# Patient Record
Sex: MALE | Race: WHITE | Employment: OTHER | URBAN - METROPOLITAN AREA
[De-identification: names, ages, dates, MRNs, and addresses within clinical notes are randomized per-mention and may not be internally consistent; named-entity substitution may affect disease eponyms.]

---

## 2019-01-12 ENCOUNTER — HOSPITAL ENCOUNTER (INPATIENT)
Age: 74
LOS: 4 days | Discharge: NURSING, REHAB OR LONG-TERM CARE | DRG: 854 | End: 2019-01-16
Attending: EMERGENCY MEDICINE | Admitting: INTERNAL MEDICINE
Payer: MEDICARE

## 2019-01-12 ENCOUNTER — APPOINTMENT (OUTPATIENT)
Dept: CT IMAGING | Age: 74
DRG: 854 | End: 2019-01-12
Payer: MEDICARE

## 2019-01-12 DIAGNOSIS — D72.829 LEUKOCYTOSIS, UNSPECIFIED TYPE: ICD-10-CM

## 2019-01-12 DIAGNOSIS — R11.2 NAUSEA AND VOMITING, INTRACTABILITY OF VOMITING NOT SPECIFIED, UNSPECIFIED VOMITING TYPE: Primary | ICD-10-CM

## 2019-01-12 DIAGNOSIS — N17.9 AKI (ACUTE KIDNEY INJURY) (HCC): ICD-10-CM

## 2019-01-12 DIAGNOSIS — K42.0 UMBILICAL HERNIA WITH OBSTRUCTION, WITHOUT GANGRENE: ICD-10-CM

## 2019-01-12 DIAGNOSIS — K56.609 INTESTINAL OBSTRUCTION, UNSPECIFIED CAUSE, UNSPECIFIED WHETHER PARTIAL OR COMPLETE (HCC): ICD-10-CM

## 2019-01-12 LAB
A/G RATIO: 1.1 (ref 1.1–2.2)
ALBUMIN SERPL-MCNC: 4.1 G/DL (ref 3.4–5)
ALP BLD-CCNC: 138 U/L (ref 40–129)
ALT SERPL-CCNC: 16 U/L (ref 10–40)
ANION GAP SERPL CALCULATED.3IONS-SCNC: 14 MMOL/L (ref 3–16)
AST SERPL-CCNC: 22 U/L (ref 15–37)
BACTERIA: ABNORMAL /HPF
BASOPHILS ABSOLUTE: 0 K/UL (ref 0–0.2)
BASOPHILS RELATIVE PERCENT: 0.2 %
BILIRUB SERPL-MCNC: 0.6 MG/DL (ref 0–1)
BILIRUBIN URINE: ABNORMAL
BLOOD, URINE: ABNORMAL
BUN BLDV-MCNC: 47 MG/DL (ref 7–20)
CALCIUM SERPL-MCNC: 10 MG/DL (ref 8.3–10.6)
CASTS 2: ABNORMAL /LPF
CASTS: ABNORMAL /LPF
CHLORIDE BLD-SCNC: 94 MMOL/L (ref 99–110)
CLARITY: ABNORMAL
CO2: 31 MMOL/L (ref 21–32)
COLOR: ABNORMAL
CREAT SERPL-MCNC: 2.7 MG/DL (ref 0.8–1.3)
EOSINOPHILS ABSOLUTE: 0 K/UL (ref 0–0.6)
EOSINOPHILS RELATIVE PERCENT: 0.1 %
EPITHELIAL CELLS, UA: 11 /HPF (ref 0–5)
GFR AFRICAN AMERICAN: 28
GFR NON-AFRICAN AMERICAN: 23
GLOBULIN: 3.6 G/DL
GLUCOSE BLD-MCNC: 114 MG/DL (ref 70–99)
GLUCOSE BLD-MCNC: 143 MG/DL (ref 70–99)
GLUCOSE URINE: NEGATIVE MG/DL
HCT VFR BLD CALC: 44.9 % (ref 40.5–52.5)
HEMOGLOBIN: 15.1 G/DL (ref 13.5–17.5)
KETONES, URINE: ABNORMAL MG/DL
LACTIC ACID, SEPSIS: 1.7 MMOL/L (ref 0.4–1.9)
LEUKOCYTE ESTERASE, URINE: ABNORMAL
LIPASE: 13 U/L (ref 13–60)
LYMPHOCYTES ABSOLUTE: 1.7 K/UL (ref 1–5.1)
LYMPHOCYTES RELATIVE PERCENT: 9.1 %
MCH RBC QN AUTO: 33 PG (ref 26–34)
MCHC RBC AUTO-ENTMCNC: 33.6 G/DL (ref 31–36)
MCV RBC AUTO: 98.1 FL (ref 80–100)
MICROSCOPIC EXAMINATION: YES
MONOCYTES ABSOLUTE: 1.7 K/UL (ref 0–1.3)
MONOCYTES RELATIVE PERCENT: 9.1 %
NEUTROPHILS ABSOLUTE: 15 K/UL (ref 1.7–7.7)
NEUTROPHILS RELATIVE PERCENT: 81.5 %
NITRITE, URINE: NEGATIVE
PDW BLD-RTO: 15.2 % (ref 12.4–15.4)
PERFORMED ON: ABNORMAL
PH UA: 5
PLATELET # BLD: 275 K/UL (ref 135–450)
PMV BLD AUTO: 8 FL (ref 5–10.5)
POTASSIUM SERPL-SCNC: 5 MMOL/L (ref 3.5–5.1)
PROTEIN UA: >=300 MG/DL
RBC # BLD: 4.57 M/UL (ref 4.2–5.9)
RBC UA: ABNORMAL /HPF (ref 0–2)
REASON FOR REJECTION: NORMAL
REJECTED TEST: NORMAL
SODIUM BLD-SCNC: 139 MMOL/L (ref 136–145)
SPECIFIC GRAVITY UA: 1.02
TOTAL PROTEIN: 7.7 G/DL (ref 6.4–8.2)
URINE REFLEX TO CULTURE: YES
URINE TYPE: ABNORMAL
UROBILINOGEN, URINE: 1 E.U./DL
WBC # BLD: 18.4 K/UL (ref 4–11)
WBC UA: >900 /HPF (ref 0–5)

## 2019-01-12 PROCEDURE — 2580000003 HC RX 258: Performed by: EMERGENCY MEDICINE

## 2019-01-12 PROCEDURE — 80053 COMPREHEN METABOLIC PANEL: CPT

## 2019-01-12 PROCEDURE — 2580000003 HC RX 258: Performed by: INTERNAL MEDICINE

## 2019-01-12 PROCEDURE — 99222 1ST HOSP IP/OBS MODERATE 55: CPT | Performed by: SURGERY

## 2019-01-12 PROCEDURE — 83605 ASSAY OF LACTIC ACID: CPT

## 2019-01-12 PROCEDURE — 96374 THER/PROPH/DIAG INJ IV PUSH: CPT

## 2019-01-12 PROCEDURE — 96375 TX/PRO/DX INJ NEW DRUG ADDON: CPT

## 2019-01-12 PROCEDURE — 87040 BLOOD CULTURE FOR BACTERIA: CPT

## 2019-01-12 PROCEDURE — 83690 ASSAY OF LIPASE: CPT

## 2019-01-12 PROCEDURE — 99285 EMERGENCY DEPT VISIT HI MDM: CPT

## 2019-01-12 PROCEDURE — 4500000025 HC ED LEVEL 5 PROCEDURE

## 2019-01-12 PROCEDURE — 1200000000 HC SEMI PRIVATE

## 2019-01-12 PROCEDURE — 81001 URINALYSIS AUTO W/SCOPE: CPT

## 2019-01-12 PROCEDURE — 74176 CT ABD & PELVIS W/O CONTRAST: CPT

## 2019-01-12 PROCEDURE — 6370000000 HC RX 637 (ALT 250 FOR IP): Performed by: INTERNAL MEDICINE

## 2019-01-12 PROCEDURE — 6360000002 HC RX W HCPCS: Performed by: EMERGENCY MEDICINE

## 2019-01-12 PROCEDURE — 87086 URINE CULTURE/COLONY COUNT: CPT

## 2019-01-12 PROCEDURE — 85025 COMPLETE CBC W/AUTO DIFF WBC: CPT

## 2019-01-12 RX ORDER — CITALOPRAM 20 MG/1
20 TABLET ORAL DAILY
Status: DISCONTINUED | OUTPATIENT
Start: 2019-01-12 | End: 2019-01-16 | Stop reason: HOSPADM

## 2019-01-12 RX ORDER — TROSPIUM CHLORIDE 20 MG/1
20 TABLET, FILM COATED ORAL
Status: DISCONTINUED | OUTPATIENT
Start: 2019-01-12 | End: 2019-01-16 | Stop reason: HOSPADM

## 2019-01-12 RX ORDER — SODIUM CHLORIDE 9 MG/ML
INJECTION, SOLUTION INTRAVENOUS CONTINUOUS
Status: DISCONTINUED | OUTPATIENT
Start: 2019-01-12 | End: 2019-01-16 | Stop reason: SDUPTHER

## 2019-01-12 RX ORDER — ONDANSETRON 2 MG/ML
4 INJECTION INTRAMUSCULAR; INTRAVENOUS
Status: COMPLETED | OUTPATIENT
Start: 2019-01-12 | End: 2019-01-12

## 2019-01-12 RX ORDER — LISINOPRIL 20 MG/1
20 TABLET ORAL DAILY
Status: DISCONTINUED | OUTPATIENT
Start: 2019-01-12 | End: 2019-01-16 | Stop reason: HOSPADM

## 2019-01-12 RX ORDER — LANOLIN ALCOHOL/MO/W.PET/CERES
1000 CREAM (GRAM) TOPICAL DAILY
Status: DISCONTINUED | OUTPATIENT
Start: 2019-01-12 | End: 2019-01-16 | Stop reason: HOSPADM

## 2019-01-12 RX ORDER — TROSPIUM CHLORIDE 20 MG/1
20 TABLET, FILM COATED ORAL 2 TIMES DAILY
COMMUNITY

## 2019-01-12 RX ORDER — GABAPENTIN 100 MG/1
200 CAPSULE ORAL 3 TIMES DAILY
Status: DISCONTINUED | OUTPATIENT
Start: 2019-01-12 | End: 2019-01-16 | Stop reason: HOSPADM

## 2019-01-12 RX ORDER — ACETAMINOPHEN 325 MG/1
650 TABLET ORAL EVERY 4 HOURS PRN
Status: DISCONTINUED | OUTPATIENT
Start: 2019-01-12 | End: 2019-01-16 | Stop reason: SDUPTHER

## 2019-01-12 RX ORDER — ONDANSETRON 2 MG/ML
4 INJECTION INTRAMUSCULAR; INTRAVENOUS EVERY 6 HOURS PRN
Status: DISCONTINUED | OUTPATIENT
Start: 2019-01-12 | End: 2019-01-16 | Stop reason: SDUPTHER

## 2019-01-12 RX ORDER — POLYETHYLENE GLYCOL 3350 17 G/17G
17 POWDER, FOR SOLUTION ORAL DAILY
Status: DISCONTINUED | OUTPATIENT
Start: 2019-01-12 | End: 2019-01-16 | Stop reason: SDUPTHER

## 2019-01-12 RX ORDER — LANOLIN ALCOHOL/MO/W.PET/CERES
3 CREAM (GRAM) TOPICAL NIGHTLY
Status: DISCONTINUED | OUTPATIENT
Start: 2019-01-12 | End: 2019-01-16 | Stop reason: HOSPADM

## 2019-01-12 RX ORDER — NICOTINE POLACRILEX 4 MG
15 LOZENGE BUCCAL PRN
Status: DISCONTINUED | OUTPATIENT
Start: 2019-01-12 | End: 2019-01-16 | Stop reason: HOSPADM

## 2019-01-12 RX ORDER — FOLIC ACID 1 MG/1
1 TABLET ORAL DAILY
Status: DISCONTINUED | OUTPATIENT
Start: 2019-01-12 | End: 2019-01-16 | Stop reason: HOSPADM

## 2019-01-12 RX ORDER — POLYETHYLENE GLYCOL 3350 17 G/17G
17 POWDER ORAL DAILY
Status: DISCONTINUED | OUTPATIENT
Start: 2019-01-12 | End: 2019-01-12 | Stop reason: SDUPTHER

## 2019-01-12 RX ORDER — VANCOMYCIN HYDROCHLORIDE 1 G/200ML
1000 INJECTION, SOLUTION INTRAVENOUS ONCE
Status: COMPLETED | OUTPATIENT
Start: 2019-01-12 | End: 2019-01-12

## 2019-01-12 RX ORDER — ASPIRIN 81 MG/1
81 TABLET, CHEWABLE ORAL DAILY
Status: DISCONTINUED | OUTPATIENT
Start: 2019-01-12 | End: 2019-01-16 | Stop reason: HOSPADM

## 2019-01-12 RX ORDER — 0.9 % SODIUM CHLORIDE 0.9 %
1000 INTRAVENOUS SOLUTION INTRAVENOUS ONCE
Status: COMPLETED | OUTPATIENT
Start: 2019-01-12 | End: 2019-01-12

## 2019-01-12 RX ORDER — PANTOPRAZOLE SODIUM 40 MG/1
40 TABLET, DELAYED RELEASE ORAL
Status: DISCONTINUED | OUTPATIENT
Start: 2019-01-13 | End: 2019-01-16 | Stop reason: HOSPADM

## 2019-01-12 RX ORDER — IPRATROPIUM BROMIDE AND ALBUTEROL SULFATE 2.5; .5 MG/3ML; MG/3ML
3 SOLUTION RESPIRATORY (INHALATION) EVERY 4 HOURS PRN
Status: DISCONTINUED | OUTPATIENT
Start: 2019-01-12 | End: 2019-01-16 | Stop reason: HOSPADM

## 2019-01-12 RX ORDER — CETIRIZINE HYDROCHLORIDE 10 MG/1
5 TABLET ORAL DAILY
Status: DISCONTINUED | OUTPATIENT
Start: 2019-01-12 | End: 2019-01-16 | Stop reason: HOSPADM

## 2019-01-12 RX ORDER — GUAIFENESIN 600 MG/1
600 TABLET, EXTENDED RELEASE ORAL 2 TIMES DAILY
Status: DISCONTINUED | OUTPATIENT
Start: 2019-01-12 | End: 2019-01-16 | Stop reason: HOSPADM

## 2019-01-12 RX ORDER — BENZONATATE 100 MG/1
100 CAPSULE ORAL 3 TIMES DAILY PRN
Status: DISCONTINUED | OUTPATIENT
Start: 2019-01-12 | End: 2019-01-16 | Stop reason: HOSPADM

## 2019-01-12 RX ORDER — LACTOBACILLUS RHAMNOSUS GG 10B CELL
1 CAPSULE ORAL 2 TIMES DAILY WITH MEALS
Status: DISCONTINUED | OUTPATIENT
Start: 2019-01-12 | End: 2019-01-16 | Stop reason: HOSPADM

## 2019-01-12 RX ORDER — CITALOPRAM 20 MG/1
20 TABLET ORAL DAILY
COMMUNITY

## 2019-01-12 RX ORDER — DEXTROSE MONOHYDRATE 25 G/50ML
12.5 INJECTION, SOLUTION INTRAVENOUS PRN
Status: DISCONTINUED | OUTPATIENT
Start: 2019-01-12 | End: 2019-01-16 | Stop reason: HOSPADM

## 2019-01-12 RX ORDER — SENNA AND DOCUSATE SODIUM 50; 8.6 MG/1; MG/1
2 TABLET, FILM COATED ORAL NIGHTLY
Status: DISCONTINUED | OUTPATIENT
Start: 2019-01-12 | End: 2019-01-16 | Stop reason: HOSPADM

## 2019-01-12 RX ORDER — MIDAZOLAM HYDROCHLORIDE 1 MG/ML
2 INJECTION INTRAMUSCULAR; INTRAVENOUS ONCE
Status: COMPLETED | OUTPATIENT
Start: 2019-01-12 | End: 2019-01-12

## 2019-01-12 RX ORDER — CARBAMAZEPINE 200 MG/1
200 TABLET ORAL 3 TIMES DAILY
Status: DISCONTINUED | OUTPATIENT
Start: 2019-01-12 | End: 2019-01-16 | Stop reason: HOSPADM

## 2019-01-12 RX ORDER — BENZONATATE 100 MG/1
100 CAPSULE ORAL 3 TIMES DAILY PRN
Status: ON HOLD | COMMUNITY
End: 2020-01-01 | Stop reason: ALTCHOICE

## 2019-01-12 RX ORDER — FENTANYL CITRATE 50 UG/ML
75 INJECTION, SOLUTION INTRAMUSCULAR; INTRAVENOUS ONCE
Status: COMPLETED | OUTPATIENT
Start: 2019-01-12 | End: 2019-01-12

## 2019-01-12 RX ORDER — YOHIMBE BARK 500 MG
CAPSULE ORAL 2 TIMES DAILY
COMMUNITY

## 2019-01-12 RX ORDER — LAMOTRIGINE 100 MG/1
100 TABLET ORAL 3 TIMES DAILY
Status: DISCONTINUED | OUTPATIENT
Start: 2019-01-12 | End: 2019-01-16 | Stop reason: HOSPADM

## 2019-01-12 RX ORDER — DEXTROSE MONOHYDRATE 50 MG/ML
100 INJECTION, SOLUTION INTRAVENOUS PRN
Status: DISCONTINUED | OUTPATIENT
Start: 2019-01-12 | End: 2019-01-16 | Stop reason: HOSPADM

## 2019-01-12 RX ORDER — YOHIMBE BARK 500 MG
1 CAPSULE ORAL 2 TIMES DAILY
Status: DISCONTINUED | OUTPATIENT
Start: 2019-01-12 | End: 2019-01-12 | Stop reason: CLARIF

## 2019-01-12 RX ADMIN — CETIRIZINE HYDROCHLORIDE 5 MG: 10 TABLET ORAL at 17:39

## 2019-01-12 RX ADMIN — FOLIC ACID 1 MG: 1 TABLET ORAL at 17:39

## 2019-01-12 RX ADMIN — TAZOBACTAM SODIUM AND PIPERACILLIN SODIUM 3.38 G: 375; 3 INJECTION, SOLUTION INTRAVENOUS at 11:49

## 2019-01-12 RX ADMIN — FENTANYL CITRATE 75 MCG: 50 INJECTION, SOLUTION INTRAMUSCULAR; INTRAVENOUS at 12:44

## 2019-01-12 RX ADMIN — LAMOTRIGINE 100 MG: 100 TABLET ORAL at 17:40

## 2019-01-12 RX ADMIN — MELATONIN TAB 3 MG 3 MG: 3 TAB at 20:50

## 2019-01-12 RX ADMIN — CYANOCOBALAMIN TAB 1000 MCG 1000 MCG: 1000 TAB at 17:40

## 2019-01-12 RX ADMIN — GABAPENTIN 200 MG: 100 CAPSULE ORAL at 17:40

## 2019-01-12 RX ADMIN — GUAIFENESIN 600 MG: 600 TABLET, EXTENDED RELEASE ORAL at 20:50

## 2019-01-12 RX ADMIN — VANCOMYCIN HYDROCHLORIDE 1000 MG: 1 INJECTION, SOLUTION INTRAVENOUS at 12:48

## 2019-01-12 RX ADMIN — GABAPENTIN 200 MG: 100 CAPSULE ORAL at 20:49

## 2019-01-12 RX ADMIN — TROSPIUM CHLORIDE 20 MG: 20 TABLET ORAL at 17:40

## 2019-01-12 RX ADMIN — INSULIN GLARGINE 5 UNITS: 100 INJECTION, SOLUTION SUBCUTANEOUS at 21:40

## 2019-01-12 RX ADMIN — VITAMIN D, TAB 1000IU (100/BT) 5000 UNITS: 25 TAB at 17:39

## 2019-01-12 RX ADMIN — SODIUM CHLORIDE: 9 INJECTION, SOLUTION INTRAVENOUS at 17:37

## 2019-01-12 RX ADMIN — LAMOTRIGINE 100 MG: 100 TABLET ORAL at 20:50

## 2019-01-12 RX ADMIN — GUAIFENESIN 600 MG: 600 TABLET, EXTENDED RELEASE ORAL at 17:40

## 2019-01-12 RX ADMIN — SENNOSIDES AND DOCUSATE SODIUM 2 TABLET: 8.6; 5 TABLET ORAL at 20:49

## 2019-01-12 RX ADMIN — CITALOPRAM HYDROBROMIDE 20 MG: 20 TABLET ORAL at 17:41

## 2019-01-12 RX ADMIN — CARBAMAZEPINE 200 MG: 200 TABLET ORAL at 20:50

## 2019-01-12 RX ADMIN — MIDAZOLAM HYDROCHLORIDE 2 MG: 2 INJECTION, SOLUTION INTRAMUSCULAR; INTRAVENOUS at 12:40

## 2019-01-12 RX ADMIN — Medication 1 CAPSULE: at 17:42

## 2019-01-12 RX ADMIN — ONDANSETRON 4 MG: 2 INJECTION INTRAMUSCULAR; INTRAVENOUS at 11:49

## 2019-01-12 RX ADMIN — LISINOPRIL 20 MG: 20 TABLET ORAL at 17:40

## 2019-01-12 RX ADMIN — ASPIRIN 81 MG 81 MG: 81 TABLET ORAL at 17:40

## 2019-01-12 RX ADMIN — CARBAMAZEPINE 200 MG: 200 TABLET ORAL at 17:46

## 2019-01-12 RX ADMIN — SODIUM CHLORIDE 1000 ML: 9 INJECTION, SOLUTION INTRAVENOUS at 11:47

## 2019-01-12 ASSESSMENT — ENCOUNTER SYMPTOMS
CHEST TIGHTNESS: 0
DIARRHEA: 0
ABDOMINAL DISTENTION: 1
VOMITING: 1
ABDOMINAL PAIN: 1
SHORTNESS OF BREATH: 0
NAUSEA: 1

## 2019-01-12 ASSESSMENT — PAIN SCALES - GENERAL
PAINLEVEL_OUTOF10: 9
PAINLEVEL_OUTOF10: 0

## 2019-01-13 LAB
A/G RATIO: 1.1 (ref 1.1–2.2)
ALBUMIN SERPL-MCNC: 3.6 G/DL (ref 3.4–5)
ALP BLD-CCNC: 118 U/L (ref 40–129)
ALT SERPL-CCNC: 16 U/L (ref 10–40)
ANION GAP SERPL CALCULATED.3IONS-SCNC: 9 MMOL/L (ref 3–16)
APTT: 30.3 SEC (ref 26–36)
AST SERPL-CCNC: 23 U/L (ref 15–37)
BASOPHILS ABSOLUTE: 0.1 K/UL (ref 0–0.2)
BASOPHILS RELATIVE PERCENT: 0.5 %
BILIRUB SERPL-MCNC: 0.6 MG/DL (ref 0–1)
BUN BLDV-MCNC: 59 MG/DL (ref 7–20)
CALCIUM SERPL-MCNC: 8.9 MG/DL (ref 8.3–10.6)
CHLORIDE BLD-SCNC: 99 MMOL/L (ref 99–110)
CO2: 29 MMOL/L (ref 21–32)
CREAT SERPL-MCNC: 2.2 MG/DL (ref 0.8–1.3)
EOSINOPHILS ABSOLUTE: 0.2 K/UL (ref 0–0.6)
EOSINOPHILS RELATIVE PERCENT: 1.6 %
GFR AFRICAN AMERICAN: 35
GFR NON-AFRICAN AMERICAN: 29
GLOBULIN: 3.3 G/DL
GLUCOSE BLD-MCNC: 120 MG/DL (ref 70–99)
GLUCOSE BLD-MCNC: 97 MG/DL (ref 70–99)
HCT VFR BLD CALC: 37 % (ref 40.5–52.5)
HEMOGLOBIN: 12.3 G/DL (ref 13.5–17.5)
INR BLD: 1.05 (ref 0.86–1.14)
LACTIC ACID: 0.9 MMOL/L (ref 0.4–2)
LYMPHOCYTES ABSOLUTE: 1.7 K/UL (ref 1–5.1)
LYMPHOCYTES RELATIVE PERCENT: 16 %
MAGNESIUM: 2.2 MG/DL (ref 1.8–2.4)
MCH RBC QN AUTO: 33.2 PG (ref 26–34)
MCHC RBC AUTO-ENTMCNC: 33.3 G/DL (ref 31–36)
MCV RBC AUTO: 99.8 FL (ref 80–100)
MONOCYTES ABSOLUTE: 1.1 K/UL (ref 0–1.3)
MONOCYTES RELATIVE PERCENT: 10.2 %
NEUTROPHILS ABSOLUTE: 7.8 K/UL (ref 1.7–7.7)
NEUTROPHILS RELATIVE PERCENT: 71.7 %
PDW BLD-RTO: 15.3 % (ref 12.4–15.4)
PERFORMED ON: ABNORMAL
PHOSPHORUS: 4.9 MG/DL (ref 2.5–4.9)
PLATELET # BLD: 205 K/UL (ref 135–450)
PMV BLD AUTO: 7.5 FL (ref 5–10.5)
POTASSIUM SERPL-SCNC: 4.5 MMOL/L (ref 3.5–5.1)
PREALBUMIN: 23.2 MG/DL (ref 20–40)
PROTHROMBIN TIME: 12 SEC (ref 9.8–13)
RBC # BLD: 3.71 M/UL (ref 4.2–5.9)
SODIUM BLD-SCNC: 137 MMOL/L (ref 136–145)
TOTAL PROTEIN: 6.9 G/DL (ref 6.4–8.2)
TRANSFERRIN: 216 MG/DL (ref 200–360)
URINE CULTURE, ROUTINE: NORMAL
WBC # BLD: 10.8 K/UL (ref 4–11)

## 2019-01-13 PROCEDURE — 2500000003 HC RX 250 WO HCPCS: Performed by: INTERNAL MEDICINE

## 2019-01-13 PROCEDURE — 2580000003 HC RX 258: Performed by: INTERNAL MEDICINE

## 2019-01-13 PROCEDURE — 6360000002 HC RX W HCPCS: Performed by: INTERNAL MEDICINE

## 2019-01-13 PROCEDURE — 99232 SBSQ HOSP IP/OBS MODERATE 35: CPT | Performed by: SURGERY

## 2019-01-13 PROCEDURE — 85610 PROTHROMBIN TIME: CPT

## 2019-01-13 PROCEDURE — 83605 ASSAY OF LACTIC ACID: CPT

## 2019-01-13 PROCEDURE — G0009 ADMIN PNEUMOCOCCAL VACCINE: HCPCS | Performed by: INTERNAL MEDICINE

## 2019-01-13 PROCEDURE — 51705 CHANGE OF BLADDER TUBE: CPT

## 2019-01-13 PROCEDURE — 84100 ASSAY OF PHOSPHORUS: CPT

## 2019-01-13 PROCEDURE — 1200000000 HC SEMI PRIVATE

## 2019-01-13 PROCEDURE — 85730 THROMBOPLASTIN TIME PARTIAL: CPT

## 2019-01-13 PROCEDURE — 84466 ASSAY OF TRANSFERRIN: CPT

## 2019-01-13 PROCEDURE — 80053 COMPREHEN METABOLIC PANEL: CPT

## 2019-01-13 PROCEDURE — 83735 ASSAY OF MAGNESIUM: CPT

## 2019-01-13 PROCEDURE — 84134 ASSAY OF PREALBUMIN: CPT

## 2019-01-13 PROCEDURE — APPNB30 APP NON BILLABLE TIME 0-30 MINS: Performed by: NURSE PRACTITIONER

## 2019-01-13 PROCEDURE — 90670 PCV13 VACCINE IM: CPT | Performed by: INTERNAL MEDICINE

## 2019-01-13 PROCEDURE — APPSS15 APP SPLIT SHARED TIME 0-15 MINUTES: Performed by: NURSE PRACTITIONER

## 2019-01-13 PROCEDURE — 85025 COMPLETE CBC W/AUTO DIFF WBC: CPT

## 2019-01-13 PROCEDURE — 36415 COLL VENOUS BLD VENIPUNCTURE: CPT

## 2019-01-13 PROCEDURE — 0T2BX0Z CHANGE DRAINAGE DEVICE IN BLADDER, EXTERNAL APPROACH: ICD-10-PCS | Performed by: UROLOGY

## 2019-01-13 PROCEDURE — 6370000000 HC RX 637 (ALT 250 FOR IP): Performed by: INTERNAL MEDICINE

## 2019-01-13 RX ADMIN — CARBAMAZEPINE 200 MG: 200 TABLET ORAL at 23:40

## 2019-01-13 RX ADMIN — GABAPENTIN 200 MG: 100 CAPSULE ORAL at 23:41

## 2019-01-13 RX ADMIN — TAZOBACTAM SODIUM AND PIPERACILLIN SODIUM 3.38 G: 375; 3 INJECTION, SOLUTION INTRAVENOUS at 23:41

## 2019-01-13 RX ADMIN — CITALOPRAM HYDROBROMIDE 20 MG: 20 TABLET ORAL at 09:34

## 2019-01-13 RX ADMIN — SODIUM CHLORIDE: 9 INJECTION, SOLUTION INTRAVENOUS at 09:55

## 2019-01-13 RX ADMIN — TROSPIUM CHLORIDE 20 MG: 20 TABLET ORAL at 17:20

## 2019-01-13 RX ADMIN — PANTOPRAZOLE SODIUM 40 MG: 40 TABLET, DELAYED RELEASE ORAL at 06:33

## 2019-01-13 RX ADMIN — TROSPIUM CHLORIDE 20 MG: 20 TABLET ORAL at 06:33

## 2019-01-13 RX ADMIN — LAMOTRIGINE 100 MG: 100 TABLET ORAL at 14:44

## 2019-01-13 RX ADMIN — GABAPENTIN 200 MG: 100 CAPSULE ORAL at 14:44

## 2019-01-13 RX ADMIN — CARBAMAZEPINE 200 MG: 200 TABLET ORAL at 09:53

## 2019-01-13 RX ADMIN — GUAIFENESIN 600 MG: 600 TABLET, EXTENDED RELEASE ORAL at 23:41

## 2019-01-13 RX ADMIN — TAZOBACTAM SODIUM AND PIPERACILLIN SODIUM 3.38 G: 375; 3 INJECTION, SOLUTION INTRAVENOUS at 12:54

## 2019-01-13 RX ADMIN — LAMOTRIGINE 100 MG: 100 TABLET ORAL at 23:40

## 2019-01-13 RX ADMIN — CARBAMAZEPINE 200 MG: 200 TABLET ORAL at 14:44

## 2019-01-13 RX ADMIN — ENOXAPARIN SODIUM 30 MG: 30 INJECTION SUBCUTANEOUS at 09:32

## 2019-01-13 RX ADMIN — LISINOPRIL 20 MG: 20 TABLET ORAL at 09:34

## 2019-01-13 RX ADMIN — GABAPENTIN 200 MG: 100 CAPSULE ORAL at 09:34

## 2019-01-13 RX ADMIN — SENNOSIDES AND DOCUSATE SODIUM 2 TABLET: 8.6; 5 TABLET ORAL at 23:41

## 2019-01-13 RX ADMIN — GUAIFENESIN 600 MG: 600 TABLET, EXTENDED RELEASE ORAL at 09:34

## 2019-01-13 RX ADMIN — LAMOTRIGINE 100 MG: 100 TABLET ORAL at 09:34

## 2019-01-13 RX ADMIN — CETIRIZINE HYDROCHLORIDE 5 MG: 10 TABLET ORAL at 09:34

## 2019-01-13 RX ADMIN — TAZOBACTAM SODIUM AND PIPERACILLIN SODIUM 3.38 G: 375; 3 INJECTION, SOLUTION INTRAVENOUS at 00:01

## 2019-01-13 RX ADMIN — INSULIN GLARGINE 5 UNITS: 100 INJECTION, SOLUTION SUBCUTANEOUS at 23:44

## 2019-01-13 RX ADMIN — MELATONIN TAB 3 MG 3 MG: 3 TAB at 23:41

## 2019-01-13 RX ADMIN — FOLIC ACID 1 MG: 1 TABLET ORAL at 09:34

## 2019-01-13 RX ADMIN — SODIUM CHLORIDE: 9 INJECTION, SOLUTION INTRAVENOUS at 23:41

## 2019-01-13 RX ADMIN — ASPIRIN 81 MG 81 MG: 81 TABLET ORAL at 09:34

## 2019-01-13 RX ADMIN — PNEUMOCOCCAL 13-VALENT CONJUGATE VACCINE 0.5 ML: 2.2; 2.2; 2.2; 2.2; 2.2; 4.4; 2.2; 2.2; 2.2; 2.2; 2.2; 2.2; 2.2 INJECTION, SUSPENSION INTRAMUSCULAR at 09:32

## 2019-01-13 RX ADMIN — Medication 1 CAPSULE: at 17:20

## 2019-01-13 ASSESSMENT — PAIN SCALES - GENERAL
PAINLEVEL_OUTOF10: 0

## 2019-01-14 ENCOUNTER — ANESTHESIA EVENT (OUTPATIENT)
Dept: OPERATING ROOM | Age: 74
DRG: 854 | End: 2019-01-14
Payer: MEDICARE

## 2019-01-14 LAB
ANION GAP SERPL CALCULATED.3IONS-SCNC: 8 MMOL/L (ref 3–16)
BASOPHILS ABSOLUTE: 0 K/UL (ref 0–0.2)
BASOPHILS RELATIVE PERCENT: 0.5 %
BUN BLDV-MCNC: 37 MG/DL (ref 7–20)
CALCIUM SERPL-MCNC: 8.8 MG/DL (ref 8.3–10.6)
CHLORIDE BLD-SCNC: 103 MMOL/L (ref 99–110)
CO2: 27 MMOL/L (ref 21–32)
CREAT SERPL-MCNC: 1.1 MG/DL (ref 0.8–1.3)
EKG ATRIAL RATE: 73 BPM
EKG DIAGNOSIS: NORMAL
EKG P AXIS: 2 DEGREES
EKG P-R INTERVAL: 136 MS
EKG Q-T INTERVAL: 390 MS
EKG QRS DURATION: 112 MS
EKG QTC CALCULATION (BAZETT): 429 MS
EKG R AXIS: 68 DEGREES
EKG T AXIS: 49 DEGREES
EKG VENTRICULAR RATE: 73 BPM
EOSINOPHILS ABSOLUTE: 0.3 K/UL (ref 0–0.6)
EOSINOPHILS RELATIVE PERCENT: 2.8 %
GFR AFRICAN AMERICAN: >60
GFR NON-AFRICAN AMERICAN: >60
GLUCOSE BLD-MCNC: 102 MG/DL (ref 70–99)
GLUCOSE BLD-MCNC: 107 MG/DL (ref 70–99)
GLUCOSE BLD-MCNC: 109 MG/DL (ref 70–99)
GLUCOSE BLD-MCNC: 97 MG/DL (ref 70–99)
GLUCOSE BLD-MCNC: 99 MG/DL (ref 70–99)
HCT VFR BLD CALC: 32.9 % (ref 40.5–52.5)
HEMOGLOBIN: 11.2 G/DL (ref 13.5–17.5)
LACTIC ACID: 0.6 MMOL/L (ref 0.4–2)
LYMPHOCYTES ABSOLUTE: 1.2 K/UL (ref 1–5.1)
LYMPHOCYTES RELATIVE PERCENT: 13.6 %
MCH RBC QN AUTO: 34.3 PG (ref 26–34)
MCHC RBC AUTO-ENTMCNC: 34.2 G/DL (ref 31–36)
MCV RBC AUTO: 100.4 FL (ref 80–100)
MONOCYTES ABSOLUTE: 1 K/UL (ref 0–1.3)
MONOCYTES RELATIVE PERCENT: 11.4 %
NEUTROPHILS ABSOLUTE: 6.4 K/UL (ref 1.7–7.7)
NEUTROPHILS RELATIVE PERCENT: 71.7 %
PDW BLD-RTO: 15.5 % (ref 12.4–15.4)
PERFORMED ON: ABNORMAL
PERFORMED ON: ABNORMAL
PERFORMED ON: NORMAL
PERFORMED ON: NORMAL
PLATELET # BLD: 182 K/UL (ref 135–450)
PMV BLD AUTO: 7.5 FL (ref 5–10.5)
POTASSIUM SERPL-SCNC: 4.3 MMOL/L (ref 3.5–5.1)
RBC # BLD: 3.28 M/UL (ref 4.2–5.9)
SODIUM BLD-SCNC: 138 MMOL/L (ref 136–145)
WBC # BLD: 8.9 K/UL (ref 4–11)

## 2019-01-14 PROCEDURE — 6360000002 HC RX W HCPCS: Performed by: INTERNAL MEDICINE

## 2019-01-14 PROCEDURE — 85025 COMPLETE CBC W/AUTO DIFF WBC: CPT

## 2019-01-14 PROCEDURE — 99232 SBSQ HOSP IP/OBS MODERATE 35: CPT | Performed by: SURGERY

## 2019-01-14 PROCEDURE — 93010 ELECTROCARDIOGRAM REPORT: CPT | Performed by: INTERNAL MEDICINE

## 2019-01-14 PROCEDURE — 1200000000 HC SEMI PRIVATE

## 2019-01-14 PROCEDURE — APPNB30 APP NON BILLABLE TIME 0-30 MINS: Performed by: NURSE PRACTITIONER

## 2019-01-14 PROCEDURE — 2500000003 HC RX 250 WO HCPCS: Performed by: INTERNAL MEDICINE

## 2019-01-14 PROCEDURE — 80048 BASIC METABOLIC PNL TOTAL CA: CPT

## 2019-01-14 PROCEDURE — 6370000000 HC RX 637 (ALT 250 FOR IP): Performed by: INTERNAL MEDICINE

## 2019-01-14 PROCEDURE — 93005 ELECTROCARDIOGRAM TRACING: CPT | Performed by: ANESTHESIOLOGY

## 2019-01-14 PROCEDURE — APPSS15 APP SPLIT SHARED TIME 0-15 MINUTES: Performed by: NURSE PRACTITIONER

## 2019-01-14 PROCEDURE — 36415 COLL VENOUS BLD VENIPUNCTURE: CPT

## 2019-01-14 PROCEDURE — 83605 ASSAY OF LACTIC ACID: CPT

## 2019-01-14 PROCEDURE — 2580000003 HC RX 258: Performed by: INTERNAL MEDICINE

## 2019-01-14 RX ADMIN — SENNOSIDES AND DOCUSATE SODIUM 2 TABLET: 8.6; 5 TABLET ORAL at 21:45

## 2019-01-14 RX ADMIN — ASPIRIN 81 MG 81 MG: 81 TABLET ORAL at 11:14

## 2019-01-14 RX ADMIN — GABAPENTIN 200 MG: 100 CAPSULE ORAL at 11:12

## 2019-01-14 RX ADMIN — TROSPIUM CHLORIDE 20 MG: 20 TABLET ORAL at 21:44

## 2019-01-14 RX ADMIN — LAMOTRIGINE 100 MG: 100 TABLET ORAL at 15:14

## 2019-01-14 RX ADMIN — CETIRIZINE HYDROCHLORIDE 5 MG: 10 TABLET ORAL at 11:12

## 2019-01-14 RX ADMIN — GABAPENTIN 200 MG: 100 CAPSULE ORAL at 15:14

## 2019-01-14 RX ADMIN — VITAMIN D, TAB 1000IU (100/BT) 5000 UNITS: 25 TAB at 11:11

## 2019-01-14 RX ADMIN — CARBAMAZEPINE 200 MG: 200 TABLET ORAL at 21:46

## 2019-01-14 RX ADMIN — Medication 1 CAPSULE: at 21:44

## 2019-01-14 RX ADMIN — GABAPENTIN 200 MG: 100 CAPSULE ORAL at 21:45

## 2019-01-14 RX ADMIN — LISINOPRIL 20 MG: 20 TABLET ORAL at 11:13

## 2019-01-14 RX ADMIN — LAMOTRIGINE 100 MG: 100 TABLET ORAL at 21:44

## 2019-01-14 RX ADMIN — POLYETHYLENE GLYCOL 3350 17 G: 17 POWDER, FOR SOLUTION ORAL at 11:25

## 2019-01-14 RX ADMIN — CYANOCOBALAMIN TAB 1000 MCG 1000 MCG: 1000 TAB at 11:14

## 2019-01-14 RX ADMIN — MELATONIN TAB 3 MG 3 MG: 3 TAB at 21:45

## 2019-01-14 RX ADMIN — CITALOPRAM HYDROBROMIDE 20 MG: 20 TABLET ORAL at 11:24

## 2019-01-14 RX ADMIN — FOLIC ACID 1 MG: 1 TABLET ORAL at 11:12

## 2019-01-14 RX ADMIN — INSULIN GLARGINE 5 UNITS: 100 INJECTION, SOLUTION SUBCUTANEOUS at 21:50

## 2019-01-14 RX ADMIN — CARBAMAZEPINE 200 MG: 200 TABLET ORAL at 15:13

## 2019-01-14 RX ADMIN — TAZOBACTAM SODIUM AND PIPERACILLIN SODIUM 3.38 G: 375; 3 INJECTION, SOLUTION INTRAVENOUS at 11:11

## 2019-01-14 RX ADMIN — ENOXAPARIN SODIUM 30 MG: 30 INJECTION SUBCUTANEOUS at 11:15

## 2019-01-14 RX ADMIN — TROSPIUM CHLORIDE 20 MG: 20 TABLET ORAL at 08:13

## 2019-01-14 RX ADMIN — GUAIFENESIN 600 MG: 600 TABLET, EXTENDED RELEASE ORAL at 11:24

## 2019-01-14 RX ADMIN — CARBAMAZEPINE 200 MG: 200 TABLET ORAL at 11:14

## 2019-01-14 RX ADMIN — SODIUM CHLORIDE: 9 INJECTION, SOLUTION INTRAVENOUS at 18:52

## 2019-01-14 RX ADMIN — PANTOPRAZOLE SODIUM 40 MG: 40 TABLET, DELAYED RELEASE ORAL at 08:13

## 2019-01-14 RX ADMIN — LAMOTRIGINE 100 MG: 100 TABLET ORAL at 11:13

## 2019-01-14 RX ADMIN — Medication 1 CAPSULE: at 11:13

## 2019-01-14 RX ADMIN — GUAIFENESIN 600 MG: 600 TABLET, EXTENDED RELEASE ORAL at 21:45

## 2019-01-14 ASSESSMENT — PAIN SCALES - GENERAL: PAINLEVEL_OUTOF10: 0

## 2019-01-15 ENCOUNTER — ANESTHESIA (OUTPATIENT)
Dept: OPERATING ROOM | Age: 74
DRG: 854 | End: 2019-01-15
Payer: MEDICARE

## 2019-01-15 VITALS
SYSTOLIC BLOOD PRESSURE: 110 MMHG | TEMPERATURE: 97 F | DIASTOLIC BLOOD PRESSURE: 58 MMHG | OXYGEN SATURATION: 100 % | RESPIRATION RATE: 5 BRPM

## 2019-01-15 LAB
ANION GAP SERPL CALCULATED.3IONS-SCNC: 6 MMOL/L (ref 3–16)
BASOPHILS ABSOLUTE: 0 K/UL (ref 0–0.2)
BASOPHILS RELATIVE PERCENT: 0.2 %
BUN BLDV-MCNC: 19 MG/DL (ref 7–20)
CALCIUM SERPL-MCNC: 8.5 MG/DL (ref 8.3–10.6)
CHLORIDE BLD-SCNC: 103 MMOL/L (ref 99–110)
CO2: 28 MMOL/L (ref 21–32)
CREAT SERPL-MCNC: 0.8 MG/DL (ref 0.8–1.3)
EOSINOPHILS ABSOLUTE: 0.1 K/UL (ref 0–0.6)
EOSINOPHILS RELATIVE PERCENT: 2.2 %
GFR AFRICAN AMERICAN: >60
GFR NON-AFRICAN AMERICAN: >60
GLUCOSE BLD-MCNC: 105 MG/DL (ref 70–99)
GLUCOSE BLD-MCNC: 108 MG/DL (ref 70–99)
GLUCOSE BLD-MCNC: 249 MG/DL (ref 70–99)
GLUCOSE BLD-MCNC: 97 MG/DL (ref 70–99)
HCT VFR BLD CALC: 32.8 % (ref 40.5–52.5)
HEMOGLOBIN: 11.1 G/DL (ref 13.5–17.5)
LYMPHOCYTES ABSOLUTE: 1.3 K/UL (ref 1–5.1)
LYMPHOCYTES RELATIVE PERCENT: 19.8 %
MCH RBC QN AUTO: 33.3 PG (ref 26–34)
MCHC RBC AUTO-ENTMCNC: 33.9 G/DL (ref 31–36)
MCV RBC AUTO: 98.5 FL (ref 80–100)
MONOCYTES ABSOLUTE: 0.9 K/UL (ref 0–1.3)
MONOCYTES RELATIVE PERCENT: 12.7 %
NEUTROPHILS ABSOLUTE: 4.4 K/UL (ref 1.7–7.7)
NEUTROPHILS RELATIVE PERCENT: 65.1 %
PDW BLD-RTO: 15.3 % (ref 12.4–15.4)
PERFORMED ON: ABNORMAL
PERFORMED ON: ABNORMAL
PERFORMED ON: NORMAL
PLATELET # BLD: 177 K/UL (ref 135–450)
PMV BLD AUTO: 7.4 FL (ref 5–10.5)
POTASSIUM SERPL-SCNC: 4.1 MMOL/L (ref 3.5–5.1)
RBC # BLD: 3.33 M/UL (ref 4.2–5.9)
SODIUM BLD-SCNC: 137 MMOL/L (ref 136–145)
WBC # BLD: 6.7 K/UL (ref 4–11)

## 2019-01-15 PROCEDURE — 2580000003 HC RX 258: Performed by: NURSE ANESTHETIST, CERTIFIED REGISTERED

## 2019-01-15 PROCEDURE — 2500000003 HC RX 250 WO HCPCS: Performed by: NURSE ANESTHETIST, CERTIFIED REGISTERED

## 2019-01-15 PROCEDURE — 3700000001 HC ADD 15 MINUTES (ANESTHESIA): Performed by: SURGERY

## 2019-01-15 PROCEDURE — 2500000003 HC RX 250 WO HCPCS: Performed by: INTERNAL MEDICINE

## 2019-01-15 PROCEDURE — 7100000000 HC PACU RECOVERY - FIRST 15 MIN: Performed by: SURGERY

## 2019-01-15 PROCEDURE — 36415 COLL VENOUS BLD VENIPUNCTURE: CPT

## 2019-01-15 PROCEDURE — 0WQF0ZZ REPAIR ABDOMINAL WALL, OPEN APPROACH: ICD-10-PCS | Performed by: SURGERY

## 2019-01-15 PROCEDURE — 2709999900 HC NON-CHARGEABLE SUPPLY: Performed by: SURGERY

## 2019-01-15 PROCEDURE — 2580000003 HC RX 258: Performed by: SURGERY

## 2019-01-15 PROCEDURE — 49585 REPAIR UMBILICAL HERN,5+Y/O,REDUC: CPT | Performed by: SURGERY

## 2019-01-15 PROCEDURE — 6370000000 HC RX 637 (ALT 250 FOR IP): Performed by: INTERNAL MEDICINE

## 2019-01-15 PROCEDURE — 3600000002 HC SURGERY LEVEL 2 BASE: Performed by: SURGERY

## 2019-01-15 PROCEDURE — 2580000003 HC RX 258: Performed by: INTERNAL MEDICINE

## 2019-01-15 PROCEDURE — 2500000003 HC RX 250 WO HCPCS: Performed by: SURGERY

## 2019-01-15 PROCEDURE — 1200000000 HC SEMI PRIVATE

## 2019-01-15 PROCEDURE — 80048 BASIC METABOLIC PNL TOTAL CA: CPT

## 2019-01-15 PROCEDURE — 3600000012 HC SURGERY LEVEL 2 ADDTL 15MIN: Performed by: SURGERY

## 2019-01-15 PROCEDURE — 85025 COMPLETE CBC W/AUTO DIFF WBC: CPT

## 2019-01-15 PROCEDURE — 6360000002 HC RX W HCPCS: Performed by: NURSE ANESTHETIST, CERTIFIED REGISTERED

## 2019-01-15 PROCEDURE — 7100000001 HC PACU RECOVERY - ADDTL 15 MIN: Performed by: SURGERY

## 2019-01-15 PROCEDURE — 3700000000 HC ANESTHESIA ATTENDED CARE: Performed by: SURGERY

## 2019-01-15 RX ORDER — LABETALOL HYDROCHLORIDE 5 MG/ML
5 INJECTION, SOLUTION INTRAVENOUS EVERY 10 MIN PRN
Status: DISCONTINUED | OUTPATIENT
Start: 2019-01-15 | End: 2019-01-15 | Stop reason: HOSPADM

## 2019-01-15 RX ORDER — MEPERIDINE HYDROCHLORIDE 25 MG/ML
12.5 INJECTION INTRAMUSCULAR; INTRAVENOUS; SUBCUTANEOUS EVERY 5 MIN PRN
Status: DISCONTINUED | OUTPATIENT
Start: 2019-01-15 | End: 2019-01-15 | Stop reason: HOSPADM

## 2019-01-15 RX ORDER — HYDROMORPHONE HCL 110MG/55ML
0.5 PATIENT CONTROLLED ANALGESIA SYRINGE INTRAVENOUS EVERY 5 MIN PRN
Status: DISCONTINUED | OUTPATIENT
Start: 2019-01-15 | End: 2019-01-15 | Stop reason: HOSPADM

## 2019-01-15 RX ORDER — MAGNESIUM HYDROXIDE 1200 MG/15ML
LIQUID ORAL CONTINUOUS PRN
Status: COMPLETED | OUTPATIENT
Start: 2019-01-15 | End: 2019-01-15

## 2019-01-15 RX ORDER — LIDOCAINE HYDROCHLORIDE 20 MG/ML
INJECTION, SOLUTION EPIDURAL; INFILTRATION; INTRACAUDAL; PERINEURAL PRN
Status: DISCONTINUED | OUTPATIENT
Start: 2019-01-15 | End: 2019-01-15 | Stop reason: SDUPTHER

## 2019-01-15 RX ORDER — ONDANSETRON 2 MG/ML
INJECTION INTRAMUSCULAR; INTRAVENOUS PRN
Status: DISCONTINUED | OUTPATIENT
Start: 2019-01-15 | End: 2019-01-15 | Stop reason: SDUPTHER

## 2019-01-15 RX ORDER — HYDRALAZINE HYDROCHLORIDE 20 MG/ML
5 INJECTION INTRAMUSCULAR; INTRAVENOUS EVERY 10 MIN PRN
Status: DISCONTINUED | OUTPATIENT
Start: 2019-01-15 | End: 2019-01-15 | Stop reason: HOSPADM

## 2019-01-15 RX ORDER — EPHEDRINE SULFATE 50 MG/ML
INJECTION, SOLUTION INTRAVENOUS PRN
Status: DISCONTINUED | OUTPATIENT
Start: 2019-01-15 | End: 2019-01-15 | Stop reason: SDUPTHER

## 2019-01-15 RX ORDER — VECURONIUM BROMIDE 1 MG/ML
INJECTION, POWDER, LYOPHILIZED, FOR SOLUTION INTRAVENOUS PRN
Status: DISCONTINUED | OUTPATIENT
Start: 2019-01-15 | End: 2019-01-15 | Stop reason: SDUPTHER

## 2019-01-15 RX ORDER — OXYCODONE HYDROCHLORIDE AND ACETAMINOPHEN 5; 325 MG/1; MG/1
1 TABLET ORAL
Status: DISCONTINUED | OUTPATIENT
Start: 2019-01-15 | End: 2019-01-15 | Stop reason: HOSPADM

## 2019-01-15 RX ORDER — BUPIVACAINE HYDROCHLORIDE AND EPINEPHRINE 5; 5 MG/ML; UG/ML
INJECTION, SOLUTION EPIDURAL; INTRACAUDAL; PERINEURAL
Status: COMPLETED | OUTPATIENT
Start: 2019-01-15 | End: 2019-01-15

## 2019-01-15 RX ORDER — DEXAMETHASONE SODIUM PHOSPHATE 4 MG/ML
INJECTION, SOLUTION INTRA-ARTICULAR; INTRALESIONAL; INTRAMUSCULAR; INTRAVENOUS; SOFT TISSUE PRN
Status: DISCONTINUED | OUTPATIENT
Start: 2019-01-15 | End: 2019-01-15 | Stop reason: SDUPTHER

## 2019-01-15 RX ORDER — FENTANYL CITRATE 50 UG/ML
INJECTION, SOLUTION INTRAMUSCULAR; INTRAVENOUS PRN
Status: DISCONTINUED | OUTPATIENT
Start: 2019-01-15 | End: 2019-01-15 | Stop reason: SDUPTHER

## 2019-01-15 RX ORDER — PROPOFOL 10 MG/ML
INJECTION, EMULSION INTRAVENOUS PRN
Status: DISCONTINUED | OUTPATIENT
Start: 2019-01-15 | End: 2019-01-15 | Stop reason: SDUPTHER

## 2019-01-15 RX ORDER — SUCCINYLCHOLINE CHLORIDE 20 MG/ML
INJECTION INTRAMUSCULAR; INTRAVENOUS PRN
Status: DISCONTINUED | OUTPATIENT
Start: 2019-01-15 | End: 2019-01-15 | Stop reason: SDUPTHER

## 2019-01-15 RX ORDER — SODIUM CHLORIDE 9 MG/ML
INJECTION, SOLUTION INTRAVENOUS CONTINUOUS PRN
Status: DISCONTINUED | OUTPATIENT
Start: 2019-01-15 | End: 2019-01-15 | Stop reason: SDUPTHER

## 2019-01-15 RX ORDER — ONDANSETRON 2 MG/ML
4 INJECTION INTRAMUSCULAR; INTRAVENOUS
Status: DISCONTINUED | OUTPATIENT
Start: 2019-01-15 | End: 2019-01-15 | Stop reason: HOSPADM

## 2019-01-15 RX ADMIN — SODIUM CHLORIDE: 9 INJECTION, SOLUTION INTRAVENOUS at 08:31

## 2019-01-15 RX ADMIN — SUCCINYLCHOLINE CHLORIDE 100 MG: 20 INJECTION, SOLUTION INTRAMUSCULAR; INTRAVENOUS at 11:49

## 2019-01-15 RX ADMIN — SODIUM CHLORIDE: 9 INJECTION, SOLUTION INTRAVENOUS at 20:09

## 2019-01-15 RX ADMIN — GUAIFENESIN 600 MG: 600 TABLET, EXTENDED RELEASE ORAL at 20:37

## 2019-01-15 RX ADMIN — TAZOBACTAM SODIUM AND PIPERACILLIN SODIUM 3.38 G: 375; 3 INJECTION, SOLUTION INTRAVENOUS at 11:40

## 2019-01-15 RX ADMIN — SODIUM CHLORIDE: 9 INJECTION, SOLUTION INTRAVENOUS at 11:45

## 2019-01-15 RX ADMIN — EPHEDRINE SULFATE 10 MG: 50 INJECTION, SOLUTION INTRAMUSCULAR; INTRAVENOUS; SUBCUTANEOUS at 12:16

## 2019-01-15 RX ADMIN — VECURONIUM BROMIDE 4 MG: 1 INJECTION, POWDER, LYOPHILIZED, FOR SOLUTION INTRAVENOUS at 11:58

## 2019-01-15 RX ADMIN — EPHEDRINE SULFATE 10 MG: 50 INJECTION, SOLUTION INTRAMUSCULAR; INTRAVENOUS; SUBCUTANEOUS at 12:15

## 2019-01-15 RX ADMIN — GABAPENTIN 200 MG: 100 CAPSULE ORAL at 20:37

## 2019-01-15 RX ADMIN — LIDOCAINE HYDROCHLORIDE 100 MG: 20 INJECTION, SOLUTION EPIDURAL; INFILTRATION; INTRACAUDAL; PERINEURAL at 11:47

## 2019-01-15 RX ADMIN — LAMOTRIGINE 100 MG: 100 TABLET ORAL at 20:37

## 2019-01-15 RX ADMIN — TAZOBACTAM SODIUM AND PIPERACILLIN SODIUM 3.38 G: 375; 3 INJECTION, SOLUTION INTRAVENOUS at 01:00

## 2019-01-15 RX ADMIN — SENNOSIDES AND DOCUSATE SODIUM 2 TABLET: 8.6; 5 TABLET ORAL at 20:37

## 2019-01-15 RX ADMIN — ONDANSETRON 4 MG: 2 INJECTION INTRAMUSCULAR; INTRAVENOUS at 12:00

## 2019-01-15 RX ADMIN — Medication 1 CAPSULE: at 17:02

## 2019-01-15 RX ADMIN — FENTANYL CITRATE 50 MCG: 50 INJECTION, SOLUTION INTRAMUSCULAR; INTRAVENOUS at 11:47

## 2019-01-15 RX ADMIN — PROPOFOL 100 MG: 10 INJECTION, EMULSION INTRAVENOUS at 11:48

## 2019-01-15 RX ADMIN — INSULIN GLARGINE 5 UNITS: 100 INJECTION, SOLUTION SUBCUTANEOUS at 20:40

## 2019-01-15 RX ADMIN — SUGAMMADEX 200 MG: 100 INJECTION, SOLUTION INTRAVENOUS at 12:24

## 2019-01-15 RX ADMIN — MELATONIN TAB 3 MG 3 MG: 3 TAB at 20:37

## 2019-01-15 RX ADMIN — FENTANYL CITRATE 50 MCG: 50 INJECTION, SOLUTION INTRAMUSCULAR; INTRAVENOUS at 12:32

## 2019-01-15 RX ADMIN — CARBAMAZEPINE 200 MG: 200 TABLET ORAL at 08:30

## 2019-01-15 RX ADMIN — TROSPIUM CHLORIDE 20 MG: 20 TABLET ORAL at 17:02

## 2019-01-15 RX ADMIN — DEXAMETHASONE SODIUM PHOSPHATE 4 MG: 4 INJECTION, SOLUTION INTRAMUSCULAR; INTRAVENOUS at 12:00

## 2019-01-15 RX ADMIN — CARBAMAZEPINE 200 MG: 200 TABLET ORAL at 20:36

## 2019-01-15 ASSESSMENT — PULMONARY FUNCTION TESTS
PIF_VALUE: 20
PIF_VALUE: 20
PIF_VALUE: 1
PIF_VALUE: 13
PIF_VALUE: 20
PIF_VALUE: 1
PIF_VALUE: 13
PIF_VALUE: 12
PIF_VALUE: 20
PIF_VALUE: 17
PIF_VALUE: 3
PIF_VALUE: 12
PIF_VALUE: 2
PIF_VALUE: 13
PIF_VALUE: 1
PIF_VALUE: 12
PIF_VALUE: 17
PIF_VALUE: 12
PIF_VALUE: 1
PIF_VALUE: 12
PIF_VALUE: 20
PIF_VALUE: 2
PIF_VALUE: 21
PIF_VALUE: 20
PIF_VALUE: 1
PIF_VALUE: 13
PIF_VALUE: 3
PIF_VALUE: 2
PIF_VALUE: 12
PIF_VALUE: 20
PIF_VALUE: 20
PIF_VALUE: 21
PIF_VALUE: 12
PIF_VALUE: 12
PIF_VALUE: 20
PIF_VALUE: 2
PIF_VALUE: 1
PIF_VALUE: 1
PIF_VALUE: 21
PIF_VALUE: 12
PIF_VALUE: 2
PIF_VALUE: 20
PIF_VALUE: 13
PIF_VALUE: 20
PIF_VALUE: 20
PIF_VALUE: 2
PIF_VALUE: 20
PIF_VALUE: 27
PIF_VALUE: 20
PIF_VALUE: 2
PIF_VALUE: 2
PIF_VALUE: 3
PIF_VALUE: 2
PIF_VALUE: 21
PIF_VALUE: 12

## 2019-01-15 ASSESSMENT — PAIN SCALES - GENERAL
PAINLEVEL_OUTOF10: 0

## 2019-01-16 VITALS
BODY MASS INDEX: 21.4 KG/M2 | WEIGHT: 181.22 LBS | RESPIRATION RATE: 18 BRPM | HEART RATE: 86 BPM | DIASTOLIC BLOOD PRESSURE: 67 MMHG | SYSTOLIC BLOOD PRESSURE: 139 MMHG | TEMPERATURE: 98.6 F | OXYGEN SATURATION: 97 % | HEIGHT: 77 IN

## 2019-01-16 LAB
ANION GAP SERPL CALCULATED.3IONS-SCNC: 7 MMOL/L (ref 3–16)
BASOPHILS ABSOLUTE: 0 K/UL (ref 0–0.2)
BASOPHILS RELATIVE PERCENT: 0.3 %
BUN BLDV-MCNC: 15 MG/DL (ref 7–20)
CALCIUM SERPL-MCNC: 8.7 MG/DL (ref 8.3–10.6)
CHLORIDE BLD-SCNC: 106 MMOL/L (ref 99–110)
CO2: 28 MMOL/L (ref 21–32)
CREAT SERPL-MCNC: 0.8 MG/DL (ref 0.8–1.3)
EOSINOPHILS ABSOLUTE: 0.2 K/UL (ref 0–0.6)
EOSINOPHILS RELATIVE PERCENT: 2 %
GFR AFRICAN AMERICAN: >60
GFR NON-AFRICAN AMERICAN: >60
GLUCOSE BLD-MCNC: 102 MG/DL (ref 70–99)
GLUCOSE BLD-MCNC: 107 MG/DL (ref 70–99)
GLUCOSE BLD-MCNC: 110 MG/DL (ref 70–99)
GLUCOSE BLD-MCNC: 91 MG/DL (ref 70–99)
HCT VFR BLD CALC: 33.1 % (ref 40.5–52.5)
HEMOGLOBIN: 11 G/DL (ref 13.5–17.5)
LYMPHOCYTES ABSOLUTE: 1.5 K/UL (ref 1–5.1)
LYMPHOCYTES RELATIVE PERCENT: 17.9 %
MCH RBC QN AUTO: 33.4 PG (ref 26–34)
MCHC RBC AUTO-ENTMCNC: 33.3 G/DL (ref 31–36)
MCV RBC AUTO: 100.3 FL (ref 80–100)
MONOCYTES ABSOLUTE: 0.9 K/UL (ref 0–1.3)
MONOCYTES RELATIVE PERCENT: 11.4 %
NEUTROPHILS ABSOLUTE: 5.6 K/UL (ref 1.7–7.7)
NEUTROPHILS RELATIVE PERCENT: 68.4 %
PDW BLD-RTO: 15.5 % (ref 12.4–15.4)
PERFORMED ON: ABNORMAL
PERFORMED ON: ABNORMAL
PERFORMED ON: NORMAL
PLATELET # BLD: 193 K/UL (ref 135–450)
PMV BLD AUTO: 8 FL (ref 5–10.5)
POTASSIUM SERPL-SCNC: 4.2 MMOL/L (ref 3.5–5.1)
RBC # BLD: 3.3 M/UL (ref 4.2–5.9)
SODIUM BLD-SCNC: 141 MMOL/L (ref 136–145)
WBC # BLD: 8.3 K/UL (ref 4–11)

## 2019-01-16 PROCEDURE — 36415 COLL VENOUS BLD VENIPUNCTURE: CPT

## 2019-01-16 PROCEDURE — 6370000000 HC RX 637 (ALT 250 FOR IP): Performed by: SURGERY

## 2019-01-16 PROCEDURE — 80048 BASIC METABOLIC PNL TOTAL CA: CPT

## 2019-01-16 PROCEDURE — 94664 DEMO&/EVAL PT USE INHALER: CPT

## 2019-01-16 PROCEDURE — APPNB30 APP NON BILLABLE TIME 0-30 MINS: Performed by: NURSE PRACTITIONER

## 2019-01-16 PROCEDURE — 6370000000 HC RX 637 (ALT 250 FOR IP): Performed by: INTERNAL MEDICINE

## 2019-01-16 PROCEDURE — 99024 POSTOP FOLLOW-UP VISIT: CPT | Performed by: SURGERY

## 2019-01-16 PROCEDURE — 94760 N-INVAS EAR/PLS OXIMETRY 1: CPT

## 2019-01-16 PROCEDURE — 2500000003 HC RX 250 WO HCPCS: Performed by: INTERNAL MEDICINE

## 2019-01-16 PROCEDURE — APPSS15 APP SPLIT SHARED TIME 0-15 MINUTES: Performed by: NURSE PRACTITIONER

## 2019-01-16 PROCEDURE — 85025 COMPLETE CBC W/AUTO DIFF WBC: CPT

## 2019-01-16 PROCEDURE — 2580000003 HC RX 258: Performed by: SURGERY

## 2019-01-16 PROCEDURE — 6360000002 HC RX W HCPCS: Performed by: SURGERY

## 2019-01-16 RX ORDER — CEFAZOLIN SODIUM 2 G/100ML
2 INJECTION, SOLUTION INTRAVENOUS EVERY 8 HOURS
Status: DISCONTINUED | OUTPATIENT
Start: 2019-01-16 | End: 2019-01-16

## 2019-01-16 RX ORDER — OXYCODONE HYDROCHLORIDE 5 MG/1
10 TABLET ORAL EVERY 4 HOURS PRN
Status: DISCONTINUED | OUTPATIENT
Start: 2019-01-16 | End: 2019-01-16 | Stop reason: HOSPADM

## 2019-01-16 RX ORDER — POLYETHYLENE GLYCOL 3350 17 G/17G
17 POWDER, FOR SOLUTION ORAL DAILY
Status: DISCONTINUED | OUTPATIENT
Start: 2019-01-16 | End: 2019-01-16 | Stop reason: HOSPADM

## 2019-01-16 RX ORDER — DOCUSATE SODIUM 100 MG/1
100 CAPSULE, LIQUID FILLED ORAL 2 TIMES DAILY
Status: DISCONTINUED | OUTPATIENT
Start: 2019-01-16 | End: 2019-01-16 | Stop reason: HOSPADM

## 2019-01-16 RX ORDER — SODIUM CHLORIDE 9 MG/ML
INJECTION, SOLUTION INTRAVENOUS CONTINUOUS
Status: DISCONTINUED | OUTPATIENT
Start: 2019-01-16 | End: 2019-01-16

## 2019-01-16 RX ORDER — SODIUM CHLORIDE 0.9 % (FLUSH) 0.9 %
10 SYRINGE (ML) INJECTION PRN
Status: DISCONTINUED | OUTPATIENT
Start: 2019-01-16 | End: 2019-01-16 | Stop reason: HOSPADM

## 2019-01-16 RX ORDER — MORPHINE SULFATE 2 MG/ML
2 INJECTION, SOLUTION INTRAMUSCULAR; INTRAVENOUS
Status: DISCONTINUED | OUTPATIENT
Start: 2019-01-16 | End: 2019-01-16

## 2019-01-16 RX ORDER — ONDANSETRON 2 MG/ML
4 INJECTION INTRAMUSCULAR; INTRAVENOUS EVERY 6 HOURS PRN
Status: DISCONTINUED | OUTPATIENT
Start: 2019-01-16 | End: 2019-01-16 | Stop reason: HOSPADM

## 2019-01-16 RX ORDER — MORPHINE SULFATE 4 MG/ML
4 INJECTION, SOLUTION INTRAMUSCULAR; INTRAVENOUS
Status: DISCONTINUED | OUTPATIENT
Start: 2019-01-16 | End: 2019-01-16

## 2019-01-16 RX ORDER — PSEUDOEPHEDRINE HCL 30 MG
100 TABLET ORAL 2 TIMES DAILY
COMMUNITY
Start: 2019-01-16 | End: 2020-01-01

## 2019-01-16 RX ORDER — CEFUROXIME AXETIL 500 MG/1
500 TABLET ORAL EVERY 12 HOURS SCHEDULED
Qty: 14 TABLET | Refills: 0 | Status: SHIPPED | OUTPATIENT
Start: 2019-01-16 | End: 2019-01-23

## 2019-01-16 RX ORDER — CEFUROXIME AXETIL 250 MG/1
500 TABLET ORAL EVERY 12 HOURS SCHEDULED
Status: DISCONTINUED | OUTPATIENT
Start: 2019-01-16 | End: 2019-01-16 | Stop reason: HOSPADM

## 2019-01-16 RX ORDER — ACETAMINOPHEN 325 MG/1
650 TABLET ORAL EVERY 4 HOURS PRN
Status: DISCONTINUED | OUTPATIENT
Start: 2019-01-16 | End: 2019-01-16 | Stop reason: HOSPADM

## 2019-01-16 RX ORDER — HYDROCODONE BITARTRATE AND ACETAMINOPHEN 5; 325 MG/1; MG/1
1 TABLET ORAL EVERY 6 HOURS PRN
Qty: 20 TABLET | Refills: 0 | Status: SHIPPED | OUTPATIENT
Start: 2019-01-16 | End: 2019-01-21

## 2019-01-16 RX ORDER — ACETAMINOPHEN 325 MG/1
650 TABLET ORAL EVERY 4 HOURS PRN
Status: DISCONTINUED | OUTPATIENT
Start: 2019-01-16 | End: 2019-01-16 | Stop reason: SDUPTHER

## 2019-01-16 RX ORDER — OXYCODONE HYDROCHLORIDE 5 MG/1
5 TABLET ORAL EVERY 4 HOURS PRN
Status: DISCONTINUED | OUTPATIENT
Start: 2019-01-16 | End: 2019-01-16 | Stop reason: HOSPADM

## 2019-01-16 RX ORDER — SODIUM CHLORIDE 0.9 % (FLUSH) 0.9 %
10 SYRINGE (ML) INJECTION EVERY 12 HOURS SCHEDULED
Status: DISCONTINUED | OUTPATIENT
Start: 2019-01-16 | End: 2019-01-16 | Stop reason: HOSPADM

## 2019-01-16 RX ORDER — NICOTINE POLACRILEX 4 MG
15 LOZENGE BUCCAL PRN
Qty: 45 G | Refills: 1 | Status: SHIPPED | OUTPATIENT
Start: 2019-01-16

## 2019-01-16 RX ADMIN — TAZOBACTAM SODIUM AND PIPERACILLIN SODIUM 3.38 G: 375; 3 INJECTION, SOLUTION INTRAVENOUS at 00:25

## 2019-01-16 RX ADMIN — TAZOBACTAM SODIUM AND PIPERACILLIN SODIUM 3.38 G: 375; 3 INJECTION, SOLUTION INTRAVENOUS at 12:35

## 2019-01-16 RX ADMIN — FOLIC ACID 1 MG: 1 TABLET ORAL at 09:09

## 2019-01-16 RX ADMIN — VITAMIN D, TAB 1000IU (100/BT) 5000 UNITS: 25 TAB at 09:09

## 2019-01-16 RX ADMIN — POLYETHYLENE GLYCOL 3350 17 G: 17 POWDER, FOR SOLUTION ORAL at 12:35

## 2019-01-16 RX ADMIN — CETIRIZINE HYDROCHLORIDE 5 MG: 10 TABLET ORAL at 09:09

## 2019-01-16 RX ADMIN — Medication 1 CAPSULE: at 09:08

## 2019-01-16 RX ADMIN — LISINOPRIL 20 MG: 20 TABLET ORAL at 09:09

## 2019-01-16 RX ADMIN — PANTOPRAZOLE SODIUM 40 MG: 40 TABLET, DELAYED RELEASE ORAL at 06:11

## 2019-01-16 RX ADMIN — GABAPENTIN 200 MG: 100 CAPSULE ORAL at 09:08

## 2019-01-16 RX ADMIN — CARBAMAZEPINE 200 MG: 200 TABLET ORAL at 14:50

## 2019-01-16 RX ADMIN — CARBAMAZEPINE 200 MG: 200 TABLET ORAL at 09:09

## 2019-01-16 RX ADMIN — Medication 10 ML: at 09:00

## 2019-01-16 RX ADMIN — GUAIFENESIN 600 MG: 600 TABLET, EXTENDED RELEASE ORAL at 09:10

## 2019-01-16 RX ADMIN — TROSPIUM CHLORIDE 20 MG: 20 TABLET ORAL at 06:11

## 2019-01-16 RX ADMIN — SODIUM CHLORIDE: 9 INJECTION, SOLUTION INTRAVENOUS at 09:08

## 2019-01-16 RX ADMIN — Medication 10 ML: at 09:52

## 2019-01-16 RX ADMIN — GABAPENTIN 200 MG: 100 CAPSULE ORAL at 14:50

## 2019-01-16 RX ADMIN — ENOXAPARIN SODIUM 40 MG: 40 INJECTION SUBCUTANEOUS at 09:09

## 2019-01-16 RX ADMIN — ACETAMINOPHEN 650 MG: 325 TABLET, FILM COATED ORAL at 10:51

## 2019-01-16 RX ADMIN — MORPHINE SULFATE 2 MG: 2 INJECTION, SOLUTION INTRAMUSCULAR; INTRAVENOUS at 09:52

## 2019-01-16 RX ADMIN — LAMOTRIGINE 100 MG: 100 TABLET ORAL at 09:09

## 2019-01-16 RX ADMIN — CITALOPRAM HYDROBROMIDE 20 MG: 20 TABLET ORAL at 09:09

## 2019-01-16 RX ADMIN — CYANOCOBALAMIN TAB 1000 MCG 1000 MCG: 1000 TAB at 09:09

## 2019-01-16 RX ADMIN — ASPIRIN 81 MG 81 MG: 81 TABLET ORAL at 09:09

## 2019-01-16 RX ADMIN — LAMOTRIGINE 100 MG: 100 TABLET ORAL at 16:52

## 2019-01-16 RX ADMIN — TROSPIUM CHLORIDE 20 MG: 20 TABLET ORAL at 16:50

## 2019-01-16 RX ADMIN — CEFAZOLIN SODIUM 2 G: 2 INJECTION, SOLUTION INTRAVENOUS at 09:11

## 2019-01-16 RX ADMIN — DOCUSATE SODIUM 100 MG: 100 CAPSULE, LIQUID FILLED ORAL at 09:09

## 2019-01-16 ASSESSMENT — PAIN SCALES - GENERAL
PAINLEVEL_OUTOF10: 0
PAINLEVEL_OUTOF10: 0
PAINLEVEL_OUTOF10: 10
PAINLEVEL_OUTOF10: 0
PAINLEVEL_OUTOF10: 10

## 2019-01-16 ASSESSMENT — PAIN DESCRIPTION - ONSET: ONSET: GRADUAL

## 2019-01-16 ASSESSMENT — PAIN DESCRIPTION - ORIENTATION: ORIENTATION: LOWER;MID

## 2019-01-16 ASSESSMENT — PAIN DESCRIPTION - PAIN TYPE: TYPE: SURGICAL PAIN

## 2019-01-16 ASSESSMENT — PAIN DESCRIPTION - DESCRIPTORS: DESCRIPTORS: BURNING

## 2019-01-16 ASSESSMENT — PAIN DESCRIPTION - FREQUENCY: FREQUENCY: CONTINUOUS

## 2019-01-16 ASSESSMENT — PAIN DESCRIPTION - LOCATION: LOCATION: ABDOMEN;BACK

## 2019-01-17 LAB
BLOOD CULTURE, ROUTINE: NORMAL
CULTURE, BLOOD 2: NORMAL
GLUCOSE BLD-MCNC: 101 MG/DL (ref 70–99)
GLUCOSE BLD-MCNC: 99 MG/DL (ref 70–99)
PERFORMED ON: ABNORMAL
PERFORMED ON: NORMAL

## 2019-01-31 ENCOUNTER — TELEPHONE (OUTPATIENT)
Dept: SURGERY | Age: 74
End: 2019-01-31

## 2019-02-05 ENCOUNTER — OFFICE VISIT (OUTPATIENT)
Dept: SURGERY | Age: 74
End: 2019-02-05

## 2019-02-05 VITALS — SYSTOLIC BLOOD PRESSURE: 108 MMHG | DIASTOLIC BLOOD PRESSURE: 62 MMHG

## 2019-02-05 DIAGNOSIS — Z48.89 ENCOUNTER FOR POSTOPERATIVE CARE: Primary | ICD-10-CM

## 2019-02-05 PROCEDURE — 99024 POSTOP FOLLOW-UP VISIT: CPT | Performed by: SURGERY

## 2019-04-22 NOTE — PROGRESS NOTES
Name_______________________________________Printed:____________________  Date and time of surgery_4/24/19  0730_______________________Arrival Time:_0600  [facility] hospital_______________   1. Do not eat or drink anything after 12 midnight (or____hours) prior to surgery. This includes no water, chewing gum or mints. Endoscopy patients follow your doctors bowel prep instructions,which may include taking part of prep after midnight. 2. Take the following pills with a small sip of water on the morning of surgery_lamictal__________________________________________________                  Do not take blood pressure medications ending in pril or sartan the alvarez prior to surgery or the morning of surgery_   3. Aspirin, Ibuprofen, Advil, Naproxen, Vitamin E and other Anti-inflammatory products should be stopped for 5 days before surgery or as directed by your physician. 4. Check with your Doctor regarding stopping Plavix, Coumadin,Eliquis, Lovenox,Effient,Pradaxa,Xarelto, Fragmin or other blood thinners and follow their instructions. 5. Do not smoke, and do not drink any alcoholic beverages 24 hours prior to surgery. This includes NA Beer. Refrain from the usage of any recreational drugs. 6. You may brush your teeth and gargle the morning of surgery. DO NOT SWALLOW WATER   7. You MUST make arrangements for a responsible adult to stay on site while you are here and take you home after your surgery. You will not be allowed to leave alone or drive yourself home. It is strongly suggested someone stay with you the first 24 hrs. Your surgery will be cancelled if you do not have a ride home. 8. A parent/legal guardian must accompany a child scheduled for surgery and plan to stay at the hospital until the child is discharged. Please do not bring other children with you.    9. Please wear simple, loose fitting clothing to the hospital.  Do not bring valuables (money, credit cards, checkbooks, etc.) Do not wear any makeup pharmacy on site to fill your prescriptions. 24. If you use oxygen and have a portable tank please bring it  with you the DOS             25. Bring a complete list of all your medications with name and dose include any supplements. 26. Other__________________________________________   *Please call pre admission testing if you any further questions   Rufina Cisneros     [id number]  [phone]   [address] [place] [address]       All above information reviewed with patient in person or by phone. Patient verbalizes understanding. All questions and concerns addressed.                                                                                                  Patient/Rep_Faxed to [facility]___________________                                                                                                                                    PRE OP INSTRUCTIONS

## 2019-04-23 ENCOUNTER — HOSPITAL ENCOUNTER (EMERGENCY)
Age: 74
Discharge: HOME | End: 2019-04-23
Attending: EMERGENCY MEDICINE
Payer: MEDICARE

## 2019-04-23 ENCOUNTER — APPOINTMENT (OUTPATIENT)
Dept: CT IMAGING | Age: 74
End: 2019-04-23
Payer: MEDICARE

## 2019-04-23 VITALS
OXYGEN SATURATION: 97 % | HEIGHT: 70 IN | TEMPERATURE: 97.7 F | SYSTOLIC BLOOD PRESSURE: 118 MMHG | HEART RATE: 78 BPM | RESPIRATION RATE: 16 BRPM | BODY MASS INDEX: 25.77 KG/M2 | DIASTOLIC BLOOD PRESSURE: 65 MMHG | WEIGHT: 180 LBS

## 2019-04-23 DIAGNOSIS — S09.90XA CLOSED HEAD INJURY, INITIAL ENCOUNTER: Primary | ICD-10-CM

## 2019-04-23 DIAGNOSIS — S01.01XA LACERATION OF SCALP, INITIAL ENCOUNTER: ICD-10-CM

## 2019-04-23 PROCEDURE — 72125 CT NECK SPINE W/O DYE: CPT

## 2019-04-23 PROCEDURE — 99284 EMERGENCY DEPT VISIT MOD MDM: CPT

## 2019-04-23 PROCEDURE — 70450 CT HEAD/BRAIN W/O DYE: CPT

## 2019-04-23 NOTE — ED NOTES
Pt in bed, resting respirations easy, even and unlabored. Pt alert and orientated. No s/s of distress. Pt has a bandaid over a dime size area in the middle of the forehead no active bleeding at this time.       Maranda Mora RN  04/23/19 0119

## 2019-04-23 NOTE — PROGRESS NOTES
Alayna Oconnor from [facility] called to report that pt fell last night, hit his head and was taken to [facility] E.R. Pt had a CT scan of the head which was normal. Pt has skin tear on head. He was released from E.R. And nursing home has been doing neuro checks which have been normal. Mary Ann BERNSTEIN called his surgeon Dr. Neha Schneider who wants anesthesia to decide if pt ok to have procedure on 4/24/19. Informed Dr. Isabel Chris about pt's fall - since pt's pain level is 6 out of a scale of 1-10 and he has a suprapubic catheter that gets clogged, Dr. Isabel Chris said that pt should keep surgery appointment on 4/24/19 and anesthesia will evaluate him that day. Alayna Oconnor at [facility]. Notified. Dr. Prosper Elliott office notified.

## 2019-04-23 NOTE — ED NOTES
Bed: 06  Expected date:   Expected time:   Means of arrival: [facility]  Comments:  negro Jeong  04/23/19 0327

## 2019-04-23 NOTE — ED NOTES
Gave report to Re Carlos at [facility]. Pt is in bed, respirations easy, even, and unlabored, no s/s of distress.       Glenn Cotto RN  04/23/19 [address] JAVIER Emmanuel  04/23/19 2324

## 2019-04-23 NOTE — ED PROVIDER NOTES
Emergency [address]  EMERGENCY DEPARTMENT    Patient: Arpita Fernandez  MRN: [id number]  : 1945  Date of Evaluation: 2019  ED Provider: Cleve Medina DO    Chief Complaint       Chief Complaint   Patient presents with    Fall     Pt in via EMS after falling forward from his wheelchair (per EMS) and hitting his head on the ground. Pt with hx of aphasia r/t previous stroke but able to communicate effectively. Denies LOC. Only pain in forehead. Amber Thompson is a 75 y.o. male who presents to the emergency department for chief complaint of fall. Patient was brought in by EMS from his nursing home after they were called due to him falling. He was sitting in his wheelchair and axillae too far forward causing him to fall and hit his face on the ground. He denies loss of consciousness, being on blood thinners, and only has a slight amount of pain with a small abrasion to his forehead. He denies visual changes, pain in his mouth, neck, numbness or tingling, chest pain, shortness breath, nausea, vomiting, abdominal pain, changes in bowel movements or urinary symptoms. ROS:     Review of Systems   All other systems reviewed and are negative.       Past History     Past Medical History:   Diagnosis Date    Constipation     Convulsions ([address]     Degenerative disease of nervous system     Delusional disorder ([address]     Encephalopathy     Enlarged prostate with lower urinary tract symptoms (LUTS)     GERD (gastroesophageal reflux disease)     Hematuria     Hyperlipidemia     Hypernatremia     Hypertension     Hypertension     Mood disorder (HCC)     Muscle weakness     Muscle weakness (generalized)     Neuromuscular dysfunction of bladder     Neuropathy     Obsessive-compulsive personality disorder (CODE)     Urinary retention      Past Surgical History:   Procedure Laterality Date    ARM SURGERY      BLADDER SURGERY      EYE SURGERY  TONSILLECTOMY      UMBILICAL HERNIA REPAIR N/A 1/15/2019    OPEN PRIMARY UMBILICAL HERNIA REPAIR performed by Christina Holder MD at [address] History     Socioeconomic History    Marital status: Single     Spouse name: None    Number of children: None    Years of education: None    Highest education level: None   Occupational History    None   Social Needs    Financial resource strain: None    Food insecurity:     Worry: None     Inability: None    Transportation needs:     Medical: None     Non-medical: None   Tobacco Use    Smoking status: Never Smoker    Smokeless tobacco: Never Used   Substance and Sexual Activity    Alcohol use: No    Drug use: No    Sexual activity: None   Lifestyle    Physical activity:     Days per week: None     Minutes per session: None    Stress: None   Relationships    Social connections:     Talks on phone: None     Gets together: None     Attends Sabianism service: None     Active member of club or organization: None     Attends meetings of clubs or organizations: None     Relationship status: None    Intimate partner violence:     Fear of current or ex partner: None     Emotionally abused: None     Physically abused: None     Forced sexual activity: None   Other Topics Concern    None   Social History Narrative    None       Medications/Allergies     Previous Medications    ACETAMINOPHEN (TYLENOL) 325 MG TABLET    Take 650 mg by mouth every 4 hours as needed for Pain    ASPIRIN 81 MG TABLET    Take 81 mg by mouth daily    BENZONATATE (TESSALON) 100 MG CAPSULE    Take 100 mg by mouth 3 times daily as needed for Cough    CARBAMAZEPINE (TEGRETOL) 200 MG TABLET    Take 200 mg by mouth 3 times daily     CITALOPRAM (CELEXA) 20 MG TABLET    Take 20 mg by mouth daily    CRANBERRY-VITAMIN C-VITAMIN E (CRANBERRY PLUS VITAMIN C PO)    Take 30 mLs by mouth 2 times daily    DOCUSATE SODIUM (COLACE, DULCOLAX) 100 MG CAPS    Take 100 mg by mouth 2 times daily FOLIC ACID 0.8 MG CAPS    Take 1 tablet by mouth daily    GABAPENTIN (NEURONTIN) 100 MG CAPSULE    Take 200 mg by mouth 3 times daily    GLUCOSE (GLUTOSE) 40 % GEL    Take 37.5 mLs by mouth as needed (low BS)    GUAIFENESIN (MUCINEX) 600 MG EXTENDED RELEASE TABLET    Take 400 mg by mouth 3 times daily as needed     INSULIN DETEMIR (LEVEMIR) 100 UNIT/ML INJECTION VIAL    Inject 5 Units into the skin 2 times daily    IPRATROPIUM-ALBUTEROL (DUONEB) 0.5-2.5 (3) MG/3ML SOLN NEBULIZER SOLUTION    Inhale 3 mLs into the lungs every 4 hours as needed for Shortness of Breath    LACTOBACILLUS (ACIDOPHILUS) 100 MG CAPS    Take by mouth 2 times daily    LAMOTRIGINE (LAMICTAL) 100 MG TABLET    Take 100 mg by mouth 3 times daily     LISINOPRIL (PRINIVIL;ZESTRIL) 40 MG TABLET    Take by mouth daily     LORATADINE (CLARITIN) 10 MG TABLET    Take 10 mg by mouth daily    MELATONIN 3 MG TABS TABLET    Take 3 mg by mouth nightly    OMEPRAZOLE (PRILOSEC) 20 MG CAPSULE    Take 20 mg by mouth daily    POLYETHYLENE GLYCOL (MIRALAX) POWD POWDER    Take 17 g by mouth daily     SENNA-DOCUSATE (PERICOLACE) 8.6-50 MG PER TABLET    Take 2 tablets by mouth nightly    TROSPIUM (SANCTURA) 20 MG TABLET    Take 20 mg by mouth 2 times daily    VITAMIN B-12 (CYANOCOBALAMIN) 1000 MCG TABLET    Take 1,000 mcg by mouth daily    VITAMIN D (CHOLECALCIFEROL) 1000 UNIT TABS TABLET    Take 5,000 Units by mouth daily      No Known Allergies     Physical Exam       ED Triage Vitals [04/23/19 0329]   BP Temp Temp Source Pulse Resp SpO2 Height Weight   (!) 155/70 97.7 °F (36.5 °C) Oral 64 19 98 % 5' 10\" (1.778 m) 180 lb (81.6 kg)     Physical Exam   Constitutional: He is oriented to person, place, and time. He appears well-developed and well-nourished. No distress. Patient is laying in bed, he is able to communicate, though he is difficult to understand due to some residual aphasia from a previous stroke.   He is following commands properly, and has no signs of distress or discomfort. HENT:   Head: Normocephalic. Head is with abrasion and with laceration. Head is without raccoon's eyes and without Morris's sign. Mouth/Throat: Oropharynx is clear and moist.   Eyes: Pupils are equal, round, and reactive to light. EOM are normal. Right eye exhibits no discharge. Left eye exhibits no discharge. Neck: Normal range of motion. Neck supple. No tracheal deviation present. Cardiovascular: Normal rate, regular rhythm, normal heart sounds and intact distal pulses. Exam reveals no gallop and no friction rub. No murmur heard. Pulmonary/Chest: Effort normal and breath sounds normal. No stridor. No respiratory distress. He has no wheezes. He has no rales. He exhibits no tenderness. Abdominal: Soft. He exhibits no distension. There is no tenderness. There is no rebound and no guarding. Musculoskeletal: Normal range of motion. He exhibits no edema, tenderness or deformity. Neurological: He is alert and oriented to person, place, and time. No cranial nerve deficit. Coordination normal.   Skin: Skin is warm and dry. No rash noted. He is not diaphoretic. No erythema. No pallor. Psychiatric: He has a normal mood and affect. Nursing note and vitals reviewed. Diagnostics   Labs:  No results found for this visit on 04/23/19. Radiographs:  Ct Head Wo Contrast    Result Date: 4/23/2019  EXAMINATION: CT OF THE HEAD WITHOUT CONTRAST  4/23/2019 3:57 am TECHNIQUE: CT of the head was performed without the administration of intravenous contrast. Dose modulation, iterative reconstruction, and/or weight based adjustment of the mA/kV was utilized to reduce the radiation dose to as low as reasonably achievable. COMPARISON: None. HISTORY: ORDERING SYSTEM PROVIDED HISTORY: fall TECHNOLOGIST PROVIDED HISTORY: Has a \"code stroke\" or \"stroke alert\" been called? ->No Ordering Physician Provided Reason for Exam: fall Acuity: Acute Type of Exam: Initial Relevant Medical/Surgical History: is minimal anterolisthesis at C2-C3 and C4-C5. Alignment is otherwise normal. DEGENERATIVE CHANGES: There is moderate to marked disc space narrowing at C5-C6. There is diffuse endplate spurring, uncovertebral spurring and facet arthropathy. SOFT TISSUES: There is no prevertebral soft tissue swelling. No acute abnormality of the cervical spine. Procedures/EKG:   Procedures    ED Course and MDM           In Karen Vidal is a 75 y.o. male who presented to the emergency department for chief complaint of fall. Patient mechanical fall earlier today with a small abrasion to his head. CT scans were negative, he has no focal neurologic deficits, and at this point I do feel that he is safe for discharge home with close PCP follow-up. We did discuss results as well as strict return precautions and close PCP follow-up. Patient agrees the plan at this time as no further concerns or questions. ED Medication Orders (From admission, onward)    None          Final Impression      1. Closed head injury, initial encounter    2.  Laceration of scalp, initial encounter      DISPOSITION    (Please note that portions of this note may have been completed with a voice recognition program. Efforts were made to edit thedictations but occasionally words are mis-transcribed.)    Enrico Burroughs, [facility] [address], DO  04/23/19 6708

## 2019-04-24 ENCOUNTER — HOSPITAL ENCOUNTER (OUTPATIENT)
Age: 74
Setting detail: OUTPATIENT SURGERY
Discharge: NURSING, REHAB OR LONG-TERM CARE | End: 2019-04-24
Attending: UROLOGY | Admitting: UROLOGY
Payer: MEDICARE

## 2019-04-24 VITALS
RESPIRATION RATE: 20 BRPM | TEMPERATURE: 97.2 F | HEART RATE: 68 BPM | WEIGHT: 180.6 LBS | HEIGHT: 68 IN | BODY MASS INDEX: 27.37 KG/M2 | SYSTOLIC BLOOD PRESSURE: 123 MMHG | OXYGEN SATURATION: 98 % | DIASTOLIC BLOOD PRESSURE: 64 MMHG

## 2019-04-24 LAB
ANION GAP SERPL CALCULATED.3IONS-SCNC: 8 MMOL/L (ref 3–16)
BUN BLDV-MCNC: 29 MG/DL (ref 7–20)
CALCIUM SERPL-MCNC: 9.4 MG/DL (ref 8.3–10.6)
CHLORIDE BLD-SCNC: 101 MMOL/L (ref 99–110)
CO2: 29 MMOL/L (ref 21–32)
CREAT SERPL-MCNC: 1 MG/DL (ref 0.8–1.3)
GFR AFRICAN AMERICAN: >60
GFR NON-AFRICAN AMERICAN: >60
GLUCOSE BLD-MCNC: 106 MG/DL (ref 70–99)
HCT VFR BLD CALC: 36.8 % (ref 40.5–52.5)
HEMOGLOBIN: 12.6 G/DL (ref 13.5–17.5)
MCH RBC QN AUTO: 33.9 PG (ref 26–34)
MCHC RBC AUTO-ENTMCNC: 34.1 G/DL (ref 31–36)
MCV RBC AUTO: 99.6 FL (ref 80–100)
PDW BLD-RTO: 15.3 % (ref 12.4–15.4)
PLATELET # BLD: 207 K/UL (ref 135–450)
PMV BLD AUTO: 7.6 FL (ref 5–10.5)
POTASSIUM SERPL-SCNC: 4.3 MMOL/L (ref 3.5–5.1)
RBC # BLD: 3.7 M/UL (ref 4.2–5.9)
SODIUM BLD-SCNC: 138 MMOL/L (ref 136–145)
WBC # BLD: 7.7 K/UL (ref 4–11)

## 2019-04-24 PROCEDURE — 80048 BASIC METABOLIC PNL TOTAL CA: CPT

## 2019-04-24 PROCEDURE — 36415 COLL VENOUS BLD VENIPUNCTURE: CPT

## 2019-04-24 PROCEDURE — 85027 COMPLETE CBC AUTOMATED: CPT

## 2019-04-24 RX ORDER — SODIUM CHLORIDE 9 MG/ML
INJECTION, SOLUTION INTRAVENOUS CONTINUOUS
Status: DISCONTINUED | OUTPATIENT
Start: 2019-04-24 | End: 2019-04-24 | Stop reason: HOSPADM

## 2019-04-24 RX ORDER — LIDOCAINE HYDROCHLORIDE 10 MG/ML
0.5 INJECTION, SOLUTION EPIDURAL; INFILTRATION; INTRACAUDAL; PERINEURAL ONCE
Status: DISCONTINUED | OUTPATIENT
Start: 2019-04-24 | End: 2019-04-24 | Stop reason: HOSPADM

## 2019-04-24 RX ORDER — CIPROFLOXACIN 2 MG/ML
400 INJECTION, SOLUTION INTRAVENOUS
Status: DISCONTINUED | OUTPATIENT
Start: 2019-04-24 | End: 2019-04-24 | Stop reason: HOSPADM

## 2019-04-24 NOTE — PROGRESS NOTES
Pt states that he had 5-6 small white powdered donuts this morning around 0500. Nursing aid to bedside and agreed that pt did have donuts this morning. Dr. Naresh Bejarano notified. Dr. Naresh Bejarano states surgery willl be delayed until the afternoon or will have to be cancelled depending on Dr. Rudolph Cool availability. Surgery cancelled and will be rescheduled per Dr. Marshal Mojica. Pt dressed x2 assist. PIV removed. Transport to take pt back to nursing home.

## 2019-04-24 NOTE — PROGRESS NOTES
Teaching / education initiated regarding perioperative experience, expectations, and pain management during stay. Patient verbalized understanding. Pt MRDD. Alert and oriented x3. Pt able to state what procedure he is having done today. Pt able to sign own consent, pt states his signature is his last name.

## 2019-05-10 NOTE — PROGRESS NOTES
Name_______________________________________Printed:____________________  Date and time of surgery_5/15/2019  0845_______________________Arrival Time:__0715  Main hospital entrance______________   1. Do not eat or drink anything after 12 midnight (or____hours) prior to surgery. This includes no water, chewing gum or mints. Endoscopy patients follow your doctors bowel prep instructions,which may include taking part of prep after midnight. 2. Take the following pills with a small sip of water on the morning of surgery_tegretol, lamictal omeprazole, duoneb prn,__________________________________________________                  Do not take blood pressure medications ending in pril or sartan the alvarez prior to surgery or the morning of surgery_   3. Aspirin, Ibuprofen, Advil, Naproxen, Vitamin E and other Anti-inflammatory products should be stopped for 5 days before surgery or as directed by your physician. 4. Check with your Doctor regarding stopping Plavix, Coumadin,Eliquis, Lovenox,Effient,Pradaxa,Xarelto, Fragmin or other blood thinners and follow their instructions. 5. Do not smoke, and do not drink any alcoholic beverages 24 hours prior to surgery. This includes NA Beer. Refrain from the usage of any recreational drugs. 6. You may brush your teeth and gargle the morning of surgery. DO NOT SWALLOW WATER   7. You MUST make arrangements for a responsible adult to stay on site while you are here and take you home after your surgery. You will not be allowed to leave alone or drive yourself home. It is strongly suggested someone stay with you the first 24 hrs. Your surgery will be cancelled if you do not have a ride home. 8. A parent/legal guardian must accompany a child scheduled for surgery and plan to stay at the hospital until the child is discharged. Please do not bring other children with you.    9. Please wear simple, loose fitting clothing to the hospital.  Do not bring valuables (money, credit cards, checkbooks, etc.) Do not wear any makeup (including no eye makeup) or nail polish on your fingers or toes. 10. DO NOT wear any jewelry or piercings on day of surgery. All body piercing jewelry must be removed. 11. If you have _x__dentures, they will be removed before going to the OR; we will provide you a container. If you wear ___contact lenses or _x__glasses, they will be removed; please bring a case for them. 12. Please see your family doctor/pediatrician for a history & physical and/or concerning medications. Bring any test results/reports from your physician's office. PCP__________________Phone___________H&P Appt. Date________             13 If you  have a Living Will and Durable Power of  for Healthcare, please bring in a copy. 15. Notify your Surgeon if you develop any illness between now and surgery  time, cough, cold, fever, sore throat, nausea, vomiting, etc.  Please notify your surgeon if you experience dizziness, shortness of breath or blurred vision between now & the time of your surgery             15. DO NOT shave your operative site 96 hours prior to surgery. For face & neck surgery, men may use an electric razor 48 hours prior to surgery. 16. Shower the night before surgery with x___Antibacterial soap ___Hibiclens             17. To provide excellent care visitors will be limited to one in the room at any given time. 18.  Please bring picture ID and insurance card. 19.  Visit our web site for additional information:  [email or web address]              20.During flu season no children under the age of 15 are permitted in the hospital for the safety of all patients.                               21. If you take a long acting insulin in the evening only  take half of your usual  dose the night  before your procedure              22. If you use a c-pap please bring DOS if staying overnight,

## 2019-05-10 NOTE — PROGRESS NOTES
Spoke with [facility] @ [facility]. Patient is his own guardian; signs his own consents. Patient in wheel chair. No isolation or skin breakdown. He will be transported and escorted by [employer]. Will fax most recent MAR.

## 2019-05-15 ENCOUNTER — ANESTHESIA EVENT (OUTPATIENT)
Dept: OPERATING ROOM | Age: 74
End: 2019-05-15
Payer: MEDICARE

## 2019-05-15 ENCOUNTER — ANESTHESIA (OUTPATIENT)
Dept: OPERATING ROOM | Age: 74
End: 2019-05-15
Payer: MEDICARE

## 2019-05-15 ENCOUNTER — HOSPITAL ENCOUNTER (OUTPATIENT)
Age: 74
Setting detail: OUTPATIENT SURGERY
Discharge: HOME | End: 2019-05-15
Attending: UROLOGY | Admitting: UROLOGY
Payer: MEDICARE

## 2019-05-15 VITALS — SYSTOLIC BLOOD PRESSURE: 111 MMHG | DIASTOLIC BLOOD PRESSURE: 58 MMHG | TEMPERATURE: 95.9 F | OXYGEN SATURATION: 100 %

## 2019-05-15 VITALS
WEIGHT: 195 LBS | HEIGHT: 68 IN | HEART RATE: 67 BPM | DIASTOLIC BLOOD PRESSURE: 65 MMHG | BODY MASS INDEX: 29.55 KG/M2 | RESPIRATION RATE: 22 BRPM | TEMPERATURE: 97 F | SYSTOLIC BLOOD PRESSURE: 132 MMHG | OXYGEN SATURATION: 94 %

## 2019-05-15 LAB
GLUCOSE BLD-MCNC: 85 MG/DL (ref 70–99)
PERFORMED ON: NORMAL

## 2019-05-15 PROCEDURE — 6360000002 HC RX W HCPCS: Performed by: NURSE ANESTHETIST, CERTIFIED REGISTERED

## 2019-05-15 PROCEDURE — 3700000000 HC ANESTHESIA ATTENDED CARE: Performed by: UROLOGY

## 2019-05-15 PROCEDURE — 7100000001 HC PACU RECOVERY - ADDTL 15 MIN: Performed by: UROLOGY

## 2019-05-15 PROCEDURE — 7100000000 HC PACU RECOVERY - FIRST 15 MIN: Performed by: UROLOGY

## 2019-05-15 PROCEDURE — 6360000002 HC RX W HCPCS: Performed by: UROLOGY

## 2019-05-15 PROCEDURE — 3600000004 HC SURGERY LEVEL 4 BASE: Performed by: UROLOGY

## 2019-05-15 PROCEDURE — 2500000003 HC RX 250 WO HCPCS: Performed by: NURSE ANESTHETIST, CERTIFIED REGISTERED

## 2019-05-15 PROCEDURE — 3600000014 HC SURGERY LEVEL 4 ADDTL 15MIN: Performed by: UROLOGY

## 2019-05-15 PROCEDURE — 3700000001 HC ADD 15 MINUTES (ANESTHESIA): Performed by: UROLOGY

## 2019-05-15 PROCEDURE — 2580000003 HC RX 258: Performed by: UROLOGY

## 2019-05-15 PROCEDURE — 7100000010 HC PHASE II RECOVERY - FIRST 15 MIN: Performed by: UROLOGY

## 2019-05-15 PROCEDURE — 2709999900 HC NON-CHARGEABLE SUPPLY: Performed by: UROLOGY

## 2019-05-15 PROCEDURE — 82365 CALCULUS SPECTROSCOPY: CPT

## 2019-05-15 PROCEDURE — 7100000011 HC PHASE II RECOVERY - ADDTL 15 MIN: Performed by: UROLOGY

## 2019-05-15 RX ORDER — LABETALOL HYDROCHLORIDE 5 MG/ML
5 INJECTION, SOLUTION INTRAVENOUS EVERY 10 MIN PRN
Status: DISCONTINUED | OUTPATIENT
Start: 2019-05-15 | End: 2019-05-15 | Stop reason: HOSPADM

## 2019-05-15 RX ORDER — MAGNESIUM HYDROXIDE 1200 MG/15ML
LIQUID ORAL
Status: COMPLETED | OUTPATIENT
Start: 2019-05-15 | End: 2019-05-15

## 2019-05-15 RX ORDER — LIDOCAINE HYDROCHLORIDE 20 MG/ML
INJECTION, SOLUTION INFILTRATION; PERINEURAL PRN
Status: DISCONTINUED | OUTPATIENT
Start: 2019-05-15 | End: 2019-05-15 | Stop reason: SDUPTHER

## 2019-05-15 RX ORDER — CIPROFLOXACIN 2 MG/ML
400 INJECTION, SOLUTION INTRAVENOUS
Status: COMPLETED | OUTPATIENT
Start: 2019-05-15 | End: 2019-05-15

## 2019-05-15 RX ORDER — SODIUM CHLORIDE 0.9 % (FLUSH) 0.9 %
10 SYRINGE (ML) INJECTION PRN
Status: CANCELLED | OUTPATIENT
Start: 2019-05-15

## 2019-05-15 RX ORDER — SODIUM CHLORIDE 9 MG/ML
INJECTION, SOLUTION INTRAVENOUS CONTINUOUS
Status: CANCELLED | OUTPATIENT
Start: 2019-05-15

## 2019-05-15 RX ORDER — LIDOCAINE HYDROCHLORIDE 10 MG/ML
0.5 INJECTION, SOLUTION EPIDURAL; INFILTRATION; INTRACAUDAL; PERINEURAL ONCE
Status: DISCONTINUED | OUTPATIENT
Start: 2019-05-15 | End: 2019-05-15 | Stop reason: HOSPADM

## 2019-05-15 RX ORDER — HYDROMORPHONE HCL 110MG/55ML
0.25 PATIENT CONTROLLED ANALGESIA SYRINGE INTRAVENOUS EVERY 5 MIN PRN
Status: DISCONTINUED | OUTPATIENT
Start: 2019-05-15 | End: 2019-05-15 | Stop reason: HOSPADM

## 2019-05-15 RX ORDER — ONDANSETRON 2 MG/ML
INJECTION INTRAMUSCULAR; INTRAVENOUS PRN
Status: DISCONTINUED | OUTPATIENT
Start: 2019-05-15 | End: 2019-05-15 | Stop reason: SDUPTHER

## 2019-05-15 RX ORDER — FENTANYL CITRATE 50 UG/ML
50 INJECTION, SOLUTION INTRAMUSCULAR; INTRAVENOUS EVERY 5 MIN PRN
Status: DISCONTINUED | OUTPATIENT
Start: 2019-05-15 | End: 2019-05-15 | Stop reason: HOSPADM

## 2019-05-15 RX ORDER — EPHEDRINE SULFATE 50 MG/ML
INJECTION INTRAVENOUS PRN
Status: DISCONTINUED | OUTPATIENT
Start: 2019-05-15 | End: 2019-05-15 | Stop reason: SDUPTHER

## 2019-05-15 RX ORDER — SODIUM CHLORIDE 0.9 % (FLUSH) 0.9 %
10 SYRINGE (ML) INJECTION EVERY 12 HOURS SCHEDULED
Status: CANCELLED | OUTPATIENT
Start: 2019-05-15

## 2019-05-15 RX ORDER — SODIUM CHLORIDE 9 MG/ML
INJECTION, SOLUTION INTRAVENOUS CONTINUOUS
Status: DISCONTINUED | OUTPATIENT
Start: 2019-05-15 | End: 2019-05-15 | Stop reason: HOSPADM

## 2019-05-15 RX ORDER — PROPOFOL 10 MG/ML
INJECTION, EMULSION INTRAVENOUS PRN
Status: DISCONTINUED | OUTPATIENT
Start: 2019-05-15 | End: 2019-05-15 | Stop reason: SDUPTHER

## 2019-05-15 RX ORDER — DEXAMETHASONE SODIUM PHOSPHATE 4 MG/ML
INJECTION, SOLUTION INTRA-ARTICULAR; INTRALESIONAL; INTRAMUSCULAR; INTRAVENOUS; SOFT TISSUE PRN
Status: DISCONTINUED | OUTPATIENT
Start: 2019-05-15 | End: 2019-05-15 | Stop reason: SDUPTHER

## 2019-05-15 RX ORDER — SODIUM CHLORIDE, SODIUM LACTATE, POTASSIUM CHLORIDE, CALCIUM CHLORIDE 600; 310; 30; 20 MG/100ML; MG/100ML; MG/100ML; MG/100ML
INJECTION, SOLUTION INTRAVENOUS CONTINUOUS
Status: CANCELLED | OUTPATIENT
Start: 2019-05-15

## 2019-05-15 RX ORDER — SUCCINYLCHOLINE CHLORIDE 20 MG/ML
INJECTION INTRAMUSCULAR; INTRAVENOUS PRN
Status: DISCONTINUED | OUTPATIENT
Start: 2019-05-15 | End: 2019-05-15 | Stop reason: SDUPTHER

## 2019-05-15 RX ORDER — ONDANSETRON 2 MG/ML
4 INJECTION INTRAMUSCULAR; INTRAVENOUS
Status: DISCONTINUED | OUTPATIENT
Start: 2019-05-15 | End: 2019-05-15 | Stop reason: HOSPADM

## 2019-05-15 RX ORDER — FENTANYL CITRATE 50 UG/ML
INJECTION, SOLUTION INTRAMUSCULAR; INTRAVENOUS PRN
Status: DISCONTINUED | OUTPATIENT
Start: 2019-05-15 | End: 2019-05-15 | Stop reason: SDUPTHER

## 2019-05-15 RX ORDER — FENTANYL CITRATE 50 UG/ML
25 INJECTION, SOLUTION INTRAMUSCULAR; INTRAVENOUS EVERY 5 MIN PRN
Status: DISCONTINUED | OUTPATIENT
Start: 2019-05-15 | End: 2019-05-15 | Stop reason: HOSPADM

## 2019-05-15 RX ORDER — HYDROMORPHONE HCL 110MG/55ML
0.5 PATIENT CONTROLLED ANALGESIA SYRINGE INTRAVENOUS EVERY 5 MIN PRN
Status: DISCONTINUED | OUTPATIENT
Start: 2019-05-15 | End: 2019-05-15 | Stop reason: HOSPADM

## 2019-05-15 RX ORDER — GLYCINE 1.5 G/100ML
IRRIGANT IRRIGATION
Status: COMPLETED | OUTPATIENT
Start: 2019-05-15 | End: 2019-05-15

## 2019-05-15 RX ADMIN — EPHEDRINE SULFATE 10 MG: 50 INJECTION, SOLUTION INTRAVENOUS at 08:59

## 2019-05-15 RX ADMIN — ONDANSETRON 4 MG: 2 INJECTION INTRAMUSCULAR; INTRAVENOUS at 08:44

## 2019-05-15 RX ADMIN — DEXAMETHASONE SODIUM PHOSPHATE 8 MG: 4 INJECTION, SOLUTION INTRAMUSCULAR; INTRAVENOUS at 08:44

## 2019-05-15 RX ADMIN — PHENYLEPHRINE HYDROCHLORIDE 50 MCG: 10 INJECTION INTRAVENOUS at 08:55

## 2019-05-15 RX ADMIN — EPHEDRINE SULFATE 10 MG: 50 INJECTION, SOLUTION INTRAVENOUS at 09:04

## 2019-05-15 RX ADMIN — LIDOCAINE HYDROCHLORIDE 80 MG: 20 INJECTION, SOLUTION INFILTRATION; PERINEURAL at 08:44

## 2019-05-15 RX ADMIN — FENTANYL CITRATE 50 MCG: 50 INJECTION, SOLUTION INTRAMUSCULAR; INTRAVENOUS at 09:06

## 2019-05-15 RX ADMIN — PROPOFOL 150 MG: 10 INJECTION, EMULSION INTRAVENOUS at 08:44

## 2019-05-15 RX ADMIN — FENTANYL CITRATE 50 MCG: 50 INJECTION, SOLUTION INTRAMUSCULAR; INTRAVENOUS at 08:44

## 2019-05-15 RX ADMIN — SODIUM CHLORIDE: 9 INJECTION, SOLUTION INTRAVENOUS at 07:48

## 2019-05-15 RX ADMIN — SUCCINYLCHOLINE CHLORIDE 120 MG: 20 INJECTION, SOLUTION INTRAMUSCULAR; INTRAVENOUS at 08:44

## 2019-05-15 RX ADMIN — CIPROFLOXACIN 400 MG: 2 INJECTION, SOLUTION INTRAVENOUS at 08:30

## 2019-05-15 ASSESSMENT — PULMONARY FUNCTION TESTS
PIF_VALUE: 14
PIF_VALUE: 18
PIF_VALUE: 2
PIF_VALUE: 20
PIF_VALUE: 22
PIF_VALUE: 20
PIF_VALUE: 20
PIF_VALUE: 2
PIF_VALUE: 18
PIF_VALUE: 0
PIF_VALUE: 1
PIF_VALUE: 24
PIF_VALUE: 1
PIF_VALUE: 18
PIF_VALUE: 20
PIF_VALUE: 20
PIF_VALUE: 16
PIF_VALUE: 28
PIF_VALUE: 20
PIF_VALUE: 3
PIF_VALUE: 1
PIF_VALUE: 14
PIF_VALUE: 18
PIF_VALUE: 17
PIF_VALUE: 18
PIF_VALUE: 20
PIF_VALUE: 2
PIF_VALUE: 0
PIF_VALUE: 20
PIF_VALUE: 2
PIF_VALUE: 20
PIF_VALUE: 20
PIF_VALUE: 1
PIF_VALUE: 2
PIF_VALUE: 18
PIF_VALUE: 8
PIF_VALUE: 18
PIF_VALUE: 1
PIF_VALUE: 18
PIF_VALUE: 20
PIF_VALUE: 0
PIF_VALUE: 16
PIF_VALUE: 2
PIF_VALUE: 2
PIF_VALUE: 20
PIF_VALUE: 16
PIF_VALUE: 20
PIF_VALUE: 15
PIF_VALUE: 20
PIF_VALUE: 18
PIF_VALUE: 2
PIF_VALUE: 3
PIF_VALUE: 1
PIF_VALUE: 0
PIF_VALUE: 21
PIF_VALUE: 19
PIF_VALUE: 16
PIF_VALUE: 1
PIF_VALUE: 12
PIF_VALUE: 1
PIF_VALUE: 18
PIF_VALUE: 18
PIF_VALUE: 0
PIF_VALUE: 20
PIF_VALUE: 0
PIF_VALUE: 18

## 2019-05-15 NOTE — PROGRESS NOTES
Pt resting quietly in bed with eyes closed, awakens easily to voice. VSS, O2 sats 94% on room air. Suprapubic cath remains in place with light pink tinged clear urine. Pt seen by anesthesia, phase 1 criteria met. Will transfer pt to same day for discharge.

## 2019-05-15 NOTE — OP NOTE
Urology Operative Report  [facility]    Provider: Amber Bravo MD Patient ID:  Admission Date: 5/15/2019 Name: Wanda Mott  OR Date: 5/15/2019  MRN: [id number]   Patient Location: OR/NONE : 1945  Attending: Amber Bravo MD Date of Service: 5/15/2019  PCP: Sharon Ugarte MD     Date of Operation: 5/15/2019    Preoperative Diagnosis: Neurogenic bladder with chronic SPT, bladder stone 1cm in size    Postoperative Diagnosis: same    Procedure:    1. Cystolithalopaxy - 1 cm bladder stone  2. Suprapubic tube exchange    Surgeon:   Amber Bravo MD    Anesthesia: General LMA anesthesia    Indications: Wanda Mott is a 75 y.o. male who presents for the above named surgery. Informed consent was obtained and the risks, benefits, and details of the procedure were explained to the patient who elected to proceed. Details of Procedure: The patient was brought to the operating room and placed in the supine position on the operating room table. SCDs were placed on the lower extremities. Following induction of anesthesia the patient was positioned in a lithotomy position, all pressure points were padded, and the genitals were prepped and draped in the usual sterile fashion. A routine timeout was performed, confirming the patient, procedure, site, risk of fire, patient allergies and confirming that preoperative antibiotics had been administered prior to beginning. A 21 fr rigid cystoscope was advanced via the urethra into the bladder. The urethra showed prostatic enlargement. The bladder was inspected systematically. There was a bladder stone seen in the dependent bladder corresponding to the CT scan. The resectoscope was then used with the stone  to break the stone into small pieces, which were irrigated out. The ureteral orifices were visualized and without injury. The bladder was left full and the scope was removed.  A 20 Chinese Herman with 10cc in the balloon was placed through the suprapubic tube tract, then confirmed with cystoscopy for correct placement. There was return of light pink urine. At the end of the procedure all counts were correct. The patient tolerated the procedure well and was transported to the PACU in stable condition. Findings: 1 cm bladder stone    Estimated Blood Loss: minimal                  Drains:  20 fr Herman catheter - suprapubic tube          Specimens: bladder stone    Complications: none apparent           Disposition:  PACU - hemodynamically stable.             Plan: continue monthly suprapubic tube changes, routine clinic followup    Xu Carreon MD  5/15/2019

## 2019-05-15 NOTE — PROGRESS NOTES
Teaching / education initiated regarding perioperative experience, expectations, and pain management during stay. Patient verbalized understanding. Pt alert and oriented x4, able to sign own consent. Consent verified and signed.

## 2019-05-15 NOTE — ANESTHESIA PRE PROCEDURE
Department of Anesthesiology  Preprocedure Note       Name:  Jaleesa Rankin   Age:  75 y.o.  :  1945                                          MRN:  [id number]         Date:  5/15/2019      Surgeon: Navin Martinez):  Jasper Knutson MD    Procedure: Corey Lias WITH LASER AND STONE  (N/A )    Medications prior to admission:   Prior to Admission medications    Medication Sig Start Date End Date Taking?  Authorizing Provider   glucose (GLUTOSE) 40 % GEL Take 37.5 mLs by mouth as needed (low BS) 19   Carly Bishop MD   docusate sodium (COLACE, DULCOLAX) 100 MG CAPS Take 100 mg by mouth 2 times daily 19   Carly Bishop MD   Lactobacillus (ACIDOPHILUS) 100 MG CAPS Take by mouth 2 times daily    Historical Provider, MD   benzonatate (TESSALON) 100 MG capsule Take 100 mg by mouth 3 times daily as needed for Cough    Historical Provider, MD   citalopram (CELEXA) 20 MG tablet Take 20 mg by mouth daily    Historical Provider, MD   trospium (SANCTURA) 20 MG tablet Take 20 mg by mouth 2 times daily    Historical Provider, MD   ipratropium-albuterol (DUONEB) 0.5-2.5 (3) MG/3ML SOLN nebulizer solution Inhale 3 mLs into the lungs every 4 hours as needed for Shortness of Breath 17   Oliverio Lugo MD   insulin detemir (LEVEMIR) 100 UNIT/ML injection vial Inject 5 Units into the skin 2 times daily    Historical Provider, MD   melatonin 3 MG TABS tablet Take 3 mg by mouth nightly    Historical Provider, MD   guaiFENesin (MUCINEX) 600 MG extended release tablet Take 400 mg by mouth 3 times daily as needed     Historical Provider, MD   Cranberry-Vitamin C-Vitamin E (CRANBERRY PLUS VITAMIN C PO) Take 30 mLs by mouth 2 times daily    Historical Provider, MD   acetaminophen (TYLENOL) 325 MG tablet Take 650 mg by mouth every 4 hours as needed for Pain    Historical Provider, MD   aspirin 81 MG tablet Take 81 mg by mouth daily    Historical Provider, MD   carBAMazepine (TEGRETOL) 200 MG tablet Take 200 mg by mouth 3 times daily     Historical Provider, MD   loratadine (CLARITIN) 10 MG tablet Take 10 mg by mouth daily    Historical Provider, MD   Folic Acid 0.8 MG CAPS Take 1 tablet by mouth daily    Historical Provider, MD   gabapentin (NEURONTIN) 100 MG capsule Take 200 mg by mouth 3 times daily    Historical Provider, MD   lamoTRIgine (LAMICTAL) 100 MG tablet Take 100 mg by mouth 3 times daily     Historical Provider, MD   lisinopril (PRINIVIL;ZESTRIL) 20 MG tablet Take 20 mg by mouth daily     Historical Provider, MD   polyethylene glycol (MIRALAX) POWD powder Take 17 g by mouth daily     Historical Provider, MD   senna-docusate (PERICOLACE) 8.6-50 MG per tablet Take 2 tablets by mouth nightly    Historical Provider, MD   vitamin B-12 (CYANOCOBALAMIN) 1000 MCG tablet Take 1,000 mcg by mouth daily    Historical Provider, MD   vitamin D (CHOLECALCIFEROL) 1000 UNIT TABS tablet Take 5,000 Units by mouth daily     Historical Provider, MD   omeprazole (PRILOSEC) 20 MG capsule Take 20 mg by mouth daily    Historical Provider, MD       Current medications:    No current facility-administered medications for this visit. No current outpatient medications on file.      Facility-Administered Medications Ordered in Other Visits   Medication Dose Route Frequency Provider Last Rate Last Dose    0.9 % sodium chloride infusion   Intravenous Continuous Staci Cabral MD 50 mL/hr at 05/15/19 0748      lidocaine PF 1 % injection 0.5 mL  0.5 mL Intradermal Once Staci Cabral MD        ciprofloxacin (CIPRO) IVPB 400 mg  400 mg Intravenous On Call to [facility] 178MD Sergio           Allergies:  No Known Allergies    Problem List:    Patient Active Problem List   Diagnosis Code    Intellectual disability F68    Seizure disorder ([address] G40.909    Acute renal failure ([address] N17.9    Acute cystitis with hematuria N30.01    Ileus ([address] K56.7    Sepsis ([address] A41.9    Acute kidney injury ([facility] N17.9    Small bowel obstruction (HCC) K56.609    Nausea and vomiting U37.2    Umbilical hernia with obstruction, without gangrene K42.0       Past Medical History:        Diagnosis Date    Constipation     Convulsions ([facility]     Degenerative disease of nervous system     Delusional disorder ([facility]     Encephalopathy     Enlarged prostate with lower urinary tract symptoms (LUTS)     GERD (gastroesophageal reflux disease)     Hematuria     Hyperlipidemia     Hypernatremia     Hypertension     Hypertension     Mood disorder (HCC)     Muscle weakness     Muscle weakness (generalized)     Neuromuscular dysfunction of bladder     Neuropathy     Obsessive-compulsive personality disorder (CODE)     Suprapubic catheter ([facility]     #20    Urinary retention        Past Surgical History:        Procedure Laterality Date    ARM SURGERY      BLADDER SURGERY      EYE SURGERY      TONSILLECTOMY      UMBILICAL HERNIA REPAIR N/A 1/15/2019    OPEN PRIMARY UMBILICAL HERNIA REPAIR performed by Tova Clark MD at [address] [place] [address] History:    Social History     Tobacco Use    Smoking status: Never Smoker    Smokeless tobacco: Never Used   Substance Use Topics    Alcohol use: No                                Counseling given: Not Answered      Vital Signs (Current): There were no vitals filed for this visit.                                            BP Readings from Last 3 Encounters:   05/15/19 (!) 158/78   04/24/19 123/64   04/23/19 118/65       NPO Status:                                                                                 BMI:   Wt Readings from Last 3 Encounters:   05/15/19 195 lb (88.5 kg)   04/24/19 180 lb 9.6 oz (81.9 kg)   04/23/19 180 lb (81.6 kg)     There is no height or weight on file to calculate BMI.    CBC:   Lab Results   Component Value Date    WBC 7.7 04/24/2019    RBC 3.70 04/24/2019    HGB 12.6 04/24/2019    HCT 36.8 04/24/2019    MCV 99.6 04/24/2019    RDW 15.3 04/24/2019     04/24/2019       CMP:   Lab Results   Component Value Date     04/24/2019    K 4.3 04/24/2019     04/24/2019    CO2 29 04/24/2019    BUN 29 04/24/2019    CREATININE 1.0 04/24/2019    GFRAA >60 04/24/2019    AGRATIO 1.1 01/13/2019    LABGLOM >60 04/24/2019    GLUCOSE 106 04/24/2019    PROT 6.9 01/13/2019    CALCIUM 9.4 04/24/2019    BILITOT 0.6 01/13/2019    ALKPHOS 118 01/13/2019    AST 23 01/13/2019    ALT 16 01/13/2019       POC Tests:   Recent Labs     05/15/19  0728   POCGLU 85       Coags:   Lab Results   Component Value Date    PROTIME 12.0 01/13/2019    INR 1.05 01/13/2019    APTT 30.3 01/13/2019       HCG (If Applicable): No results found for: PREGTESTUR, PREGSERUM, HCG, HCGQUANT     ABGs: No results found for: PHART, PO2ART, WVG3YRD, ZEK8QOM, BEART, J0BWCSSZ     Type & Screen (If Applicable):  No results found for: LABABO, [address]    Anesthesia Evaluation  Patient summary reviewed and Nursing notes reviewed no history of anesthetic complications:   Airway: Mallampati: II  TM distance: >3 FB   Neck ROM: full  Mouth opening: > = 3 FB Dental:    (+) edentulous      Pulmonary:normal exam                               Cardiovascular:  Exercise tolerance: poor (<4 METS),   (+) hypertension:, CHF: diastolic, hyperlipidemia      ECG reviewed  Rhythm: regular    Echocardiogram reviewed               ROS comment: EF 55%   RVSP 35 to 45     Neuro/Psych:   (+) seizures:, neuromuscular disease:, psychiatric history:depression/anxiety             GI/Hepatic/Renal:   (+) GERD:,          ROS comment: Possibly SBO from umbilical hernia. .   Endo/Other:    (+) Diabetes, electrolyte abnormalities, . Abdominal:           Vascular:                                        Anesthesia Plan      general     ASA 4     (Dr Lloyd Acuña has reviewed WILLIAM Franco preop.)  Induction: intravenous and rapid sequence. MIPS: Postoperative opioids intended and Prophylactic antiemetics administered.   Anesthetic plan and risks discussed with patient. Plan discussed with CRNA. MEDICATIONS:  No current facility-administered medications for this visit. No current outpatient medications on file.      Facility-Administered Medications Ordered in Other Visits   Medication Dose Route Frequency Provider Last Rate Last Dose    0.9 % sodium chloride infusion   Intravenous Continuous Mak Schmidt MD 50 mL/hr at 05/15/19 0748      lidocaine PF 1 % injection 0.5 mL  0.5 mL Intradermal Once Mak Schmidt MD        ciprofloxacin (CIPRO) IVPB 400 mg  400 mg Intravenous On Call to [address] [place] [address] Jesús 178MD Sergio            LABS:  Lab Results   Component Value Date    WBC 7.7 04/24/2019    HGB 12.6 (L) 04/24/2019    HCT 36.8 (L) 04/24/2019    MCV 99.6 04/24/2019     04/24/2019    GLUCOSE 106 (H) 04/24/2019    PROTIME 12.0 01/13/2019    INR 1.05 01/13/2019    APTT 30.3 01/13/2019       Lab Results   Component Value Date     04/24/2019    K 4.3 04/24/2019     04/24/2019    CO2 29 04/24/2019    PHOS 4.9 01/13/2019    BUN 29 (H) 04/24/2019    CREATININE 1.0 04/24/2019       Problem List:  Patient Active Problem List   Diagnosis    Intellectual disability    Seizure disorder ([address]    Acute renal failure ([address]    Acute cystitis with hematuria    Ileus ([address]    Sepsis ([address]    Acute kidney injury ([address]    Small bowel obstruction ([address]    Nausea and vomiting    Umbilical hernia with obstruction, without gangrene           Sana Perry MD   5/15/2019

## 2019-05-15 NOTE — PROGRESS NOTES
Pt arrived from OR to PACU, awakens to voice, denies pain. VSS, O2 sats 100% on 3 L NC. Suprapubic catheter in place draining pink tinged clear urine. Will monitor.

## 2019-05-15 NOTE — ANESTHESIA POSTPROCEDURE EVALUATION
[facility] Department of Anesthesiology  Post-Anesthesia Note       Name:  Harini Mckeon                                  Age:  75 y.o. MRN:  [id number]     Last Vitals & Oxygen Saturation: /65   Pulse 67   Temp 97 °F (36.1 °C) (Temporal)   Resp 22   Ht 5' 8\" (1.727 m)   Wt 195 lb (88.5 kg)   SpO2 94%   BMI 29.65 kg/m²   Patient Vitals for the past 4 hrs:   BP Temp Temp src Pulse Resp SpO2   05/15/19 1027 132/65 97 °F (36.1 °C) Temporal 67 22 94 %   05/15/19 1015 (!) 141/68 -- -- 70 22 95 %   05/15/19 1005 (!) 146/65 97.3 °F (36.3 °C) Temporal 70 17 100 %   05/15/19 1000 (!) 143/72 -- -- 69 17 100 %   05/15/19 0955 126/76 -- -- 71 18 100 %   05/15/19 0948 125/60 97.6 °F (36.4 °C) Temporal 69 30 100 %       Level of consciousness:  Awake, alert to baseline    Respiratory: Respirations easy, no distress. Stable. Cardiovascular: Hemodynamically stable. Hydration: Adequate. PONV: Adequately managed. Post-op pain: Adequately controlled. Post-op assessment: Tolerated anesthetic well without complication. Complications:  None.     Chaparrita Almeida MD  May 15, 2019   12:59 PM

## 2019-05-15 NOTE — PROGRESS NOTES
Discharge instructions reviewed and understanding verbalized per pt/family with copy given. All home medications/new prescriptions have been reviewed, questions answered and patient/family state understanding.  Medication information sheet provided for new prescriptions received when applicable  With Juliet HERRERA at [facility]

## 2019-05-18 LAB
CALCULI COMPOSITION: NORMAL
MASS: 181 MG
STONE DESCRIPTION: NORMAL
STONE NUMBER: 1
STONE SIZE: NORMAL MM

## 2019-05-19 ENCOUNTER — APPOINTMENT (OUTPATIENT)
Dept: CT IMAGING | Age: 74
DRG: 696 | End: 2019-05-19
Payer: MEDICARE

## 2019-05-19 ENCOUNTER — APPOINTMENT (OUTPATIENT)
Dept: GENERAL RADIOLOGY | Age: 74
DRG: 696 | End: 2019-05-19
Payer: MEDICARE

## 2019-05-19 ENCOUNTER — HOSPITAL ENCOUNTER (INPATIENT)
Age: 74
LOS: 3 days | Discharge: NURSING, REHAB OR LONG-TERM CARE | DRG: 696 | End: 2019-05-22
Attending: EMERGENCY MEDICINE | Admitting: INTERNAL MEDICINE
Payer: MEDICARE

## 2019-05-19 DIAGNOSIS — Z98.890 POST-OPERATIVE STATE: ICD-10-CM

## 2019-05-19 DIAGNOSIS — N13.9 ACUTE URINARY OBSTRUCTION: Primary | ICD-10-CM

## 2019-05-19 DIAGNOSIS — R31.9 HEMATURIA, UNSPECIFIED TYPE: ICD-10-CM

## 2019-05-19 PROBLEM — N20.0 KIDNEY STONE: Status: ACTIVE | Noted: 2019-05-19

## 2019-05-19 LAB
A/G RATIO: 1.2 (ref 1.1–2.2)
ALBUMIN SERPL-MCNC: 3.7 G/DL (ref 3.4–5)
ALP BLD-CCNC: 144 U/L (ref 40–129)
ALT SERPL-CCNC: 13 U/L (ref 10–40)
ANION GAP SERPL CALCULATED.3IONS-SCNC: 14 MMOL/L (ref 3–16)
APTT: 30.8 SEC (ref 26–36)
AST SERPL-CCNC: 19 U/L (ref 15–37)
BASOPHILS ABSOLUTE: 0 K/UL (ref 0–0.2)
BASOPHILS RELATIVE PERCENT: 0 %
BILIRUB SERPL-MCNC: <0.2 MG/DL (ref 0–1)
BILIRUBIN URINE: NEGATIVE
BLOOD, URINE: ABNORMAL
BUN BLDV-MCNC: 40 MG/DL (ref 7–20)
CALCIUM SERPL-MCNC: 9.1 MG/DL (ref 8.3–10.6)
CHLORIDE BLD-SCNC: 99 MMOL/L (ref 99–110)
CLARITY: ABNORMAL
CO2: 20 MMOL/L (ref 21–32)
COLOR: ABNORMAL
CREAT SERPL-MCNC: 1.2 MG/DL (ref 0.8–1.3)
EOSINOPHILS ABSOLUTE: 0.1 K/UL (ref 0–0.6)
EOSINOPHILS RELATIVE PERCENT: 1 %
GFR AFRICAN AMERICAN: >60
GFR NON-AFRICAN AMERICAN: 59
GLOBULIN: 3.2 G/DL
GLUCOSE BLD-MCNC: 147 MG/DL (ref 70–99)
GLUCOSE URINE: NEGATIVE MG/DL
HCT VFR BLD CALC: 33.6 % (ref 40.5–52.5)
HEMOGLOBIN: 11.5 G/DL (ref 13.5–17.5)
INR BLD: 1 (ref 0.86–1.14)
KETONES, URINE: NEGATIVE MG/DL
LEUKOCYTE ESTERASE, URINE: NEGATIVE
LYMPHOCYTES ABSOLUTE: 2.9 K/UL (ref 1–5.1)
LYMPHOCYTES RELATIVE PERCENT: 26 %
MCH RBC QN AUTO: 33.6 PG (ref 26–34)
MCHC RBC AUTO-ENTMCNC: 34.2 G/DL (ref 31–36)
MCV RBC AUTO: 98.3 FL (ref 80–100)
MICROSCOPIC EXAMINATION: YES
MONOCYTES ABSOLUTE: 1 K/UL (ref 0–1.3)
MONOCYTES RELATIVE PERCENT: 9 %
NEUTROPHILS ABSOLUTE: 7 K/UL (ref 1.7–7.7)
NEUTROPHILS RELATIVE PERCENT: 64 %
NITRITE, URINE: NEGATIVE
OVALOCYTES: ABNORMAL
PDW BLD-RTO: 15.2 % (ref 12.4–15.4)
PH UA: 7.5 (ref 5–8)
PLATELET # BLD: 207 K/UL (ref 135–450)
PLATELET SLIDE REVIEW: ADEQUATE
PMV BLD AUTO: 7.8 FL (ref 5–10.5)
POLYCHROMASIA: ABNORMAL
POTASSIUM REFLEX MAGNESIUM: 5 MMOL/L (ref 3.5–5.1)
PROTEIN UA: >=300 MG/DL
PROTHROMBIN TIME: 11.4 SEC (ref 9.8–13)
RBC # BLD: 3.42 M/UL (ref 4.2–5.9)
RBC UA: >100 /HPF (ref 0–2)
SLIDE REVIEW: ABNORMAL
SODIUM BLD-SCNC: 133 MMOL/L (ref 136–145)
SPECIFIC GRAVITY UA: 1.02 (ref 1–1.03)
TOTAL PROTEIN: 6.9 G/DL (ref 6.4–8.2)
URINE REFLEX TO CULTURE: ABNORMAL
URINE TYPE: ABNORMAL
UROBILINOGEN, URINE: 0.2 E.U./DL
WBC # BLD: 11 K/UL (ref 4–11)
WBC UA: ABNORMAL /HPF (ref 0–5)

## 2019-05-19 PROCEDURE — 96376 TX/PRO/DX INJ SAME DRUG ADON: CPT

## 2019-05-19 PROCEDURE — 96374 THER/PROPH/DIAG INJ IV PUSH: CPT

## 2019-05-19 PROCEDURE — 85025 COMPLETE CBC W/AUTO DIFF WBC: CPT

## 2019-05-19 PROCEDURE — 85730 THROMBOPLASTIN TIME PARTIAL: CPT

## 2019-05-19 PROCEDURE — 99285 EMERGENCY DEPT VISIT HI MDM: CPT

## 2019-05-19 PROCEDURE — 1200000000 HC SEMI PRIVATE

## 2019-05-19 PROCEDURE — 51702 INSERT TEMP BLADDER CATH: CPT

## 2019-05-19 PROCEDURE — 2580000003 HC RX 258: Performed by: INTERNAL MEDICINE

## 2019-05-19 PROCEDURE — 2580000003 HC RX 258: Performed by: EMERGENCY MEDICINE

## 2019-05-19 PROCEDURE — 81001 URINALYSIS AUTO W/SCOPE: CPT

## 2019-05-19 PROCEDURE — 74177 CT ABD & PELVIS W/CONTRAST: CPT

## 2019-05-19 PROCEDURE — 6360000004 HC RX CONTRAST MEDICATION: Performed by: EMERGENCY MEDICINE

## 2019-05-19 PROCEDURE — 2700000000 HC OXYGEN THERAPY PER DAY

## 2019-05-19 PROCEDURE — 6360000002 HC RX W HCPCS: Performed by: EMERGENCY MEDICINE

## 2019-05-19 PROCEDURE — 87184 SC STD DISK METHOD PER PLATE: CPT

## 2019-05-19 PROCEDURE — 96375 TX/PRO/DX INJ NEW DRUG ADDON: CPT

## 2019-05-19 PROCEDURE — 87186 SC STD MICRODIL/AGAR DIL: CPT

## 2019-05-19 PROCEDURE — 80053 COMPREHEN METABOLIC PANEL: CPT

## 2019-05-19 PROCEDURE — 85610 PROTHROMBIN TIME: CPT

## 2019-05-19 PROCEDURE — 87086 URINE CULTURE/COLONY COUNT: CPT

## 2019-05-19 PROCEDURE — 87077 CULTURE AEROBIC IDENTIFY: CPT

## 2019-05-19 PROCEDURE — 71045 X-RAY EXAM CHEST 1 VIEW: CPT

## 2019-05-19 PROCEDURE — 94760 N-INVAS EAR/PLS OXIMETRY 1: CPT

## 2019-05-19 RX ORDER — HYDROMORPHONE HYDROCHLORIDE 1 MG/ML
0.5 INJECTION, SOLUTION INTRAMUSCULAR; INTRAVENOUS; SUBCUTANEOUS EVERY 4 HOURS PRN
Status: DISCONTINUED | OUTPATIENT
Start: 2019-05-19 | End: 2019-05-22 | Stop reason: HOSPADM

## 2019-05-19 RX ORDER — ONDANSETRON 2 MG/ML
4 INJECTION INTRAMUSCULAR; INTRAVENOUS EVERY 8 HOURS PRN
Status: DISCONTINUED | OUTPATIENT
Start: 2019-05-19 | End: 2019-05-22 | Stop reason: HOSPADM

## 2019-05-19 RX ORDER — ONDANSETRON 2 MG/ML
4 INJECTION INTRAMUSCULAR; INTRAVENOUS ONCE
Status: COMPLETED | OUTPATIENT
Start: 2019-05-19 | End: 2019-05-19

## 2019-05-19 RX ORDER — SODIUM CHLORIDE 0.9 % (FLUSH) 0.9 %
10 SYRINGE (ML) INJECTION EVERY 12 HOURS SCHEDULED
Status: DISCONTINUED | OUTPATIENT
Start: 2019-05-19 | End: 2019-05-22

## 2019-05-19 RX ORDER — MORPHINE SULFATE 2 MG/ML
2 INJECTION, SOLUTION INTRAMUSCULAR; INTRAVENOUS ONCE
Status: COMPLETED | OUTPATIENT
Start: 2019-05-19 | End: 2019-05-19

## 2019-05-19 RX ORDER — MORPHINE SULFATE 4 MG/ML
4 INJECTION, SOLUTION INTRAMUSCULAR; INTRAVENOUS ONCE
Status: COMPLETED | OUTPATIENT
Start: 2019-05-19 | End: 2019-05-19

## 2019-05-19 RX ORDER — SODIUM CHLORIDE 9 MG/ML
INJECTION, SOLUTION INTRAVENOUS CONTINUOUS
Status: DISCONTINUED | OUTPATIENT
Start: 2019-05-19 | End: 2019-05-22

## 2019-05-19 RX ORDER — SODIUM CHLORIDE, SODIUM LACTATE, POTASSIUM CHLORIDE, CALCIUM CHLORIDE 600; 310; 30; 20 MG/100ML; MG/100ML; MG/100ML; MG/100ML
INJECTION, SOLUTION INTRAVENOUS ONCE
Status: COMPLETED | OUTPATIENT
Start: 2019-05-19 | End: 2019-05-19

## 2019-05-19 RX ORDER — SODIUM CHLORIDE 0.9 % (FLUSH) 0.9 %
10 SYRINGE (ML) INJECTION PRN
Status: DISCONTINUED | OUTPATIENT
Start: 2019-05-19 | End: 2019-05-22 | Stop reason: HOSPADM

## 2019-05-19 RX ADMIN — MORPHINE SULFATE 4 MG: 4 INJECTION INTRAVENOUS at 09:23

## 2019-05-19 RX ADMIN — SODIUM CHLORIDE, POTASSIUM CHLORIDE, SODIUM LACTATE AND CALCIUM CHLORIDE: 600; 310; 30; 20 INJECTION, SOLUTION INTRAVENOUS at 13:33

## 2019-05-19 RX ADMIN — IOPAMIDOL 75 ML: 755 INJECTION, SOLUTION INTRAVENOUS at 10:26

## 2019-05-19 RX ADMIN — MORPHINE SULFATE 2 MG: 2 INJECTION, SOLUTION INTRAMUSCULAR; INTRAVENOUS at 11:17

## 2019-05-19 RX ADMIN — ONDANSETRON 4 MG: 2 INJECTION INTRAMUSCULAR; INTRAVENOUS at 09:22

## 2019-05-19 RX ADMIN — SODIUM CHLORIDE: 9 INJECTION, SOLUTION INTRAVENOUS at 18:04

## 2019-05-19 RX ADMIN — Medication 1 G: at 13:27

## 2019-05-19 ASSESSMENT — PAIN DESCRIPTION - PAIN TYPE
TYPE: ACUTE PAIN
TYPE: ACUTE PAIN

## 2019-05-19 ASSESSMENT — PAIN SCALES - GENERAL
PAINLEVEL_OUTOF10: 6
PAINLEVEL_OUTOF10: 3
PAINLEVEL_OUTOF10: 0
PAINLEVEL_OUTOF10: 10
PAINLEVEL_OUTOF10: 5
PAINLEVEL_OUTOF10: 9
PAINLEVEL_OUTOF10: 10

## 2019-05-19 ASSESSMENT — PAIN DESCRIPTION - LOCATION
LOCATION: ABDOMEN
LOCATION: ABDOMEN

## 2019-05-19 NOTE — ED NOTES
Jason Barnard from [facility] updated on pt status and plan of care.      Mini Licea RN  05/19/19 4290

## 2019-05-19 NOTE — ED NOTES
Bladder scanned. 700ml+ urine noted in bladder. pts bladder distended. State lloyd at catheter site.      Reynaldo , RN  05/19/19 4017

## 2019-05-19 NOTE — ED PROVIDER NOTES
[address] [facility] Emergency Department      Pt Name: Maryanne Abreu  MRN: [id number]  [place] 1945  Date of evaluation: 5/19/2019  Provider: Del Escobedo MD  CHIEF COMPLAINT  Chief Complaint   Patient presents with    Post-op Problem     day four urostomy post-op due to kidney stone surgery. With bright red blood today. HPI  Maryanne Abreu is a 75 y.o. male who presents because of bleeding from her urostomy site. He has a history of neurogenic bladder and had surgery 4 days ago for urostomy and bladder stone procedure. He says he's had some bleeding for a couple days. He's had increasing pain. He is a poor historian and resides at [address] [place] [address]. REVIEW OF SYSTEMS:  See HPI for further details. Remainder of review of systems limited by patient ability and willingness to provide history \"you don't need to know about that, work on that (pointing to his catheter)\". Nursing notes reviewed.     PAST MEDICAL HISTORY  Past Medical History:   Diagnosis Date    Constipation     Convulsions ([address]     Degenerative disease of nervous system     Delusional disorder ([address]     Encephalopathy     Enlarged prostate with lower urinary tract symptoms (LUTS)     GERD (gastroesophageal reflux disease)     Hematuria     Hyperlipidemia     Hypernatremia     Hypertension     Hypertension     Mood disorder (HCC)     Muscle weakness     Muscle weakness (generalized)     Neuromuscular dysfunction of bladder     Neuropathy     Obsessive-compulsive personality disorder (CODE)     Suprapubic catheter ([address]     #20    Urinary retention      SURGICAL HISTORY  Past Surgical History:   Procedure Laterality Date    ARM SURGERY      BLADDER SURGERY      CYSTOSCOPY N/A 5/15/2019    CYSTOLITHALOPAXY , STONE  SUPRA PUBIC CATHETER EXCHANGE. performed by Delia Greenwood MD at [address] [place] [address] N/A 1/15/2019    OPEN PRIMARY UMBILICAL HERNIA MG per tablet Take 2 tablets by mouth nightly      vitamin B-12 (CYANOCOBALAMIN) 1000 MCG tablet Take 1,000 mcg by mouth daily      vitamin D (CHOLECALCIFEROL) 1000 UNIT TABS tablet Take 5,000 Units by mouth daily       omeprazole (PRILOSEC) 20 MG capsule Take 20 mg by mouth daily       ALLERGIES  Patient has no known allergies. FAMILY HISTORY  History reviewed. No pertinent family history. SOCIAL HISTORY:  Social History     Tobacco Use    Smoking status: Never Smoker    Smokeless tobacco: Never Used   Substance Use Topics    Alcohol use: No    Drug use: No     IMMUNIZATIONS:  Noncontributory    PHYSICAL EXAM  VITAL SIGNS:  Blood pressure (!) 169/98, pulse 99, temperature 99 °F (37.2 °C), temperature source Infrared, resp. rate 16, height 5' 8\" (1.727 m), weight 180 lb (81.6 kg), SpO2 97 %. Constitutional:  75 y.o. male alert, chronically ill appearing  HENT:  Atraumatic, mucous membranes moist  Eyes:   Conjunctiva clear, no icterus  Neck:  Supple, no JVD, no signs of injury  Cardiovascular:  Regular, no rubs, no discernible murmur  Thorax & Lungs:  No accessory muscle usage, clear  Abdomen:  Lower abdominal distention, mild tenderness, small amount of blood present at the opening of the ostomy, no cellulitis  Back:  No deformity  Genitalia:  Grossly appears normal without erythema  Rectal:  Deferred  Extremities:  No cyanosis, no edema  Skin:  Warm, dry  Neurologic:  Alert, no slurred speech  Psychiatric:  Not assessed    DIAGNOSTIC RESULTS:  Labs resulted at the time of this note reviewed.   Labs Reviewed   CBC WITH AUTO DIFFERENTIAL - Abnormal; Notable for the following components:       Result Value    RBC 3.42 (*)     Hemoglobin 11.5 (*)     Hematocrit 33.6 (*)     Polychromasia Occasional (*)     Ovalocytes Occasional (*)     All other components within normal limits    Narrative:     Performed at:  [facility] [address], [place], [address]   Phone [phone]   COMPREHENSIVE METABOLIC PANEL W/ REFLEX TO MG FOR LOW K - Abnormal; Notable for the following components:    Sodium 133 (*)     CO2 20 (*)     Glucose 147 (*)     BUN 40 (*)     GFR Non-African American 59 (*)     Alkaline Phosphatase 144 (*)     All other components within normal limits    Narrative:     Performed at:  [facility] [address], [facility], [address]   Phone [phone]   URINE RT REFLEX TO CULTURE - Abnormal; Notable for the following components:    Color, UA RED (*)     Clarity, UA CLOUDY (*)     Blood, Urine LARGE (*)     Protein, UA >=300 (*)     All other components within normal limits    Narrative:     Performed at:  [facility] [address], [facility], [address] [place] [address] [employer]   Phone [phone]   MICROSCOPIC URINALYSIS - Abnormal; Notable for the following components:    RBC, UA >100 (*)     All other components within normal limits    Narrative:     Performed at:  [facility] [address], [facility], [address] [place], [place] [address]   Phone [phone]   PROTIME-INR    Narrative:     Performed at:  [facility] [address], [facility], [facility] [employer]   Phone [phone]   APTT    Narrative:     Performed at:  [facility] [address], [facility], [facility] [employer]   Phone [phone]     RADIOLOGY:    Plain x-rays were viewed by me:   CT ABDOMEN PELVIS W IV CONTRAST Additional Contrast? None   Final Result   Suprapubic catheter in place. The urinary bladder is moderately distended   and contains a large amount of heterogeneous relatively high density   material, likely blood products. Correlation to exclude underlying mass is   recommended. Given mild perivesical fat stranding, cystitis could be considered. Cholelithiasis. XR CHEST PORTABLE   Final Result   No evidence of acute cardiopulmonary disease.            ED COURSE:    Medications administered:  Medications   morphine injection 4 mg (4 mg Intravenous Given 5/19/19 0923)   ondansetron (ZOFRAN) injection 4 mg (4 mg Intravenous Given 5/19/19 0922)   iopamidol (ISOVUE-370) 76 % injection 75 mL (75 mLs Intravenous Given 5/19/19 1026)   morphine (PF) injection 2 mg (2 mg Intravenous Given 5/19/19 1117)     PROCEDURES:  None    CRITICAL CARE:  None    CONSULTATIONS:  Dr. Prem Reyes, Dr. Cindy Elliott: Anjana Lundy is a 75 y.o. male who presented because of bleeding. He had a bladder procedure and has blood clot material present in the bladder. After irrigation, there still is retained material and bladder distention. I've discussed this with Dr. Prem Reyes. He will see the patient in the hospital and recommends admission. I discussed the case in full with the admitting physician. FINAL IMPRESSION:    1. Acute urinary obstruction    2. Post-operative state    3. Hematuria, unspecified type      (Please note that I used voice recognition software to generate this note.   Occasionally words are mistranscribed despite my efforts to edit errors.)        David Guerrero MD  05/19/19 8854

## 2019-05-19 NOTE — ED NOTES
Writer spoke with Master Bourne, RN from [facility] SPIL G[facility], RN  05/19/19 [phone]

## 2019-05-19 NOTE — PROGRESS NOTES
Specialty mattress ordered, bilateral heel protectors applied, mepilex to sacrum, dressings to BLE changed with charge RN wound care consult placed to assess leg wounds

## 2019-05-19 NOTE — PROGRESS NOTES
Note dictated  Gross hematuria and clot retention  Irrigated mult clots from bladder and irrigant now clear  Running cbi in 20 f st tube and out 22 f urethral singh  Plan   Admit   prob cysto tomoorrow under anesth

## 2019-05-19 NOTE — ED NOTES
Bed: 21  Expected date:   Expected time:   Means of arrival:   Comments:  [address] Ch[address], JAVIER  05/19/19 0702

## 2019-05-19 NOTE — ED NOTES
Pt back from Ct. Back In bed and back on monitor. Patient resting comfortably with no signs of distress. Denies any needs at this time. Bed locked and in lowest position with both side rails raised. Call light within reach.  Light off at patient request.     Toshia Davila RN  05/19/19 3550

## 2019-05-19 NOTE — ED NOTES
Pt cleaned up. Herman inserted. Patient resting comfortably with no signs of distress. Denies any needs at this time. Bed locked and in lowest position with both side rails raised. Call light within reach.  Patient brought warm blanket at patient's request.     Yuriy Serrano RN  05/19/19 0077

## 2019-05-19 NOTE — ED NOTES
Assumed care of patient from [facility]. Pt is cursing and swatting at RN states he has had bright red blood from supra-catheter for two days. Catheter has 600 cc of bright red blood. Blood also oozing from suprapubic site on abdomen. ST on monitor. HTN noted. Abdomen is firm to touch.       Danay Handler, JAVIER  05/19/19 7310

## 2019-05-19 NOTE — ED NOTES
Pt gown and blankets bloody . Gown changed. Pt updated on plan of care. Patient resting comfortably with no signs of distress. Denies any needs at this time. Bed locked and in lowest position with both side rails raised. Call light within reach.      Chapin Maguire RN  05/19/19 3220

## 2019-05-19 NOTE — ED NOTES
Report given to Tom Kaba, [facility]. Pt alert and oriented and shows no signs of distress at time of transfer to  . Pt taken upstairs via bed by sophie bettencourt and JAVIER shields. Pt has lactated ringers running at time of transfer.      Seabron Callas, RN  05/19/19 3273

## 2019-05-20 LAB
HCT VFR BLD CALC: 27.3 % (ref 40.5–52.5)
HEMOGLOBIN: 9.6 G/DL (ref 13.5–17.5)
MCH RBC QN AUTO: 34.4 PG (ref 26–34)
MCHC RBC AUTO-ENTMCNC: 35 G/DL (ref 31–36)
MCV RBC AUTO: 98.3 FL (ref 80–100)
PDW BLD-RTO: 15.1 % (ref 12.4–15.4)
PLATELET # BLD: 163 K/UL (ref 135–450)
PMV BLD AUTO: 7.2 FL (ref 5–10.5)
RBC # BLD: 2.77 M/UL (ref 4.2–5.9)
WBC # BLD: 8.9 K/UL (ref 4–11)

## 2019-05-20 PROCEDURE — 6360000002 HC RX W HCPCS: Performed by: INTERNAL MEDICINE

## 2019-05-20 PROCEDURE — 2580000003 HC RX 258: Performed by: INTERNAL MEDICINE

## 2019-05-20 PROCEDURE — 85027 COMPLETE CBC AUTOMATED: CPT

## 2019-05-20 PROCEDURE — 36415 COLL VENOUS BLD VENIPUNCTURE: CPT

## 2019-05-20 PROCEDURE — 1200000000 HC SEMI PRIVATE

## 2019-05-20 RX ADMIN — ONDANSETRON 4 MG: 2 INJECTION INTRAMUSCULAR; INTRAVENOUS at 16:00

## 2019-05-20 RX ADMIN — SODIUM CHLORIDE: 9 INJECTION, SOLUTION INTRAVENOUS at 08:30

## 2019-05-20 RX ADMIN — ONDANSETRON 4 MG: 2 INJECTION INTRAMUSCULAR; INTRAVENOUS at 06:37

## 2019-05-20 RX ADMIN — SODIUM CHLORIDE: 9 INJECTION, SOLUTION INTRAVENOUS at 16:43

## 2019-05-20 RX ADMIN — Medication 1 G: at 12:22

## 2019-05-20 ASSESSMENT — PAIN SCALES - GENERAL: PAINLEVEL_OUTOF10: 0

## 2019-05-20 NOTE — PROGRESS NOTES
The care plan and education has been reviewed and mutually agreed upon with the patient. Assessment complete and documented. A/O, VSS. Suprapubic catheter intact, draining yellow urine by gravity to bag. CBI. leaking around catheter site-gauze applied. Dressing to BLE intact. Bilateral boots in place. No complaints of pain. Mouth swabbed. Patient aware of NPO status. Will monitor.

## 2019-05-20 NOTE — CARE COORDINATION
Discharge Planning Assessment        SW discharge planner reviewed chart to determine reason for admission, current living situation, and potential needs at the time of discharge. Demographics/Insurance Verified:  Yes  Medicare and [employer]    Current type of dwelling: Nursing facility    Patient admitted from:  [address] [place] [address]       Confirmed with: Rachel Cook (Admissions at [facility])    Level of care: Skilled____   Long term_X_   Assisted Living_____    Transportation at the time of discharge: Ambulance    Tentative discharge plan: Anticipate patient will return to the nursing facility at discharge.      Electronically signed by BROWN Martinez on 5/20/2019 at 11:57 AM

## 2019-05-20 NOTE — CONSULTS
[place] [address]                     [facility], [address]                                  CONSULTATION    PATIENT NAME: Amelie Cancino                    :        1945  MED REC NO:   [id number]                          ROOM:       6700  ACCOUNT NO:   [de-identified]                           ADMIT DATE: 2019  PROVIDER:     Ashlie Vee MD    CONSULT DATE:  2019    CONSULTING PHYSICIAN:  Dr. Patrick Henderson from [facility] Emergency Room. REASON FOR CONSULTATION:  Gross hematuria, clot retention. HISTORY OF PRESENT ILLNESS:  The patient is a 69-year-old man who  reportedly had procedure done four days ago for removal of a bladder  stone. He is currently in a nursing home. He was sent home either the  same date of surgery or the following day with a suprapubic tube in  place, but came to the emergency room on the morning of  with gross  hematuria, clot retention. He had had a removal of a 1 cm bladder  stone, suprapubic tube change done on the  by Dr. Terry Mars. In  the emergency room, he was seen and evaluated. His creatinine was 1.2  which is relatively stable. His hemoglobin was 11.5 which also was not  that low from his preoperative hemoglobin which was 12.6. A CAT scan  was obtained, the suprapubic tube was in place, but there was quite a  bit of clot within the bladder. They were unable to irrigate the  catheter and because of this urologic consultation was made. PAST MEDICAL HISTORY:  Remarkable for obsessive compulsive disorder,  neuropathy, hypertension, elevated lipids, esophageal reflux, prostatic  hypertrophy with neurogenic bladder. PAST SURGICAL HISTORY:  He just had cystoscopy with removal of a bladder  stone four days ago. He has also had a hernia surgery, tonsillectomy  and eye surgery.     MEDICATIONS:  Please see history of present illness for medication list.    PHYSICAL EXAMINATION:  GENITOURINARY: The patient has a suprapubic tube, there is actual  leakage of urine which is bloody around the suprapubic tube site. He  had a three-way urethral Herman as well. ABDOMEN:  His abdomen is somewhat distended, but it was soft. GENERAL:  He did not appear to be in any significant distress. He was  able to communicate with me fairly well. EXTREMITIES:  He has some lower extremity edema which I believe is  chronic. PROCEDURE NOTE:  I placed a 22-Italian urethral Herman catheter and spent  approximately 20-30 minutes irrigating multiple clots from the bladder  without any significant patient discomfort probably because he had some  leakage of urine around the suprapubic tube site. Eventually, we were  able to get the clot removed and after that point we ran a continuous  flow bladder irrigation in through the suprapubic tube and out the  urethral Herman and the irrigant appeared completely clear. The patient  tolerated the procedure without difficulty. IMPRESSION AND PLAN:  Gross hematuria with clots of unknown etiology. The patient will be admitted to the hospital with continuous bladder  irrigation in through the suprapubic tube and out the urethral Herman. We will have him n.p.o. after midnight and repeat cystoscopy tomorrow  under anesthesia for source of the bleeding. We will also give the  patient IV Rocephin.         Antony Duarte MD    D: 05/19/2019 14:53:42       T: 05/19/2019 20:55:39     [email or web address]  Job#: [id number]     Doc#: [id number]    CC:

## 2019-05-20 NOTE — PROGRESS NOTES
Shift assessment complete. Patient alert and oriented x4, difficult to understand at times. Patient has been NPO since midnight. Emptying singh every 2 hours, minimal clots and bleeding noted overnight. Small amount of fluid leaking from suprapubic cath site. Patient is on specialty mattress, encouraged and assisting with repositioning every 2 hours. Heel protecting boots in place. The care plan and education has been reviewed and mutually agreed upon with the patient. Patient remains free from falls. All fall precautions in place. Yellow blanket at bedside, yellow bracelet on patient. SAFE sign on door. Bed and chair alarms being used. Bed in lowest position. Will monitor.      Electronically signed by Shon Mott RN on 5/20/2019 at 2:48 AM

## 2019-05-20 NOTE — DISCHARGE INSTR - COC
Continuity of Care Form    Patient Name: Osbaldo Grigsby   :    MRN:  [id number]    Admit date:  2019  Discharge date:  2019    Code Status Order: Full Code   Advance Directives:   Advance Care Flowsheet Documentation     Date/Time Healthcare Directive Type of Healthcare Directive Copy in [address] Agent's Name Healthcare Agent's Phone Number    19 1610  No, patient does not have an advance directive for healthcare treatment -- -- -- -- --          Admitting Physician:  Rad Sheffield MD  PCP: Susan Robb MD    Discharging Nurse: [facility] Unit/Room#: 3UD-6621/1359-76  Discharging Unit Phone Number: [phone]    Emergency Contact:   Extended Emergency Contact Information  Primary Emergency Contact: [place], [address] Phone: [phone]  Relation: Brother/Sister  Secondary Emergency Contact: Vickey Hancock  Address: [facility]           [id number]  Home Phone: [phone]  Relation: Niece/Nephew    Past Surgical History:  Past Surgical History:   Procedure Laterality Date    ARM SURGERY      BLADDER SURGERY      CYSTOSCOPY N/A 5/15/2019    CYSTOLITHALOPAXY , STONE  SUPRA PUBIC CATHETER EXCHANGE. performed by Magdalena George MD at [address] [place] [address] N/A 1/15/2019    OPEN PRIMARY UMBILICAL HERNIA REPAIR performed by Nae Partida MD at [address] [place] [address]       Immunization History:   Immunization History   Administered Date(s) Administered    Influenza Vaccine, unspecified formulation 10/17/2018    Pneumococcal 13-valent Conjugate ([id number]) 2019    Pneumococcal Polysaccharide ([id number]) 2015       Active Problems:  Patient Active Problem List   Diagnosis Code    Intellectual disability F79    Seizure disorder ([facility] [address] G40.909    Acute renal failure ([facility] [address] N17.9    Acute cystitis with hematuria N30.01    Ileus (HCC) K56.7    Sepsis (HCC) A41.9    Acute kidney injury ([address] N17.9    Small bowel obstruction (HCC) K56.609    Nausea and vomiting X93.3    Umbilical hernia with obstruction, without gangrene K42.0    Kidney stone N20.0       Isolation/Infection:   Isolation          No Isolation            Nurse Assessment:  Last Vital Signs: /75   Pulse 67   Temp 98.1 °F (36.7 °C) (Oral)   Resp 19   Ht 5' 8\" (1.727 m)   Wt 180 lb (81.6 kg)   SpO2 93%   BMI 27.37 kg/m²     Last documented pain score (0-10 scale): Pain Level: 3  Last Weight:   Wt Readings from Last 1 Encounters:   05/19/19 180 lb (81.6 kg)     Mental Status:  alert, coherent, logical and thought processes intact    IV Access:  - None    Nursing Mobility/ADLs:  Walking   Assisted  Transfer  Assisted  Bathing  Dependent  Dressing  Dependent  Toileting  Dependent  Feeding  Dependent  Med Admin  Dependent  Med Delivery   whole    Wound Care Documentation and Therapy:        Elimination:  Continence:   · Bowel: No  · Bladder: No  Urinary Catheter: Indication for Use of Catheter: Urology/Urologist seeing this patient or inserted indwelling catheter   Colostomy/Ileostomy/Ileal Conduit: No       Date of Last BM: 5/21    Intake/Output Summary (Last 24 hours) at 5/20/2019 1151  Last data filed at 5/20/2019 1006  Gross per 24 hour   Intake 845 ml   Output 3350 ml   Net -2505 ml     I/O last 3 completed shifts: In: 845 [I.V.:845]  Out: 900 [Urine:900]    Safety Concerns: At Risk for Falls    Impairments/Disabilities:      None    Nutrition Therapy:  Current Nutrition Therapy:   - Oral Diet:  General    Routes of Feeding: Oral  Liquids: Thin Liquids  Daily Fluid Restriction: no  Last Modified Barium Swallow with Video (Video Swallowing Test): not done    Treatments at the Time of Hospital Discharge:   Respiratory Treatments:   Oxygen Therapy:  is not on home oxygen therapy.   Ventilator:    - No ventilator support    Rehab Therapies: Physical Therapy and Occupational Therapy  Weight Bearing Status/Restrictions: No weight bearing restirctions  Other Medical Equipment (for information only, NOT a DME order):  hospital bed  Other Treatments:     Patient's personal belongings (please select all that are sent with patient):  None    RN SIGNATURE:  Electronically signed by Bryan Ball RN on 5/22/19 at 11:14 AM    CASE MANAGEMENT/SOCIAL WORK SECTION    Inpatient Status Date: ***    Readmission Risk Assessment Score:  Readmission Risk              Risk of Unplanned Readmission:        16           Discharging to Facility/ Agency   · Name: [facility]  · [address] [facility]  · [phone]  · Fax: [phone]      / signature: Electronically signed by BROWN Huitron on 5/20/19 at 11:52 AM    PHYSICIAN SECTION    Prognosis: Guarded    Condition at Discharge: Stable    Rehab Potential (if transferring to Rehab): Poor    Recommended Labs or Other Treatments After Discharge: urology follow up     Physician Certification: I certify the above information and transfer of Ryan Larson  is necessary for the continuing treatment of the diagnosis listed and that he requires Intermediate Nursing Care for greater 30 days.      Update Admission H&P: No change in H&P    PHYSICIAN SIGNATURE:  Electronically signed by Amrita Griffin MD on 5/22/19 at 11:00 AM

## 2019-05-20 NOTE — PROGRESS NOTES
Urology Progress Note  [facility]    Provider: Pretty Royal MD Patient ID:  Admission Date: 2019 Name: Alicia Espinoza  OR Date: 2019 MRN: [id number]   Patient Location: [facility]80/[id number] : 1945  Attending: Jonathan Mclean MD Date of Service: 2019  PCP: Barnet Snellen, MD     Diagnoses:  SPT  Clot retention  [address]    Assessment/Plan:  74 yo M with GH and clot retention after cystolithalopaxy for bladder stones    - clamped CBI this AM  - no need for urgent cystoscopy  - continue to monitor for [address]  - if gets significantly more bloody can irrigate PRN and notify  right away    The patient had a chance to ask questions which were answered. he understands the above plan. Subjective:   Alicia Espinoza is a 75 y.o. male. He was seen and examined this morning. Today we discussed stopping CBI to see how urine looks later this morning     Objective:   Vitals:  Vitals:    19 0615   BP: (!) 150/76   Pulse: 86   Resp: 16   Temp: 98.2 °F (36.8 °C)   SpO2: 93%       Intake/Output Summary (Last 24 hours) at 2019 0747  Last data filed at 2019 8064  Gross per 24 hour   Intake 845 ml   Output 900 ml   Net -55 ml     Physical Exam:  Gen: Alert and oriented x3, no acute distress  CV: Regular rate   Resp: unlabored respirations  Abd: Soft, non-distended, non-tender, no masses  Ext: no peripheral edema noted, moves upper and lower extremities spontaneously  Skin: warm and well perfused, no rashes noted on the face, or arms.    CBI running at slow through SPT and out urethral singh clear with no pink    Labs:  Lab Results   Component Value Date    WBC 8.9 2019    HGB 9.6 (L) 2019    HCT 27.3 (L) 2019    MCV 98.3 2019     2019     Lab Results   Component Value Date    CREATININE 1.2 2019    BUN 40 (H) 2019     (L) 2019    K 5.0 2019    CL 99 2019    CO2 20 (L) 2019       Thai Nevarez

## 2019-05-21 PROBLEM — N17.9 ACUTE KIDNEY INJURY (HCC): Status: RESOLVED | Noted: 2019-01-12 | Resolved: 2019-05-21

## 2019-05-21 PROBLEM — R31.9 HEMATURIA: Status: ACTIVE | Noted: 2019-05-21

## 2019-05-21 PROBLEM — R33.9 URINARY RETENTION: Status: ACTIVE | Noted: 2019-05-21

## 2019-05-21 PROBLEM — K56.609 SMALL BOWEL OBSTRUCTION (HCC): Status: RESOLVED | Noted: 2019-01-12 | Resolved: 2019-05-21

## 2019-05-21 PROBLEM — E86.0 DEHYDRATION: Status: ACTIVE | Noted: 2019-05-21

## 2019-05-21 LAB
ANION GAP SERPL CALCULATED.3IONS-SCNC: 8 MMOL/L (ref 3–16)
BUN BLDV-MCNC: 19 MG/DL (ref 7–20)
CALCIUM SERPL-MCNC: 9.1 MG/DL (ref 8.3–10.6)
CHLORIDE BLD-SCNC: 102 MMOL/L (ref 99–110)
CO2: 28 MMOL/L (ref 21–32)
CREAT SERPL-MCNC: 0.6 MG/DL (ref 0.8–1.3)
GFR AFRICAN AMERICAN: >60
GFR NON-AFRICAN AMERICAN: >60
GLUCOSE BLD-MCNC: 138 MG/DL (ref 70–99)
HCT VFR BLD CALC: 29.7 % (ref 40.5–52.5)
HEMOGLOBIN: 10.1 G/DL (ref 13.5–17.5)
MCH RBC QN AUTO: 33.8 PG (ref 26–34)
MCHC RBC AUTO-ENTMCNC: 34 G/DL (ref 31–36)
MCV RBC AUTO: 99.4 FL (ref 80–100)
PDW BLD-RTO: 15.2 % (ref 12.4–15.4)
PLATELET # BLD: 177 K/UL (ref 135–450)
PMV BLD AUTO: 7.8 FL (ref 5–10.5)
POTASSIUM SERPL-SCNC: 4 MMOL/L (ref 3.5–5.1)
RBC # BLD: 2.99 M/UL (ref 4.2–5.9)
SODIUM BLD-SCNC: 138 MMOL/L (ref 136–145)
TROPONIN: <0.01 NG/ML
TROPONIN: <0.01 NG/ML
WBC # BLD: 18.1 K/UL (ref 4–11)

## 2019-05-21 PROCEDURE — 36415 COLL VENOUS BLD VENIPUNCTURE: CPT

## 2019-05-21 PROCEDURE — 1200000000 HC SEMI PRIVATE

## 2019-05-21 PROCEDURE — 6360000002 HC RX W HCPCS: Performed by: INTERNAL MEDICINE

## 2019-05-21 PROCEDURE — 84484 ASSAY OF TROPONIN QUANT: CPT

## 2019-05-21 PROCEDURE — 85027 COMPLETE CBC AUTOMATED: CPT

## 2019-05-21 PROCEDURE — 80048 BASIC METABOLIC PNL TOTAL CA: CPT

## 2019-05-21 PROCEDURE — 2580000003 HC RX 258: Performed by: INTERNAL MEDICINE

## 2019-05-21 RX ORDER — CITALOPRAM 20 MG/1
20 TABLET ORAL DAILY
Status: CANCELLED | OUTPATIENT
Start: 2019-05-21

## 2019-05-21 RX ORDER — LANOLIN ALCOHOL/MO/W.PET/CERES
3 CREAM (GRAM) TOPICAL NIGHTLY
Status: CANCELLED | OUTPATIENT
Start: 2019-05-21

## 2019-05-21 RX ORDER — GUAIFENESIN 600 MG/1
400 TABLET, EXTENDED RELEASE ORAL 3 TIMES DAILY PRN
Status: CANCELLED | OUTPATIENT
Start: 2019-05-21

## 2019-05-21 RX ORDER — LAMOTRIGINE 100 MG/1
100 TABLET ORAL 3 TIMES DAILY
Status: CANCELLED | OUTPATIENT
Start: 2019-05-21

## 2019-05-21 RX ORDER — NITROGLYCERIN 0.4 MG/1
0.4 TABLET SUBLINGUAL EVERY 5 MIN PRN
Status: DISCONTINUED | OUTPATIENT
Start: 2019-05-21 | End: 2019-05-22 | Stop reason: HOSPADM

## 2019-05-21 RX ORDER — NICOTINE POLACRILEX 4 MG
15 LOZENGE BUCCAL PRN
Status: CANCELLED | OUTPATIENT
Start: 2019-05-21

## 2019-05-21 RX ORDER — PSEUDOEPHEDRINE HCL 30 MG
100 TABLET ORAL 2 TIMES DAILY
Status: CANCELLED | OUTPATIENT
Start: 2019-05-21

## 2019-05-21 RX ORDER — YOHIMBE BARK 500 MG
1 CAPSULE ORAL 2 TIMES DAILY
Status: CANCELLED | OUTPATIENT
Start: 2019-05-21

## 2019-05-21 RX ORDER — AMOXICILLIN 250 MG
2 CAPSULE ORAL NIGHTLY
Status: CANCELLED | OUTPATIENT
Start: 2019-05-21

## 2019-05-21 RX ORDER — BENZONATATE 100 MG/1
100 CAPSULE ORAL 3 TIMES DAILY PRN
Status: CANCELLED | OUTPATIENT
Start: 2019-05-21

## 2019-05-21 RX ORDER — CYANOCOBALAMIN (VITAMIN B-12) 500 MCG
1 TABLET ORAL DAILY
Status: CANCELLED | OUTPATIENT
Start: 2019-05-21

## 2019-05-21 RX ORDER — TROSPIUM CHLORIDE 20 MG/1
20 TABLET, FILM COATED ORAL
Status: CANCELLED | OUTPATIENT
Start: 2019-05-21

## 2019-05-21 RX ORDER — GABAPENTIN 100 MG/1
200 CAPSULE ORAL 3 TIMES DAILY
Status: CANCELLED | OUTPATIENT
Start: 2019-05-21

## 2019-05-21 RX ORDER — LISINOPRIL 20 MG/1
20 TABLET ORAL DAILY
Status: CANCELLED | OUTPATIENT
Start: 2019-05-21

## 2019-05-21 RX ORDER — PANTOPRAZOLE SODIUM 40 MG/1
40 TABLET, DELAYED RELEASE ORAL
Status: CANCELLED | OUTPATIENT
Start: 2019-05-22

## 2019-05-21 RX ORDER — MORPHINE SULFATE 2 MG/ML
2 INJECTION, SOLUTION INTRAMUSCULAR; INTRAVENOUS
Status: DISCONTINUED | OUTPATIENT
Start: 2019-05-21 | End: 2019-05-22 | Stop reason: HOSPADM

## 2019-05-21 RX ORDER — CARBAMAZEPINE 200 MG/1
200 TABLET ORAL 3 TIMES DAILY
Status: CANCELLED | OUTPATIENT
Start: 2019-05-21

## 2019-05-21 RX ORDER — POLYETHYLENE GLYCOL 3350 17 G/17G
17 POWDER ORAL DAILY
Status: CANCELLED | OUTPATIENT
Start: 2019-05-21

## 2019-05-21 RX ORDER — LANOLIN ALCOHOL/MO/W.PET/CERES
1000 CREAM (GRAM) TOPICAL DAILY
Status: CANCELLED | OUTPATIENT
Start: 2019-05-21

## 2019-05-21 RX ORDER — IPRATROPIUM BROMIDE AND ALBUTEROL SULFATE 2.5; .5 MG/3ML; MG/3ML
3 SOLUTION RESPIRATORY (INHALATION) EVERY 4 HOURS PRN
Status: CANCELLED | OUTPATIENT
Start: 2019-05-21

## 2019-05-21 RX ORDER — ACETAMINOPHEN 325 MG/1
650 TABLET ORAL EVERY 4 HOURS PRN
Status: CANCELLED | OUTPATIENT
Start: 2019-05-21

## 2019-05-21 RX ADMIN — Medication 1 G: at 13:42

## 2019-05-21 RX ADMIN — SODIUM CHLORIDE: 9 INJECTION, SOLUTION INTRAVENOUS at 00:25

## 2019-05-21 RX ADMIN — ENOXAPARIN SODIUM 40 MG: 40 INJECTION SUBCUTANEOUS at 07:57

## 2019-05-21 RX ADMIN — Medication 10 ML: at 21:02

## 2019-05-21 RX ADMIN — SODIUM CHLORIDE: 9 INJECTION, SOLUTION INTRAVENOUS at 21:02

## 2019-05-21 RX ADMIN — MORPHINE SULFATE 2 MG: 2 INJECTION, SOLUTION INTRAMUSCULAR; INTRAVENOUS at 21:01

## 2019-05-21 ASSESSMENT — PAIN SCALES - GENERAL
PAINLEVEL_OUTOF10: 7
PAINLEVEL_OUTOF10: 7
PAINLEVEL_OUTOF10: 0

## 2019-05-21 ASSESSMENT — PAIN DESCRIPTION - PAIN TYPE: TYPE: ACUTE PAIN

## 2019-05-21 ASSESSMENT — PAIN DESCRIPTION - LOCATION: LOCATION: ABDOMEN

## 2019-05-21 NOTE — PROGRESS NOTES
Patient incontinent of stool, ripped out PIV, and had oxygen off upon assessment. Full linin change and provided pericare.

## 2019-05-21 NOTE — H&P
[address], [address]                              HISTORY AND PHYSICAL    PATIENT NAME: Germania Wall                    :        1945  MED REC NO:   [id number]                          ROOM:       6567  ACCOUNT NO:   [de-identified]                           ADMIT DATE: 2019  PROVIDER:     Sukumar Horta MD    HISTORY OF PRESENT ILLNESS:  The patient is a 74-year-old white man with  some intellectual disability, came to the emergency room, does have  chronic indwelling suprapubic catheter, had intractable abdominal pain. The patient recently had some urologic intervention for kidney stone. The patient was evaluated further and was found to have some blockage of  the urine outflow. The patient as such is not able to give much  history. 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 PAST SURGICAL HISTORY:  Pertinent for bladder suspension procedure,  tonsillectomy, eye surgery, umbilical cord hernia repair, and  cystoscopy. MEDICATIONS:  Vitamin D, vitamin B12, Sanctura, Hafsa-Colace, MiraLax,  Prilosec, melatonin, Claritin, Prinivil, Lamictal, acidophilous, DuoNeb,  Levemir, Mucinex, glutose gel, gabapentin, folic acid, Colace, vitamin  C, cranberry, Celexa, Tegretol, Tessalon, aspirin, and Tylenol. ALLERGIES:  The patient has no known allergies. SOCIAL HISTORY:  Single man, never , never had any children. No  occupational history. No education. Has been in pretty much in  assisted care.   No history of recent smoking or drinking that can be  confirmed. FAMILY HISTORY:  Both parents are  because of natural causes. REVIEW OF SYSTEMS:  Unobtainable as this patient has significant  intellectual difficulty and now speech impairment, functionally appears  paraplegic, moderate distress. No convulsions. No nausea or vomiting. Does have abdominal pain. No gross hematuria. Does have oliguria. Does have flank pain. No angina pectoris or shortness of breath. No  hematemesis or melena. The patient appears to be eating modified diet. The patient is nonambulatory. No decubitus ulceration mentioned. PHYSICAL EXAMINATION:  GENERAL:  Lethargic, incoherent, disoriented 50-year-old man. VITAL SIGNS:  Body mass index 27.4, temperature 97.8, blood pressure  150/76, respirations 16, heart rate 86. HEENT:  Oral mucosa are dry. SKIN:  Warm and dry. NECK:  Supple. No jugular venous distention. No lymphadenopathy. LUNGS:  Vesicular breath sounds. Poor inspirations. Scattered crackles  at the bases. HEART:  Regular rate and rhythm. S1, S2, 1/6 holosystolic murmur. No  gallop rhythm. ABDOMEN:  Soft. There is tenderness in the left flank and hyper-gastric  region with a suprapubic catheter. There is no guarding, rebound, or  rigidity. EXTREMITIES:  Shows no cyanosis or edema. Distal pulsations are weak. NEUROLOGIC:  The patient appears to be functionally paraplegic. Upper  extremity movements are minimum, cannot follow any instruction for a  detailed sensory motor evaluation. Babinski is absent. LABORATORY EVALUATION:  Shows of sodium of 133, potassium 5.0, chloride  99, CO2 20, BUN 40, creatinine 1.2, anion gap was 14, glomerular  filtration rate is 59, calcium is 9.1, albumin 3.7, alkaline phosphatase  144. White blood cell count is 11, hemoglobin and hematocrit 11.5 and  33.6, and platelet count 010. PT/INR is 11.4 and 1.00. PTT is 30.8.    Urinalysis consistent with red colored urine, large amount of blood. No  leukocyte esterase. More than 100 wbcs. Leukocyte esterase negative. The patient underwent CT scan of the abdomen and pelvis that showed  suprapubic catheter in place. His urinary bladder moderately distended  and contains a large amount of heterogeneous relatively high density  material most likely blood product. Correlation to exclude underlying  mass is recommended. Cystoscopy maybe worthwhile. There is some  perivesical fat stranding consistent with cystitis. Chest x-ray done  showed no evidence of acute cardiopulmonary disease. ASSESSMENT:  Hematuria, urinary retention, anemia due to acute blood  loss, abdominal pain, intellectual disability, dehydration, and seizure  disorder. PLAN:  Get him admitted. Treat him with IV hydration. Urology  consultation.         Laura Greene MD    D: 05/21/2019 9:25:31       T: 05/21/2019 10:11:00     [email or web address]  Job#: [id number]     Doc#: [id number]    CC:

## 2019-05-21 NOTE — PROGRESS NOTES
Urology Progress Note  [facility]    Provider: Lilli Richard MD Patient ID:  Admission Date: 2019 Name: Wanda Mott  OR Date: 2019 MRN: [id number]   Patient Location: [id number] : 1945  Attending: Brandin Gupta MD Date of Service: 2019  PCP: Sharon Ugarte MD     Diagnoses:  Bladder stones  Chronic urinary retention  Clot urinary retention  Gross hematuria    Assessment/Plan:  -urethral Herman catheter removed by me, patient tolerated well  -SP tube irrigates and flows well  -OK for discharge to home once culture results finalized. Will need ABX. -PRN bladder flushing if concern for obstruction  -routine monthly SP tube changes. The patient had a chance to ask questions which were answered. he understands the above plan. Subjective:   Wanda Mott is a 75 y.o. male. He was seen and examined this morning. Today we discussed going home, and removing his urethral Herman catheter. He usually lives with just the SP tube to drainage. Objective:   Vitals:  Vitals:    19 0730   BP: (!) 154/78   Pulse: 80   Resp: 14   Temp: 97.8 °F (36.6 °C)   SpO2: 97%       Intake/Output Summary (Last 24 hours) at 2019 1054  Last data filed at 2019 0617  Gross per 24 hour   Intake 1830 ml   Output 2575 ml   Net -745 ml     Physical Exam:  Gen: Alert and oriented x3, no acute distress  CV: Regular rate   Resp: unlabored respirations  Abd: Soft, non-distended, non-tender, no masses  Ext: no peripheral edema noted, moves upper and lower extremities spontaneously  Skin: warm and well perfused, no rashes noted on the face, or arms.     urine color is clear and bladder flushes easily    Labs:  Lab Results   Component Value Date    WBC 18.1 (H) 2019    HGB 10.1 (L) 2019    HCT 29.7 (L) 2019    MCV 99.4 2019     2019     Lab Results   Component Value Date    CREATININE 0.6 (L) 2019    BUN 19 2019     05/21/2019    K 4.0 05/21/2019     05/21/2019    CO2 28 05/21/2019       Ruddy Llanes MD  5/21/2019

## 2019-05-21 NOTE — PROGRESS NOTES
7:56 AM: Morning assessment completed, patient denies needs at this time, call light in reach, will continue to monitor. The care plan and education has been reviewed and mutually agreed upon with the patient. Educated patient on the potential for falls during their hospital stay. Reviewed current fall safety protocol. Reviewed indication for use of bed alarm. Patient verbalized understanding.

## 2019-05-21 NOTE — PROGRESS NOTES
Patient Active Problem List   Diagnosis    Intellectual disability    Seizure disorder ([facility] [address]    Acute cystitis with hematuria    Nausea and vomiting    Kidney stone    Dehydration    Urinary retention   H&P dictated

## 2019-05-21 NOTE — CARE COORDINATION
[address] [address] Continence Nurse  Consult Note       NAME:  Sneha Carbajal RECORD NUMBER:  [id number]  AGE: 75 y.o. GENDER: male  : 1945  TODAY'S DATE:  2019    Subjective   Reason for WOCN Evaluation and Assessment: BLE fragile, dry, bleeding      Joel Machado is a 75 y.o. male referred by:   [] Physician  [x] Nursing  [] Other:     Wound Identification:  Wound Type: venous  Contributing Factors: venous stasis    Wound History: chronic, pt unable to give much hx  Current Wound Care Treatment:  Adaptic, kerlix, ace    Patient Goal of Care:  [x] Wound Healing  [] Odor Control  [] Palliative Care  [] Pain Control   [] Other:         PAST MEDICAL HISTORY        Diagnosis Date    Constipation     [facility]ulsions [facility]     Degenerative disease of nervous system     Delusional disorder ([facility]     Encephalopathy     Enlarged prostate with lower urinary tract symptoms (LUTS)     GERD (gastroesophageal reflux disease)     Hematuria     Hyperlipidemia     Hypernatremia     Hypertension     Hypertension     Mood disorder (HCC)     Muscle weakness     Muscle weakness (generalized)     Neuromuscular dysfunction of bladder     Neuropathy     Obsessive-compulsive personality disorder (CODE)     Suprapubic catheter ([facility]     #20    Urinary retention        PAST SURGICAL HISTORY    Past Surgical History:   Procedure Laterality Date    ARM SURGERY      BLADDER SURGERY      CYSTOSCOPY N/A 5/15/2019    CYSTOLITHALOPAXY , STONE  SUPRA PUBIC CATHETER EXCHANGE. performed by Roly Prescott MD at [address] [place] [address] N/A 1/15/2019    OPEN PRIMARY UMBILICAL HERNIA REPAIR performed by Brian Kingston MD at [facility].    History reviewed. No pertinent family history.     SOCIAL HISTORY    Social History     Tobacco Use    Smoking status: Never Smoker    Smokeless tobacco: Never Used   Substance Use Topics    Alcohol use: No    Drug use: No       ALLERGIES    No Known Allergies    MEDICATIONS    No current facility-administered medications on file prior to encounter.       Current Outpatient Medications on File Prior to Encounter   Medication Sig Dispense Refill    docusate sodium (COLACE, DULCOLAX) 100 MG CAPS Take 100 mg by mouth 2 times daily      Lactobacillus (ACIDOPHILUS) 100 MG CAPS Take by mouth 2 times daily      citalopram (CELEXA) 20 MG tablet Take 20 mg by mouth daily      trospium (SANCTURA) 20 MG tablet Take 20 mg by mouth 2 times daily      insulin detemir (LEVEMIR) 100 UNIT/ML injection vial Inject 5 Units into the skin 2 times daily      melatonin 3 MG TABS tablet Take 3 mg by mouth nightly      Cranberry-Vitamin C-Vitamin E (CRANBERRY PLUS VITAMIN C PO) Take 30 mLs by mouth 2 times daily      aspirin 81 MG tablet Take 81 mg by mouth daily      carBAMazepine (TEGRETOL) 200 MG tablet Take 200 mg by mouth 3 times daily       loratadine (CLARITIN) 10 MG tablet Take 10 mg by mouth daily      Folic Acid 0.8 MG CAPS Take 1 tablet by mouth daily      gabapentin (NEURONTIN) 100 MG capsule Take 200 mg by mouth 3 times daily      lamoTRIgine (LAMICTAL) 100 MG tablet Take 100 mg by mouth 3 times daily       lisinopril (PRINIVIL;ZESTRIL) 20 MG tablet Take 20 mg by mouth daily       polyethylene glycol (MIRALAX) POWD powder Take 17 g by mouth daily       senna-docusate (PERICOLACE) 8.6-50 MG per tablet Take 2 tablets by mouth nightly      vitamin B-12 (CYANOCOBALAMIN) 1000 MCG tablet Take 1,000 mcg by mouth daily      vitamin D (CHOLECALCIFEROL) 1000 UNIT TABS tablet Take 5,000 Units by mouth daily       omeprazole (PRILOSEC) 20 MG capsule Take 20 mg by mouth daily      glucose (GLUTOSE) 40 % GEL Take 37.5 mLs by mouth as needed (low BS) 45 g 1    benzonatate (TESSALON) 100 MG capsule Take 100 mg by mouth 3 times daily as needed for Cough      ipratropium-albuterol (DUONEB) 0.5-2.5 (3) MG/3ML SOLN nebulizer solution Inhale 3 mLs into the lungs every 4 hours as needed for Shortness of Breath 360 mL     guaiFENesin (MUCINEX) 600 MG extended release tablet Take 400 mg by mouth 3 times daily as needed       acetaminophen (TYLENOL) 325 MG tablet Take 650 mg by mouth every 4 hours as needed for Pain         Objective    BP (!) 161/81   Pulse 78   Temp 99.5 °F (37.5 °C)   Resp 16   Ht 5' 8\" (1.727 m)   Wt 180 lb (81.6 kg)   SpO2 97%   BMI 27.37 kg/m²     LABS:  WBC:    Lab Results   Component Value Date    WBC 18.1 05/21/2019     H/H:    Lab Results   Component Value Date    HGB 10.1 05/21/2019    HCT 29.7 05/21/2019     PTT:    Lab Results   Component Value Date    APTT 30.8 05/19/2019   [APTT}  PT/INR:    Lab Results   Component Value Date    PROTIME 11.4 05/19/2019    INR 1.00 05/19/2019     HgBA1c:  No results found for: LABA1C    Assessment   Milind Risk Score: Milind Scale Score: 16    Patient Active Problem List   Diagnosis Code    Intellectual disability F79    Seizure disorder ([facility] G40.909    Acute cystitis with hematuria N30.01    Nausea and vomiting R11.2    Kidney stone N20.0    Dehydration E86.0    Urinary retention R33.9    Hematuria R31.9       Measurements:  Wound 05/21/19 Pretibial Right (Active)   Wound Venous 5/21/2019  1:23 PM   Dressing Status Clean;Dry; Old drainage; Changed 5/21/2019  1:23 PM   Dressing Changed Changed/New 5/21/2019  1:23 PM   Dressing/Treatment Moisture barrier;Non adherent; Ace Wrap 5/21/2019  1:23 PM   Wound Cleansed Other (Comment) 5/21/2019  1:23 PM   Wound Assessment Clean;Drainage;Pink 5/21/2019  1:23 PM   Drainage Amount Small 5/21/2019  1:23 PM   Drainage Description Sanguinous 5/21/2019  1:23 PM   Odor None 5/21/2019  1:23 PM   Margins Undefined edges 5/21/2019  1:23 PM   Hafsa-wound Assessment Dry;Clean 5/21/2019  1:23 PM   Culture Taken No 5/21/2019  1:23 PM   Number of days: 0       Wound 05/21/19 Pretibial Left (Active)   Wound Venous 5/21/2019 1:21 PM   Dressing Status Clean;Dry 5/21/2019  1:21 PM   Dressing Changed Changed/New 5/21/2019  1:21 PM   Dressing/Treatment Moisture barrier;Non adherent 5/21/2019  1:21 PM   Wound Cleansed Other (Comment) 5/21/2019  1:21 PM   Dressing Change Due 05/21/19 5/21/2019  1:21 PM   Wound Assessment Clean;Dry;Pink 5/21/2019  1:21 PM   Drainage Amount None 5/21/2019  1:21 PM   Odor None 5/21/2019  1:21 PM   Margins Undefined edges 5/21/2019  1:21 PM   Hafsa-wound Assessment Dry 5/21/2019  1:21 PM   Non-staged Wound Description Partial thickness 5/21/2019  1:21 PM   Culture Taken No 5/21/2019  1:21 PM   Number of days: 0      Pt seen. Has dry, scaling skin to BLE with scattered, superficial open areas that are pink. Small amount of bleeding noted from RLE. Pt does report itching. Bilat heels and buttocks are intact. Suprapubic cath intact. Response to treatment:  Well tolerated by patient.      Pain Assessment:  Severity:  0 / 10  Quality of pain: N/A  Wound Pain Timing/Severity: none  Premedicated: No    Plan   Plan of Care: Wound 05/21/19 Pretibial Right-Dressing/Treatment: Moisture barrier, Non adherent, Ace Wrap  Wound 05/21/19 Pretibial Left-Dressing/Treatment: Moisture barrier, Non adherent    Specialty Bed Required : Yes   [x] Low Air Loss Power Pro Elite  [] Pressure Redistribution  [] Fluid Immersion  [] Bariatric  [] Total Pressure Relief  [] Other:     Current Diet: DIET GENERAL;  Dietician consult:  No    Discharge Plan:  Placement for patient upon discharge: skilled nursing    Patient appropriate for Outpatient [address]: No    Referrals:  [x]   [] [facility]  [] Supplies  [] Other    Patient/Caregiver Teaching:  Level of patient/caregiver understanding able to:   [] Indicates understanding       [x] Needs reinforcement  [] Unsuccessful      [] Verbal Understanding  [] Demonstrated understanding       [] No evidence of learning  [] Refused teaching         [] N/A Electronically signed by Deberah Duane, CWOCN on 5/21/2019 at 1:25 PM

## 2019-05-21 NOTE — PROGRESS NOTES
Assessment complete Herman cath draining clear yellow urine Call light in reach will continue to monitor

## 2019-05-22 VITALS
TEMPERATURE: 98.8 F | HEIGHT: 68 IN | BODY MASS INDEX: 27.28 KG/M2 | WEIGHT: 180 LBS | DIASTOLIC BLOOD PRESSURE: 76 MMHG | RESPIRATION RATE: 14 BRPM | HEART RATE: 72 BPM | OXYGEN SATURATION: 95 % | SYSTOLIC BLOOD PRESSURE: 145 MMHG

## 2019-05-22 PROCEDURE — 6360000002 HC RX W HCPCS: Performed by: INTERNAL MEDICINE

## 2019-05-22 PROCEDURE — 6370000000 HC RX 637 (ALT 250 FOR IP): Performed by: INTERNAL MEDICINE

## 2019-05-22 RX ORDER — CIPROFLOXACIN 500 MG/1
500 TABLET, FILM COATED ORAL EVERY 12 HOURS SCHEDULED
Qty: 20 TABLET | Refills: 0 | Status: SHIPPED | OUTPATIENT
Start: 2019-05-22 | End: 2019-06-01

## 2019-05-22 RX ORDER — CIPROFLOXACIN 500 MG/1
500 TABLET, FILM COATED ORAL EVERY 12 HOURS SCHEDULED
Status: DISCONTINUED | OUTPATIENT
Start: 2019-05-22 | End: 2019-05-22 | Stop reason: HOSPADM

## 2019-05-22 RX ADMIN — CIPROFLOXACIN HYDROCHLORIDE 500 MG: 500 TABLET, FILM COATED ORAL at 11:44

## 2019-05-22 RX ADMIN — ENOXAPARIN SODIUM 40 MG: 40 INJECTION SUBCUTANEOUS at 07:23

## 2019-05-22 ASSESSMENT — PAIN SCALES - GENERAL
PAINLEVEL_OUTOF10: 0
PAINLEVEL_OUTOF10: 1

## 2019-05-22 ASSESSMENT — PAIN DESCRIPTION - PAIN TYPE: TYPE: ACUTE PAIN

## 2019-05-22 ASSESSMENT — PAIN DESCRIPTION - LOCATION: LOCATION: ABDOMEN

## 2019-05-22 NOTE — PROGRESS NOTES
Department of Internal Medicine  General Internal Medicine   Progress Note      SUBJECTIVE: looks in no distress , but does c/o intermittent pain , no hematuria , urine flow in bladder irrigation is clear      History obtained from chart review and the patient  General ROS: positive for  - fatigue and malaise  negative for - chills, fever or night sweats  Psychological ROS: positive for - anxiety, disorientation, irritability, memory difficulties and mood swings  negative for - hallucinations or hostility  Respiratory ROS: positive for - cough, shortness of breath and tachypnea  negative for - hemoptysis or stridor  Cardiovascular ROS: positive for - dyspnea on exertion  negative for - chest pain  Gastrointestinal ROS: no abdominal pain, change in bowel habits, or black or bloody stools    OBJECTIVE      Medications      Current Facility-Administered Medications: nitroGLYCERIN (NITROSTAT) SL tablet 0.4 mg, 0.4 mg, Sublingual, Q5 Min PRN  morphine (PF) injection 2 mg, 2 mg, Intravenous, Q1H PRN  sodium chloride flush 0.9 % injection 10 mL, 10 mL, Intravenous, 2 times per day  sodium chloride flush 0.9 % injection 10 mL, 10 mL, Intravenous, PRN  ondansetron (ZOFRAN) injection 4 mg, 4 mg, Intravenous, Q8H PRN  0.9 % sodium chloride infusion, , Intravenous, Continuous  HYDROmorphone HCl PF (DILAUDID) injection 0.5 mg, 0.5 mg, Intravenous, Q4H PRN  cefTRIAXone (ROCEPHIN) 1 g in sterile water 10 mL IV syringe, 1 g, Intravenous, Q24H  enoxaparin (LOVENOX) injection 40 mg, 40 mg, Subcutaneous, Daily    Physical      VITALS:    BP (!) 145/76   Pulse 72   Temp 98.8 °F (37.1 °C) (Oral)   Resp 14   Ht 5' 8\" (1.727 m)   Wt 180 lb (81.6 kg)   SpO2 95%   BMI 27.37 kg/m²   TEMPERATURE:  Current - Temp: 98.8 °F (37.1 °C);  Max - Temp  Av °F (37.2 °C)  Min: 97.8 °F (36.6 °C)  Max: 99.7 °F (37.6 °C)  RESPIRATIONS RANGE: Resp  Avg: 15.5  Min: 14  Max: 16  PULSE RANGE: Pulse  Av  Min: 72  Max: 84  BLOOD PRESSURE RANGE:

## 2019-05-22 NOTE — PROGRESS NOTES
Shift assessment completed, patient is alert and oriented. Slurry speech but follows commands. Morphine given for pain. Drying dressing applied at suprapubic catheter site for small leakage. (See flowsheets). Will monitor patient. The care plan and education has been reviewed and mutually agreed upon with the patient.

## 2019-05-22 NOTE — PROGRESS NOTES
7: 27 AM: Morning assessment completed, patient denies needs at this time, call light in reach, will continue to monitor. The care plan and education has been reviewed and mutually agreed upon with the patient. Educated patient on the potential for falls during their hospital stay. Reviewed current fall safety protocol. Reviewed indication for use of bed alarm. Patient verbalized understanding. SPC minimal leakage. Temp trending down.

## 2019-05-22 NOTE — CARE COORDINATION
Discharge Plan:     Patient discharged back to: [facility]  SW/DC Planner faxed, [address] and AVS to: [phone]  Narcotic Prescriptions faxed were: None  RN: Alejandro Celis will call report to:   [address] [place] [address] with: [address] [facility]  [phone]   time: 12:30 pm  Family advised of discharge?: Yes, Matthew garcia?:  N/A  Returning  All discharge needs met per case management.     Electronically signed by BROWN Huitron on 5/22/2019 at 11:23 AM

## 2019-05-22 NOTE — PROGRESS NOTES
CLINICAL PHARMACY NOTE: MEDS TO [address] Select Patient?: No  Total # of Prescriptions Filled: 1   The following medications were delivered to the patient:  · ciprofloxacin  Total # of Interventions Completed: 0  Time Spent (min): 60    Additional Documentation:  Delivered meds to patient bedside.  Patient signed

## 2019-05-22 NOTE — PLAN OF CARE
Problem: Falls - Risk of:  Goal: Will remain free from falls  Description  Will remain free from falls  Outcome: Ongoing  Note:   Fall precautions in place, bed alarm on, bed at lowest position     Problem: Risk for Impaired Skin Integrity  Goal: Tissue integrity - skin and mucous membranes  Description  Structural intactness and normal physiological function of skin and  mucous membranes.   Outcome: Ongoing  Note:   Reposition patient very 2 hours     Problem: Pain:  Goal: Pain level will decrease  Description  Pain level will decrease  Outcome: Ongoing  Note:   Prn pain meds available
Pt.will  remain free of falls or accidental physical injury this shift. Precautions in place to prevent accidental fall or physical injury. Bed in lowest position, call light with in reach, overbed table within reach, bed alarm activated . Pt VU of all precautions and are encouraged to call out for any needs and for help ambulating to the restroom and in/out of bed.
behavior  Outcome: Ongoing  Goal: Verbalizations of feeling emotionally comfortable while being cared for will increase  Description  Verbalizations of feeling emotionally comfortable while being cared for will increase  Outcome: Ongoing     Problem: Psychomotor Activity - Altered:  Goal: Absence of psychomotor disturbance signs and symptoms  Description  Absence of psychomotor disturbance signs and symptoms  Outcome: Ongoing     Problem: Sensory Perception - Impaired:  Goal: Demonstrations of improved sensory functioning will increase  Description  Demonstrations of improved sensory functioning will increase  Outcome: Ongoing  Goal: Decrease in sensory misperception frequency  Description  Decrease in sensory misperception frequency  Outcome: Ongoing  Goal: Able to refrain from responding to false sensory perceptions  Description  Able to refrain from responding to false sensory perceptions  Outcome: Ongoing  Goal: Demonstrates accurate environmental perceptions  Description  Demonstrates accurate environmental perceptions  Outcome: Ongoing  Goal: Able to distinguish between reality-based and nonreality-based thinking  Description  Able to distinguish between reality-based and nonreality-based thinking  Outcome: Ongoing  Goal: Able to interrupt nonreality-based thinking  Description  Able to interrupt nonreality-based thinking  Outcome: Ongoing     Problem: Sleep Pattern Disturbance:  Goal: Appears well-rested  Description  Appears well-rested  Outcome: Ongoing

## 2019-05-23 LAB
ORGANISM: ABNORMAL
URINE CULTURE, ROUTINE: ABNORMAL
URINE CULTURE, ROUTINE: ABNORMAL

## 2019-05-30 PROBLEM — R31.0 GROSS HEMATURIA: Status: ACTIVE | Noted: 2019-05-30

## 2019-05-30 NOTE — PROGRESS NOTES
Patient Active Problem List   Diagnosis    Intellectual disability    Seizure disorder ([facility]    Acute cystitis with hematuria    Nausea and vomiting    Kidney stone    Dehydration    Urinary retention    Hematuria    Gross hematuria   problem list updated

## 2019-06-19 NOTE — PROGRESS NOTES
Patient seen , discharge dictated scripts given , arrangements made , AMANDA completed .  Discussed with nursing staff  And   If applicable ,  Discussed with  Patient's family , all questions answered and concerns addressed  When applicable

## 2019-06-20 PROBLEM — E86.0 DEHYDRATION: Status: RESOLVED | Noted: 2019-05-21 | Resolved: 2019-06-20

## 2019-06-20 NOTE — DISCHARGE SUMMARY
[facility]                     [facility], [facility]                               DISCHARGE SUMMARY    PATIENT NAME: Esequiel Duverney                    :        1945  MED REC NO:   [id number]                          ROOM:       9137  ACCOUNT NO:   [de-identified]                           ADMIT DATE: 2019  PROVIDER:     Christian Welsh MD                  DISCHARGE DATE:  2019    FINAL DIAGNOSES:  1. Hematuria. 2.  Urinary retention. 3.  Anemia due to acute blood loss  4. Abdominal pain. 5.  Intellectual disability. 6.  Dehydration. 7.  Seizure disorder. DISCHARGE MEDICATIONS:  1. Cipro 500 mg p.o. b.i.d. for 10 days. 2.  Docusate 100 mg p.o. b.i.d.  3.  Lactobacillus acidophilus one tablet daily b.i.d.  4.  Celexa 20 mg once a day. 5·  Sanctura 20 mg p.o. b.i.d.  6.  Levemir 5 units two times a day. 7.  Melatonin 3 mg nightly. 8.  Cranberry. 9.  Vitamin C 30 mL p.o. b.i.d. 10.  Aspirin 81 mg once a day. 11.  Carbamazepine 200 mg p.o. t.i.d.  12.  Loratadine 10 mg once a day. 13.  Folic acid 0.8 mg once a day. 14.  Gabapentin 100 mg two tablets three times a day. 15.  Lamotrigine 100 mg p.o. b.i.d. 16.  Zestril 20 mg once a day. 17.  MiraLAX 17 g once a day. 18.  Hafsa-Colace two tablets nightly. 19.  Vitamin B12 1000 mcg daily. 20.  Vitamin D3 1000 units daily. 21.  Omeprazole 20 mg once a day. 22.  Glutose gel p.r.n. for low blood sugar. 23.  Tessalon Perles 100 mg p.o. t.i.d.  24.  DuoNeb 1 every four hours p.r.n.  25.  Mucinex 600 mg p.o. b.i.d.  26.  Tylenol 650 q.4 p.r.n. HOSPITAL COURSE:  This elderly man came to the emergency room, does have  some intellectual disability, had gross hematuria, was found due to  anemia with acute blood loss. Vital signs are stable. Blood pressure  was 150/76, respirations 16, heart rate 86. The patient was afebrile. Lungs were clear to auscultation.   Heart is regular rate and rhythm. S1, S2 without any S3 or S4 gallop. Abdomen is soft, nontender. Sodium  was 133, potassium 5.0, chloride 99, CO2 of 20, BUN 40, creatinine 1.2,  anion gap was 14. Glomerular filtration rate was 59. Calcium was 9.1,  albumin 3.7, alkaline phosphatase 144. White blood cell count was  11,000. Hemoglobin/hematocrit 11.5 and 33.6. Urinalysis consistent  with  red colored urine with large amount of blood, and leukocyte  esterase was absent. CT scan of the abdomen and pelvis showed  suprapubic catheter in place with urinary bladder moderately distended  and contained large amount of heterogeneous relativity, relatively high  density material, most likely blood product with correlation to be  excluded with any other underlying mass. Cystoscopy was recommended. The patient had perivesical fat stranding consistent with cystitis. No  evidence of acute cardiopulmonary disease. Hemoglobin/hematocrit  remained stable. Hematuria clear. The patient was given IV  antibiotics, subsequently switched on oral Cipro based on sensitivity. Dr. Sushant Wilks saw the patient from urology group. He recommended  urethral Herman catheter to be removed by Dr. Sean Hope, status post  tube feeding. Once the culture result is finalized, the patient was  okay to dismiss, empiric Cipro was perfectly okay. At the present time,  the patient was discharged in stable condition. Ongoing care provider  will be the doctor at the nursing home. The patient was discharged in  stable condition. The patient also had a urine culture, and it showed  Proteus mirabilis. Regardless, it was sensitive to _____. The patient  was well responsive to it. The patient was discharged in stable  condition to the nursing home. Ongoing care provider will be  _____.           Palomo Barksdale MD    D: 06/19/2019 12:31:31       T: 06/20/2019 7:00:23     [email or web address]  Job#: [id number]     Doc#: [id number]    CC:

## 2019-08-28 ENCOUNTER — APPOINTMENT (OUTPATIENT)
Dept: CT IMAGING | Age: 74
End: 2019-08-28
Payer: MEDICARE

## 2019-08-28 ENCOUNTER — HOSPITAL ENCOUNTER (EMERGENCY)
Age: 74
Discharge: HOME | End: 2019-08-28
Attending: EMERGENCY MEDICINE
Payer: MEDICARE

## 2019-08-28 VITALS
HEIGHT: 68 IN | DIASTOLIC BLOOD PRESSURE: 62 MMHG | OXYGEN SATURATION: 95 % | HEART RATE: 68 BPM | BODY MASS INDEX: 27.28 KG/M2 | RESPIRATION RATE: 18 BRPM | SYSTOLIC BLOOD PRESSURE: 154 MMHG | WEIGHT: 180 LBS | TEMPERATURE: 98.1 F
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
## 2019-08-28 DIAGNOSIS — S01.81XA LACERATION OF OTHER PART OF HEAD WITHOUT FOREIGN BODY, INITIAL ENCOUNTER: ICD-10-CM
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
## 2019-08-28 DIAGNOSIS — S09.90XA CLOSED HEAD INJURY, INITIAL ENCOUNTER: ICD-10-CM

## 2019-08-28 PROCEDURE — 99283 EMERGENCY DEPT VISIT LOW MDM: CPT

## 2019-08-28 PROCEDURE — 6360000002 HC RX W HCPCS: Performed by: EMERGENCY MEDICINE

## 2019-08-28 PROCEDURE — 6370000000 HC RX 637 (ALT 250 FOR IP): Performed by: EMERGENCY MEDICINE

## 2019-08-28 PROCEDURE — 90471 IMMUNIZATION ADMIN: CPT | Performed by: EMERGENCY MEDICINE

## 2019-08-28 PROCEDURE — 90715 TDAP VACCINE 7 YRS/> IM: CPT | Performed by: EMERGENCY MEDICINE

## 2019-08-28 PROCEDURE — 4500000023 HC ED LEVEL 3 PROCEDURE

## 2019-08-28 PROCEDURE — 70450 CT HEAD/BRAIN W/O DYE: CPT

## 2019-08-28 PROCEDURE — 72125 CT NECK SPINE W/O DYE: CPT

## 2019-08-28 RX ORDER — LORATADINE 10 MG/1
10 TABLET ORAL DAILY
COMMUNITY
End: 2020-01-01

## 2019-08-28 RX ORDER — DIVALPROEX SODIUM 125 MG/1
125 TABLET, DELAYED RELEASE ORAL 2 TIMES DAILY
COMMUNITY

## 2019-08-28 RX ORDER — ACETAMINOPHEN 500 MG
1000 TABLET ORAL ONCE
Status: COMPLETED | OUTPATIENT
Start: 2019-08-28 | End: 2019-08-28

## 2019-08-28 RX ORDER — HALOPERIDOL 5 MG/ML
INJECTION INTRAMUSCULAR EVERY 6 HOURS PRN
COMMUNITY
End: 2020-01-01

## 2019-08-28 RX ADMIN — TETANUS TOXOID, REDUCED DIPHTHERIA TOXOID AND ACELLULAR PERTUSSIS VACCINE, ADSORBED 0.5 ML: 5; 2.5; 8; 8; 2.5 SUSPENSION INTRAMUSCULAR at 06:06

## 2019-08-28 RX ADMIN — ACETAMINOPHEN 1000 MG: 500 TABLET, FILM COATED ORAL at 06:05

## 2019-08-28 ASSESSMENT — PAIN SCALES - GENERAL
PAINLEVEL_OUTOF10: 8
PAINLEVEL_OUTOF10: 0

## 2019-08-28 ASSESSMENT — PAIN DESCRIPTION - LOCATION: LOCATION: HEAD

## 2019-08-28 NOTE — ED NOTES
Pt resting with eyes closed. Meds given per orders.  Awaiting EMS     Mike Jacques RN  08/28/19 [id number]

## 2019-08-28 NOTE — ED PROVIDER NOTES
[address] [facility]  eMERGENCY dEPARTMENTeNCOUnter      Pt Name: Kieran Hinton  MRN: [id number]  [place] 1945  Date of evaluation: 8/28/2019  Provider: Lorena Vázquez MD    CHIEF COMPLAINT       Chief Complaint   Patient presents with    Fall     FF Ems transported from [facility] stating that pt was asleep in wheelchair and fell forward and hit forehead and has a 1 inch laceration, no active bleeding at this time, [facility] states that pt is normally combative but wasn't after fall so they were concerned. VSS BS 82         HISTORY OF PRESENT ILLNESS   (Location/Symptom, Timing/Onset,Context/Setting, Quality, Duration, Modifying Factors, Severity)  Note limiting factors. Kieran Hinton is a 75 y.o. male who presents to the emergency department for evaluation after he fell from his wheelchair hitting his forehead. The patient normally sleeps in his wheelchair and is given several medications that make him drowsy. Patient's only complaint presently is of headache. He denies any neck pain chest pain or abdominal pain. Nursing notes were reviewed.     REVIEW OF SYSTEMS    (2-9 systems for level 4, 10 or more for level 5)     Review of Systems    Limited due to speech impediment    PAST MEDICAL HISTORY     Past Medical History:   Diagnosis Date    Constipation     Convulsions ([address]     Degenerative disease of nervous system     Delusional disorder ([address]     Encephalopathy     Enlarged prostate with lower urinary tract symptoms (LUTS)     GERD (gastroesophageal reflux disease)     Hematuria     Hyperlipidemia     Hypernatremia     Hypertension     Hypertension     Mood disorder (HCC)     Muscle weakness     Muscle weakness (generalized)     Neuromuscular dysfunction of bladder     Neuropathy     Obsessive-compulsive personality disorder (CODE)     Suprapubic catheter ([address]     #20    Urinary retention          SURGICALHISTORY       Past Surgical History: Procedure Laterality Date    ARM SURGERY      BLADDER SURGERY      CYSTOSCOPY N/A 5/15/2019    CYSTOLITHALOPAXY , STONE  SUPRA PUBIC CATHETER EXCHANGE. performed by Kayla Lam MD at [address] [place] [address] N/A 1/15/2019    OPEN PRIMARY UMBILICAL HERNIA REPAIR performed by Calin Munoz MD at [address] [facility]       Previous Medications    ACETAMINOPHEN (TYLENOL) 325 MG TABLET    Take 650 mg by mouth every 4 hours as needed for Pain    ASPIRIN 81 MG TABLET    Take 81 mg by mouth daily    BENZONATATE (TESSALON) 100 MG CAPSULE    Take 100 mg by mouth 3 times daily as needed for Cough    CARBAMAZEPINE (TEGRETOL) 200 MG TABLET    Take 200 mg by mouth 3 times daily     CITALOPRAM (CELEXA) 20 MG TABLET    Take 20 mg by mouth daily    CRANBERRY-VITAMIN C-VITAMIN E (CRANBERRY PLUS VITAMIN C PO)    Take 30 mLs by mouth 2 times daily    DIVALPROEX (DEPAKOTE) 125 MG DR TABLET    Take 125 mg by mouth 2 times daily    DOCUSATE SODIUM (COLACE, DULCOLAX) 100 MG CAPS    Take 100 mg by mouth 2 times daily    FOLIC ACID 0.8 MG CAPS    Take 1 tablet by mouth daily    GABAPENTIN (NEURONTIN) 100 MG CAPSULE    Take 200 mg by mouth 3 times daily    GLUCOSE (GLUTOSE) 40 % GEL    Take 37.5 mLs by mouth as needed (low BS)    GUAIFENESIN (MUCINEX) 600 MG EXTENDED RELEASE TABLET    Take 400 mg by mouth 3 times daily as needed     HALOPERIDOL LACTATE (HALDOL) 5 MG/ML INJECTION    Inject into the muscle every 6 hours as needed for Agitation    INSULIN DETEMIR (LEVEMIR) 100 UNIT/ML INJECTION VIAL    Inject 5 Units into the skin 2 times daily    IPRATROPIUM-ALBUTEROL (DUONEB) 0.5-2.5 (3) MG/3ML SOLN NEBULIZER SOLUTION    Inhale 3 mLs into the lungs every 4 hours as needed for Shortness of Breath    LACTOBACILLUS (ACIDOPHILUS) 100 MG CAPS    Take by mouth 2 times daily    LAMOTRIGINE (LAMICTAL) 100 MG TABLET    Take 100 mg by mouth 3 times daily     LISINOPRIL

## 2020-01-01 ENCOUNTER — ANESTHESIA EVENT (OUTPATIENT)
Dept: ENDOSCOPY | Age: 75
DRG: 100 | End: 2020-01-01
Payer: MEDICARE

## 2020-01-01 ENCOUNTER — APPOINTMENT (OUTPATIENT)
Dept: CT IMAGING | Age: 75
DRG: 100 | End: 2020-01-01
Payer: MEDICARE

## 2020-01-01 ENCOUNTER — HOSPITAL ENCOUNTER (INPATIENT)
Age: 75
LOS: 20 days | DRG: 100 | End: 2020-09-30
Attending: STUDENT IN AN ORGANIZED HEALTH CARE EDUCATION/TRAINING PROGRAM | Admitting: INTERNAL MEDICINE
Payer: MEDICARE

## 2020-01-01 ENCOUNTER — ANESTHESIA (OUTPATIENT)
Dept: ENDOSCOPY | Age: 75
DRG: 100 | End: 2020-01-01
Payer: MEDICARE

## 2020-01-01 ENCOUNTER — APPOINTMENT (OUTPATIENT)
Dept: CT IMAGING | Age: 75
End: 2020-01-01
Payer: MEDICARE

## 2020-01-01 ENCOUNTER — APPOINTMENT (OUTPATIENT)
Dept: GENERAL RADIOLOGY | Age: 75
DRG: 100 | End: 2020-01-01
Payer: MEDICARE

## 2020-01-01 ENCOUNTER — TELEPHONE (OUTPATIENT)
Dept: NEUROLOGY | Age: 75
End: 2020-01-01

## 2020-01-01 ENCOUNTER — HOSPITAL ENCOUNTER (EMERGENCY)
Age: 75
Discharge: HOME | End: 2020-05-14
Payer: MEDICARE

## 2020-01-01 VITALS
BODY MASS INDEX: 25.73 KG/M2 | HEIGHT: 68 IN | OXYGEN SATURATION: 82 % | WEIGHT: 169.75 LBS | HEART RATE: 43 BPM | TEMPERATURE: 95.1 F | SYSTOLIC BLOOD PRESSURE: 41 MMHG | RESPIRATION RATE: 14 BRPM | DIASTOLIC BLOOD PRESSURE: 16 MMHG

## 2020-01-01 VITALS
BODY MASS INDEX: 26.52 KG/M2 | WEIGHT: 165 LBS | TEMPERATURE: 98.2 F | HEART RATE: 57 BPM | DIASTOLIC BLOOD PRESSURE: 52 MMHG | SYSTOLIC BLOOD PRESSURE: 102 MMHG | HEIGHT: 66 IN | RESPIRATION RATE: 20 BRPM | OXYGEN SATURATION: 95 %

## 2020-01-01 VITALS
OXYGEN SATURATION: 99 % | SYSTOLIC BLOOD PRESSURE: 113 MMHG | DIASTOLIC BLOOD PRESSURE: 49 MMHG | RESPIRATION RATE: 12 BRPM

## 2020-01-01 VITALS — OXYGEN SATURATION: 98 %

## 2020-01-01 LAB
A/G RATIO: 0.6 (ref 1.1–2.2)
A/G RATIO: 0.8 (ref 1.1–2.2)
A/G RATIO: 1 (ref 1.1–2.2)
ABO/RH: NORMAL
ABO/RH: NORMAL
ACANTHOCYTES: ABNORMAL
ALBUMIN SERPL-MCNC: 2.4 G/DL (ref 3.4–5)
ALBUMIN SERPL-MCNC: 2.6 G/DL (ref 3.4–5)
ALBUMIN SERPL-MCNC: 2.7 G/DL (ref 3.4–5)
ALBUMIN SERPL-MCNC: 2.7 G/DL (ref 3.4–5)
ALBUMIN SERPL-MCNC: 2.9 G/DL (ref 3.4–5)
ALBUMIN SERPL-MCNC: 3.5 G/DL (ref 3.4–5)
ALP BLD-CCNC: 113 U/L (ref 40–129)
ALP BLD-CCNC: 56 U/L (ref 40–129)
ALP BLD-CCNC: 83 U/L (ref 40–129)
ALP BLD-CCNC: 91 U/L (ref 40–129)
ALP BLD-CCNC: 94 U/L (ref 40–129)
ALP BLD-CCNC: 97 U/L (ref 40–129)
ALT SERPL-CCNC: 12 U/L (ref 10–40)
ALT SERPL-CCNC: 13 U/L (ref 10–40)
ALT SERPL-CCNC: 13 U/L (ref 10–40)
ALT SERPL-CCNC: 14 U/L (ref 10–40)
ALT SERPL-CCNC: 38 U/L (ref 10–40)
ALT SERPL-CCNC: 60 U/L (ref 10–40)
AMMONIA: 29 UMOL/L (ref 16–60)
ANION GAP SERPL CALCULATED.3IONS-SCNC: 10 MMOL/L (ref 3–16)
ANION GAP SERPL CALCULATED.3IONS-SCNC: 11 MMOL/L (ref 3–16)
ANION GAP SERPL CALCULATED.3IONS-SCNC: 2 MMOL/L (ref 3–16)
ANION GAP SERPL CALCULATED.3IONS-SCNC: 3 MMOL/L (ref 3–16)
ANION GAP SERPL CALCULATED.3IONS-SCNC: 4 MMOL/L (ref 3–16)
ANION GAP SERPL CALCULATED.3IONS-SCNC: 5 MMOL/L (ref 3–16)
ANION GAP SERPL CALCULATED.3IONS-SCNC: 6 MMOL/L (ref 3–16)
ANION GAP SERPL CALCULATED.3IONS-SCNC: 7 MMOL/L (ref 3–16)
ANION GAP SERPL CALCULATED.3IONS-SCNC: 8 MMOL/L (ref 3–16)
ANION GAP SERPL CALCULATED.3IONS-SCNC: 9 MMOL/L (ref 3–16)
ANION GAP SERPL CALCULATED.3IONS-SCNC: 9 MMOL/L (ref 3–16)
ANISOCYTOSIS: ABNORMAL
ANTIBODY SCREEN: NORMAL
ANTIBODY SCREEN: NORMAL
AST SERPL-CCNC: 29 U/L (ref 15–37)
AST SERPL-CCNC: 30 U/L (ref 15–37)
AST SERPL-CCNC: 32 U/L (ref 15–37)
AST SERPL-CCNC: 33 U/L (ref 15–37)
AST SERPL-CCNC: 37 U/L (ref 15–37)
AST SERPL-CCNC: 66 U/L (ref 15–37)
BACTERIA: ABNORMAL /HPF
BANDED NEUTROPHILS RELATIVE PERCENT: 1 % (ref 0–7)
BANDED NEUTROPHILS RELATIVE PERCENT: 1 % (ref 0–7)
BANDED NEUTROPHILS RELATIVE PERCENT: 4 % (ref 0–7)
BANDED NEUTROPHILS RELATIVE PERCENT: 5 % (ref 0–7)
BASE EXCESS ARTERIAL: 13.8 MMOL/L (ref -3–3)
BASE EXCESS ARTERIAL: 17 (ref -3–3)
BASE EXCESS ARTERIAL: 20 (ref -3–3)
BASE EXCESS ARTERIAL: 20.6 MMOL/L (ref -3–3)
BASE EXCESS ARTERIAL: >30 (ref -3–3)
BASE EXCESS ARTERIAL: >30 (ref -3–3)
BASOPHILIC STIPPLING: ABNORMAL
BASOPHILS ABSOLUTE: 0 K/UL (ref 0–0.2)
BASOPHILS ABSOLUTE: ABNORMAL K/UL (ref 0–0.2)
BASOPHILS RELATIVE PERCENT: 0 %
BASOPHILS RELATIVE PERCENT: 0.3 %
BASOPHILS RELATIVE PERCENT: 0.5 %
BASOPHILS RELATIVE PERCENT: ABNORMAL %
BILIRUB SERPL-MCNC: 0.3 MG/DL (ref 0–1)
BILIRUB SERPL-MCNC: 0.4 MG/DL (ref 0–1)
BILIRUB SERPL-MCNC: 0.4 MG/DL (ref 0–1)
BILIRUB SERPL-MCNC: <0.2 MG/DL (ref 0–1)
BILIRUBIN DIRECT: <0.2 MG/DL (ref 0–0.3)
BILIRUBIN URINE: ABNORMAL
BILIRUBIN URINE: NEGATIVE
BILIRUBIN, INDIRECT: ABNORMAL MG/DL (ref 0–1)
BLOOD BANK DISPENSE STATUS: NORMAL
BLOOD BANK PRODUCT CODE: NORMAL
BLOOD CULTURE, ROUTINE: NORMAL
BLOOD CULTURE, ROUTINE: NORMAL
BLOOD, URINE: ABNORMAL
BLOOD, URINE: ABNORMAL
BPU ID: NORMAL
BUN BLDV-MCNC: 10 MG/DL (ref 7–20)
BUN BLDV-MCNC: 11 MG/DL (ref 7–20)
BUN BLDV-MCNC: 13 MG/DL (ref 7–20)
BUN BLDV-MCNC: 14 MG/DL (ref 7–20)
BUN BLDV-MCNC: 16 MG/DL (ref 7–20)
BUN BLDV-MCNC: 22 MG/DL (ref 7–20)
BUN BLDV-MCNC: 23 MG/DL (ref 7–20)
BUN BLDV-MCNC: 24 MG/DL (ref 7–20)
BUN BLDV-MCNC: 25 MG/DL (ref 7–20)
BUN BLDV-MCNC: 27 MG/DL (ref 7–20)
BUN BLDV-MCNC: 29 MG/DL (ref 7–20)
BUN BLDV-MCNC: 33 MG/DL (ref 7–20)
BUN BLDV-MCNC: 36 MG/DL (ref 7–20)
BUN BLDV-MCNC: 46 MG/DL (ref 7–20)
BUN BLDV-MCNC: 54 MG/DL (ref 7–20)
BUN BLDV-MCNC: 8 MG/DL (ref 7–20)
BUN BLDV-MCNC: 8 MG/DL (ref 7–20)
BUN BLDV-MCNC: 80 MG/DL (ref 7–20)
BUN BLDV-MCNC: 89 MG/DL (ref 7–20)
BUN BLDV-MCNC: 9 MG/DL (ref 7–20)
C DIFF TOXIN/ANTIGEN: NORMAL
C-REACTIVE PROTEIN: 59.5 MG/L (ref 0–5.1)
CALCIUM IONIZED: 1.28 MMOL/L (ref 1.12–1.32)
CALCIUM IONIZED: 1.32 MMOL/L (ref 1.12–1.32)
CALCIUM SERPL-MCNC: 7.6 MG/DL (ref 8.3–10.6)
CALCIUM SERPL-MCNC: 7.9 MG/DL (ref 8.3–10.6)
CALCIUM SERPL-MCNC: 8 MG/DL (ref 8.3–10.6)
CALCIUM SERPL-MCNC: 8.2 MG/DL (ref 8.3–10.6)
CALCIUM SERPL-MCNC: 8.3 MG/DL (ref 8.3–10.6)
CALCIUM SERPL-MCNC: 8.3 MG/DL (ref 8.3–10.6)
CALCIUM SERPL-MCNC: 8.4 MG/DL (ref 8.3–10.6)
CALCIUM SERPL-MCNC: 8.5 MG/DL (ref 8.3–10.6)
CALCIUM SERPL-MCNC: 8.6 MG/DL (ref 8.3–10.6)
CALCIUM SERPL-MCNC: 8.7 MG/DL (ref 8.3–10.6)
CALCIUM SERPL-MCNC: 8.8 MG/DL (ref 8.3–10.6)
CALCIUM SERPL-MCNC: 8.8 MG/DL (ref 8.3–10.6)
CALCIUM SERPL-MCNC: 8.9 MG/DL (ref 8.3–10.6)
CALCIUM SERPL-MCNC: 9.1 MG/DL (ref 8.3–10.6)
CALCIUM SERPL-MCNC: 9.2 MG/DL (ref 8.3–10.6)
CARBAMAZEPINE DOSE: NORMAL
CARBAMAZEPINE LEVEL: 6.7 UG/ML (ref 4–12)
CARBOXYHEMOGLOBIN ARTERIAL: 1.3 % (ref 0–1.5)
CARBOXYHEMOGLOBIN ARTERIAL: 1.4 % (ref 0–1.5)
CHLORIDE BLD-SCNC: 102 MMOL/L (ref 99–110)
CHLORIDE BLD-SCNC: 104 MMOL/L (ref 99–110)
CHLORIDE BLD-SCNC: 106 MMOL/L (ref 99–110)
CHLORIDE BLD-SCNC: 107 MMOL/L (ref 99–110)
CHLORIDE BLD-SCNC: 107 MMOL/L (ref 99–110)
CHLORIDE BLD-SCNC: 108 MMOL/L (ref 99–110)
CHLORIDE BLD-SCNC: 109 MMOL/L (ref 99–110)
CHLORIDE BLD-SCNC: 109 MMOL/L (ref 99–110)
CHLORIDE BLD-SCNC: 110 MMOL/L (ref 99–110)
CHLORIDE BLD-SCNC: 111 MMOL/L (ref 99–110)
CHLORIDE BLD-SCNC: 112 MMOL/L (ref 99–110)
CHLORIDE BLD-SCNC: 113 MMOL/L (ref 99–110)
CHLORIDE BLD-SCNC: 113 MMOL/L (ref 99–110)
CHLORIDE BLD-SCNC: 118 MMOL/L (ref 99–110)
CHLORIDE BLD-SCNC: 94 MMOL/L (ref 99–110)
CHLORIDE BLD-SCNC: 96 MMOL/L (ref 99–110)
CHLORIDE BLD-SCNC: 96 MMOL/L (ref 99–110)
CHLORIDE BLD-SCNC: 97 MMOL/L (ref 99–110)
CHLORIDE BLD-SCNC: 98 MMOL/L (ref 99–110)
CLARITY: ABNORMAL
CLARITY: CLEAR
CO2: 20 MMOL/L (ref 21–32)
CO2: 25 MMOL/L (ref 21–32)
CO2: 26 MMOL/L (ref 21–32)
CO2: 27 MMOL/L (ref 21–32)
CO2: 27 MMOL/L (ref 21–32)
CO2: 28 MMOL/L (ref 21–32)
CO2: 30 MMOL/L (ref 21–32)
CO2: 30 MMOL/L (ref 21–32)
CO2: 32 MMOL/L (ref 21–32)
CO2: 34 MMOL/L (ref 21–32)
CO2: 35 MMOL/L (ref 21–32)
CO2: 39 MMOL/L (ref 21–32)
CO2: 40 MMOL/L (ref 21–32)
CO2: 40 MMOL/L (ref 21–32)
CO2: 42 MMOL/L (ref 21–32)
CO2: 43 MMOL/L (ref 21–32)
CO2: 44 MMOL/L (ref 21–32)
CO2: 46 MMOL/L (ref 21–32)
COLOR: YELLOW
COLOR: YELLOW
COMMENT UA: ABNORMAL
CREAT SERPL-MCNC: 0.5 MG/DL (ref 0.8–1.3)
CREAT SERPL-MCNC: 0.6 MG/DL (ref 0.8–1.3)
CREAT SERPL-MCNC: 0.7 MG/DL (ref 0.8–1.3)
CREAT SERPL-MCNC: 0.7 MG/DL (ref 0.8–1.3)
CREAT SERPL-MCNC: 0.8 MG/DL (ref 0.8–1.3)
CREAT SERPL-MCNC: 0.9 MG/DL (ref 0.8–1.3)
CREAT SERPL-MCNC: 1.3 MG/DL (ref 0.8–1.3)
CREAT SERPL-MCNC: 1.7 MG/DL (ref 0.8–1.3)
CREAT SERPL-MCNC: <0.5 MG/DL (ref 0.8–1.3)
CREATININE URINE: 15.9 MG/DL (ref 39–259)
CRYSTALS, UA: ABNORMAL /HPF
CULTURE, BLOOD 2: NORMAL
CULTURE, BLOOD 2: NORMAL
D DIMER: 602 NG/ML DDU (ref 0–229)
D DIMER: 624 NG/ML DDU (ref 0–229)
DAT IGG CAPTURE: NORMAL
DESCRIPTION BLOOD BANK: NORMAL
EKG ATRIAL RATE: 68 BPM
EKG ATRIAL RATE: 70 BPM
EKG DIAGNOSIS: NORMAL
EKG DIAGNOSIS: NORMAL
EKG P AXIS: 37 DEGREES
EKG P-R INTERVAL: 124 MS
EKG Q-T INTERVAL: 428 MS
EKG Q-T INTERVAL: 614 MS
EKG QRS DURATION: 112 MS
EKG QRS DURATION: 128 MS
EKG QTC CALCULATION (BAZETT): 455 MS
EKG QTC CALCULATION (BAZETT): 652 MS
EKG R AXIS: 64 DEGREES
EKG R AXIS: 66 DEGREES
EKG T AXIS: 44 DEGREES
EKG T AXIS: 63 DEGREES
EKG VENTRICULAR RATE: 68 BPM
EKG VENTRICULAR RATE: 68 BPM
EOSINOPHILS ABSOLUTE: 0 K/UL (ref 0–0.6)
EOSINOPHILS ABSOLUTE: 0.1 K/UL (ref 0–0.6)
EOSINOPHILS ABSOLUTE: 0.1 K/UL (ref 0–0.6)
EOSINOPHILS ABSOLUTE: ABNORMAL K/UL (ref 0–0.6)
EOSINOPHILS RELATIVE PERCENT: 0 %
EOSINOPHILS RELATIVE PERCENT: 0.1 %
EOSINOPHILS RELATIVE PERCENT: 0.2 %
EOSINOPHILS RELATIVE PERCENT: 0.2 %
EOSINOPHILS RELATIVE PERCENT: 0.3 %
EOSINOPHILS RELATIVE PERCENT: 1 %
EOSINOPHILS RELATIVE PERCENT: 1 %
EOSINOPHILS RELATIVE PERCENT: ABNORMAL %
EPITHELIAL CELLS, UA: 1 /HPF (ref 0–5)
EPITHELIAL CELLS, UA: 1 /HPF (ref 0–5)
ESTIMATED AVERAGE GLUCOSE: 111.2 MG/DL
FERRITIN: 532.1 NG/ML (ref 30–400)
FIBRINOGEN: 180 MG/DL (ref 200–397)
FIBRINOGEN: 180 MG/DL (ref 200–397)
GFR AFRICAN AMERICAN: 48
GFR AFRICAN AMERICAN: >60
GFR NON-AFRICAN AMERICAN: 39
GFR NON-AFRICAN AMERICAN: 54
GFR NON-AFRICAN AMERICAN: >60
GLOBULIN: 3 G/DL
GLOBULIN: 4.1 G/DL
GLOBULIN: 4.3 G/DL
GLUCOSE BLD-MCNC: 100 MG/DL (ref 70–99)
GLUCOSE BLD-MCNC: 101 MG/DL (ref 70–99)
GLUCOSE BLD-MCNC: 102 MG/DL (ref 70–99)
GLUCOSE BLD-MCNC: 103 MG/DL (ref 70–99)
GLUCOSE BLD-MCNC: 104 MG/DL (ref 70–99)
GLUCOSE BLD-MCNC: 105 MG/DL (ref 70–99)
GLUCOSE BLD-MCNC: 106 MG/DL (ref 70–99)
GLUCOSE BLD-MCNC: 107 MG/DL (ref 70–99)
GLUCOSE BLD-MCNC: 108 MG/DL (ref 70–99)
GLUCOSE BLD-MCNC: 109 MG/DL (ref 70–99)
GLUCOSE BLD-MCNC: 109 MG/DL (ref 70–99)
GLUCOSE BLD-MCNC: 110 MG/DL (ref 70–99)
GLUCOSE BLD-MCNC: 110 MG/DL (ref 70–99)
GLUCOSE BLD-MCNC: 111 MG/DL (ref 70–99)
GLUCOSE BLD-MCNC: 112 MG/DL (ref 70–99)
GLUCOSE BLD-MCNC: 113 MG/DL (ref 70–99)
GLUCOSE BLD-MCNC: 114 MG/DL (ref 70–99)
GLUCOSE BLD-MCNC: 115 MG/DL (ref 70–99)
GLUCOSE BLD-MCNC: 116 MG/DL (ref 70–99)
GLUCOSE BLD-MCNC: 116 MG/DL (ref 70–99)
GLUCOSE BLD-MCNC: 117 MG/DL (ref 70–99)
GLUCOSE BLD-MCNC: 117 MG/DL (ref 70–99)
GLUCOSE BLD-MCNC: 118 MG/DL (ref 70–99)
GLUCOSE BLD-MCNC: 120 MG/DL (ref 70–99)
GLUCOSE BLD-MCNC: 121 MG/DL (ref 70–99)
GLUCOSE BLD-MCNC: 121 MG/DL (ref 70–99)
GLUCOSE BLD-MCNC: 122 MG/DL (ref 70–99)
GLUCOSE BLD-MCNC: 123 MG/DL (ref 70–99)
GLUCOSE BLD-MCNC: 123 MG/DL (ref 70–99)
GLUCOSE BLD-MCNC: 124 MG/DL (ref 70–99)
GLUCOSE BLD-MCNC: 125 MG/DL (ref 70–99)
GLUCOSE BLD-MCNC: 127 MG/DL (ref 70–99)
GLUCOSE BLD-MCNC: 127 MG/DL (ref 70–99)
GLUCOSE BLD-MCNC: 128 MG/DL (ref 70–99)
GLUCOSE BLD-MCNC: 128 MG/DL (ref 70–99)
GLUCOSE BLD-MCNC: 129 MG/DL (ref 70–99)
GLUCOSE BLD-MCNC: 130 MG/DL (ref 70–99)
GLUCOSE BLD-MCNC: 130 MG/DL (ref 70–99)
GLUCOSE BLD-MCNC: 133 MG/DL (ref 70–99)
GLUCOSE BLD-MCNC: 133 MG/DL (ref 70–99)
GLUCOSE BLD-MCNC: 134 MG/DL (ref 70–99)
GLUCOSE BLD-MCNC: 135 MG/DL (ref 70–99)
GLUCOSE BLD-MCNC: 139 MG/DL (ref 70–99)
GLUCOSE BLD-MCNC: 139 MG/DL (ref 70–99)
GLUCOSE BLD-MCNC: 140 MG/DL (ref 70–99)
GLUCOSE BLD-MCNC: 140 MG/DL (ref 70–99)
GLUCOSE BLD-MCNC: 141 MG/DL (ref 70–99)
GLUCOSE BLD-MCNC: 142 MG/DL (ref 70–99)
GLUCOSE BLD-MCNC: 144 MG/DL (ref 70–99)
GLUCOSE BLD-MCNC: 145 MG/DL (ref 70–99)
GLUCOSE BLD-MCNC: 146 MG/DL (ref 70–99)
GLUCOSE BLD-MCNC: 147 MG/DL (ref 70–99)
GLUCOSE BLD-MCNC: 147 MG/DL (ref 70–99)
GLUCOSE BLD-MCNC: 148 MG/DL (ref 70–99)
GLUCOSE BLD-MCNC: 152 MG/DL (ref 70–99)
GLUCOSE BLD-MCNC: 156 MG/DL (ref 70–99)
GLUCOSE BLD-MCNC: 160 MG/DL (ref 70–99)
GLUCOSE BLD-MCNC: 161 MG/DL (ref 70–99)
GLUCOSE BLD-MCNC: 163 MG/DL (ref 70–99)
GLUCOSE BLD-MCNC: 171 MG/DL (ref 70–99)
GLUCOSE BLD-MCNC: 174 MG/DL (ref 70–99)
GLUCOSE BLD-MCNC: 176 MG/DL (ref 70–99)
GLUCOSE BLD-MCNC: 308 MG/DL (ref 70–99)
GLUCOSE BLD-MCNC: 37 MG/DL (ref 70–99)
GLUCOSE BLD-MCNC: 65 MG/DL (ref 70–99)
GLUCOSE BLD-MCNC: 67 MG/DL (ref 70–99)
GLUCOSE BLD-MCNC: 80 MG/DL (ref 70–99)
GLUCOSE BLD-MCNC: 82 MG/DL (ref 70–99)
GLUCOSE BLD-MCNC: 83 MG/DL (ref 70–99)
GLUCOSE BLD-MCNC: 84 MG/DL (ref 70–99)
GLUCOSE BLD-MCNC: 87 MG/DL (ref 70–99)
GLUCOSE BLD-MCNC: 87 MG/DL (ref 70–99)
GLUCOSE BLD-MCNC: 88 MG/DL (ref 70–99)
GLUCOSE BLD-MCNC: 89 MG/DL (ref 70–99)
GLUCOSE BLD-MCNC: 90 MG/DL (ref 70–99)
GLUCOSE BLD-MCNC: 91 MG/DL (ref 70–99)
GLUCOSE BLD-MCNC: 92 MG/DL (ref 70–99)
GLUCOSE BLD-MCNC: 92 MG/DL (ref 70–99)
GLUCOSE BLD-MCNC: 93 MG/DL (ref 70–99)
GLUCOSE BLD-MCNC: 94 MG/DL (ref 70–99)
GLUCOSE BLD-MCNC: 94 MG/DL (ref 70–99)
GLUCOSE BLD-MCNC: 95 MG/DL (ref 70–99)
GLUCOSE BLD-MCNC: 96 MG/DL (ref 70–99)
GLUCOSE BLD-MCNC: 97 MG/DL (ref 70–99)
GLUCOSE BLD-MCNC: 97 MG/DL (ref 70–99)
GLUCOSE BLD-MCNC: 99 MG/DL (ref 70–99)
GLUCOSE URINE: NEGATIVE MG/DL
GLUCOSE URINE: NEGATIVE MG/DL
HBA1C MFR BLD: 5.5 %
HCO3 ARTERIAL: 42.2 MMOL/L (ref 21–29)
HCO3 ARTERIAL: 44.3 MMOL/L (ref 21–29)
HCO3 ARTERIAL: 46.2 MMOL/L (ref 21–29)
HCO3 ARTERIAL: 47.4 MMOL/L (ref 21–29)
HCO3 ARTERIAL: 55.7 MMOL/L (ref 21–29)
HCO3 ARTERIAL: 58.5 MMOL/L (ref 21–29)
HCT VFR BLD CALC: 15.4 % (ref 40.5–52.5)
HCT VFR BLD CALC: 15.5 % (ref 40.5–52.5)
HCT VFR BLD CALC: 17.2 % (ref 40.5–52.5)
HCT VFR BLD CALC: 17.3 % (ref 40.5–52.5)
HCT VFR BLD CALC: 19 % (ref 40.5–52.5)
HCT VFR BLD CALC: 19.5 % (ref 40.5–52.5)
HCT VFR BLD CALC: 20.8 % (ref 40.5–52.5)
HCT VFR BLD CALC: 21.6 % (ref 40.5–52.5)
HCT VFR BLD CALC: 22.1 % (ref 40.5–52.5)
HCT VFR BLD CALC: 22.4 % (ref 40.5–52.5)
HCT VFR BLD CALC: 22.5 % (ref 40.5–52.5)
HCT VFR BLD CALC: 22.6 % (ref 40.5–52.5)
HCT VFR BLD CALC: 22.7 % (ref 40.5–52.5)
HCT VFR BLD CALC: 22.7 % (ref 40.5–52.5)
HCT VFR BLD CALC: 22.8 % (ref 40.5–52.5)
HCT VFR BLD CALC: 22.9 % (ref 40.5–52.5)
HCT VFR BLD CALC: 23 % (ref 40.5–52.5)
HCT VFR BLD CALC: 23.1 % (ref 40.5–52.5)
HCT VFR BLD CALC: 23.2 % (ref 40.5–52.5)
HCT VFR BLD CALC: 23.4 % (ref 40.5–52.5)
HCT VFR BLD CALC: 23.7 % (ref 40.5–52.5)
HCT VFR BLD CALC: 23.9 % (ref 40.5–52.5)
HCT VFR BLD CALC: 24 % (ref 40.5–52.5)
HCT VFR BLD CALC: 24 % (ref 40.5–52.5)
HCT VFR BLD CALC: 24.4 % (ref 40.5–52.5)
HCT VFR BLD CALC: 24.7 % (ref 40.5–52.5)
HCT VFR BLD CALC: 25.7 % (ref 40.5–52.5)
HCT VFR BLD CALC: 26 % (ref 40.5–52.5)
HCT VFR BLD CALC: 26.1 % (ref 40.5–52.5)
HCT VFR BLD CALC: 27 % (ref 40.5–52.5)
HCT VFR BLD CALC: 27.8 % (ref 40.5–52.5)
HCT VFR BLD CALC: 28.8 % (ref 40.5–52.5)
HCT VFR BLD CALC: 31.3 % (ref 40.5–52.5)
HCT VFR BLD CALC: 33.6 % (ref 40.5–52.5)
HCT VFR BLD CALC: 38.9 % (ref 40.5–52.5)
HCT VFR BLD CALC: 39.6 % (ref 40.5–52.5)
HCT VFR BLD CALC: ABNORMAL % (ref 40.5–52.5)
HEMOGLOBIN, ART, EXTENDED: 6.1 G/DL (ref 13.5–17.5)
HEMOGLOBIN, ART, EXTENDED: 7.7 G/DL (ref 13.5–17.5)
HEMOGLOBIN: 11.2 G/DL (ref 13.5–17.5)
HEMOGLOBIN: 11.7 G/DL (ref 13.5–17.5)
HEMOGLOBIN: 13.5 G/DL (ref 13.5–17.5)
HEMOGLOBIN: 13.7 G/DL (ref 13.5–17.5)
HEMOGLOBIN: 5 G/DL (ref 13.5–17.5)
HEMOGLOBIN: 5.3 G/DL (ref 13.5–17.5)
HEMOGLOBIN: 5.8 G/DL (ref 13.5–17.5)
HEMOGLOBIN: 6 G/DL (ref 13.5–17.5)
HEMOGLOBIN: 6.4 G/DL (ref 13.5–17.5)
HEMOGLOBIN: 6.8 G/DL (ref 13.5–17.5)
HEMOGLOBIN: 6.9 G/DL (ref 13.5–17.5)
HEMOGLOBIN: 7.3 G/DL (ref 13.5–17.5)
HEMOGLOBIN: 7.5 G/DL (ref 13.5–17.5)
HEMOGLOBIN: 7.5 G/DL (ref 13.5–17.5)
HEMOGLOBIN: 7.6 G/DL (ref 13.5–17.5)
HEMOGLOBIN: 7.7 G/DL (ref 13.5–17.5)
HEMOGLOBIN: 7.8 G/DL (ref 13.5–17.5)
HEMOGLOBIN: 7.8 G/DL (ref 13.5–17.5)
HEMOGLOBIN: 8 G/DL (ref 13.5–17.5)
HEMOGLOBIN: 8.2 G/DL (ref 13.5–17.5)
HEMOGLOBIN: 8.2 G/DL (ref 13.5–17.5)
HEMOGLOBIN: 8.2 GM/DL (ref 13.5–17.5)
HEMOGLOBIN: 8.3 GM/DL (ref 13.5–17.5)
HEMOGLOBIN: 8.4 G/DL (ref 13.5–17.5)
HEMOGLOBIN: 8.4 G/DL (ref 13.5–17.5)
HEMOGLOBIN: 8.5 G/DL (ref 13.5–17.5)
HEMOGLOBIN: 8.6 G/DL (ref 13.5–17.5)
HEMOGLOBIN: 8.8 G/DL (ref 13.5–17.5)
HEMOGLOBIN: 9.1 G/DL (ref 13.5–17.5)
HEMOGLOBIN: 9.1 G/DL (ref 13.5–17.5)
HEMOGLOBIN: ABNORMAL G/DL (ref 13.5–17.5)
HYALINE CASTS: 1 /LPF (ref 0–8)
HYALINE CASTS: 14 /LPF (ref 0–8)
INR BLD: 0.92 (ref 0.86–1.14)
INR BLD: 0.97 (ref 0.86–1.14)
INR BLD: 1.02 (ref 0.86–1.14)
INR BLD: 1.06 (ref 0.86–1.14)
INR BLD: 1.1 (ref 0.86–1.14)
INR BLD: 1.13 (ref 0.86–1.14)
INR BLD: 1.14 (ref 0.86–1.14)
INR BLD: 1.15 (ref 0.86–1.14)
INR BLD: 1.16 (ref 0.86–1.14)
INR BLD: 1.24 (ref 0.86–1.14)
INR BLD: 1.25 (ref 0.86–1.14)
INR BLD: 1.32 (ref 0.86–1.14)
INR BLD: 1.37 (ref 0.86–1.14)
INR BLD: 1.43 (ref 0.86–1.14)
INR BLD: 1.61 (ref 0.86–1.14)
INR BLD: 1.77 (ref 0.86–1.14)
INR BLD: 2.23 (ref 0.86–1.14)
IRON SATURATION: ABNORMAL % (ref 20–50)
IRON: 180 UG/DL (ref 59–158)
KEPPRA DOSE AMT: NORMAL
KEPPRA: 19.4 UG/ML (ref 6–46)
KETONES, URINE: ABNORMAL MG/DL
KETONES, URINE: NEGATIVE MG/DL
L. PNEUMOPHILA SEROGP 1 UR AG: NORMAL
LACTATE: 0.41 MMOL/L (ref 0.4–2)
LACTATE: <0.4 MMOL/L (ref 0.4–2)
LACTIC ACID, SEPSIS: 1.3 MMOL/L (ref 0.4–1.9)
LACTIC ACID: 1.4 MMOL/L (ref 0.4–2)
LACTIC ACID: 2.1 MMOL/L (ref 0.4–2)
LAMOTRIGINE LEVEL: 11.3 UG/ML (ref 2.5–15)
LEUKOCYTE ESTERASE, URINE: ABNORMAL
LEUKOCYTE ESTERASE, URINE: ABNORMAL
LYMPHOCYTES ABSOLUTE: 0.4 K/UL (ref 1–5.1)
LYMPHOCYTES ABSOLUTE: 0.5 K/UL (ref 1–5.1)
LYMPHOCYTES ABSOLUTE: 0.6 K/UL (ref 1–5.1)
LYMPHOCYTES ABSOLUTE: 0.9 K/UL (ref 1–5.1)
LYMPHOCYTES ABSOLUTE: 1 K/UL (ref 1–5.1)
LYMPHOCYTES ABSOLUTE: 1.2 K/UL (ref 1–5.1)
LYMPHOCYTES ABSOLUTE: 1.4 K/UL (ref 1–5.1)
LYMPHOCYTES ABSOLUTE: 1.4 K/UL (ref 1–5.1)
LYMPHOCYTES ABSOLUTE: 1.7 K/UL (ref 1–5.1)
LYMPHOCYTES ABSOLUTE: ABNORMAL K/UL (ref 1–5.1)
LYMPHOCYTES RELATIVE PERCENT: 11.7 %
LYMPHOCYTES RELATIVE PERCENT: 14 %
LYMPHOCYTES RELATIVE PERCENT: 18.4 %
LYMPHOCYTES RELATIVE PERCENT: 22.1 %
LYMPHOCYTES RELATIVE PERCENT: 4 %
LYMPHOCYTES RELATIVE PERCENT: 5 %
LYMPHOCYTES RELATIVE PERCENT: 5.1 %
LYMPHOCYTES RELATIVE PERCENT: 8 %
LYMPHOCYTES RELATIVE PERCENT: 9 %
LYMPHOCYTES RELATIVE PERCENT: ABNORMAL %
MAGNESIUM: 1.7 MG/DL (ref 1.8–2.4)
MAGNESIUM: 1.7 MG/DL (ref 1.8–2.4)
MAGNESIUM: 1.9 MG/DL (ref 1.8–2.4)
MAGNESIUM: 1.9 MG/DL (ref 1.8–2.4)
MAGNESIUM: 2 MG/DL (ref 1.8–2.4)
MAGNESIUM: 2.1 MG/DL (ref 1.8–2.4)
MAGNESIUM: 2.1 MG/DL (ref 1.8–2.4)
MAGNESIUM: 2.2 MG/DL (ref 1.8–2.4)
MAGNESIUM: 2.4 MG/DL (ref 1.8–2.4)
MAGNESIUM: 2.8 MG/DL (ref 1.8–2.4)
MAGNESIUM: 3.5 MG/DL (ref 1.8–2.4)
MCH RBC QN AUTO: 29.5 PG (ref 26–34)
MCH RBC QN AUTO: 30.2 PG (ref 26–34)
MCH RBC QN AUTO: 30.3 PG (ref 26–34)
MCH RBC QN AUTO: 30.3 PG (ref 26–34)
MCH RBC QN AUTO: 30.4 PG (ref 26–34)
MCH RBC QN AUTO: 30.8 PG (ref 26–34)
MCH RBC QN AUTO: 30.9 PG (ref 26–34)
MCH RBC QN AUTO: 31 PG (ref 26–34)
MCH RBC QN AUTO: 31.1 PG (ref 26–34)
MCH RBC QN AUTO: 31.2 PG (ref 26–34)
MCH RBC QN AUTO: 31.3 PG (ref 26–34)
MCH RBC QN AUTO: 31.3 PG (ref 26–34)
MCH RBC QN AUTO: 31.4 PG (ref 26–34)
MCH RBC QN AUTO: 31.5 PG (ref 26–34)
MCH RBC QN AUTO: 31.5 PG (ref 26–34)
MCH RBC QN AUTO: 31.6 PG (ref 26–34)
MCH RBC QN AUTO: 31.7 PG (ref 26–34)
MCH RBC QN AUTO: 31.8 PG (ref 26–34)
MCH RBC QN AUTO: 31.9 PG (ref 26–34)
MCH RBC QN AUTO: 32 PG (ref 26–34)
MCH RBC QN AUTO: 33.2 PG (ref 26–34)
MCH RBC QN AUTO: 33.3 PG (ref 26–34)
MCH RBC QN AUTO: 33.4 PG (ref 26–34)
MCH RBC QN AUTO: 33.7 PG (ref 26–34)
MCH RBC QN AUTO: 33.8 PG (ref 26–34)
MCH RBC QN AUTO: 34.1 PG (ref 26–34)
MCH RBC QN AUTO: ABNORMAL PG (ref 26–34)
MCHC RBC AUTO-ENTMCNC: 31.7 G/DL (ref 31–36)
MCHC RBC AUTO-ENTMCNC: 32.1 G/DL (ref 31–36)
MCHC RBC AUTO-ENTMCNC: 32.2 G/DL (ref 31–36)
MCHC RBC AUTO-ENTMCNC: 32.4 G/DL (ref 31–36)
MCHC RBC AUTO-ENTMCNC: 32.5 G/DL (ref 31–36)
MCHC RBC AUTO-ENTMCNC: 32.6 G/DL (ref 31–36)
MCHC RBC AUTO-ENTMCNC: 32.7 G/DL (ref 31–36)
MCHC RBC AUTO-ENTMCNC: 32.8 G/DL (ref 31–36)
MCHC RBC AUTO-ENTMCNC: 33 G/DL (ref 31–36)
MCHC RBC AUTO-ENTMCNC: 33.2 G/DL (ref 31–36)
MCHC RBC AUTO-ENTMCNC: 33.4 G/DL (ref 31–36)
MCHC RBC AUTO-ENTMCNC: 33.5 G/DL (ref 31–36)
MCHC RBC AUTO-ENTMCNC: 33.5 G/DL (ref 31–36)
MCHC RBC AUTO-ENTMCNC: 33.6 G/DL (ref 31–36)
MCHC RBC AUTO-ENTMCNC: 33.7 G/DL (ref 31–36)
MCHC RBC AUTO-ENTMCNC: 33.8 G/DL (ref 31–36)
MCHC RBC AUTO-ENTMCNC: 33.8 G/DL (ref 31–36)
MCHC RBC AUTO-ENTMCNC: 33.9 G/DL (ref 31–36)
MCHC RBC AUTO-ENTMCNC: 34.1 G/DL (ref 31–36)
MCHC RBC AUTO-ENTMCNC: 34.2 G/DL (ref 31–36)
MCHC RBC AUTO-ENTMCNC: 34.3 G/DL (ref 31–36)
MCHC RBC AUTO-ENTMCNC: 34.3 G/DL (ref 31–36)
MCHC RBC AUTO-ENTMCNC: 34.6 G/DL (ref 31–36)
MCHC RBC AUTO-ENTMCNC: 34.6 G/DL (ref 31–36)
MCHC RBC AUTO-ENTMCNC: 34.7 G/DL (ref 31–36)
MCHC RBC AUTO-ENTMCNC: 34.9 G/DL (ref 31–36)
MCHC RBC AUTO-ENTMCNC: 34.9 G/DL (ref 31–36)
MCHC RBC AUTO-ENTMCNC: 35.2 G/DL (ref 31–36)
MCHC RBC AUTO-ENTMCNC: 35.7 G/DL (ref 31–36)
MCHC RBC AUTO-ENTMCNC: 35.8 G/DL (ref 31–36)
MCHC RBC AUTO-ENTMCNC: ABNORMAL G/DL (ref 31–36)
MCV RBC AUTO: 89 FL (ref 80–100)
MCV RBC AUTO: 89.2 FL (ref 80–100)
MCV RBC AUTO: 90 FL (ref 80–100)
MCV RBC AUTO: 90.8 FL (ref 80–100)
MCV RBC AUTO: 91.2 FL (ref 80–100)
MCV RBC AUTO: 91.4 FL (ref 80–100)
MCV RBC AUTO: 91.8 FL (ref 80–100)
MCV RBC AUTO: 92.2 FL (ref 80–100)
MCV RBC AUTO: 92.4 FL (ref 80–100)
MCV RBC AUTO: 92.6 FL (ref 80–100)
MCV RBC AUTO: 92.8 FL (ref 80–100)
MCV RBC AUTO: 92.9 FL (ref 80–100)
MCV RBC AUTO: 93.2 FL (ref 80–100)
MCV RBC AUTO: 93.7 FL (ref 80–100)
MCV RBC AUTO: 94.1 FL (ref 80–100)
MCV RBC AUTO: 94.1 FL (ref 80–100)
MCV RBC AUTO: 94.2 FL (ref 80–100)
MCV RBC AUTO: 94.2 FL (ref 80–100)
MCV RBC AUTO: 94.6 FL (ref 80–100)
MCV RBC AUTO: 95.4 FL (ref 80–100)
MCV RBC AUTO: 95.5 FL (ref 80–100)
MCV RBC AUTO: 95.6 FL (ref 80–100)
MCV RBC AUTO: 95.8 FL (ref 80–100)
MCV RBC AUTO: 95.9 FL (ref 80–100)
MCV RBC AUTO: 96.2 FL (ref 80–100)
MCV RBC AUTO: 96.4 FL (ref 80–100)
MCV RBC AUTO: 96.9 FL (ref 80–100)
MCV RBC AUTO: 97.4 FL (ref 80–100)
MCV RBC AUTO: 98.9 FL (ref 80–100)
MCV RBC AUTO: 99.1 FL (ref 80–100)
MCV RBC AUTO: ABNORMAL FL (ref 80–100)
METAMYELOCYTES RELATIVE PERCENT: 2 %
METHEMOGLOBIN ARTERIAL: 0.3 %
METHEMOGLOBIN ARTERIAL: 0.5 %
MICROSCOPIC EXAMINATION: YES
MICROSCOPIC EXAMINATION: YES
MONOCYTES ABSOLUTE: 0.6 K/UL (ref 0–1.3)
MONOCYTES ABSOLUTE: 0.6 K/UL (ref 0–1.3)
MONOCYTES ABSOLUTE: 0.7 K/UL (ref 0–1.3)
MONOCYTES ABSOLUTE: 0.8 K/UL (ref 0–1.3)
MONOCYTES ABSOLUTE: 0.9 K/UL (ref 0–1.3)
MONOCYTES ABSOLUTE: 0.9 K/UL (ref 0–1.3)
MONOCYTES ABSOLUTE: 1 K/UL (ref 0–1.3)
MONOCYTES ABSOLUTE: 1.2 K/UL (ref 0–1.3)
MONOCYTES ABSOLUTE: 1.3 K/UL (ref 0–1.3)
MONOCYTES ABSOLUTE: ABNORMAL K/UL (ref 0–1.3)
MONOCYTES RELATIVE PERCENT: 10.9 %
MONOCYTES RELATIVE PERCENT: 11.9 %
MONOCYTES RELATIVE PERCENT: 12.4 %
MONOCYTES RELATIVE PERCENT: 16.1 %
MONOCYTES RELATIVE PERCENT: 5 %
MONOCYTES RELATIVE PERCENT: 5 %
MONOCYTES RELATIVE PERCENT: 7 %
MONOCYTES RELATIVE PERCENT: 8 %
MONOCYTES RELATIVE PERCENT: 8 %
MONOCYTES RELATIVE PERCENT: ABNORMAL %
MRSA CULTURE ONLY: ABNORMAL
NEUTROPHILS ABSOLUTE: 11.1 K/UL (ref 1.7–7.7)
NEUTROPHILS ABSOLUTE: 12.7 K/UL (ref 1.7–7.7)
NEUTROPHILS ABSOLUTE: 15.2 K/UL (ref 1.7–7.7)
NEUTROPHILS ABSOLUTE: 2.8 K/UL (ref 1.7–7.7)
NEUTROPHILS ABSOLUTE: 3.3 K/UL (ref 1.7–7.7)
NEUTROPHILS ABSOLUTE: 6.7 K/UL (ref 1.7–7.7)
NEUTROPHILS ABSOLUTE: 8.4 K/UL (ref 1.7–7.7)
NEUTROPHILS ABSOLUTE: 8.9 K/UL (ref 1.7–7.7)
NEUTROPHILS ABSOLUTE: 9.6 K/UL (ref 1.7–7.7)
NEUTROPHILS ABSOLUTE: ABNORMAL K/UL (ref 1.7–7.7)
NEUTROPHILS RELATIVE PERCENT: 61.1 %
NEUTROPHILS RELATIVE PERCENT: 69.2 %
NEUTROPHILS RELATIVE PERCENT: 76.8 %
NEUTROPHILS RELATIVE PERCENT: 78 %
NEUTROPHILS RELATIVE PERCENT: 79 %
NEUTROPHILS RELATIVE PERCENT: 82 %
NEUTROPHILS RELATIVE PERCENT: 82.1 %
NEUTROPHILS RELATIVE PERCENT: 86 %
NEUTROPHILS RELATIVE PERCENT: 87 %
NEUTROPHILS RELATIVE PERCENT: ABNORMAL %
NITRITE, URINE: NEGATIVE
NITRITE, URINE: NEGATIVE
O2 CONTENT ARTERIAL: 11 ML/DL
O2 CONTENT ARTERIAL: 9 ML/DL
O2 SAT, ARTERIAL: 79 % (ref 93–100)
O2 SAT, ARTERIAL: 91 % (ref 93–100)
O2 SAT, ARTERIAL: 94 % (ref 93–100)
O2 SAT, ARTERIAL: 95 % (ref 93–100)
O2 SAT, ARTERIAL: 99.6 %
O2 SAT, ARTERIAL: 99.8 %
O2 THERAPY: ABNORMAL
O2 THERAPY: ABNORMAL
ORGANISM: ABNORMAL
OSMOLALITY URINE: 558 MOSM/KG (ref 390–1070)
OVALOCYTES: ABNORMAL
PCO2 ARTERIAL: 100.3 MM HG (ref 35–45)
PCO2 ARTERIAL: 102.1 MM HG (ref 35–45)
PCO2 ARTERIAL: 125.5 MM HG (ref 35–45)
PCO2 ARTERIAL: 77.9 MMHG (ref 35–45)
PCO2 ARTERIAL: 85.4 MMHG (ref 35–45)
PCO2 ARTERIAL: 93.1 MM HG (ref 35–45)
PDW BLD-RTO: 15 % (ref 12.4–15.4)
PDW BLD-RTO: 15.2 % (ref 12.4–15.4)
PDW BLD-RTO: 15.3 % (ref 12.4–15.4)
PDW BLD-RTO: 15.4 % (ref 12.4–15.4)
PDW BLD-RTO: 15.6 % (ref 12.4–15.4)
PDW BLD-RTO: 15.7 % (ref 12.4–15.4)
PDW BLD-RTO: 15.8 % (ref 12.4–15.4)
PDW BLD-RTO: 16 % (ref 12.4–15.4)
PDW BLD-RTO: 16 % (ref 12.4–15.4)
PDW BLD-RTO: 16.3 % (ref 12.4–15.4)
PDW BLD-RTO: 16.6 % (ref 12.4–15.4)
PDW BLD-RTO: 16.6 % (ref 12.4–15.4)
PDW BLD-RTO: 16.7 % (ref 12.4–15.4)
PDW BLD-RTO: 16.8 % (ref 12.4–15.4)
PDW BLD-RTO: 16.9 % (ref 12.4–15.4)
PDW BLD-RTO: 17 % (ref 12.4–15.4)
PDW BLD-RTO: 17 % (ref 12.4–15.4)
PDW BLD-RTO: 17.1 % (ref 12.4–15.4)
PDW BLD-RTO: 17.2 % (ref 12.4–15.4)
PDW BLD-RTO: 17.2 % (ref 12.4–15.4)
PDW BLD-RTO: 17.3 % (ref 12.4–15.4)
PDW BLD-RTO: 17.7 % (ref 12.4–15.4)
PDW BLD-RTO: ABNORMAL % (ref 12.4–15.4)
PERFORMED ON: ABNORMAL
PERFORMED ON: NORMAL
PH ARTERIAL: 7.25 (ref 7.35–7.45)
PH ARTERIAL: 7.28 (ref 7.35–7.45)
PH ARTERIAL: 7.3 (ref 7.35–7.45)
PH ARTERIAL: 7.3 (ref 7.35–7.45)
PH ARTERIAL: 7.35 (ref 7.35–7.45)
PH ARTERIAL: 7.39 (ref 7.35–7.45)
PH UA: 6 (ref 5–8)
PH UA: 7 (ref 5–8)
PHOSPHORUS: 2.1 MG/DL (ref 2.5–4.9)
PHOSPHORUS: 2.1 MG/DL (ref 2.5–4.9)
PHOSPHORUS: 2.2 MG/DL (ref 2.5–4.9)
PHOSPHORUS: 2.3 MG/DL (ref 2.5–4.9)
PHOSPHORUS: 2.7 MG/DL (ref 2.5–4.9)
PHOSPHORUS: 2.8 MG/DL (ref 2.5–4.9)
PHOSPHORUS: 2.8 MG/DL (ref 2.5–4.9)
PHOSPHORUS: 3.1 MG/DL (ref 2.5–4.9)
PHOSPHORUS: 3.7 MG/DL (ref 2.5–4.9)
PLATELET # BLD: 101 K/UL (ref 135–450)
PLATELET # BLD: 112 K/UL (ref 135–450)
PLATELET # BLD: 113 K/UL (ref 135–450)
PLATELET # BLD: 114 K/UL (ref 135–450)
PLATELET # BLD: 128 K/UL (ref 135–450)
PLATELET # BLD: 175 K/UL (ref 135–450)
PLATELET # BLD: 189 K/UL (ref 135–450)
PLATELET # BLD: 215 K/UL (ref 135–450)
PLATELET # BLD: 222 K/UL (ref 135–450)
PLATELET # BLD: 224 K/UL (ref 135–450)
PLATELET # BLD: 242 K/UL (ref 135–450)
PLATELET # BLD: 294 K/UL (ref 135–450)
PLATELET # BLD: 66 K/UL (ref 135–450)
PLATELET # BLD: 71 K/UL (ref 135–450)
PLATELET # BLD: 76 K/UL (ref 135–450)
PLATELET # BLD: 80 K/UL (ref 135–450)
PLATELET # BLD: 83 K/UL (ref 135–450)
PLATELET # BLD: 83 K/UL (ref 135–450)
PLATELET # BLD: 85 K/UL (ref 135–450)
PLATELET # BLD: 88 K/UL (ref 135–450)
PLATELET # BLD: 89 K/UL (ref 135–450)
PLATELET # BLD: 91 K/UL (ref 135–450)
PLATELET # BLD: 91 K/UL (ref 135–450)
PLATELET # BLD: 92 K/UL (ref 135–450)
PLATELET # BLD: 92 K/UL (ref 135–450)
PLATELET # BLD: 93 K/UL (ref 135–450)
PLATELET # BLD: 95 K/UL (ref 135–450)
PLATELET # BLD: 96 K/UL (ref 135–450)
PLATELET # BLD: 97 K/UL (ref 135–450)
PLATELET # BLD: 98 K/UL (ref 135–450)
PLATELET # BLD: ABNORMAL K/UL (ref 135–450)
PLATELET SLIDE REVIEW: ABNORMAL
PLATELET SLIDE REVIEW: ADEQUATE
PMV BLD AUTO: 10 FL (ref 5–10.5)
PMV BLD AUTO: 10.2 FL (ref 5–10.5)
PMV BLD AUTO: 10.5 FL (ref 5–10.5)
PMV BLD AUTO: 8.2 FL (ref 5–10.5)
PMV BLD AUTO: 8.2 FL (ref 5–10.5)
PMV BLD AUTO: 8.3 FL (ref 5–10.5)
PMV BLD AUTO: 8.3 FL (ref 5–10.5)
PMV BLD AUTO: 8.5 FL (ref 5–10.5)
PMV BLD AUTO: 8.6 FL (ref 5–10.5)
PMV BLD AUTO: 8.8 FL (ref 5–10.5)
PMV BLD AUTO: 8.9 FL (ref 5–10.5)
PMV BLD AUTO: 9 FL (ref 5–10.5)
PMV BLD AUTO: 9.1 FL (ref 5–10.5)
PMV BLD AUTO: 9.2 FL (ref 5–10.5)
PMV BLD AUTO: 9.2 FL (ref 5–10.5)
PMV BLD AUTO: 9.4 FL (ref 5–10.5)
PMV BLD AUTO: 9.4 FL (ref 5–10.5)
PMV BLD AUTO: 9.5 FL (ref 5–10.5)
PMV BLD AUTO: 9.6 FL (ref 5–10.5)
PMV BLD AUTO: 9.6 FL (ref 5–10.5)
PMV BLD AUTO: 9.9 FL (ref 5–10.5)
PMV BLD AUTO: ABNORMAL FL (ref 5–10.5)
PO2 ARTERIAL: 121 MMHG (ref 75–108)
PO2 ARTERIAL: 158 MMHG (ref 75–108)
PO2 ARTERIAL: 54.5 MM HG (ref 75–108)
PO2 ARTERIAL: 77.6 MM HG (ref 75–108)
PO2 ARTERIAL: 79.7 MM HG (ref 75–108)
PO2 ARTERIAL: 91.2 MM HG (ref 75–108)
POC HEMATOCRIT: 24 % (ref 40.5–52.5)
POC HEMATOCRIT: 24 % (ref 40.5–52.5)
POC POTASSIUM: 3.7 MMOL/L (ref 3.5–5.1)
POC POTASSIUM: 4 MMOL/L (ref 3.5–5.1)
POC SAMPLE TYPE: ABNORMAL
POC SODIUM: 147 MMOL/L (ref 136–145)
POC SODIUM: 150 MMOL/L (ref 136–145)
POIKILOCYTES: ABNORMAL
POIKILOCYTES: ABNORMAL
POLYCHROMASIA: ABNORMAL
POTASSIUM REFLEX MAGNESIUM: 2.7 MMOL/L (ref 3.5–5.1)
POTASSIUM REFLEX MAGNESIUM: 2.9 MMOL/L (ref 3.5–5.1)
POTASSIUM REFLEX MAGNESIUM: 3.9 MMOL/L (ref 3.5–5.1)
POTASSIUM SERPL-SCNC: 2.8 MMOL/L (ref 3.5–5.1)
POTASSIUM SERPL-SCNC: 2.8 MMOL/L (ref 3.5–5.1)
POTASSIUM SERPL-SCNC: 2.9 MMOL/L (ref 3.5–5.1)
POTASSIUM SERPL-SCNC: 3 MMOL/L (ref 3.5–5.1)
POTASSIUM SERPL-SCNC: 3 MMOL/L (ref 3.5–5.1)
POTASSIUM SERPL-SCNC: 3.1 MMOL/L (ref 3.5–5.1)
POTASSIUM SERPL-SCNC: 3.3 MMOL/L (ref 3.5–5.1)
POTASSIUM SERPL-SCNC: 3.4 MMOL/L (ref 3.5–5.1)
POTASSIUM SERPL-SCNC: 3.5 MMOL/L (ref 3.5–5.1)
POTASSIUM SERPL-SCNC: 3.6 MMOL/L (ref 3.5–5.1)
POTASSIUM SERPL-SCNC: 3.7 MMOL/L (ref 3.5–5.1)
POTASSIUM SERPL-SCNC: 3.8 MMOL/L (ref 3.5–5.1)
POTASSIUM SERPL-SCNC: 3.8 MMOL/L (ref 3.5–5.1)
POTASSIUM SERPL-SCNC: 3.9 MMOL/L (ref 3.5–5.1)
POTASSIUM SERPL-SCNC: 4.1 MMOL/L (ref 3.5–5.1)
PROCALCITONIN: 0.11 NG/ML (ref 0–0.15)
PROCALCITONIN: 0.15 NG/ML (ref 0–0.15)
PROCALCITONIN: 0.2 NG/ML (ref 0–0.15)
PROCALCITONIN: 0.35 NG/ML (ref 0–0.15)
PROTEIN UA: 30 MG/DL
PROTEIN UA: ABNORMAL MG/DL
PROTHROMBIN TIME: 10.7 SEC (ref 10–13.2)
PROTHROMBIN TIME: 11.2 SEC (ref 10–13.2)
PROTHROMBIN TIME: 11.8 SEC (ref 10–13.2)
PROTHROMBIN TIME: 12.3 SEC (ref 10–13.2)
PROTHROMBIN TIME: 12.8 SEC (ref 10–13.2)
PROTHROMBIN TIME: 13.1 SEC (ref 10–13.2)
PROTHROMBIN TIME: 13.3 SEC (ref 10–13.2)
PROTHROMBIN TIME: 13.4 SEC (ref 10–13.2)
PROTHROMBIN TIME: 13.5 SEC (ref 10–13.2)
PROTHROMBIN TIME: 14.4 SEC (ref 10–13.2)
PROTHROMBIN TIME: 14.5 SEC (ref 10–13.2)
PROTHROMBIN TIME: 15.3 SEC (ref 10–13.2)
PROTHROMBIN TIME: 16 SEC (ref 10–13.2)
PROTHROMBIN TIME: 16.7 SEC (ref 10–13.2)
PROTHROMBIN TIME: 18.8 SEC (ref 10–13.2)
PROTHROMBIN TIME: 20.6 SEC (ref 10–13.2)
PROTHROMBIN TIME: 26.1 SEC (ref 10–13.2)
RBC # BLD: 1.57 M/UL (ref 4.2–5.9)
RBC # BLD: 1.6 M/UL (ref 4.2–5.9)
RBC # BLD: 1.74 M/UL (ref 4.2–5.9)
RBC # BLD: 2.08 M/UL (ref 4.2–5.9)
RBC # BLD: 2.17 M/UL (ref 4.2–5.9)
RBC # BLD: 2.19 M/UL (ref 4.2–5.9)
RBC # BLD: 2.32 M/UL (ref 4.2–5.9)
RBC # BLD: 2.4 M/UL (ref 4.2–5.9)
RBC # BLD: 2.4 M/UL (ref 4.2–5.9)
RBC # BLD: 2.42 M/UL (ref 4.2–5.9)
RBC # BLD: 2.43 M/UL (ref 4.2–5.9)
RBC # BLD: 2.43 M/UL (ref 4.2–5.9)
RBC # BLD: 2.47 M/UL (ref 4.2–5.9)
RBC # BLD: 2.47 M/UL (ref 4.2–5.9)
RBC # BLD: 2.48 M/UL (ref 4.2–5.9)
RBC # BLD: 2.5 M/UL (ref 4.2–5.9)
RBC # BLD: 2.5 M/UL (ref 4.2–5.9)
RBC # BLD: 2.51 M/UL (ref 4.2–5.9)
RBC # BLD: 2.51 M/UL (ref 4.2–5.9)
RBC # BLD: 2.56 M/UL (ref 4.2–5.9)
RBC # BLD: 2.58 M/UL (ref 4.2–5.9)
RBC # BLD: 2.59 M/UL (ref 4.2–5.9)
RBC # BLD: 2.62 M/UL (ref 4.2–5.9)
RBC # BLD: 2.69 M/UL (ref 4.2–5.9)
RBC # BLD: 2.72 M/UL (ref 4.2–5.9)
RBC # BLD: 2.77 M/UL (ref 4.2–5.9)
RBC # BLD: 2.8 M/UL (ref 4.2–5.9)
RBC # BLD: 2.87 M/UL (ref 4.2–5.9)
RBC # BLD: 2.89 M/UL (ref 4.2–5.9)
RBC # BLD: 3.1 M/UL (ref 4.2–5.9)
RBC # BLD: 3.28 M/UL (ref 4.2–5.9)
RBC # BLD: 3.51 M/UL (ref 4.2–5.9)
RBC # BLD: 4 M/UL (ref 4.2–5.9)
RBC # BLD: 4.12 M/UL (ref 4.2–5.9)
RBC # BLD: ABNORMAL M/UL (ref 4.2–5.9)
RBC UA: ABNORMAL /HPF (ref 0–4)
RBC UA: ABNORMAL /HPF (ref 0–4)
REASON FOR REJECTION: NORMAL
REJECTED TEST: NORMAL
REPORT: NORMAL
RESPIRATORY PANEL PCR: NORMAL
SARS-COV-2, NAA: DETECTED
SLIDE REVIEW: ABNORMAL
SODIUM BLD-SCNC: 136 MMOL/L (ref 136–145)
SODIUM BLD-SCNC: 141 MMOL/L (ref 136–145)
SODIUM BLD-SCNC: 141 MMOL/L (ref 136–145)
SODIUM BLD-SCNC: 142 MMOL/L (ref 136–145)
SODIUM BLD-SCNC: 143 MMOL/L (ref 136–145)
SODIUM BLD-SCNC: 144 MMOL/L (ref 136–145)
SODIUM BLD-SCNC: 145 MMOL/L (ref 136–145)
SODIUM BLD-SCNC: 146 MMOL/L (ref 136–145)
SODIUM BLD-SCNC: 147 MMOL/L (ref 136–145)
SODIUM BLD-SCNC: 148 MMOL/L (ref 136–145)
SODIUM BLD-SCNC: 151 MMOL/L (ref 136–145)
SODIUM BLD-SCNC: 154 MMOL/L (ref 136–145)
SODIUM URINE: <20 MMOL/L
SPECIFIC GRAVITY UA: 1.02 (ref 1–1.03)
SPECIFIC GRAVITY UA: 1.02 (ref 1–1.03)
TARGET CELLS: ABNORMAL
TCO2 ARTERIAL: 100.4 MMOL/L
TCO2 ARTERIAL: 111.6 MMOL/L
TCO2 ARTERIAL: 47 MMOL/L
TCO2 ARTERIAL: 49 MMOL/L
TCO2 ARTERIAL: >50 MMOL/L
TCO2 ARTERIAL: >50 MMOL/L
TEAR DROP CELLS: ABNORMAL
TOTAL IRON BINDING CAPACITY: ABNORMAL UG/DL (ref 260–445)
TOTAL PROTEIN: 4.6 G/DL (ref 6.4–8.2)
TOTAL PROTEIN: 5.9 G/DL (ref 6.4–8.2)
TOTAL PROTEIN: 6.3 G/DL (ref 6.4–8.2)
TOTAL PROTEIN: 6.7 G/DL (ref 6.4–8.2)
TOTAL PROTEIN: 7 G/DL (ref 6.4–8.2)
TOTAL PROTEIN: 7.8 G/DL (ref 6.4–8.2)
TSH REFLEX: 1.19 UIU/ML (ref 0.27–4.2)
TSH REFLEX: 2.01 UIU/ML (ref 0.27–4.2)
URINE CULTURE, ROUTINE: NORMAL
URINE REFLEX TO CULTURE: ABNORMAL
URINE TYPE: ABNORMAL
URINE TYPE: ABNORMAL
UROBILINOGEN, URINE: 0.2 E.U./DL
UROBILINOGEN, URINE: 0.2 E.U./DL
VALPROIC ACID LEVEL: 19.8 UG/ML (ref 50–100)
VALPROIC ACID LEVEL: 21.7 UG/ML (ref 50–100)
VALPROIC ACID LEVEL: 25.5 UG/ML (ref 50–100)
VITAMIN B-12: >2000 PG/ML (ref 211–911)
VITAMIN B-12: >2000 PG/ML (ref 211–911)
WBC # BLD: 10.3 K/UL (ref 4–11)
WBC # BLD: 10.5 K/UL (ref 4–11)
WBC # BLD: 11.6 K/UL (ref 4–11)
WBC # BLD: 11.9 K/UL (ref 4–11)
WBC # BLD: 13.4 K/UL (ref 4–11)
WBC # BLD: 14.6 K/UL (ref 4–11)
WBC # BLD: 14.6 K/UL (ref 4–11)
WBC # BLD: 14.7 K/UL (ref 4–11)
WBC # BLD: 14.8 K/UL (ref 4–11)
WBC # BLD: 17.5 K/UL (ref 4–11)
WBC # BLD: 18.9 K/UL (ref 4–11)
WBC # BLD: 20 K/UL (ref 4–11)
WBC # BLD: 22.8 K/UL (ref 4–11)
WBC # BLD: 36.3 K/UL (ref 4–11)
WBC # BLD: 4.5 K/UL (ref 4–11)
WBC # BLD: 4.8 K/UL (ref 4–11)
WBC # BLD: 5.5 K/UL (ref 4–11)
WBC # BLD: 5.5 K/UL (ref 4–11)
WBC # BLD: 5.8 K/UL (ref 4–11)
WBC # BLD: 6.4 K/UL (ref 4–11)
WBC # BLD: 6.6 K/UL (ref 4–11)
WBC # BLD: 6.9 K/UL (ref 4–11)
WBC # BLD: 7.5 K/UL (ref 4–11)
WBC # BLD: 7.5 K/UL (ref 4–11)
WBC # BLD: 7.6 K/UL (ref 4–11)
WBC # BLD: 7.6 K/UL (ref 4–11)
WBC # BLD: 7.7 K/UL (ref 4–11)
WBC # BLD: 8 K/UL (ref 4–11)
WBC # BLD: 8.2 K/UL (ref 4–11)
WBC # BLD: 8.4 K/UL (ref 4–11)
WBC # BLD: 8.8 K/UL (ref 4–11)
WBC # BLD: 9.1 K/UL (ref 4–11)
WBC # BLD: 9.5 K/UL (ref 4–11)
WBC # BLD: 9.6 K/UL (ref 4–11)
WBC # BLD: ABNORMAL K/UL (ref 4–11)
WBC UA: 2 /HPF (ref 0–5)
WBC UA: 82 /HPF (ref 0–5)
YEAST: PRESENT /HPF

## 2020-01-01 PROCEDURE — 2580000003 HC RX 258: Performed by: HOSPITALIST

## 2020-01-01 PROCEDURE — 6370000000 HC RX 637 (ALT 250 FOR IP): Performed by: INTERNAL MEDICINE

## 2020-01-01 PROCEDURE — 84295 ASSAY OF SERUM SODIUM: CPT

## 2020-01-01 PROCEDURE — 86140 C-REACTIVE PROTEIN: CPT

## 2020-01-01 PROCEDURE — 6360000002 HC RX W HCPCS: Performed by: INTERNAL MEDICINE

## 2020-01-01 PROCEDURE — 80053 COMPREHEN METABOLIC PANEL: CPT

## 2020-01-01 PROCEDURE — 6360000002 HC RX W HCPCS: Performed by: HOSPITALIST

## 2020-01-01 PROCEDURE — 85025 COMPLETE CBC W/AUTO DIFF WBC: CPT

## 2020-01-01 PROCEDURE — 80175 DRUG SCREEN QUAN LAMOTRIGINE: CPT

## 2020-01-01 PROCEDURE — 36415 COLL VENOUS BLD VENIPUNCTURE: CPT

## 2020-01-01 PROCEDURE — 80048 BASIC METABOLIC PNL TOTAL CA: CPT

## 2020-01-01 PROCEDURE — 2580000003 HC RX 258: Performed by: INTERNAL MEDICINE

## 2020-01-01 PROCEDURE — 84100 ASSAY OF PHOSPHORUS: CPT

## 2020-01-01 PROCEDURE — 2000000000 HC ICU R&B

## 2020-01-01 PROCEDURE — 82330 ASSAY OF CALCIUM: CPT

## 2020-01-01 PROCEDURE — 84145 PROCALCITONIN (PCT): CPT

## 2020-01-01 PROCEDURE — 97530 THERAPEUTIC ACTIVITIES: CPT

## 2020-01-01 PROCEDURE — 2580000003 HC RX 258: Performed by: PSYCHIATRY & NEUROLOGY

## 2020-01-01 PROCEDURE — 85379 FIBRIN DEGRADATION QUANT: CPT

## 2020-01-01 PROCEDURE — P9016 RBC LEUKOCYTES REDUCED: HCPCS

## 2020-01-01 PROCEDURE — 99233 SBSQ HOSP IP/OBS HIGH 50: CPT | Performed by: INTERNAL MEDICINE

## 2020-01-01 PROCEDURE — P9047 ALBUMIN (HUMAN), 25%, 50ML: HCPCS | Performed by: HOSPITALIST

## 2020-01-01 PROCEDURE — 94761 N-INVAS EAR/PLS OXIMETRY MLT: CPT

## 2020-01-01 PROCEDURE — 85018 HEMOGLOBIN: CPT

## 2020-01-01 PROCEDURE — 2700000000 HC OXYGEN THERAPY PER DAY

## 2020-01-01 PROCEDURE — C9113 INJ PANTOPRAZOLE SODIUM, VIA: HCPCS | Performed by: PHYSICIAN ASSISTANT

## 2020-01-01 PROCEDURE — 6370000000 HC RX 637 (ALT 250 FOR IP): Performed by: PSYCHIATRY & NEUROLOGY

## 2020-01-01 PROCEDURE — 81001 URINALYSIS AUTO W/SCOPE: CPT

## 2020-01-01 PROCEDURE — 96365 THER/PROPH/DIAG IV INF INIT: CPT

## 2020-01-01 PROCEDURE — 94660 CPAP INITIATION&MGMT: CPT

## 2020-01-01 PROCEDURE — 6360000002 HC RX W HCPCS: Performed by: PEDIATRICS

## 2020-01-01 PROCEDURE — 85610 PROTHROMBIN TIME: CPT

## 2020-01-01 PROCEDURE — 85027 COMPLETE CBC AUTOMATED: CPT

## 2020-01-01 PROCEDURE — 82803 BLOOD GASES ANY COMBINATION: CPT

## 2020-01-01 PROCEDURE — 70450 CT HEAD/BRAIN W/O DYE: CPT

## 2020-01-01 PROCEDURE — 87081 CULTURE SCREEN ONLY: CPT

## 2020-01-01 PROCEDURE — 99232 SBSQ HOSP IP/OBS MODERATE 35: CPT | Performed by: INTERNAL MEDICINE

## 2020-01-01 PROCEDURE — P9017 PLASMA 1 DONOR FRZ W/IN 8 HR: HCPCS

## 2020-01-01 PROCEDURE — 83735 ASSAY OF MAGNESIUM: CPT

## 2020-01-01 PROCEDURE — 80076 HEPATIC FUNCTION PANEL: CPT

## 2020-01-01 PROCEDURE — 94640 AIRWAY INHALATION TREATMENT: CPT

## 2020-01-01 PROCEDURE — 83540 ASSAY OF IRON: CPT

## 2020-01-01 PROCEDURE — 2500000003 HC RX 250 WO HCPCS: Performed by: PSYCHIATRY & NEUROLOGY

## 2020-01-01 PROCEDURE — 05HA33Z INSERTION OF INFUSION DEVICE INTO LEFT BRACHIAL VEIN, PERCUTANEOUS APPROACH: ICD-10-PCS | Performed by: STUDENT IN AN ORGANIZED HEALTH CARE EDUCATION/TRAINING PROGRAM

## 2020-01-01 PROCEDURE — 87324 CLOSTRIDIUM AG IA: CPT

## 2020-01-01 PROCEDURE — 84132 ASSAY OF SERUM POTASSIUM: CPT

## 2020-01-01 PROCEDURE — 2500000003 HC RX 250 WO HCPCS: Performed by: INTERNAL MEDICINE

## 2020-01-01 PROCEDURE — C9113 INJ PANTOPRAZOLE SODIUM, VIA: HCPCS | Performed by: INTERNAL MEDICINE

## 2020-01-01 PROCEDURE — 99223 1ST HOSP IP/OBS HIGH 75: CPT | Performed by: PSYCHIATRY & NEUROLOGY

## 2020-01-01 PROCEDURE — 71045 X-RAY EXAM CHEST 1 VIEW: CPT

## 2020-01-01 PROCEDURE — 82728 ASSAY OF FERRITIN: CPT

## 2020-01-01 PROCEDURE — 1200000000 HC SEMI PRIVATE

## 2020-01-01 PROCEDURE — 84443 ASSAY THYROID STIM HORMONE: CPT

## 2020-01-01 PROCEDURE — 94770 HC ETCO2 MONITOR DAILY: CPT

## 2020-01-01 PROCEDURE — 86901 BLOOD TYPING SEROLOGIC RH(D): CPT

## 2020-01-01 PROCEDURE — 94760 N-INVAS EAR/PLS OXIMETRY 1: CPT

## 2020-01-01 PROCEDURE — 6360000002 HC RX W HCPCS: Performed by: PHYSICIAN ASSISTANT

## 2020-01-01 PROCEDURE — 30233N1 TRANSFUSION OF NONAUTOLOGOUS RED BLOOD CELLS INTO PERIPHERAL VEIN, PERCUTANEOUS APPROACH: ICD-10-PCS | Performed by: STUDENT IN AN ORGANIZED HEALTH CARE EDUCATION/TRAINING PROGRAM

## 2020-01-01 PROCEDURE — 6360000002 HC RX W HCPCS: Performed by: NURSE ANESTHETIST, CERTIFIED REGISTERED

## 2020-01-01 PROCEDURE — 2580000003 HC RX 258

## 2020-01-01 PROCEDURE — 85014 HEMATOCRIT: CPT

## 2020-01-01 PROCEDURE — 0100U HC RESPIRPTHGN MULT REV TRANS & AMP PRB TECH 21 TRGT: CPT

## 2020-01-01 PROCEDURE — 82607 VITAMIN B-12: CPT

## 2020-01-01 PROCEDURE — C1760 CLOSURE DEV, VASC: HCPCS | Performed by: INTERNAL MEDICINE

## 2020-01-01 PROCEDURE — 97161 PT EVAL LOW COMPLEX 20 MIN: CPT

## 2020-01-01 PROCEDURE — 83605 ASSAY OF LACTIC ACID: CPT

## 2020-01-01 PROCEDURE — 2500000003 HC RX 250 WO HCPCS: Performed by: NURSE ANESTHETIST, CERTIFIED REGISTERED

## 2020-01-01 PROCEDURE — 80164 ASSAY DIPROPYLACETIC ACD TOT: CPT

## 2020-01-01 PROCEDURE — 2580000003 HC RX 258: Performed by: NURSE ANESTHETIST, CERTIFIED REGISTERED

## 2020-01-01 PROCEDURE — 87040 BLOOD CULTURE FOR BACTERIA: CPT

## 2020-01-01 PROCEDURE — 93010 ELECTROCARDIOGRAM REPORT: CPT | Performed by: INTERNAL MEDICINE

## 2020-01-01 PROCEDURE — 2500000003 HC RX 250 WO HCPCS: Performed by: STUDENT IN AN ORGANIZED HEALTH CARE EDUCATION/TRAINING PROGRAM

## 2020-01-01 PROCEDURE — U0003 INFECTIOUS AGENT DETECTION BY NUCLEIC ACID (DNA OR RNA); SEVERE ACUTE RESPIRATORY SYNDROME CORONAVIRUS 2 (SARS-COV-2) (CORONAVIRUS DISEASE [COVID-19]), AMPLIFIED PROBE TECHNIQUE, MAKING USE OF HIGH THROUGHPUT TECHNOLOGIES AS DESCRIBED BY CMS-2020-01-R: HCPCS

## 2020-01-01 PROCEDURE — 82570 ASSAY OF URINE CREATININE: CPT

## 2020-01-01 PROCEDURE — 87086 URINE CULTURE/COLONY COUNT: CPT

## 2020-01-01 PROCEDURE — 99291 CRITICAL CARE FIRST HOUR: CPT

## 2020-01-01 PROCEDURE — 99223 1ST HOSP IP/OBS HIGH 75: CPT | Performed by: INTERNAL MEDICINE

## 2020-01-01 PROCEDURE — 86880 COOMBS TEST DIRECT: CPT

## 2020-01-01 PROCEDURE — 82947 ASSAY GLUCOSE BLOOD QUANT: CPT

## 2020-01-01 PROCEDURE — 2709999900 HC NON-CHARGEABLE SUPPLY: Performed by: INTERNAL MEDICINE

## 2020-01-01 PROCEDURE — 30233K1 TRANSFUSION OF NONAUTOLOGOUS FROZEN PLASMA INTO PERIPHERAL VEIN, PERCUTANEOUS APPROACH: ICD-10-PCS | Performed by: STUDENT IN AN ORGANIZED HEALTH CARE EDUCATION/TRAINING PROGRAM

## 2020-01-01 PROCEDURE — 6360000002 HC RX W HCPCS: Performed by: STUDENT IN AN ORGANIZED HEALTH CARE EDUCATION/TRAINING PROGRAM

## 2020-01-01 PROCEDURE — 3700000001 HC ADD 15 MINUTES (ANESTHESIA): Performed by: INTERNAL MEDICINE

## 2020-01-01 PROCEDURE — 93005 ELECTROCARDIOGRAM TRACING: CPT | Performed by: STUDENT IN AN ORGANIZED HEALTH CARE EDUCATION/TRAINING PROGRAM

## 2020-01-01 PROCEDURE — 36430 TRANSFUSION BLD/BLD COMPNT: CPT

## 2020-01-01 PROCEDURE — 85384 FIBRINOGEN ACTIVITY: CPT

## 2020-01-01 PROCEDURE — 80156 ASSAY CARBAMAZEPINE TOTAL: CPT

## 2020-01-01 PROCEDURE — 86850 RBC ANTIBODY SCREEN: CPT

## 2020-01-01 PROCEDURE — 36569 INSJ PICC 5 YR+ W/O IMAGING: CPT

## 2020-01-01 PROCEDURE — 2580000003 HC RX 258: Performed by: STUDENT IN AN ORGANIZED HEALTH CARE EDUCATION/TRAINING PROGRAM

## 2020-01-01 PROCEDURE — 96375 TX/PRO/DX INJ NEW DRUG ADDON: CPT

## 2020-01-01 PROCEDURE — 84300 ASSAY OF URINE SODIUM: CPT

## 2020-01-01 PROCEDURE — 3609013300 HC EGD TUBE PLACEMENT: Performed by: INTERNAL MEDICINE

## 2020-01-01 PROCEDURE — 2500000003 HC RX 250 WO HCPCS

## 2020-01-01 PROCEDURE — 86900 BLOOD TYPING SEROLOGIC ABO: CPT

## 2020-01-01 PROCEDURE — 83036 HEMOGLOBIN GLYCOSYLATED A1C: CPT

## 2020-01-01 PROCEDURE — C1751 CATH, INF, PER/CENT/MIDLINE: HCPCS

## 2020-01-01 PROCEDURE — 83550 IRON BINDING TEST: CPT

## 2020-01-01 PROCEDURE — 83935 ASSAY OF URINE OSMOLALITY: CPT

## 2020-01-01 PROCEDURE — 82140 ASSAY OF AMMONIA: CPT

## 2020-01-01 PROCEDURE — 3700000000 HC ANESTHESIA ATTENDED CARE: Performed by: INTERNAL MEDICINE

## 2020-01-01 PROCEDURE — 36592 COLLECT BLOOD FROM PICC: CPT

## 2020-01-01 PROCEDURE — 87449 NOS EACH ORGANISM AG IA: CPT

## 2020-01-01 PROCEDURE — 97165 OT EVAL LOW COMPLEX 30 MIN: CPT

## 2020-01-01 PROCEDURE — 3609013000 HC EGD TRANSORAL CONTROL BLEEDING ANY METHOD: Performed by: INTERNAL MEDICINE

## 2020-01-01 PROCEDURE — 99284 EMERGENCY DEPT VISIT MOD MDM: CPT

## 2020-01-01 PROCEDURE — 0DH63UZ INSERTION OF FEEDING DEVICE INTO STOMACH, PERCUTANEOUS APPROACH: ICD-10-PCS | Performed by: INTERNAL MEDICINE

## 2020-01-01 PROCEDURE — 99232 SBSQ HOSP IP/OBS MODERATE 35: CPT | Performed by: PSYCHIATRY & NEUROLOGY

## 2020-01-01 PROCEDURE — 0W3P8ZZ CONTROL BLEEDING IN GASTROINTESTINAL TRACT, VIA NATURAL OR ARTIFICIAL OPENING ENDOSCOPIC: ICD-10-PCS | Performed by: INTERNAL MEDICINE

## 2020-01-01 PROCEDURE — 86923 COMPATIBILITY TEST ELECTRIC: CPT

## 2020-01-01 PROCEDURE — 80177 DRUG SCRN QUAN LEVETIRACETAM: CPT

## 2020-01-01 PROCEDURE — 72125 CT NECK SPINE W/O DYE: CPT

## 2020-01-01 PROCEDURE — 12053 INTMD RPR FACE/MM 5.1-7.5 CM: CPT

## 2020-01-01 PROCEDURE — 96368 THER/DIAG CONCURRENT INF: CPT

## 2020-01-01 PROCEDURE — 2580000003 HC RX 258: Performed by: PHYSICIAN ASSISTANT

## 2020-01-01 DEVICE — ENDOSCOPIC LIGATION CLIP
Type: IMPLANTABLE DEVICE | Status: FUNCTIONAL
Brand: PADLOCK CLIP-12

## 2020-01-01 RX ORDER — CARBAMAZEPINE 200 MG/1
200 TABLET ORAL 3 TIMES DAILY
Status: DISCONTINUED | OUTPATIENT
Start: 2020-01-01 | End: 2020-01-01

## 2020-01-01 RX ORDER — 0.9 % SODIUM CHLORIDE 0.9 %
20 INTRAVENOUS SOLUTION INTRAVENOUS ONCE
Status: COMPLETED | OUTPATIENT
Start: 2020-01-01 | End: 2020-01-01

## 2020-01-01 RX ORDER — PANTOPRAZOLE SODIUM 40 MG/1
40 TABLET, DELAYED RELEASE ORAL
Status: DISCONTINUED | OUTPATIENT
Start: 2020-01-01 | End: 2020-01-01

## 2020-01-01 RX ORDER — PROMETHAZINE HYDROCHLORIDE 25 MG/1
12.5 TABLET ORAL EVERY 6 HOURS PRN
Status: DISCONTINUED | OUTPATIENT
Start: 2020-01-01 | End: 2020-01-01 | Stop reason: HOSPADM

## 2020-01-01 RX ORDER — SODIUM CHLORIDE 9 MG/ML
INJECTION, SOLUTION INTRAVENOUS CONTINUOUS PRN
Status: DISCONTINUED | OUTPATIENT
Start: 2020-01-01 | End: 2020-01-01 | Stop reason: SDUPTHER

## 2020-01-01 RX ORDER — LABETALOL HYDROCHLORIDE 5 MG/ML
10 INJECTION, SOLUTION INTRAVENOUS EVERY 6 HOURS PRN
Status: DISCONTINUED | OUTPATIENT
Start: 2020-01-01 | End: 2020-01-01

## 2020-01-01 RX ORDER — 0.9 % SODIUM CHLORIDE 0.9 %
500 INTRAVENOUS SOLUTION INTRAVENOUS ONCE
Status: COMPLETED | OUTPATIENT
Start: 2020-01-01 | End: 2020-01-01

## 2020-01-01 RX ORDER — TROSPIUM CHLORIDE 20 MG/1
20 TABLET, FILM COATED ORAL
Status: DISCONTINUED | OUTPATIENT
Start: 2020-01-01 | End: 2020-01-01

## 2020-01-01 RX ORDER — SODIUM CHLORIDE 9 MG/ML
INJECTION, SOLUTION INTRAVENOUS
Status: DISPENSED
Start: 2020-01-01 | End: 2020-01-01

## 2020-01-01 RX ORDER — DEXAMETHASONE SODIUM PHOSPHATE 10 MG/ML
6 INJECTION, SOLUTION INTRAMUSCULAR; INTRAVENOUS DAILY
Status: COMPLETED | OUTPATIENT
Start: 2020-01-01 | End: 2020-01-01

## 2020-01-01 RX ORDER — POLYETHYLENE GLYCOL 3350 17 G/17G
17 POWDER, FOR SOLUTION ORAL DAILY PRN
Status: DISCONTINUED | OUTPATIENT
Start: 2020-01-01 | End: 2020-01-01

## 2020-01-01 RX ORDER — PROPOFOL 10 MG/ML
INJECTION, EMULSION INTRAVENOUS PRN
Status: DISCONTINUED | OUTPATIENT
Start: 2020-01-01 | End: 2020-01-01 | Stop reason: SDUPTHER

## 2020-01-01 RX ORDER — ONDANSETRON 2 MG/ML
4 INJECTION INTRAMUSCULAR; INTRAVENOUS EVERY 6 HOURS PRN
Status: DISCONTINUED | OUTPATIENT
Start: 2020-01-01 | End: 2020-01-01 | Stop reason: HOSPADM

## 2020-01-01 RX ORDER — ACETAMINOPHEN 650 MG/1
650 SUPPOSITORY RECTAL EVERY 6 HOURS PRN
Status: DISCONTINUED | OUTPATIENT
Start: 2020-01-01 | End: 2020-01-01 | Stop reason: HOSPADM

## 2020-01-01 RX ORDER — ATROPINE SULFATE 10 MG/ML
2 SOLUTION/ DROPS OPHTHALMIC EVERY 4 HOURS PRN
Status: DISCONTINUED | OUTPATIENT
Start: 2020-01-01 | End: 2020-01-01 | Stop reason: HOSPADM

## 2020-01-01 RX ORDER — ALBUTEROL SULFATE 90 UG/1
2 AEROSOL, METERED RESPIRATORY (INHALATION) EVERY 6 HOURS PRN
Status: DISCONTINUED | OUTPATIENT
Start: 2020-01-01 | End: 2020-01-01 | Stop reason: HOSPADM

## 2020-01-01 RX ORDER — MORPHINE SULFATE 20 MG/ML
5 SOLUTION ORAL
Status: DISCONTINUED | OUTPATIENT
Start: 2020-01-01 | End: 2020-01-01 | Stop reason: HOSPADM

## 2020-01-01 RX ORDER — MORPHINE SULFATE 4 MG/ML
4 INJECTION, SOLUTION INTRAMUSCULAR; INTRAVENOUS
Status: DISCONTINUED | OUTPATIENT
Start: 2020-01-01 | End: 2020-01-01 | Stop reason: HOSPADM

## 2020-01-01 RX ORDER — LORAZEPAM 2 MG/ML
1 INJECTION INTRAMUSCULAR
Status: DISCONTINUED | OUTPATIENT
Start: 2020-01-01 | End: 2020-01-01 | Stop reason: HOSPADM

## 2020-01-01 RX ORDER — CITALOPRAM 20 MG/1
20 TABLET ORAL DAILY
Status: DISCONTINUED | OUTPATIENT
Start: 2020-01-01 | End: 2020-01-01

## 2020-01-01 RX ORDER — LIDOCAINE HYDROCHLORIDE 10 MG/ML
5 INJECTION, SOLUTION EPIDURAL; INFILTRATION; INTRACAUDAL; PERINEURAL ONCE
Status: DISCONTINUED | OUTPATIENT
Start: 2020-01-01 | End: 2020-01-01

## 2020-01-01 RX ORDER — SODIUM CHLORIDE 0.9 % (FLUSH) 0.9 %
10 SYRINGE (ML) INJECTION PRN
Status: DISCONTINUED | OUTPATIENT
Start: 2020-01-01 | End: 2020-01-01 | Stop reason: HOSPADM

## 2020-01-01 RX ORDER — GUAIFENESIN 600 MG/1
600 TABLET, EXTENDED RELEASE ORAL 2 TIMES DAILY
Status: DISCONTINUED | OUTPATIENT
Start: 2020-01-01 | End: 2020-01-01

## 2020-01-01 RX ORDER — ASPIRIN 81 MG/1
81 TABLET, CHEWABLE ORAL DAILY
Status: DISCONTINUED | OUTPATIENT
Start: 2020-01-01 | End: 2020-01-01

## 2020-01-01 RX ORDER — TRAZODONE HYDROCHLORIDE 50 MG/1
25 TABLET ORAL NIGHTLY
Status: DISCONTINUED | OUTPATIENT
Start: 2020-01-01 | End: 2020-01-01

## 2020-01-01 RX ORDER — POTASSIUM CHLORIDE 7.45 MG/ML
10 INJECTION INTRAVENOUS
Status: DISPENSED | OUTPATIENT
Start: 2020-01-01 | End: 2020-01-01

## 2020-01-01 RX ORDER — IPRATROPIUM BROMIDE AND ALBUTEROL SULFATE 2.5; .5 MG/3ML; MG/3ML
1 SOLUTION RESPIRATORY (INHALATION) EVERY 4 HOURS PRN
Status: DISCONTINUED | OUTPATIENT
Start: 2020-01-01 | End: 2020-01-01

## 2020-01-01 RX ORDER — DIPHENHYDRAMINE HYDROCHLORIDE 50 MG/ML
25 INJECTION INTRAMUSCULAR; INTRAVENOUS NIGHTLY PRN
Status: DISCONTINUED | OUTPATIENT
Start: 2020-01-01 | End: 2020-01-01

## 2020-01-01 RX ORDER — DIVALPROEX SODIUM 125 MG/1
250 CAPSULE, COATED PELLETS ORAL EVERY 8 HOURS SCHEDULED
Status: DISCONTINUED | OUTPATIENT
Start: 2020-01-01 | End: 2020-01-01

## 2020-01-01 RX ORDER — THIAMINE MONONITRATE (VIT B1) 100 MG
100 TABLET ORAL DAILY
Status: DISCONTINUED | OUTPATIENT
Start: 2020-01-01 | End: 2020-01-01

## 2020-01-01 RX ORDER — LIDOCAINE HYDROCHLORIDE 20 MG/ML
INJECTION, SOLUTION INFILTRATION; PERINEURAL PRN
Status: DISCONTINUED | OUTPATIENT
Start: 2020-01-01 | End: 2020-01-01 | Stop reason: SDUPTHER

## 2020-01-01 RX ORDER — ASPIRIN 81 MG/1
81 TABLET ORAL DAILY
Status: DISCONTINUED | OUTPATIENT
Start: 2020-01-01 | End: 2020-01-01

## 2020-01-01 RX ORDER — 0.9 % SODIUM CHLORIDE 0.9 %
20 INTRAVENOUS SOLUTION INTRAVENOUS ONCE
Status: DISCONTINUED | OUTPATIENT
Start: 2020-01-01 | End: 2020-01-01

## 2020-01-01 RX ORDER — DEXTROSE MONOHYDRATE 50 MG/ML
100 INJECTION, SOLUTION INTRAVENOUS PRN
Status: DISCONTINUED | OUTPATIENT
Start: 2020-01-01 | End: 2020-01-01

## 2020-01-01 RX ORDER — NICOTINE POLACRILEX 4 MG
15 LOZENGE BUCCAL PRN
Status: DISCONTINUED | OUTPATIENT
Start: 2020-01-01 | End: 2020-01-01

## 2020-01-01 RX ORDER — DIVALPROEX SODIUM 125 MG/1
125 CAPSULE, COATED PELLETS ORAL 2 TIMES DAILY
Status: DISCONTINUED | OUTPATIENT
Start: 2020-01-01 | End: 2020-01-01 | Stop reason: ALTCHOICE

## 2020-01-01 RX ORDER — 0.9 % SODIUM CHLORIDE 0.9 %
250 INTRAVENOUS SOLUTION INTRAVENOUS ONCE
Status: DISCONTINUED | OUTPATIENT
Start: 2020-01-01 | End: 2020-01-01

## 2020-01-01 RX ORDER — DEXTROSE MONOHYDRATE 25 G/50ML
12.5 INJECTION, SOLUTION INTRAVENOUS PRN
Status: DISCONTINUED | OUTPATIENT
Start: 2020-01-01 | End: 2020-01-01

## 2020-01-01 RX ORDER — SODIUM CHLORIDE 9 MG/ML
INJECTION, SOLUTION INTRAVENOUS CONTINUOUS
Status: DISCONTINUED | OUTPATIENT
Start: 2020-01-01 | End: 2020-01-01

## 2020-01-01 RX ORDER — KETOROLAC TROMETHAMINE 15 MG/ML
15 INJECTION, SOLUTION INTRAMUSCULAR; INTRAVENOUS EVERY 6 HOURS PRN
Status: DISCONTINUED | OUTPATIENT
Start: 2020-01-01 | End: 2020-01-01 | Stop reason: HOSPADM

## 2020-01-01 RX ORDER — KETOROLAC TROMETHAMINE 15 MG/ML
15 INJECTION, SOLUTION INTRAMUSCULAR; INTRAVENOUS EVERY 6 HOURS PRN
Status: DISCONTINUED | OUTPATIENT
Start: 2020-01-01 | End: 2020-01-01

## 2020-01-01 RX ORDER — AMLODIPINE BESYLATE 5 MG/1
10 TABLET ORAL DAILY
Status: DISCONTINUED | OUTPATIENT
Start: 2020-01-01 | End: 2020-01-01

## 2020-01-01 RX ORDER — LEVETIRACETAM 100 MG/ML
500 SOLUTION ORAL 2 TIMES DAILY
Status: DISCONTINUED | OUTPATIENT
Start: 2020-01-01 | End: 2020-01-01

## 2020-01-01 RX ORDER — DEXTROSE MONOHYDRATE 50 MG/ML
INJECTION, SOLUTION INTRAVENOUS CONTINUOUS
Status: DISCONTINUED | OUTPATIENT
Start: 2020-01-01 | End: 2020-01-01

## 2020-01-01 RX ORDER — FUROSEMIDE 10 MG/ML
40 INJECTION INTRAMUSCULAR; INTRAVENOUS ONCE
Status: COMPLETED | OUTPATIENT
Start: 2020-01-01 | End: 2020-01-01

## 2020-01-01 RX ORDER — PANTOPRAZOLE SODIUM 40 MG/10ML
40 INJECTION, POWDER, LYOPHILIZED, FOR SOLUTION INTRAVENOUS 2 TIMES DAILY
Status: DISCONTINUED | OUTPATIENT
Start: 2020-01-01 | End: 2020-01-01

## 2020-01-01 RX ORDER — LAMOTRIGINE 100 MG/1
100 TABLET ORAL 3 TIMES DAILY
Status: DISCONTINUED | OUTPATIENT
Start: 2020-01-01 | End: 2020-01-01

## 2020-01-01 RX ORDER — DEXMEDETOMIDINE HYDROCHLORIDE 4 UG/ML
0.2 INJECTION, SOLUTION INTRAVENOUS CONTINUOUS
Status: DISCONTINUED | OUTPATIENT
Start: 2020-01-01 | End: 2020-01-01 | Stop reason: HOSPADM

## 2020-01-01 RX ORDER — EPINEPHRINE 0.1 MG/ML
SYRINGE (ML) INJECTION PRN
Status: DISCONTINUED | OUTPATIENT
Start: 2020-01-01 | End: 2020-01-01 | Stop reason: ALTCHOICE

## 2020-01-01 RX ORDER — LINEZOLID 2 MG/ML
600 INJECTION, SOLUTION INTRAVENOUS EVERY 12 HOURS
Status: DISCONTINUED | OUTPATIENT
Start: 2020-01-01 | End: 2020-01-01

## 2020-01-01 RX ORDER — SODIUM CHLORIDE, SODIUM LACTATE, POTASSIUM CHLORIDE, CALCIUM CHLORIDE 600; 310; 30; 20 MG/100ML; MG/100ML; MG/100ML; MG/100ML
INJECTION, SOLUTION INTRAVENOUS CONTINUOUS
Status: DISCONTINUED | OUTPATIENT
Start: 2020-01-01 | End: 2020-01-01

## 2020-01-01 RX ORDER — ACETAMINOPHEN 325 MG/1
650 TABLET ORAL EVERY 6 HOURS PRN
Status: DISCONTINUED | OUTPATIENT
Start: 2020-01-01 | End: 2020-01-01 | Stop reason: HOSPADM

## 2020-01-01 RX ORDER — SODIUM CHLORIDE 9 MG/ML
INJECTION, SOLUTION INTRAVENOUS
Status: COMPLETED
Start: 2020-01-01 | End: 2020-01-01

## 2020-01-01 RX ORDER — SODIUM CHLORIDE 0.9 % (FLUSH) 0.9 %
10 SYRINGE (ML) INJECTION EVERY 12 HOURS SCHEDULED
Status: DISCONTINUED | OUTPATIENT
Start: 2020-01-01 | End: 2020-01-01 | Stop reason: HOSPADM

## 2020-01-01 RX ORDER — FUROSEMIDE 10 MG/ML
20 INJECTION INTRAMUSCULAR; INTRAVENOUS ONCE
Status: COMPLETED | OUTPATIENT
Start: 2020-01-01 | End: 2020-01-01

## 2020-01-01 RX ORDER — 0.9 % SODIUM CHLORIDE 0.9 %
250 INTRAVENOUS SOLUTION INTRAVENOUS ONCE
Status: COMPLETED | OUTPATIENT
Start: 2020-01-01 | End: 2020-01-01

## 2020-01-01 RX ORDER — ALBUMIN (HUMAN) 12.5 G/50ML
25 SOLUTION INTRAVENOUS ONCE
Status: COMPLETED | OUTPATIENT
Start: 2020-01-01 | End: 2020-01-01

## 2020-01-01 RX ORDER — LEVETIRACETAM 5 MG/ML
500 INJECTION INTRAVASCULAR EVERY 12 HOURS
Status: DISCONTINUED | OUTPATIENT
Start: 2020-01-01 | End: 2020-01-01

## 2020-01-01 RX ORDER — BUDESONIDE AND FORMOTEROL FUMARATE DIHYDRATE 160; 4.5 UG/1; UG/1
2 AEROSOL RESPIRATORY (INHALATION) 2 TIMES DAILY
Status: DISCONTINUED | OUTPATIENT
Start: 2020-01-01 | End: 2020-01-01

## 2020-01-01 RX ORDER — DEXAMETHASONE 1 MG
6 TABLET ORAL
COMMUNITY
Start: 2020-01-01 | End: 2020-01-01

## 2020-01-01 RX ORDER — 0.9 % SODIUM CHLORIDE 0.9 %
1000 INTRAVENOUS SOLUTION INTRAVENOUS ONCE
Status: COMPLETED | OUTPATIENT
Start: 2020-01-01 | End: 2020-01-01

## 2020-01-01 RX ORDER — MAGNESIUM SULFATE IN WATER 40 MG/ML
2 INJECTION, SOLUTION INTRAVENOUS ONCE
Status: COMPLETED | OUTPATIENT
Start: 2020-01-01 | End: 2020-01-01

## 2020-01-01 RX ORDER — POTASSIUM CHLORIDE 7.45 MG/ML
10 INJECTION INTRAVENOUS
Status: DISCONTINUED | OUTPATIENT
Start: 2020-01-01 | End: 2020-01-01 | Stop reason: SDUPTHER

## 2020-01-01 RX ORDER — DEXTROSE MONOHYDRATE 50 MG/ML
INJECTION, SOLUTION INTRAVENOUS CONTINUOUS
Status: DISCONTINUED | OUTPATIENT
Start: 2020-01-01 | End: 2020-01-01 | Stop reason: HOSPADM

## 2020-01-01 RX ORDER — MORPHINE SULFATE 2 MG/ML
2 INJECTION, SOLUTION INTRAMUSCULAR; INTRAVENOUS
Status: DISCONTINUED | OUTPATIENT
Start: 2020-01-01 | End: 2020-01-01 | Stop reason: HOSPADM

## 2020-01-01 RX ORDER — LANSOPRAZOLE 30 MG/1
30 TABLET, ORALLY DISINTEGRATING, DELAYED RELEASE ORAL
Status: DISCONTINUED | OUTPATIENT
Start: 2020-01-01 | End: 2020-01-01

## 2020-01-01 RX ORDER — BENZONATATE 100 MG/1
100 CAPSULE ORAL 3 TIMES DAILY PRN
Status: DISCONTINUED | OUTPATIENT
Start: 2020-01-01 | End: 2020-01-01 | Stop reason: ALTCHOICE

## 2020-01-01 RX ORDER — INSULIN LISPRO 100 [IU]/ML
0-6 INJECTION, SOLUTION INTRAVENOUS; SUBCUTANEOUS EVERY 4 HOURS
Status: DISCONTINUED | OUTPATIENT
Start: 2020-01-01 | End: 2020-01-01

## 2020-01-01 RX ORDER — POTASSIUM CHLORIDE 7.45 MG/ML
10 INJECTION INTRAVENOUS
Status: COMPLETED | OUTPATIENT
Start: 2020-01-01 | End: 2020-01-01

## 2020-01-01 RX ORDER — POTASSIUM CHLORIDE 7.45 MG/ML
10 INJECTION INTRAVENOUS PRN
Status: DISCONTINUED | OUTPATIENT
Start: 2020-01-01 | End: 2020-01-01

## 2020-01-01 RX ADMIN — TROSPIUM CHLORIDE 20 MG: 20 TABLET, FILM COATED ORAL at 17:18

## 2020-01-01 RX ADMIN — CARBAMAZEPINE 200 MG: 200 TABLET ORAL at 08:12

## 2020-01-01 RX ADMIN — DEXTROSE MONOHYDRATE: 50 INJECTION, SOLUTION INTRAVENOUS at 01:28

## 2020-01-01 RX ADMIN — POTASSIUM CHLORIDE 10 MEQ: 7.46 INJECTION, SOLUTION INTRAVENOUS at 16:26

## 2020-01-01 RX ADMIN — MAGNESIUM SULFATE IN WATER 2 G: 40 INJECTION, SOLUTION INTRAVENOUS at 15:59

## 2020-01-01 RX ADMIN — ENOXAPARIN SODIUM 40 MG: 40 INJECTION SUBCUTANEOUS at 08:12

## 2020-01-01 RX ADMIN — AMPICILLIN SODIUM AND SULBACTAM SODIUM 1.5 G: 1; .5 INJECTION, POWDER, FOR SOLUTION INTRAMUSCULAR; INTRAVENOUS at 17:06

## 2020-01-01 RX ADMIN — VALPROATE SODIUM 250 MG: 100 INJECTION, SOLUTION INTRAVENOUS at 12:15

## 2020-01-01 RX ADMIN — ALBUMIN (HUMAN) 25 G: 0.25 INJECTION, SOLUTION INTRAVENOUS at 23:27

## 2020-01-01 RX ADMIN — Medication 10 ML: at 08:49

## 2020-01-01 RX ADMIN — ACETAMINOPHEN 650 MG: 650 SUPPOSITORY RECTAL at 19:45

## 2020-01-01 RX ADMIN — CITALOPRAM HYDROBROMIDE 20 MG: 20 TABLET ORAL at 08:49

## 2020-01-01 RX ADMIN — TROSPIUM CHLORIDE 20 MG: 20 TABLET, FILM COATED ORAL at 06:44

## 2020-01-01 RX ADMIN — LAMOTRIGINE 100 MG: 100 TABLET ORAL at 23:01

## 2020-01-01 RX ADMIN — PANTOPRAZOLE SODIUM 40 MG: 40 INJECTION, POWDER, FOR SOLUTION INTRAVENOUS at 08:51

## 2020-01-01 RX ADMIN — DEXTROSE MONOHYDRATE: 50 INJECTION, SOLUTION INTRAVENOUS at 09:08

## 2020-01-01 RX ADMIN — POTASSIUM CHLORIDE: 2 INJECTION, SOLUTION, CONCENTRATE INTRAVENOUS at 06:49

## 2020-01-01 RX ADMIN — THIAMINE HYDROCHLORIDE 100 MG: 100 INJECTION, SOLUTION INTRAMUSCULAR; INTRAVENOUS at 13:03

## 2020-01-01 RX ADMIN — DEXTROSE MONOHYDRATE 12.5 G: 25 INJECTION, SOLUTION INTRAVENOUS at 06:53

## 2020-01-01 RX ADMIN — TRAZODONE HYDROCHLORIDE 25 MG: 50 TABLET ORAL at 23:01

## 2020-01-01 RX ADMIN — Medication 2 PUFF: at 19:48

## 2020-01-01 RX ADMIN — PANTOPRAZOLE SODIUM 40 MG: 40 INJECTION, POWDER, FOR SOLUTION INTRAVENOUS at 08:53

## 2020-01-01 RX ADMIN — THIAMINE HYDROCHLORIDE 100 MG: 100 INJECTION, SOLUTION INTRAMUSCULAR; INTRAVENOUS at 12:26

## 2020-01-01 RX ADMIN — LAMOTRIGINE 100 MG: 100 TABLET ORAL at 17:53

## 2020-01-01 RX ADMIN — CARBAMAZEPINE 200 MG: 200 TABLET ORAL at 08:29

## 2020-01-01 RX ADMIN — LANSOPRAZOLE 30 MG: 30 TABLET, ORALLY DISINTEGRATING, DELAYED RELEASE ORAL at 16:15

## 2020-01-01 RX ADMIN — LAMOTRIGINE 100 MG: 100 TABLET ORAL at 10:32

## 2020-01-01 RX ADMIN — CARBAMAZEPINE 200 MG: 200 TABLET ORAL at 11:44

## 2020-01-01 RX ADMIN — INSULIN LISPRO 1 UNITS: 100 INJECTION, SOLUTION INTRAVENOUS; SUBCUTANEOUS at 09:00

## 2020-01-01 RX ADMIN — INSULIN LISPRO 1 UNITS: 100 INJECTION, SOLUTION INTRAVENOUS; SUBCUTANEOUS at 08:01

## 2020-01-01 RX ADMIN — POTASSIUM CHLORIDE 10 MEQ: 7.46 INJECTION, SOLUTION INTRAVENOUS at 15:25

## 2020-01-01 RX ADMIN — SODIUM CHLORIDE: 9 INJECTION, SOLUTION INTRAVENOUS at 08:17

## 2020-01-01 RX ADMIN — CARBAMAZEPINE 200 MG: 200 TABLET ORAL at 22:27

## 2020-01-01 RX ADMIN — SODIUM CHLORIDE 0.4 MCG/KG/HR: 9 INJECTION, SOLUTION INTRAVENOUS at 18:43

## 2020-01-01 RX ADMIN — CITALOPRAM HYDROBROMIDE 20 MG: 20 TABLET ORAL at 09:19

## 2020-01-01 RX ADMIN — PROPOFOL 20 MG: 10 INJECTION, EMULSION INTRAVENOUS at 08:09

## 2020-01-01 RX ADMIN — CARBAMAZEPINE 200 MG: 200 TABLET ORAL at 20:11

## 2020-01-01 RX ADMIN — CARBAMAZEPINE 200 MG: 200 TABLET ORAL at 17:54

## 2020-01-01 RX ADMIN — POTASSIUM CHLORIDE 10 MEQ: 7.46 INJECTION, SOLUTION INTRAVENOUS at 23:32

## 2020-01-01 RX ADMIN — INSULIN LISPRO 1 UNITS: 100 INJECTION, SOLUTION INTRAVENOUS; SUBCUTANEOUS at 01:00

## 2020-01-01 RX ADMIN — PANTOPRAZOLE SODIUM 40 MG: 40 INJECTION, POWDER, FOR SOLUTION INTRAVENOUS at 08:39

## 2020-01-01 RX ADMIN — TROSPIUM CHLORIDE 20 MG: 20 TABLET, FILM COATED ORAL at 16:49

## 2020-01-01 RX ADMIN — Medication 10 MEQ: at 10:20

## 2020-01-01 RX ADMIN — LAMOTRIGINE 100 MG: 100 TABLET ORAL at 09:19

## 2020-01-01 RX ADMIN — Medication 10 ML: at 19:41

## 2020-01-01 RX ADMIN — ENOXAPARIN SODIUM 40 MG: 40 INJECTION SUBCUTANEOUS at 09:26

## 2020-01-01 RX ADMIN — LAMOTRIGINE 100 MG: 100 TABLET ORAL at 22:14

## 2020-01-01 RX ADMIN — LANSOPRAZOLE 30 MG: 30 TABLET, ORALLY DISINTEGRATING, DELAYED RELEASE ORAL at 17:53

## 2020-01-01 RX ADMIN — CITALOPRAM HYDROBROMIDE 20 MG: 20 TABLET ORAL at 10:45

## 2020-01-01 RX ADMIN — Medication 10 ML: at 08:35

## 2020-01-01 RX ADMIN — Medication 1 G: at 08:59

## 2020-01-01 RX ADMIN — LEVETIRACETAM 500 MG: 5 INJECTION INTRAVENOUS at 02:08

## 2020-01-01 RX ADMIN — CARBAMAZEPINE 200 MG: 200 TABLET ORAL at 21:05

## 2020-01-01 RX ADMIN — Medication 10 ML: at 11:46

## 2020-01-01 RX ADMIN — AMPICILLIN SODIUM AND SULBACTAM SODIUM 1.5 G: 1; .5 INJECTION, POWDER, FOR SOLUTION INTRAMUSCULAR; INTRAVENOUS at 16:28

## 2020-01-01 RX ADMIN — DEXAMETHASONE SODIUM PHOSPHATE 6 MG: 10 INJECTION, SOLUTION INTRAMUSCULAR; INTRAVENOUS at 16:18

## 2020-01-01 RX ADMIN — VALPROATE SODIUM 250 MG: 100 INJECTION, SOLUTION INTRAVENOUS at 06:05

## 2020-01-01 RX ADMIN — VALPROATE SODIUM 250 MG: 100 INJECTION, SOLUTION INTRAVENOUS at 01:01

## 2020-01-01 RX ADMIN — LEVETIRACETAM 500 MG: 5 INJECTION INTRAVENOUS at 01:31

## 2020-01-01 RX ADMIN — PANTOPRAZOLE SODIUM 40 MG: 40 INJECTION, POWDER, FOR SOLUTION INTRAVENOUS at 08:12

## 2020-01-01 RX ADMIN — LEVETIRACETAM 500 MG: 5 INJECTION INTRAVENOUS at 01:43

## 2020-01-01 RX ADMIN — Medication 100 MG: at 08:29

## 2020-01-01 RX ADMIN — Medication 10 ML: at 08:55

## 2020-01-01 RX ADMIN — LANSOPRAZOLE 30 MG: 30 TABLET, ORALLY DISINTEGRATING, DELAYED RELEASE ORAL at 15:56

## 2020-01-01 RX ADMIN — Medication 10 MEQ: at 11:54

## 2020-01-01 RX ADMIN — Medication 10 MEQ: at 11:30

## 2020-01-01 RX ADMIN — INSULIN LISPRO 1 UNITS: 100 INJECTION, SOLUTION INTRAVENOUS; SUBCUTANEOUS at 20:18

## 2020-01-01 RX ADMIN — Medication 100 MG: at 09:23

## 2020-01-01 RX ADMIN — PANTOPRAZOLE SODIUM 40 MG: 40 INJECTION, POWDER, FOR SOLUTION INTRAVENOUS at 23:02

## 2020-01-01 RX ADMIN — POTASSIUM CHLORIDE 10 MEQ: 7.46 INJECTION, SOLUTION INTRAVENOUS at 20:08

## 2020-01-01 RX ADMIN — DOXYCYCLINE 100 MG: 100 INJECTION, POWDER, LYOPHILIZED, FOR SOLUTION INTRAVENOUS at 09:00

## 2020-01-01 RX ADMIN — ENOXAPARIN SODIUM 40 MG: 40 INJECTION SUBCUTANEOUS at 12:29

## 2020-01-01 RX ADMIN — SODIUM CHLORIDE 400 ML: 9 INJECTION, SOLUTION INTRAVENOUS at 07:44

## 2020-01-01 RX ADMIN — CARBAMAZEPINE 200 MG: 200 TABLET ORAL at 10:33

## 2020-01-01 RX ADMIN — TROSPIUM CHLORIDE 20 MG: 20 TABLET, FILM COATED ORAL at 08:55

## 2020-01-01 RX ADMIN — LEVETIRACETAM 500 MG: 5 INJECTION INTRAVENOUS at 16:17

## 2020-01-01 RX ADMIN — AMPICILLIN SODIUM AND SULBACTAM SODIUM 1.5 G: 1; .5 INJECTION, POWDER, FOR SOLUTION INTRAMUSCULAR; INTRAVENOUS at 03:02

## 2020-01-01 RX ADMIN — ACETAMINOPHEN 650 MG: 650 SUPPOSITORY RECTAL at 02:31

## 2020-01-01 RX ADMIN — LEVETIRACETAM 500 MG: 5 INJECTION INTRAVENOUS at 01:02

## 2020-01-01 RX ADMIN — Medication 10 MEQ: at 05:38

## 2020-01-01 RX ADMIN — LABETALOL HYDROCHLORIDE 10 MG: 5 INJECTION INTRAVENOUS at 16:16

## 2020-01-01 RX ADMIN — TROSPIUM CHLORIDE 20 MG: 20 TABLET, FILM COATED ORAL at 06:05

## 2020-01-01 RX ADMIN — SODIUM CHLORIDE 500 ML: 9 INJECTION, SOLUTION INTRAVENOUS at 23:27

## 2020-01-01 RX ADMIN — AMPICILLIN SODIUM AND SULBACTAM SODIUM 1.5 G: 1; .5 INJECTION, POWDER, FOR SOLUTION INTRAMUSCULAR; INTRAVENOUS at 05:05

## 2020-01-01 RX ADMIN — LAMOTRIGINE 100 MG: 100 TABLET ORAL at 08:06

## 2020-01-01 RX ADMIN — Medication 10 MEQ: at 06:36

## 2020-01-01 RX ADMIN — LAMOTRIGINE 100 MG: 100 TABLET ORAL at 08:38

## 2020-01-01 RX ADMIN — LEVETIRACETAM 500 MG: 5 INJECTION INTRAVENOUS at 13:27

## 2020-01-01 RX ADMIN — KETOROLAC TROMETHAMINE 15 MG: 15 INJECTION, SOLUTION INTRAMUSCULAR; INTRAVENOUS at 18:02

## 2020-01-01 RX ADMIN — CARBAMAZEPINE 200 MG: 200 TABLET ORAL at 13:54

## 2020-01-01 RX ADMIN — AMPICILLIN SODIUM AND SULBACTAM SODIUM 1.5 G: 1; .5 INJECTION, POWDER, FOR SOLUTION INTRAMUSCULAR; INTRAVENOUS at 23:10

## 2020-01-01 RX ADMIN — SODIUM CHLORIDE 20 ML: 9 INJECTION, SOLUTION INTRAVENOUS at 12:09

## 2020-01-01 RX ADMIN — LANSOPRAZOLE 30 MG: 30 TABLET, ORALLY DISINTEGRATING, DELAYED RELEASE ORAL at 15:28

## 2020-01-01 RX ADMIN — CITALOPRAM HYDROBROMIDE 20 MG: 20 TABLET ORAL at 08:38

## 2020-01-01 RX ADMIN — VALPROATE SODIUM 250 MG: 100 INJECTION, SOLUTION INTRAVENOUS at 03:30

## 2020-01-01 RX ADMIN — LABETALOL HYDROCHLORIDE 10 MG: 5 INJECTION INTRAVENOUS at 03:14

## 2020-01-01 RX ADMIN — TRAZODONE HYDROCHLORIDE 25 MG: 50 TABLET ORAL at 20:56

## 2020-01-01 RX ADMIN — DEXTROSE MONOHYDRATE: 50 INJECTION, SOLUTION INTRAVENOUS at 19:14

## 2020-01-01 RX ADMIN — CITALOPRAM HYDROBROMIDE 20 MG: 20 TABLET ORAL at 08:12

## 2020-01-01 RX ADMIN — DEXTROSE MONOHYDRATE: 50 INJECTION, SOLUTION INTRAVENOUS at 12:51

## 2020-01-01 RX ADMIN — CARBAMAZEPINE 200 MG: 200 TABLET ORAL at 20:55

## 2020-01-01 RX ADMIN — VALPROATE SODIUM 250 MG: 100 INJECTION, SOLUTION INTRAVENOUS at 09:30

## 2020-01-01 RX ADMIN — Medication 10 MEQ: at 15:00

## 2020-01-01 RX ADMIN — SODIUM CHLORIDE 0.3 MCG/KG/HR: 9 INJECTION, SOLUTION INTRAVENOUS at 12:17

## 2020-01-01 RX ADMIN — LINEZOLID 600 MG: 600 INJECTION, SOLUTION INTRAVENOUS at 12:41

## 2020-01-01 RX ADMIN — VALPROATE SODIUM 250 MG: 100 INJECTION, SOLUTION INTRAVENOUS at 17:25

## 2020-01-01 RX ADMIN — DEXAMETHASONE SODIUM PHOSPHATE 6 MG: 10 INJECTION, SOLUTION INTRAMUSCULAR; INTRAVENOUS at 09:54

## 2020-01-01 RX ADMIN — POTASSIUM CHLORIDE 10 MEQ: 7.46 INJECTION, SOLUTION INTRAVENOUS at 09:23

## 2020-01-01 RX ADMIN — SODIUM CHLORIDE, POTASSIUM CHLORIDE, SODIUM LACTATE AND CALCIUM CHLORIDE: 600; 310; 30; 20 INJECTION, SOLUTION INTRAVENOUS at 14:53

## 2020-01-01 RX ADMIN — LAMOTRIGINE 100 MG: 100 TABLET ORAL at 16:43

## 2020-01-01 RX ADMIN — LINEZOLID 600 MG: 600 INJECTION, SOLUTION INTRAVENOUS at 05:50

## 2020-01-01 RX ADMIN — TROSPIUM CHLORIDE 20 MG: 20 TABLET, FILM COATED ORAL at 17:53

## 2020-01-01 RX ADMIN — ENOXAPARIN SODIUM 40 MG: 40 INJECTION SUBCUTANEOUS at 09:42

## 2020-01-01 RX ADMIN — SODIUM CHLORIDE 20 ML: 9 INJECTION, SOLUTION INTRAVENOUS at 06:31

## 2020-01-01 RX ADMIN — TROSPIUM CHLORIDE 20 MG: 20 TABLET, FILM COATED ORAL at 08:06

## 2020-01-01 RX ADMIN — DIPHENHYDRAMINE HYDROCHLORIDE 25 MG: 50 INJECTION, SOLUTION INTRAMUSCULAR; INTRAVENOUS at 01:33

## 2020-01-01 RX ADMIN — Medication 10 ML: at 03:16

## 2020-01-01 RX ADMIN — INSULIN LISPRO 1 UNITS: 100 INJECTION, SOLUTION INTRAVENOUS; SUBCUTANEOUS at 08:24

## 2020-01-01 RX ADMIN — VALPROATE SODIUM 250 MG: 100 INJECTION, SOLUTION INTRAVENOUS at 15:00

## 2020-01-01 RX ADMIN — CITALOPRAM HYDROBROMIDE 20 MG: 20 TABLET ORAL at 08:29

## 2020-01-01 RX ADMIN — TROSPIUM CHLORIDE 20 MG: 20 TABLET, FILM COATED ORAL at 08:30

## 2020-01-01 RX ADMIN — POTASSIUM CHLORIDE 10 MEQ: 7.46 INJECTION, SOLUTION INTRAVENOUS at 08:17

## 2020-01-01 RX ADMIN — Medication 1 G: at 08:16

## 2020-01-01 RX ADMIN — Medication 10 ML: at 20:00

## 2020-01-01 RX ADMIN — CARBAMAZEPINE 200 MG: 200 TABLET ORAL at 09:19

## 2020-01-01 RX ADMIN — Medication 10 ML: at 19:40

## 2020-01-01 RX ADMIN — LEVETIRACETAM 500 MG: 5 INJECTION INTRAVENOUS at 14:43

## 2020-01-01 RX ADMIN — LANSOPRAZOLE 30 MG: 30 TABLET, ORALLY DISINTEGRATING, DELAYED RELEASE ORAL at 08:48

## 2020-01-01 RX ADMIN — Medication 10 ML: at 11:45

## 2020-01-01 RX ADMIN — Medication 10 ML: at 08:04

## 2020-01-01 RX ADMIN — AMPICILLIN SODIUM AND SULBACTAM SODIUM 1.5 G: 1; .5 INJECTION, POWDER, FOR SOLUTION INTRAMUSCULAR; INTRAVENOUS at 22:30

## 2020-01-01 RX ADMIN — DEXTROSE MONOHYDRATE: 50 INJECTION, SOLUTION INTRAVENOUS at 08:10

## 2020-01-01 RX ADMIN — KETOROLAC TROMETHAMINE 15 MG: 15 INJECTION, SOLUTION INTRAMUSCULAR; INTRAVENOUS at 01:35

## 2020-01-01 RX ADMIN — CARBAMAZEPINE 200 MG: 200 TABLET ORAL at 16:15

## 2020-01-01 RX ADMIN — TRAZODONE HYDROCHLORIDE 25 MG: 50 TABLET ORAL at 20:28

## 2020-01-01 RX ADMIN — SODIUM CHLORIDE 0.4 MCG/KG/HR: 9 INJECTION, SOLUTION INTRAVENOUS at 00:57

## 2020-01-01 RX ADMIN — Medication 10 ML: at 23:20

## 2020-01-01 RX ADMIN — AMPICILLIN SODIUM AND SULBACTAM SODIUM 1.5 G: 1; .5 INJECTION, POWDER, FOR SOLUTION INTRAMUSCULAR; INTRAVENOUS at 20:12

## 2020-01-01 RX ADMIN — VALPROATE SODIUM 250 MG: 100 INJECTION, SOLUTION INTRAVENOUS at 17:18

## 2020-01-01 RX ADMIN — PANTOPRAZOLE SODIUM 40 MG: 40 INJECTION, POWDER, FOR SOLUTION INTRAVENOUS at 22:14

## 2020-01-01 RX ADMIN — LEVETIRACETAM 500 MG: 5 INJECTION INTRAVENOUS at 14:27

## 2020-01-01 RX ADMIN — CARBAMAZEPINE 200 MG: 200 TABLET ORAL at 15:28

## 2020-01-01 RX ADMIN — Medication 100 MG: at 09:26

## 2020-01-01 RX ADMIN — INSULIN LISPRO 1 UNITS: 100 INJECTION, SOLUTION INTRAVENOUS; SUBCUTANEOUS at 22:16

## 2020-01-01 RX ADMIN — PANTOPRAZOLE SODIUM 40 MG: 40 INJECTION, POWDER, FOR SOLUTION INTRAVENOUS at 20:28

## 2020-01-01 RX ADMIN — AMPICILLIN SODIUM AND SULBACTAM SODIUM 1.5 G: 1; .5 INJECTION, POWDER, FOR SOLUTION INTRAMUSCULAR; INTRAVENOUS at 03:39

## 2020-01-01 RX ADMIN — SODIUM CHLORIDE, POTASSIUM CHLORIDE, SODIUM LACTATE AND CALCIUM CHLORIDE: 600; 310; 30; 20 INJECTION, SOLUTION INTRAVENOUS at 12:27

## 2020-01-01 RX ADMIN — AMLODIPINE BESYLATE 10 MG: 5 TABLET ORAL at 12:28

## 2020-01-01 RX ADMIN — VALPROATE SODIUM 250 MG: 100 INJECTION, SOLUTION INTRAVENOUS at 04:50

## 2020-01-01 RX ADMIN — AMPICILLIN SODIUM AND SULBACTAM SODIUM 1.5 G: 1; .5 INJECTION, POWDER, FOR SOLUTION INTRAMUSCULAR; INTRAVENOUS at 09:30

## 2020-01-01 RX ADMIN — Medication 10 MEQ: at 05:20

## 2020-01-01 RX ADMIN — AMLODIPINE BESYLATE 10 MG: 5 TABLET ORAL at 09:18

## 2020-01-01 RX ADMIN — SODIUM CHLORIDE 1000 ML: 9 INJECTION, SOLUTION INTRAVENOUS at 01:43

## 2020-01-01 RX ADMIN — AMPICILLIN SODIUM AND SULBACTAM SODIUM 1.5 G: 1; .5 INJECTION, POWDER, FOR SOLUTION INTRAMUSCULAR; INTRAVENOUS at 09:53

## 2020-01-01 RX ADMIN — AMPICILLIN SODIUM AND SULBACTAM SODIUM 1.5 G: 1; .5 INJECTION, POWDER, FOR SOLUTION INTRAMUSCULAR; INTRAVENOUS at 16:09

## 2020-01-01 RX ADMIN — LEVETIRACETAM 500 MG: 5 INJECTION INTRAVENOUS at 15:52

## 2020-01-01 RX ADMIN — ENOXAPARIN SODIUM 30 MG: 30 INJECTION SUBCUTANEOUS at 08:16

## 2020-01-01 RX ADMIN — DEXTROSE MONOHYDRATE: 50 INJECTION, SOLUTION INTRAVENOUS at 15:56

## 2020-01-01 RX ADMIN — TROSPIUM CHLORIDE 20 MG: 20 TABLET, FILM COATED ORAL at 06:35

## 2020-01-01 RX ADMIN — TROSPIUM CHLORIDE 20 MG: 20 TABLET, FILM COATED ORAL at 09:18

## 2020-01-01 RX ADMIN — LAMOTRIGINE 100 MG: 100 TABLET ORAL at 14:41

## 2020-01-01 RX ADMIN — INSULIN LISPRO 1 UNITS: 100 INJECTION, SOLUTION INTRAVENOUS; SUBCUTANEOUS at 12:33

## 2020-01-01 RX ADMIN — SODIUM CHLORIDE: 9 INJECTION, SOLUTION INTRAVENOUS at 13:11

## 2020-01-01 RX ADMIN — LAMOTRIGINE 100 MG: 100 TABLET ORAL at 16:15

## 2020-01-01 RX ADMIN — INSULIN GLARGINE 10 UNITS: 100 INJECTION, SOLUTION SUBCUTANEOUS at 21:08

## 2020-01-01 RX ADMIN — SODIUM CHLORIDE, POTASSIUM CHLORIDE, SODIUM LACTATE AND CALCIUM CHLORIDE: 600; 310; 30; 20 INJECTION, SOLUTION INTRAVENOUS at 12:03

## 2020-01-01 RX ADMIN — POTASSIUM PHOSPHATE, MONOBASIC AND POTASSIUM PHOSPHATE, DIBASIC 30 MMOL: 224; 236 INJECTION, SOLUTION INTRAVENOUS at 14:54

## 2020-01-01 RX ADMIN — Medication 10 MEQ: at 13:00

## 2020-01-01 RX ADMIN — TROSPIUM CHLORIDE 20 MG: 20 TABLET, FILM COATED ORAL at 15:29

## 2020-01-01 RX ADMIN — VALPROATE SODIUM 250 MG: 100 INJECTION, SOLUTION INTRAVENOUS at 01:03

## 2020-01-01 RX ADMIN — DEXAMETHASONE SODIUM PHOSPHATE 6 MG: 10 INJECTION, SOLUTION INTRAMUSCULAR; INTRAVENOUS at 08:16

## 2020-01-01 RX ADMIN — LAMOTRIGINE 100 MG: 100 TABLET ORAL at 20:56

## 2020-01-01 RX ADMIN — ENOXAPARIN SODIUM 40 MG: 40 INJECTION SUBCUTANEOUS at 09:54

## 2020-01-01 RX ADMIN — LAMOTRIGINE 100 MG: 100 TABLET ORAL at 10:45

## 2020-01-01 RX ADMIN — LAMOTRIGINE 100 MG: 100 TABLET ORAL at 08:12

## 2020-01-01 RX ADMIN — CARBAMAZEPINE 200 MG: 200 TABLET ORAL at 09:23

## 2020-01-01 RX ADMIN — VALPROATE SODIUM 250 MG: 100 INJECTION, SOLUTION INTRAVENOUS at 08:29

## 2020-01-01 RX ADMIN — LEVETIRACETAM 500 MG: 5 INJECTION INTRAVENOUS at 02:57

## 2020-01-01 RX ADMIN — DEXAMETHASONE SODIUM PHOSPHATE 6 MG: 10 INJECTION, SOLUTION INTRAMUSCULAR; INTRAVENOUS at 08:31

## 2020-01-01 RX ADMIN — CITALOPRAM HYDROBROMIDE 20 MG: 20 TABLET ORAL at 08:04

## 2020-01-01 RX ADMIN — CARBAMAZEPINE 200 MG: 200 TABLET ORAL at 14:54

## 2020-01-01 RX ADMIN — Medication 10 MEQ: at 13:36

## 2020-01-01 RX ADMIN — Medication 10 ML: at 20:11

## 2020-01-01 RX ADMIN — SODIUM CHLORIDE 250 ML: 9 INJECTION, SOLUTION INTRAVENOUS at 08:34

## 2020-01-01 RX ADMIN — LEVETIRACETAM 500 MG: 5 INJECTION INTRAVENOUS at 02:40

## 2020-01-01 RX ADMIN — AMPICILLIN SODIUM AND SULBACTAM SODIUM 1.5 G: 1; .5 INJECTION, POWDER, FOR SOLUTION INTRAMUSCULAR; INTRAVENOUS at 10:43

## 2020-01-01 RX ADMIN — TRAZODONE HYDROCHLORIDE 25 MG: 50 TABLET ORAL at 22:15

## 2020-01-01 RX ADMIN — TROSPIUM CHLORIDE 20 MG: 20 TABLET, FILM COATED ORAL at 08:48

## 2020-01-01 RX ADMIN — INSULIN LISPRO 1 UNITS: 100 INJECTION, SOLUTION INTRAVENOUS; SUBCUTANEOUS at 05:41

## 2020-01-01 RX ADMIN — LINEZOLID 600 MG: 600 INJECTION, SOLUTION INTRAVENOUS at 06:06

## 2020-01-01 RX ADMIN — Medication 10 ML: at 08:30

## 2020-01-01 RX ADMIN — Medication 100 MG: at 08:49

## 2020-01-01 RX ADMIN — AMPICILLIN SODIUM AND SULBACTAM SODIUM 1.5 G: 1; .5 INJECTION, POWDER, FOR SOLUTION INTRAMUSCULAR; INTRAVENOUS at 16:51

## 2020-01-01 RX ADMIN — Medication 10 MEQ: at 11:21

## 2020-01-01 RX ADMIN — FUROSEMIDE 20 MG: 10 INJECTION, SOLUTION INTRAMUSCULAR; INTRAVENOUS at 21:30

## 2020-01-01 RX ADMIN — TROSPIUM CHLORIDE 20 MG: 20 TABLET, FILM COATED ORAL at 06:31

## 2020-01-01 RX ADMIN — Medication 10 ML: at 20:55

## 2020-01-01 RX ADMIN — TROSPIUM CHLORIDE 20 MG: 20 TABLET, FILM COATED ORAL at 15:56

## 2020-01-01 RX ADMIN — POLYETHYLENE GLYCOL 3350 17 G: 17 POWDER, FOR SOLUTION ORAL at 09:18

## 2020-01-01 RX ADMIN — VALPROATE SODIUM 250 MG: 100 INJECTION, SOLUTION INTRAVENOUS at 22:25

## 2020-01-01 RX ADMIN — LINEZOLID 600 MG: 600 INJECTION, SOLUTION INTRAVENOUS at 17:12

## 2020-01-01 RX ADMIN — SODIUM CHLORIDE 0.7 MCG/KG/HR: 9 INJECTION, SOLUTION INTRAVENOUS at 03:08

## 2020-01-01 RX ADMIN — AMPICILLIN SODIUM AND SULBACTAM SODIUM 1.5 G: 1; .5 INJECTION, POWDER, FOR SOLUTION INTRAMUSCULAR; INTRAVENOUS at 03:03

## 2020-01-01 RX ADMIN — POTASSIUM CHLORIDE: 2 INJECTION, SOLUTION, CONCENTRATE INTRAVENOUS at 09:54

## 2020-01-01 RX ADMIN — CARBAMAZEPINE 200 MG: 200 TABLET ORAL at 20:27

## 2020-01-01 RX ADMIN — LEVETIRACETAM 500 MG: 5 INJECTION INTRAVENOUS at 13:37

## 2020-01-01 RX ADMIN — LAMOTRIGINE 100 MG: 100 TABLET ORAL at 08:29

## 2020-01-01 RX ADMIN — LORAZEPAM 1 MG: 2 INJECTION INTRAMUSCULAR; INTRAVENOUS at 07:52

## 2020-01-01 RX ADMIN — TROSPIUM CHLORIDE 20 MG: 20 TABLET, FILM COATED ORAL at 16:15

## 2020-01-01 RX ADMIN — DEXTROSE MONOHYDRATE: 50 INJECTION, SOLUTION INTRAVENOUS at 05:38

## 2020-01-01 RX ADMIN — ENOXAPARIN SODIUM 40 MG: 40 INJECTION SUBCUTANEOUS at 07:57

## 2020-01-01 RX ADMIN — DEXTROSE MONOHYDRATE 12.5 G: 25 INJECTION, SOLUTION INTRAVENOUS at 06:13

## 2020-01-01 RX ADMIN — LAMOTRIGINE 100 MG: 100 TABLET ORAL at 15:28

## 2020-01-01 RX ADMIN — LAMOTRIGINE 100 MG: 100 TABLET ORAL at 21:04

## 2020-01-01 RX ADMIN — Medication 10 MEQ: at 18:50

## 2020-01-01 RX ADMIN — Medication 10 ML: at 21:39

## 2020-01-01 RX ADMIN — PROPOFOL 20 MG: 10 INJECTION, EMULSION INTRAVENOUS at 08:01

## 2020-01-01 RX ADMIN — LEVETIRACETAM 500 MG: 5 INJECTION INTRAVENOUS at 02:00

## 2020-01-01 RX ADMIN — Medication 10 MEQ: at 15:27

## 2020-01-01 RX ADMIN — Medication 2 PUFF: at 19:46

## 2020-01-01 RX ADMIN — CARBAMAZEPINE 200 MG: 200 TABLET ORAL at 16:20

## 2020-01-01 RX ADMIN — ENOXAPARIN SODIUM 40 MG: 40 INJECTION SUBCUTANEOUS at 10:44

## 2020-01-01 RX ADMIN — TROSPIUM CHLORIDE 20 MG: 20 TABLET, FILM COATED ORAL at 15:28

## 2020-01-01 RX ADMIN — CARBAMAZEPINE 200 MG: 200 TABLET ORAL at 22:14

## 2020-01-01 RX ADMIN — PANTOPRAZOLE SODIUM 40 MG: 40 INJECTION, POWDER, FOR SOLUTION INTRAVENOUS at 00:54

## 2020-01-01 RX ADMIN — Medication 10 ML: at 21:08

## 2020-01-01 RX ADMIN — CARBAMAZEPINE 200 MG: 200 TABLET ORAL at 14:59

## 2020-01-01 RX ADMIN — LAMOTRIGINE 100 MG: 100 TABLET ORAL at 14:54

## 2020-01-01 RX ADMIN — DIPHENHYDRAMINE HYDROCHLORIDE 25 MG: 50 INJECTION, SOLUTION INTRAMUSCULAR; INTRAVENOUS at 03:23

## 2020-01-01 RX ADMIN — AMPICILLIN SODIUM AND SULBACTAM SODIUM 1.5 G: 1; .5 INJECTION, POWDER, FOR SOLUTION INTRAMUSCULAR; INTRAVENOUS at 00:33

## 2020-01-01 RX ADMIN — DEXTROSE MONOHYDRATE 1000 MG: 50 INJECTION, SOLUTION INTRAVENOUS at 11:57

## 2020-01-01 RX ADMIN — Medication 10 ML: at 01:34

## 2020-01-01 RX ADMIN — Medication 10 MEQ: at 18:35

## 2020-01-01 RX ADMIN — POTASSIUM CHLORIDE 10 MEQ: 7.46 INJECTION, SOLUTION INTRAVENOUS at 10:47

## 2020-01-01 RX ADMIN — VALPROATE SODIUM 250 MG: 100 INJECTION, SOLUTION INTRAVENOUS at 20:31

## 2020-01-01 RX ADMIN — Medication 10 ML: at 23:37

## 2020-01-01 RX ADMIN — Medication 10 MEQ: at 13:53

## 2020-01-01 RX ADMIN — CARBAMAZEPINE 200 MG: 200 TABLET ORAL at 09:26

## 2020-01-01 RX ADMIN — Medication 10 ML: at 08:13

## 2020-01-01 RX ADMIN — LEVETIRACETAM 500 MG: 5 INJECTION INTRAVENOUS at 13:12

## 2020-01-01 RX ADMIN — TROSPIUM CHLORIDE 20 MG: 20 TABLET, FILM COATED ORAL at 14:59

## 2020-01-01 RX ADMIN — CARBAMAZEPINE 200 MG: 200 TABLET ORAL at 16:43

## 2020-01-01 RX ADMIN — Medication 10 ML: at 08:51

## 2020-01-01 RX ADMIN — SODIUM CHLORIDE 0.2 MCG/KG/HR: 9 INJECTION, SOLUTION INTRAVENOUS at 10:13

## 2020-01-01 RX ADMIN — VALPROATE SODIUM 250 MG: 100 INJECTION, SOLUTION INTRAVENOUS at 21:42

## 2020-01-01 RX ADMIN — Medication 10 ML: at 10:31

## 2020-01-01 RX ADMIN — LINEZOLID 600 MG: 600 INJECTION, SOLUTION INTRAVENOUS at 19:04

## 2020-01-01 RX ADMIN — VALPROATE SODIUM 250 MG: 100 INJECTION, SOLUTION INTRAVENOUS at 11:04

## 2020-01-01 RX ADMIN — VALPROATE SODIUM 250 MG: 100 INJECTION, SOLUTION INTRAVENOUS at 04:45

## 2020-01-01 RX ADMIN — Medication 10 ML: at 09:19

## 2020-01-01 RX ADMIN — LAMOTRIGINE 100 MG: 100 TABLET ORAL at 08:49

## 2020-01-01 RX ADMIN — Medication 10 MEQ: at 07:57

## 2020-01-01 RX ADMIN — DIPHENHYDRAMINE HYDROCHLORIDE 25 MG: 50 INJECTION, SOLUTION INTRAMUSCULAR; INTRAVENOUS at 20:12

## 2020-01-01 RX ADMIN — Medication 10 MEQ: at 09:10

## 2020-01-01 RX ADMIN — LIDOCAINE HYDROCHLORIDE 60 MG: 20 INJECTION, SOLUTION INFILTRATION; PERINEURAL at 07:44

## 2020-01-01 RX ADMIN — Medication 10 ML: at 09:24

## 2020-01-01 RX ADMIN — SODIUM CHLORIDE: 9 INJECTION, SOLUTION INTRAVENOUS at 14:52

## 2020-01-01 RX ADMIN — VALPROATE SODIUM 250 MG: 100 INJECTION, SOLUTION INTRAVENOUS at 15:53

## 2020-01-01 RX ADMIN — LINEZOLID 600 MG: 600 INJECTION, SOLUTION INTRAVENOUS at 13:34

## 2020-01-01 RX ADMIN — Medication 10 ML: at 20:54

## 2020-01-01 RX ADMIN — LAMOTRIGINE 100 MG: 100 TABLET ORAL at 21:55

## 2020-01-01 RX ADMIN — Medication 10 ML: at 10:45

## 2020-01-01 RX ADMIN — VALPROATE SODIUM 250 MG: 100 INJECTION, SOLUTION INTRAVENOUS at 19:45

## 2020-01-01 RX ADMIN — DEXAMETHASONE SODIUM PHOSPHATE 6 MG: 10 INJECTION, SOLUTION INTRAMUSCULAR; INTRAVENOUS at 08:18

## 2020-01-01 RX ADMIN — AMPICILLIN SODIUM AND SULBACTAM SODIUM 1.5 G: 1; .5 INJECTION, POWDER, FOR SOLUTION INTRAMUSCULAR; INTRAVENOUS at 06:16

## 2020-01-01 RX ADMIN — POTASSIUM PHOSPHATE, MONOBASIC AND POTASSIUM PHOSPHATE, DIBASIC 30 MMOL: 224; 236 INJECTION, SOLUTION, CONCENTRATE INTRAVENOUS at 10:08

## 2020-01-01 RX ADMIN — SODIUM CHLORIDE, POTASSIUM CHLORIDE, SODIUM LACTATE AND CALCIUM CHLORIDE: 600; 310; 30; 20 INJECTION, SOLUTION INTRAVENOUS at 12:15

## 2020-01-01 RX ADMIN — AMLODIPINE BESYLATE 10 MG: 5 TABLET ORAL at 10:32

## 2020-01-01 RX ADMIN — LAMOTRIGINE 100 MG: 100 TABLET ORAL at 20:11

## 2020-01-01 RX ADMIN — ENOXAPARIN SODIUM 40 MG: 40 INJECTION SUBCUTANEOUS at 11:10

## 2020-01-01 RX ADMIN — CARBAMAZEPINE 200 MG: 200 TABLET ORAL at 21:24

## 2020-01-01 RX ADMIN — POTASSIUM CHLORIDE 10 MEQ: 7.46 INJECTION, SOLUTION INTRAVENOUS at 14:30

## 2020-01-01 RX ADMIN — TROSPIUM CHLORIDE 20 MG: 20 TABLET, FILM COATED ORAL at 16:20

## 2020-01-01 RX ADMIN — PROPOFOL 20 MG: 10 INJECTION, EMULSION INTRAVENOUS at 07:51

## 2020-01-01 RX ADMIN — AMPICILLIN SODIUM AND SULBACTAM SODIUM 1.5 G: 1; .5 INJECTION, POWDER, FOR SOLUTION INTRAMUSCULAR; INTRAVENOUS at 11:09

## 2020-01-01 RX ADMIN — ALBUMIN (HUMAN) 25 G: 0.25 INJECTION, SOLUTION INTRAVENOUS at 01:43

## 2020-01-01 RX ADMIN — ENOXAPARIN SODIUM 30 MG: 30 INJECTION SUBCUTANEOUS at 16:17

## 2020-01-01 RX ADMIN — CARBAMAZEPINE 200 MG: 200 TABLET ORAL at 08:38

## 2020-01-01 RX ADMIN — THIAMINE HYDROCHLORIDE 300 MG: 100 INJECTION, SOLUTION INTRAMUSCULAR; INTRAVENOUS at 12:23

## 2020-01-01 RX ADMIN — LAMOTRIGINE 100 MG: 100 TABLET ORAL at 22:47

## 2020-01-01 RX ADMIN — POTASSIUM CHLORIDE 10 MEQ: 7.46 INJECTION, SOLUTION INTRAVENOUS at 17:46

## 2020-01-01 RX ADMIN — Medication 10 ML: at 23:02

## 2020-01-01 RX ADMIN — Medication 10 ML: at 23:15

## 2020-01-01 RX ADMIN — FUROSEMIDE 40 MG: 10 INJECTION, SOLUTION INTRAMUSCULAR; INTRAVENOUS at 11:44

## 2020-01-01 RX ADMIN — Medication 10 ML: at 21:25

## 2020-01-01 RX ADMIN — CITALOPRAM HYDROBROMIDE 20 MG: 20 TABLET ORAL at 09:27

## 2020-01-01 RX ADMIN — SODIUM CHLORIDE 1000 ML: 9 INJECTION, SOLUTION INTRAVENOUS at 08:28

## 2020-01-01 RX ADMIN — Medication 10 ML: at 21:57

## 2020-01-01 RX ADMIN — Medication 10 ML: at 20:32

## 2020-01-01 RX ADMIN — Medication 10 ML: at 08:17

## 2020-01-01 RX ADMIN — Medication 10 ML: at 22:31

## 2020-01-01 RX ADMIN — CARBAMAZEPINE 200 MG: 200 TABLET ORAL at 21:56

## 2020-01-01 RX ADMIN — SODIUM CHLORIDE 250 ML: 9 INJECTION, SOLUTION INTRAVENOUS at 05:44

## 2020-01-01 RX ADMIN — CITALOPRAM HYDROBROMIDE 20 MG: 20 TABLET ORAL at 09:23

## 2020-01-01 RX ADMIN — Medication 100 MG: at 14:28

## 2020-01-01 RX ADMIN — LANSOPRAZOLE 30 MG: 30 TABLET, ORALLY DISINTEGRATING, DELAYED RELEASE ORAL at 16:43

## 2020-01-01 RX ADMIN — CITALOPRAM HYDROBROMIDE 20 MG: 20 TABLET ORAL at 10:33

## 2020-01-01 RX ADMIN — LAMOTRIGINE 100 MG: 100 TABLET ORAL at 21:24

## 2020-01-01 RX ADMIN — Medication 10 MEQ: at 06:50

## 2020-01-01 RX ADMIN — Medication 100 MG: at 08:06

## 2020-01-01 RX ADMIN — PROPOFOL 40 MG: 10 INJECTION, EMULSION INTRAVENOUS at 13:38

## 2020-01-01 RX ADMIN — Medication 10 MEQ: at 14:51

## 2020-01-01 RX ADMIN — VALPROATE SODIUM 250 MG: 100 INJECTION, SOLUTION INTRAVENOUS at 06:17

## 2020-01-01 RX ADMIN — Medication 10 ML: at 09:23

## 2020-01-01 RX ADMIN — CARBAMAZEPINE 200 MG: 200 TABLET ORAL at 14:28

## 2020-01-01 RX ADMIN — VALPROATE SODIUM 250 MG: 100 INJECTION, SOLUTION INTRAVENOUS at 16:20

## 2020-01-01 RX ADMIN — VALPROATE SODIUM 250 MG: 100 INJECTION, SOLUTION INTRAVENOUS at 20:57

## 2020-01-01 RX ADMIN — Medication 10 MEQ: at 11:50

## 2020-01-01 RX ADMIN — VALPROATE SODIUM 250 MG: 100 INJECTION, SOLUTION INTRAVENOUS at 15:25

## 2020-01-01 RX ADMIN — LINEZOLID 600 MG: 600 INJECTION, SOLUTION INTRAVENOUS at 02:12

## 2020-01-01 RX ADMIN — SODIUM CHLORIDE 8 MG/HR: 9 INJECTION, SOLUTION INTRAVENOUS at 12:20

## 2020-01-01 RX ADMIN — LINEZOLID 600 MG: 600 INJECTION, SOLUTION INTRAVENOUS at 01:32

## 2020-01-01 RX ADMIN — LEVETIRACETAM 500 MG: 5 INJECTION INTRAVENOUS at 02:33

## 2020-01-01 RX ADMIN — LEVETIRACETAM 500 MG: 5 INJECTION INTRAVENOUS at 13:36

## 2020-01-01 RX ADMIN — CARBAMAZEPINE 200 MG: 200 TABLET ORAL at 10:45

## 2020-01-01 RX ADMIN — Medication 10 MEQ: at 09:30

## 2020-01-01 RX ADMIN — CITALOPRAM HYDROBROMIDE 20 MG: 20 TABLET ORAL at 08:06

## 2020-01-01 RX ADMIN — PROPOFOL 20 MG: 10 INJECTION, EMULSION INTRAVENOUS at 13:42

## 2020-01-01 RX ADMIN — Medication 10 ML: at 09:26

## 2020-01-01 RX ADMIN — LINEZOLID 600 MG: 600 INJECTION, SOLUTION INTRAVENOUS at 02:09

## 2020-01-01 RX ADMIN — POTASSIUM CHLORIDE 10 MEQ: 7.46 INJECTION, SOLUTION INTRAVENOUS at 12:33

## 2020-01-01 RX ADMIN — CARBAMAZEPINE 200 MG: 200 TABLET ORAL at 15:29

## 2020-01-01 RX ADMIN — Medication 100 MG: at 09:18

## 2020-01-01 RX ADMIN — THIAMINE HYDROCHLORIDE 100 MG: 100 INJECTION, SOLUTION INTRAMUSCULAR; INTRAVENOUS at 11:53

## 2020-01-01 RX ADMIN — POTASSIUM PHOSPHATE, MONOBASIC AND POTASSIUM PHOSPHATE, DIBASIC 30 MMOL: 224; 236 INJECTION, SOLUTION, CONCENTRATE INTRAVENOUS at 11:50

## 2020-01-01 RX ADMIN — LAMOTRIGINE 100 MG: 100 TABLET ORAL at 15:29

## 2020-01-01 RX ADMIN — DEXTROSE MONOHYDRATE 12.5 G: 25 INJECTION, SOLUTION INTRAVENOUS at 20:28

## 2020-01-01 RX ADMIN — Medication 10 MEQ: at 10:08

## 2020-01-01 RX ADMIN — MORPHINE SULFATE 4 MG: 4 INJECTION INTRAVENOUS at 07:52

## 2020-01-01 RX ADMIN — LAMOTRIGINE 100 MG: 100 TABLET ORAL at 22:27

## 2020-01-01 RX ADMIN — CARBAMAZEPINE 200 MG: 200 TABLET ORAL at 21:30

## 2020-01-01 RX ADMIN — Medication 2 PUFF: at 08:33

## 2020-01-01 RX ADMIN — INSULIN LISPRO 1 UNITS: 100 INJECTION, SOLUTION INTRAVENOUS; SUBCUTANEOUS at 16:00

## 2020-01-01 RX ADMIN — FUROSEMIDE 40 MG: 10 INJECTION, SOLUTION INTRAMUSCULAR; INTRAVENOUS at 10:31

## 2020-01-01 RX ADMIN — TROSPIUM CHLORIDE 20 MG: 20 TABLET, FILM COATED ORAL at 06:13

## 2020-01-01 RX ADMIN — ENOXAPARIN SODIUM 40 MG: 40 INJECTION SUBCUTANEOUS at 10:30

## 2020-01-01 RX ADMIN — TROSPIUM CHLORIDE 20 MG: 20 TABLET, FILM COATED ORAL at 14:54

## 2020-01-01 RX ADMIN — Medication 100 MG: at 10:45

## 2020-01-01 RX ADMIN — LAMOTRIGINE 100 MG: 100 TABLET ORAL at 13:54

## 2020-01-01 RX ADMIN — Medication 10 MEQ: at 07:35

## 2020-01-01 RX ADMIN — CARBAMAZEPINE 200 MG: 200 TABLET ORAL at 08:03

## 2020-01-01 RX ADMIN — Medication 10 ML: at 21:12

## 2020-01-01 RX ADMIN — AMPICILLIN SODIUM AND SULBACTAM SODIUM 1.5 G: 1; .5 INJECTION, POWDER, FOR SOLUTION INTRAMUSCULAR; INTRAVENOUS at 23:31

## 2020-01-01 RX ADMIN — LANSOPRAZOLE 30 MG: 30 TABLET, ORALLY DISINTEGRATING, DELAYED RELEASE ORAL at 08:29

## 2020-01-01 RX ADMIN — VALPROATE SODIUM 250 MG: 100 INJECTION, SOLUTION INTRAVENOUS at 22:17

## 2020-01-01 RX ADMIN — SODIUM CHLORIDE 0.8 MCG/KG/HR: 9 INJECTION, SOLUTION INTRAVENOUS at 10:30

## 2020-01-01 RX ADMIN — AMPICILLIN SODIUM AND SULBACTAM SODIUM 1.5 G: 1; .5 INJECTION, POWDER, FOR SOLUTION INTRAMUSCULAR; INTRAVENOUS at 18:25

## 2020-01-01 RX ADMIN — POTASSIUM CHLORIDE: 2 INJECTION, SOLUTION, CONCENTRATE INTRAVENOUS at 15:32

## 2020-01-01 RX ADMIN — LINEZOLID 600 MG: 600 INJECTION, SOLUTION INTRAVENOUS at 13:53

## 2020-01-01 RX ADMIN — Medication 10 MEQ: at 15:17

## 2020-01-01 RX ADMIN — Medication 10 MEQ: at 08:53

## 2020-01-01 RX ADMIN — Medication 10 ML: at 20:50

## 2020-01-01 RX ADMIN — LANSOPRAZOLE 30 MG: 30 TABLET, ORALLY DISINTEGRATING, DELAYED RELEASE ORAL at 09:19

## 2020-01-01 RX ADMIN — DEXTROSE MONOHYDRATE: 50 INJECTION, SOLUTION INTRAVENOUS at 12:15

## 2020-01-01 RX ADMIN — CARBAMAZEPINE 200 MG: 200 TABLET ORAL at 23:01

## 2020-01-01 RX ADMIN — KETOROLAC TROMETHAMINE 15 MG: 15 INJECTION, SOLUTION INTRAMUSCULAR; INTRAVENOUS at 03:22

## 2020-01-01 RX ADMIN — VALPROATE SODIUM 250 MG: 100 INJECTION, SOLUTION INTRAVENOUS at 21:32

## 2020-01-01 RX ADMIN — INSULIN GLARGINE 10 UNITS: 100 INJECTION, SOLUTION SUBCUTANEOUS at 20:45

## 2020-01-01 RX ADMIN — SODIUM CHLORIDE: 9 INJECTION, SOLUTION INTRAVENOUS at 01:35

## 2020-01-01 RX ADMIN — CARBAMAZEPINE 200 MG: 200 TABLET ORAL at 14:41

## 2020-01-01 RX ADMIN — LINEZOLID 600 MG: 600 INJECTION, SOLUTION INTRAVENOUS at 06:46

## 2020-01-01 RX ADMIN — LAMOTRIGINE 100 MG: 100 TABLET ORAL at 14:28

## 2020-01-01 RX ADMIN — PANTOPRAZOLE SODIUM 40 MG: 40 INJECTION, POWDER, FOR SOLUTION INTRAVENOUS at 08:36

## 2020-01-01 RX ADMIN — ENOXAPARIN SODIUM 40 MG: 40 INJECTION SUBCUTANEOUS at 08:30

## 2020-01-01 RX ADMIN — INSULIN LISPRO 1 UNITS: 100 INJECTION, SOLUTION INTRAVENOUS; SUBCUTANEOUS at 12:10

## 2020-01-01 RX ADMIN — INSULIN LISPRO 1 UNITS: 100 INJECTION, SOLUTION INTRAVENOUS; SUBCUTANEOUS at 23:30

## 2020-01-01 RX ADMIN — PROPOFOL 60 MG: 10 INJECTION, EMULSION INTRAVENOUS at 07:45

## 2020-01-01 RX ADMIN — AMPICILLIN SODIUM AND SULBACTAM SODIUM 1.5 G: 1; .5 INJECTION, POWDER, FOR SOLUTION INTRAMUSCULAR; INTRAVENOUS at 11:30

## 2020-01-01 RX ADMIN — ENOXAPARIN SODIUM 40 MG: 40 INJECTION SUBCUTANEOUS at 09:23

## 2020-01-01 RX ADMIN — LEVETIRACETAM 500 MG: 5 INJECTION INTRAVENOUS at 05:07

## 2020-01-01 RX ADMIN — Medication 10 ML: at 08:16

## 2020-01-01 RX ADMIN — PANTOPRAZOLE SODIUM 40 MG: 40 INJECTION, POWDER, FOR SOLUTION INTRAVENOUS at 08:03

## 2020-01-01 RX ADMIN — ENOXAPARIN SODIUM 40 MG: 40 INJECTION SUBCUTANEOUS at 08:48

## 2020-01-01 RX ADMIN — ENOXAPARIN SODIUM 40 MG: 40 INJECTION SUBCUTANEOUS at 09:18

## 2020-01-01 RX ADMIN — INSULIN LISPRO 1 UNITS: 100 INJECTION, SOLUTION INTRAVENOUS; SUBCUTANEOUS at 08:16

## 2020-01-01 RX ADMIN — LIDOCAINE HYDROCHLORIDE 60 MG: 20 INJECTION, SOLUTION INFILTRATION; PERINEURAL at 13:38

## 2020-01-01 RX ADMIN — LAMOTRIGINE 100 MG: 100 TABLET ORAL at 09:26

## 2020-01-01 RX ADMIN — TROSPIUM CHLORIDE 20 MG: 20 TABLET, FILM COATED ORAL at 16:43

## 2020-01-01 RX ADMIN — Medication 10 ML: at 21:56

## 2020-01-01 RX ADMIN — KETOROLAC TROMETHAMINE 15 MG: 15 INJECTION, SOLUTION INTRAMUSCULAR; INTRAVENOUS at 20:11

## 2020-01-01 RX ADMIN — CARBAMAZEPINE 200 MG: 200 TABLET ORAL at 08:49

## 2020-01-01 RX ADMIN — Medication 2 PUFF: at 04:05

## 2020-01-01 RX ADMIN — LAMOTRIGINE 100 MG: 100 TABLET ORAL at 14:58

## 2020-01-01 RX ADMIN — LAMOTRIGINE 100 MG: 100 TABLET ORAL at 20:55

## 2020-01-01 RX ADMIN — SODIUM CHLORIDE 20 ML: 9 INJECTION, SOLUTION INTRAVENOUS at 23:13

## 2020-01-01 RX ADMIN — LAMOTRIGINE 100 MG: 100 TABLET ORAL at 09:23

## 2020-01-01 RX ADMIN — LINEZOLID 600 MG: 600 INJECTION, SOLUTION INTRAVENOUS at 18:35

## 2020-01-01 ASSESSMENT — PAIN SCALES - PAIN ASSESSMENT IN ADVANCED DEMENTIA (PAINAD)
FACIALEXPRESSION: 1
FACIALEXPRESSION: 2
TOTALSCORE: 0
FACIALEXPRESSION: 1
TOTALSCORE: 0
BREATHING: 0
CONSOLABILITY: 0
FACIALEXPRESSION: 0
BODYLANGUAGE: 0
TOTALSCORE: 5
TOTALSCORE: 0
BODYLANGUAGE: 0
BREATHING: 0
FACIALEXPRESSION: 0
FACIALEXPRESSION: 0
NEGVOCALIZATION: 1
NEGVOCALIZATION: 0
NEGVOCALIZATION: 0
BODYLANGUAGE: 1
CONSOLABILITY: 0
CONSOLABILITY: 0
NEGVOCALIZATION: 0
CONSOLABILITY: 0
NEGVOCALIZATION: 0
CONSOLABILITY: 0
TOTALSCORE: 1
NEGVOCALIZATION: 1
CONSOLABILITY: 0
TOTALSCORE: 0
BREATHING: 0
NEGVOCALIZATION: 1
NEGVOCALIZATION: 0
CONSOLABILITY: 0
TOTALSCORE: 0
BODYLANGUAGE: 0
BREATHING: 1
BREATHING: 1
TOTALSCORE: 0
BREATHING: 0
FACIALEXPRESSION: 2
NEGVOCALIZATION: 0
TOTALSCORE: 0
BODYLANGUAGE: 1
BODYLANGUAGE: 0
CONSOLABILITY: 0
NEGVOCALIZATION: 0
BODYLANGUAGE: 0
CONSOLABILITY: 0
CONSOLABILITY: 1
TOTALSCORE: 0
TOTALSCORE: 5
NEGVOCALIZATION: 1
NEGVOCALIZATION: 0
TOTALSCORE: 0
CONSOLABILITY: 0
BODYLANGUAGE: 2
FACIALEXPRESSION: 1
TOTALSCORE: 0
BODYLANGUAGE: 0
FACIALEXPRESSION: 1
CONSOLABILITY: 0
CONSOLABILITY: 0
TOTALSCORE: 2
CONSOLABILITY: 0
BREATHING: 0
BREATHING: 0
FACIALEXPRESSION: 0
TOTALSCORE: 0
CONSOLABILITY: 1
BODYLANGUAGE: 0
FACIALEXPRESSION: 0
CONSOLABILITY: 0
FACIALEXPRESSION: 0
NEGVOCALIZATION: 0
BREATHING: 0
BREATHING: 0
CONSOLABILITY: 1
CONSOLABILITY: 1
CONSOLABILITY: 0
FACIALEXPRESSION: 0
NEGVOCALIZATION: 1
TOTALSCORE: 1
NEGVOCALIZATION: 0
TOTALSCORE: 0
FACIALEXPRESSION: 0
BODYLANGUAGE: 0
NEGVOCALIZATION: 0
BREATHING: 0
FACIALEXPRESSION: 0
NEGVOCALIZATION: 1
CONSOLABILITY: 1
TOTALSCORE: 4
NEGVOCALIZATION: 0
BREATHING: 0
BREATHING: 0
CONSOLABILITY: 0
TOTALSCORE: 0
BODYLANGUAGE: 0
TOTALSCORE: 0
NEGVOCALIZATION: 0
CONSOLABILITY: 0
BODYLANGUAGE: 0
TOTALSCORE: 0
NEGVOCALIZATION: 0
NEGVOCALIZATION: 0
CONSOLABILITY: 0
NEGVOCALIZATION: 1
NEGVOCALIZATION: 0
BODYLANGUAGE: 0
FACIALEXPRESSION: 0
NEGVOCALIZATION: 0
CONSOLABILITY: 0
FACIALEXPRESSION: 0
CONSOLABILITY: 0
BREATHING: 0
TOTALSCORE: 0
FACIALEXPRESSION: 2
BREATHING: 0
BREATHING: 0
BODYLANGUAGE: 0
CONSOLABILITY: 0
BREATHING: 0
CONSOLABILITY: 0
TOTALSCORE: 0
BODYLANGUAGE: 0
BODYLANGUAGE: 0
BODYLANGUAGE: 1
NEGVOCALIZATION: 0
BREATHING: 0
TOTALSCORE: 7
TOTALSCORE: 0
NEGVOCALIZATION: 0
NEGVOCALIZATION: 1
BODYLANGUAGE: 1
TOTALSCORE: 0
CONSOLABILITY: 0
TOTALSCORE: 0
TOTALSCORE: 3
BREATHING: 0
BODYLANGUAGE: 0
TOTALSCORE: 0
NEGVOCALIZATION: 0
FACIALEXPRESSION: 0
NEGVOCALIZATION: 0
CONSOLABILITY: 0
BODYLANGUAGE: 0
TOTALSCORE: 0
BREATHING: 0
BREATHING: 0
TOTALSCORE: 0
BODYLANGUAGE: 2
CONSOLABILITY: 0
BODYLANGUAGE: 0
TOTALSCORE: 1
NEGVOCALIZATION: 0
TOTALSCORE: 5
FACIALEXPRESSION: 0
NEGVOCALIZATION: 0
TOTALSCORE: 0
BODYLANGUAGE: 0
BREATHING: 1
TOTALSCORE: 4
NEGVOCALIZATION: 0
FACIALEXPRESSION: 0
BODYLANGUAGE: 0
FACIALEXPRESSION: 0
NEGVOCALIZATION: 1
BODYLANGUAGE: 0
NEGVOCALIZATION: 1
BODYLANGUAGE: 1
FACIALEXPRESSION: 0
CONSOLABILITY: 1
BREATHING: 0
CONSOLABILITY: 0
TOTALSCORE: 0
CONSOLABILITY: 0
BODYLANGUAGE: 0
NEGVOCALIZATION: 1
NEGVOCALIZATION: 0
TOTALSCORE: 0
BODYLANGUAGE: 0
BREATHING: 0
FACIALEXPRESSION: 1
FACIALEXPRESSION: 0
BODYLANGUAGE: 0
FACIALEXPRESSION: 0
NEGVOCALIZATION: 0
NEGVOCALIZATION: 0
TOTALSCORE: 0
FACIALEXPRESSION: 0
FACIALEXPRESSION: 0
CONSOLABILITY: 1
NEGVOCALIZATION: 0
BODYLANGUAGE: 0
CONSOLABILITY: 0
FACIALEXPRESSION: 0
TOTALSCORE: 4
CONSOLABILITY: 0
CONSOLABILITY: 0
FACIALEXPRESSION: 0
CONSOLABILITY: 0
BREATHING: 0
BREATHING: 0
NEGVOCALIZATION: 0
BREATHING: 0
CONSOLABILITY: 0
BREATHING: 1
NEGVOCALIZATION: 0
BREATHING: 0
TOTALSCORE: 6
CONSOLABILITY: 0
NEGVOCALIZATION: 0
NEGVOCALIZATION: 0
BREATHING: 0
BREATHING: 0
FACIALEXPRESSION: 0
CONSOLABILITY: 0
NEGVOCALIZATION: 0
BODYLANGUAGE: 2
BODYLANGUAGE: 0
FACIALEXPRESSION: 0
TOTALSCORE: 5
BODYLANGUAGE: 0
CONSOLABILITY: 0
FACIALEXPRESSION: 0
NEGVOCALIZATION: 0
BODYLANGUAGE: 0
TOTALSCORE: 0
BREATHING: 0
TOTALSCORE: 0
BREATHING: 0
NEGVOCALIZATION: 0
TOTALSCORE: 2
BODYLANGUAGE: 0
FACIALEXPRESSION: 0
BODYLANGUAGE: 2
NEGVOCALIZATION: 0
BREATHING: 1
CONSOLABILITY: 2
BREATHING: 0
BREATHING: 0
FACIALEXPRESSION: 0
TOTALSCORE: 0
TOTALSCORE: 0
BODYLANGUAGE: 0
BREATHING: 0
NEGVOCALIZATION: 0
TOTALSCORE: 1
NEGVOCALIZATION: 0
TOTALSCORE: 0
FACIALEXPRESSION: 0
BREATHING: 1
TOTALSCORE: 3
BODYLANGUAGE: 0
FACIALEXPRESSION: 1
BREATHING: 0
CONSOLABILITY: 0
CONSOLABILITY: 0
NEGVOCALIZATION: 0
FACIALEXPRESSION: 0
TOTALSCORE: 0
BREATHING: 0
BODYLANGUAGE: 0
BREATHING: 0
TOTALSCORE: 0
BODYLANGUAGE: 1
TOTALSCORE: 2
NEGVOCALIZATION: 0
CONSOLABILITY: 1
TOTALSCORE: 0
BREATHING: 0
NEGVOCALIZATION: 0
FACIALEXPRESSION: 0
BREATHING: 0
TOTALSCORE: 0
BODYLANGUAGE: 1
CONSOLABILITY: 0
CONSOLABILITY: 0
BREATHING: 1
CONSOLABILITY: 0
CONSOLABILITY: 0
NEGVOCALIZATION: 0
FACIALEXPRESSION: 2
TOTALSCORE: 0
CONSOLABILITY: 0
FACIALEXPRESSION: 0
CONSOLABILITY: 0
BREATHING: 0
BREATHING: 0
BODYLANGUAGE: 0
FACIALEXPRESSION: 1
BREATHING: 0
FACIALEXPRESSION: 0
BREATHING: 0
FACIALEXPRESSION: 0
FACIALEXPRESSION: 2
TOTALSCORE: 0
BODYLANGUAGE: 0
BODYLANGUAGE: 0
NEGVOCALIZATION: 1
NEGVOCALIZATION: 0
FACIALEXPRESSION: 0
BREATHING: 0
FACIALEXPRESSION: 0
FACIALEXPRESSION: 0
BREATHING: 0
CONSOLABILITY: 1
TOTALSCORE: 0
NEGVOCALIZATION: 1
BREATHING: 0
NEGVOCALIZATION: 0
BODYLANGUAGE: 0
BODYLANGUAGE: 2
NEGVOCALIZATION: 1
CONSOLABILITY: 0
FACIALEXPRESSION: 0
BREATHING: 0
BODYLANGUAGE: 0
CONSOLABILITY: 0
NEGVOCALIZATION: 0
BREATHING: 0
CONSOLABILITY: 0
BREATHING: 0
BODYLANGUAGE: 0
NEGVOCALIZATION: 0
BREATHING: 0
FACIALEXPRESSION: 0
CONSOLABILITY: 0
NEGVOCALIZATION: 0
CONSOLABILITY: 0
FACIALEXPRESSION: 0
TOTALSCORE: 0
FACIALEXPRESSION: 2
CONSOLABILITY: 0
BODYLANGUAGE: 0
NEGVOCALIZATION: 0
BREATHING: 0
NEGVOCALIZATION: 1
BODYLANGUAGE: 2
BODYLANGUAGE: 1
NEGVOCALIZATION: 1
CONSOLABILITY: 0
TOTALSCORE: 0
BREATHING: 0
TOTALSCORE: 0
FACIALEXPRESSION: 0
FACIALEXPRESSION: 0
TOTALSCORE: 0
TOTALSCORE: 0
FACIALEXPRESSION: 0
TOTALSCORE: 0
TOTALSCORE: 0
FACIALEXPRESSION: 0
BREATHING: 1
TOTALSCORE: 1
TOTALSCORE: 3
CONSOLABILITY: 1
CONSOLABILITY: 0
BODYLANGUAGE: 0
FACIALEXPRESSION: 0
BODYLANGUAGE: 0
BODYLANGUAGE: 0
CONSOLABILITY: 0
FACIALEXPRESSION: 0
FACIALEXPRESSION: 0
NEGVOCALIZATION: 0
BODYLANGUAGE: 0
TOTALSCORE: 2
BREATHING: 0
FACIALEXPRESSION: 1
CONSOLABILITY: 0
CONSOLABILITY: 0
CONSOLABILITY: 1
FACIALEXPRESSION: 0
CONSOLABILITY: 0
BREATHING: 0
TOTALSCORE: 0
BREATHING: 0
CONSOLABILITY: 0
CONSOLABILITY: 0
BODYLANGUAGE: 0
CONSOLABILITY: 0
BODYLANGUAGE: 0
FACIALEXPRESSION: 0
CONSOLABILITY: 0
FACIALEXPRESSION: 0
CONSOLABILITY: 0
NEGVOCALIZATION: 1
CONSOLABILITY: 0
FACIALEXPRESSION: 0
NEGVOCALIZATION: 0
BODYLANGUAGE: 0
CONSOLABILITY: 0
FACIALEXPRESSION: 0
NEGVOCALIZATION: 1
NEGVOCALIZATION: 0
TOTALSCORE: 2
CONSOLABILITY: 0
NEGVOCALIZATION: 0
BODYLANGUAGE: 0
BODYLANGUAGE: 0
BREATHING: 0
TOTALSCORE: 5
BREATHING: 0
TOTALSCORE: 0
FACIALEXPRESSION: 0
BODYLANGUAGE: 1
NEGVOCALIZATION: 0
BODYLANGUAGE: 0
TOTALSCORE: 0
FACIALEXPRESSION: 0
TOTALSCORE: 0
BODYLANGUAGE: 0
CONSOLABILITY: 0
FACIALEXPRESSION: 0
FACIALEXPRESSION: 0
BODYLANGUAGE: 0
NEGVOCALIZATION: 1
NEGVOCALIZATION: 0
BODYLANGUAGE: 0
BREATHING: 0
CONSOLABILITY: 0
BREATHING: 0
NEGVOCALIZATION: 1
BREATHING: 0
BREATHING: 0
CONSOLABILITY: 1
NEGVOCALIZATION: 0
BREATHING: 0
BODYLANGUAGE: 0
CONSOLABILITY: 0
BREATHING: 0
NEGVOCALIZATION: 0
BREATHING: 0
BODYLANGUAGE: 0
BODYLANGUAGE: 1
TOTALSCORE: 0
NEGVOCALIZATION: 0
FACIALEXPRESSION: 0
TOTALSCORE: 0
NEGVOCALIZATION: 0
BREATHING: 0
NEGVOCALIZATION: 1
BREATHING: 0
FACIALEXPRESSION: 0
NEGVOCALIZATION: 0
BREATHING: 0
BREATHING: 0
BODYLANGUAGE: 1
FACIALEXPRESSION: 0
NEGVOCALIZATION: 1
FACIALEXPRESSION: 0
BREATHING: 0
FACIALEXPRESSION: 0
BODYLANGUAGE: 0
NEGVOCALIZATION: 0
FACIALEXPRESSION: 0
TOTALSCORE: 0
BREATHING: 0
TOTALSCORE: 3
CONSOLABILITY: 0
FACIALEXPRESSION: 0
BREATHING: 0
CONSOLABILITY: 0
CONSOLABILITY: 0
FACIALEXPRESSION: 0
BODYLANGUAGE: 0
BODYLANGUAGE: 0
BREATHING: 0
BODYLANGUAGE: 0
FACIALEXPRESSION: 0
TOTALSCORE: 4
BODYLANGUAGE: 0
TOTALSCORE: 0
FACIALEXPRESSION: 0
CONSOLABILITY: 0
TOTALSCORE: 0
FACIALEXPRESSION: 0
FACIALEXPRESSION: 0
BREATHING: 0
NEGVOCALIZATION: 1
FACIALEXPRESSION: 0
NEGVOCALIZATION: 0
TOTALSCORE: 5
FACIALEXPRESSION: 2
FACIALEXPRESSION: 0
CONSOLABILITY: 0
BREATHING: 0
CONSOLABILITY: 0
FACIALEXPRESSION: 0
NEGVOCALIZATION: 0
CONSOLABILITY: 0
CONSOLABILITY: 1
TOTALSCORE: 0
BODYLANGUAGE: 0
NEGVOCALIZATION: 1
NEGVOCALIZATION: 1
TOTALSCORE: 0
BREATHING: 0
BREATHING: 1
BREATHING: 0
NEGVOCALIZATION: 0
CONSOLABILITY: 0
NEGVOCALIZATION: 0
TOTALSCORE: 0
BREATHING: 0
BODYLANGUAGE: 0
TOTALSCORE: 0
BODYLANGUAGE: 1
BODYLANGUAGE: 0
BODYLANGUAGE: 0
FACIALEXPRESSION: 0
TOTALSCORE: 3
TOTALSCORE: 5
FACIALEXPRESSION: 0
FACIALEXPRESSION: 0
BODYLANGUAGE: 0
TOTALSCORE: 0
BODYLANGUAGE: 0
BODYLANGUAGE: 0
TOTALSCORE: 0
CONSOLABILITY: 0
FACIALEXPRESSION: 0
CONSOLABILITY: 0
FACIALEXPRESSION: 0
NEGVOCALIZATION: 0
CONSOLABILITY: 0
FACIALEXPRESSION: 0
NEGVOCALIZATION: 1
BREATHING: 0
BREATHING: 0
NEGVOCALIZATION: 0
FACIALEXPRESSION: 0
CONSOLABILITY: 0
BODYLANGUAGE: 0
CONSOLABILITY: 1
TOTALSCORE: 0
TOTALSCORE: 0
FACIALEXPRESSION: 0
BREATHING: 0
BREATHING: 0
CONSOLABILITY: 0
BODYLANGUAGE: 0
FACIALEXPRESSION: 2
BODYLANGUAGE: 0
CONSOLABILITY: 0
FACIALEXPRESSION: 2
TOTALSCORE: 5
BODYLANGUAGE: 0
CONSOLABILITY: 0
FACIALEXPRESSION: 0
FACIALEXPRESSION: 0
BREATHING: 0
CONSOLABILITY: 1
FACIALEXPRESSION: 1
TOTALSCORE: 0
NEGVOCALIZATION: 1
BREATHING: 0
TOTALSCORE: 0
BREATHING: 0
CONSOLABILITY: 0
CONSOLABILITY: 1
TOTALSCORE: 0
CONSOLABILITY: 0
TOTALSCORE: 3
CONSOLABILITY: 0
BREATHING: 0
NEGVOCALIZATION: 0
CONSOLABILITY: 1
BODYLANGUAGE: 0
CONSOLABILITY: 0
BREATHING: 0
CONSOLABILITY: 0
CONSOLABILITY: 0
TOTALSCORE: 4
TOTALSCORE: 0
TOTALSCORE: 0
BODYLANGUAGE: 0
BODYLANGUAGE: 0
BREATHING: 0
FACIALEXPRESSION: 0
FACIALEXPRESSION: 0
FACIALEXPRESSION: 1
TOTALSCORE: 0
BREATHING: 0
BODYLANGUAGE: 2
NEGVOCALIZATION: 1
FACIALEXPRESSION: 0
BREATHING: 0
BREATHING: 0
FACIALEXPRESSION: 0
NEGVOCALIZATION: 0
NEGVOCALIZATION: 0
CONSOLABILITY: 1
NEGVOCALIZATION: 0
BODYLANGUAGE: 1
TOTALSCORE: 0
FACIALEXPRESSION: 1
FACIALEXPRESSION: 0
TOTALSCORE: 2
BREATHING: 0
TOTALSCORE: 3
NEGVOCALIZATION: 0
BREATHING: 0
BODYLANGUAGE: 1
NEGVOCALIZATION: 0
BODYLANGUAGE: 0
NEGVOCALIZATION: 0
BODYLANGUAGE: 0
FACIALEXPRESSION: 0
TOTALSCORE: 4
TOTALSCORE: 0
CONSOLABILITY: 0
BREATHING: 0
FACIALEXPRESSION: 2
BREATHING: 0
BODYLANGUAGE: 0
BREATHING: 1
CONSOLABILITY: 0
BODYLANGUAGE: 0
NEGVOCALIZATION: 0
BREATHING: 0
BODYLANGUAGE: 0
FACIALEXPRESSION: 0
TOTALSCORE: 0
FACIALEXPRESSION: 0
BREATHING: 0
FACIALEXPRESSION: 0
FACIALEXPRESSION: 0
BREATHING: 0
NEGVOCALIZATION: 0
BODYLANGUAGE: 0
BODYLANGUAGE: 0
CONSOLABILITY: 0
NEGVOCALIZATION: 0
BREATHING: 0
TOTALSCORE: 0
BREATHING: 0
NEGVOCALIZATION: 0
FACIALEXPRESSION: 0
BODYLANGUAGE: 0
NEGVOCALIZATION: 0
BREATHING: 0
BREATHING: 0
NEGVOCALIZATION: 1
FACIALEXPRESSION: 0
CONSOLABILITY: 0
CONSOLABILITY: 1
TOTALSCORE: 0
BODYLANGUAGE: 0
CONSOLABILITY: 0
BREATHING: 0
BODYLANGUAGE: 0
FACIALEXPRESSION: 0
NEGVOCALIZATION: 1
TOTALSCORE: 2
BREATHING: 0
NEGVOCALIZATION: 0
CONSOLABILITY: 0
CONSOLABILITY: 1
NEGVOCALIZATION: 1
BODYLANGUAGE: 0
FACIALEXPRESSION: 0
BREATHING: 0
CONSOLABILITY: 0
NEGVOCALIZATION: 1
NEGVOCALIZATION: 0
BODYLANGUAGE: 0
BODYLANGUAGE: 0
NEGVOCALIZATION: 0
FACIALEXPRESSION: 0
CONSOLABILITY: 1
FACIALEXPRESSION: 0
CONSOLABILITY: 0
TOTALSCORE: 0
BODYLANGUAGE: 0
BREATHING: 1
NEGVOCALIZATION: 0
NEGVOCALIZATION: 1
BREATHING: 0
TOTALSCORE: 0
NEGVOCALIZATION: 0
NEGVOCALIZATION: 0
CONSOLABILITY: 0
NEGVOCALIZATION: 2
TOTALSCORE: 0
TOTALSCORE: 2
CONSOLABILITY: 0
BODYLANGUAGE: 0
CONSOLABILITY: 0
BODYLANGUAGE: 0
TOTALSCORE: 0
BODYLANGUAGE: 0
TOTALSCORE: 0
NEGVOCALIZATION: 0
BODYLANGUAGE: 1
CONSOLABILITY: 0
TOTALSCORE: 0
TOTALSCORE: 0
FACIALEXPRESSION: 2
TOTALSCORE: 1
BODYLANGUAGE: 0
NEGVOCALIZATION: 0
CONSOLABILITY: 0
BODYLANGUAGE: 0
TOTALSCORE: 0
CONSOLABILITY: 0
BODYLANGUAGE: 0
TOTALSCORE: 0
FACIALEXPRESSION: 0
BREATHING: 0
BREATHING: 0
CONSOLABILITY: 1
FACIALEXPRESSION: 0
BREATHING: 0
BREATHING: 0
CONSOLABILITY: 2
TOTALSCORE: 3
BREATHING: 0
BODYLANGUAGE: 1
BODYLANGUAGE: 0
BODYLANGUAGE: 1
NEGVOCALIZATION: 1
NEGVOCALIZATION: 0
BREATHING: 0
NEGVOCALIZATION: 0
BODYLANGUAGE: 0
TOTALSCORE: 0
FACIALEXPRESSION: 0
BREATHING: 0
BODYLANGUAGE: 0
CONSOLABILITY: 0
CONSOLABILITY: 0
BODYLANGUAGE: 0
FACIALEXPRESSION: 0
FACIALEXPRESSION: 0
TOTALSCORE: 0
BREATHING: 0
TOTALSCORE: 0
BODYLANGUAGE: 0
FACIALEXPRESSION: 0
BODYLANGUAGE: 0
BODYLANGUAGE: 0
CONSOLABILITY: 1
BODYLANGUAGE: 0
TOTALSCORE: 0
NEGVOCALIZATION: 0
CONSOLABILITY: 0
FACIALEXPRESSION: 0
BODYLANGUAGE: 1
TOTALSCORE: 0
CONSOLABILITY: 0
FACIALEXPRESSION: 0
TOTALSCORE: 2
NEGVOCALIZATION: 1
BREATHING: 0
BREATHING: 0
BODYLANGUAGE: 0
CONSOLABILITY: 0
FACIALEXPRESSION: 0
TOTALSCORE: 0
NEGVOCALIZATION: 0
BREATHING: 0
FACIALEXPRESSION: 0
BREATHING: 0
FACIALEXPRESSION: 1
CONSOLABILITY: 0
CONSOLABILITY: 0
BREATHING: 0
BREATHING: 0
CONSOLABILITY: 1
FACIALEXPRESSION: 1
TOTALSCORE: 0
NEGVOCALIZATION: 0
CONSOLABILITY: 0
CONSOLABILITY: 1
FACIALEXPRESSION: 0
CONSOLABILITY: 0
FACIALEXPRESSION: 0
BODYLANGUAGE: 0
TOTALSCORE: 0
CONSOLABILITY: 0
FACIALEXPRESSION: 0
BODYLANGUAGE: 0
BREATHING: 0
CONSOLABILITY: 0
FACIALEXPRESSION: 1
TOTALSCORE: 4
BODYLANGUAGE: 0
BODYLANGUAGE: 0
TOTALSCORE: 2
TOTALSCORE: 0
BREATHING: 0
CONSOLABILITY: 0
FACIALEXPRESSION: 2
NEGVOCALIZATION: 0
FACIALEXPRESSION: 0
FACIALEXPRESSION: 1
CONSOLABILITY: 1
NEGVOCALIZATION: 0
NEGVOCALIZATION: 0
FACIALEXPRESSION: 0
TOTALSCORE: 0
CONSOLABILITY: 0
BREATHING: 0
FACIALEXPRESSION: 0
NEGVOCALIZATION: 1
BODYLANGUAGE: 1
FACIALEXPRESSION: 2
FACIALEXPRESSION: 0
NEGVOCALIZATION: 0
NEGVOCALIZATION: 0
BODYLANGUAGE: 0
TOTALSCORE: 4
BREATHING: 1
BODYLANGUAGE: 0
FACIALEXPRESSION: 0
FACIALEXPRESSION: 0
BREATHING: 0
TOTALSCORE: 0
NEGVOCALIZATION: 0
BREATHING: 0
FACIALEXPRESSION: 0
BREATHING: 0
FACIALEXPRESSION: 0
BREATHING: 0
BREATHING: 0
CONSOLABILITY: 0
BREATHING: 0
BREATHING: 0
TOTALSCORE: 4
BODYLANGUAGE: 0
CONSOLABILITY: 0
NEGVOCALIZATION: 0
NEGVOCALIZATION: 0
TOTALSCORE: 5
BODYLANGUAGE: 1
TOTALSCORE: 2
NEGVOCALIZATION: 0
TOTALSCORE: 0
BREATHING: 0
FACIALEXPRESSION: 2
NEGVOCALIZATION: 0
BREATHING: 0
BREATHING: 0
TOTALSCORE: 0
BREATHING: 0
CONSOLABILITY: 0
NEGVOCALIZATION: 0
BREATHING: 0
BODYLANGUAGE: 0
TOTALSCORE: 5
NEGVOCALIZATION: 0
TOTALSCORE: 0
TOTALSCORE: 8
NEGVOCALIZATION: 0
BODYLANGUAGE: 0
BREATHING: 0
BODYLANGUAGE: 0
BODYLANGUAGE: 0
TOTALSCORE: 0
NEGVOCALIZATION: 0
TOTALSCORE: 0
FACIALEXPRESSION: 0
CONSOLABILITY: 1
BREATHING: 0
BODYLANGUAGE: 0
CONSOLABILITY: 0
BODYLANGUAGE: 0
FACIALEXPRESSION: 0
BODYLANGUAGE: 0
BREATHING: 0
BODYLANGUAGE: 1
NEGVOCALIZATION: 0
NEGVOCALIZATION: 0
TOTALSCORE: 0
NEGVOCALIZATION: 0
BODYLANGUAGE: 0
TOTALSCORE: 0
NEGVOCALIZATION: 0

## 2020-01-01 ASSESSMENT — PAIN SCALES - GENERAL
PAINLEVEL_OUTOF10: 3
PAINLEVEL_OUTOF10: 0
PAINLEVEL_OUTOF10: 2
PAINLEVEL_OUTOF10: 1
PAINLEVEL_OUTOF10: 2
PAINLEVEL_OUTOF10: 0
PAINLEVEL_OUTOF10: 2
PAINLEVEL_OUTOF10: 0
PAINLEVEL_OUTOF10: 3
PAINLEVEL_OUTOF10: 5
PAINLEVEL_OUTOF10: 3
PAINLEVEL_OUTOF10: 0
PAINLEVEL_OUTOF10: 3
PAINLEVEL_OUTOF10: 5
PAINLEVEL_OUTOF10: 1
PAINLEVEL_OUTOF10: 4
PAINLEVEL_OUTOF10: 3
PAINLEVEL_OUTOF10: 0
PAINLEVEL_OUTOF10: 2
PAINLEVEL_OUTOF10: 8
PAINLEVEL_OUTOF10: 0
PAINLEVEL_OUTOF10: 0
PAINLEVEL_OUTOF10: 6
PAINLEVEL_OUTOF10: 8
PAINLEVEL_OUTOF10: 2
PAINLEVEL_OUTOF10: 0
PAINLEVEL_OUTOF10: 4
PAINLEVEL_OUTOF10: 0
PAINLEVEL_OUTOF10: 0
PAINLEVEL_OUTOF10: 3
PAINLEVEL_OUTOF10: 0
PAINLEVEL_OUTOF10: 0
PAINLEVEL_OUTOF10: 3
PAINLEVEL_OUTOF10: 2
PAINLEVEL_OUTOF10: 0
PAINLEVEL_OUTOF10: 6
PAINLEVEL_OUTOF10: 4
PAINLEVEL_OUTOF10: 0
PAINLEVEL_OUTOF10: 7
PAINLEVEL_OUTOF10: 0
PAINLEVEL_OUTOF10: 5
PAINLEVEL_OUTOF10: 4
PAINLEVEL_OUTOF10: 6
PAINLEVEL_OUTOF10: 0
PAINLEVEL_OUTOF10: 0
PAINLEVEL_OUTOF10: 5
PAINLEVEL_OUTOF10: 8
PAINLEVEL_OUTOF10: 0
PAINLEVEL_OUTOF10: 3
PAINLEVEL_OUTOF10: 0
PAINLEVEL_OUTOF10: 0
PAINLEVEL_OUTOF10: 1
PAINLEVEL_OUTOF10: 0
PAINLEVEL_OUTOF10: 3
PAINLEVEL_OUTOF10: 0
PAINLEVEL_OUTOF10: 3
PAINLEVEL_OUTOF10: 0
PAINLEVEL_OUTOF10: 8
PAINLEVEL_OUTOF10: 4
PAINLEVEL_OUTOF10: 0
PAINLEVEL_OUTOF10: 3
PAINLEVEL_OUTOF10: 8
PAINLEVEL_OUTOF10: 6
PAINLEVEL_OUTOF10: 0
PAINLEVEL_OUTOF10: 7
PAINLEVEL_OUTOF10: 2
PAINLEVEL_OUTOF10: 0
PAINLEVEL_OUTOF10: 3
PAINLEVEL_OUTOF10: 5
PAINLEVEL_OUTOF10: 0
PAINLEVEL_OUTOF10: 3
PAINLEVEL_OUTOF10: 0
PAINLEVEL_OUTOF10: 0
PAINLEVEL_OUTOF10: 6
PAINLEVEL_OUTOF10: 4
PAINLEVEL_OUTOF10: 3
PAINLEVEL_OUTOF10: 3
PAINLEVEL_OUTOF10: 5
PAINLEVEL_OUTOF10: 5
PAINLEVEL_OUTOF10: 3
PAINLEVEL_OUTOF10: 0

## 2020-01-01 ASSESSMENT — PAIN SCALES - WONG BAKER
WONGBAKER_NUMERICALRESPONSE: 0
WONGBAKER_NUMERICALRESPONSE: 2
WONGBAKER_NUMERICALRESPONSE: 0

## 2020-01-01 ASSESSMENT — PAIN DESCRIPTION - PROGRESSION
CLINICAL_PROGRESSION: GRADUALLY IMPROVING
CLINICAL_PROGRESSION: GRADUALLY IMPROVING

## 2020-01-01 ASSESSMENT — PAIN DESCRIPTION - PAIN TYPE: TYPE: ACUTE PAIN

## 2020-01-01 ASSESSMENT — PAIN DESCRIPTION - LOCATION: LOCATION: HEAD

## 2020-01-01 ASSESSMENT — PAIN DESCRIPTION - FREQUENCY
FREQUENCY: CONTINUOUS
FREQUENCY: CONTINUOUS

## 2020-01-01 ASSESSMENT — PAIN DESCRIPTION - ORIENTATION: ORIENTATION: RIGHT

## 2020-01-01 ASSESSMENT — PAIN DESCRIPTION - DESCRIPTORS: DESCRIPTORS: HEADACHE

## 2020-05-14 NOTE — ED NOTES
Report given to transport team, report called and given to RN at [facility], patient was transported back at this time      Natalie Khan RN  05/14/20 [id number]

## 2020-05-14 NOTE — ED PROVIDER NOTES
[facility] [facility]        Pt Name: Liz Murray  MRN: [id number]  [place] 1945  Date of evaluation: 5/13/2020  Provider: DANIELLA Samson - CNP  PCP: Shaniqua Martinez MD    Evaluation by NAVYA. My supervising physician was available for consultation. CHIEF COMPLAINT       Chief Complaint   Patient presents with    Fall     Pt fell out of wheelchair and hit heat on floor.  Head Laceration     Pt has laceration on right side of forehead, no loss of consciencness. HISTORY OF PRESENT ILLNESS   (Location, Timing/Onset, Context/Setting, Quality, Duration, Modifying Factors, Severity, Associated Signs and Symptoms)  Note limiting factors. Liz Murray is a 67 y.o. male presents to the emergency department tonight after falling out of his wheelchair. The patient does not offer history, he is from [address] [place] [address], he does know his name and where he is, uncertain about the date or situation surrounding event. He does have a large laceration to the forehead, oozing bright red blood. Uncertain if he was knocked out. Denies any neck or back pain. Nursing Notes were all reviewed and agreed with or any disagreements were addressed in the HPI. REVIEW OF SYSTEMS    (2-9 systems for level 4, 10 or more for level 5)     Review of Systems   Unable to perform ROS: Other       Positives and Pertinent negatives as per HPI. Except as noted above in the ROS, all other systems were reviewed and negative.        PAST MEDICAL HISTORY     Past Medical History:   Diagnosis Date    Constipation     Convulsions ([address]     Degenerative disease of nervous system ([address]     Delusional disorder ([address]     Encephalopathy     Enlarged prostate with lower urinary tract symptoms (LUTS)     GERD (gastroesophageal reflux disease)     Hematuria     Hyperlipidemia     Hypernatremia     Hypertension     Hypertension     Mood disorder 0.5-2.5 (3) MG/3ML SOLN NEBULIZER SOLUTION    Inhale 3 mLs into the lungs every 4 hours as needed for Shortness of Breath    LACTOBACILLUS (ACIDOPHILUS) 100 MG CAPS    Take by mouth 2 times daily    LAMOTRIGINE (LAMICTAL) 100 MG TABLET    Take 100 mg by mouth 3 times daily     LISINOPRIL (PRINIVIL;ZESTRIL) 20 MG TABLET    Take 20 mg by mouth daily     LORATADINE (CLARITIN) 10 MG TABLET    Take 10 mg by mouth daily    LORATADINE (LORADAMED) 10 MG TABLET    Take 10 mg by mouth daily    MELATONIN 3 MG TABS TABLET    Take 3 mg by mouth nightly    OMEPRAZOLE (PRILOSEC) 20 MG CAPSULE    Take 20 mg by mouth daily    POLYETHYLENE GLYCOL (MIRALAX) POWD POWDER    Take 17 g by mouth daily     SENNA-DOCUSATE (PERICOLACE) 8.6-50 MG PER TABLET    Take 2 tablets by mouth nightly    TRAZODONE (DESYREL) 25 MG TABS    Take 25 mg by mouth nightly    TROSPIUM (SANCTURA) 20 MG TABLET    Take 20 mg by mouth 2 times daily    VITAMIN B-12 (CYANOCOBALAMIN) 1000 MCG TABLET    Take 1,000 mcg by mouth daily    VITAMIN D (CHOLECALCIFEROL) 1000 UNIT TABS TABLET    Take 5,000 Units by mouth daily          ALLERGIES     Patient has no known allergies. FAMILYHISTORY     History reviewed. No pertinent family history. SOCIAL HISTORY       Social History     Tobacco Use    Smoking status: Former Smoker     Types: Cigarettes     Last attempt to quit: 5/13/2005     Years since quitting: 15.0    Smokeless tobacco: Never Used   Substance Use Topics    Alcohol use: No    Drug use: No       SCREENINGS   NIH Stroke Scale  Interval: Baseline  Level of Consciousness (1a. ): Alert  LOC Questions (1b. ):  Answers both correctly  LOC Commands (1c. ): Performs both tasks correctly  Best Gaze (2. ): Normal  Visual (3. ): No visual loss  Facial Palsy (4. ): Normal symmetrical movement  Motor Arm, Left (5a. ): No drift  Motor Arm, Right (5b. ): No drift  Motor Leg, Left (6a. ): No drift  Motor Leg, Right (6b. ): No drift  Limb Ataxia (7. ): the time of this note:    CT CERVICAL SPINE WO CONTRAST   Final Result   No acute abnormality of the cervical spine. CT Head WO Contrast   Preliminary Result   1. No acute intracranial abnormality. 2. Right frontal scalp hematoma and laceration. No evidence of underlying   calvarial fracture. 3. Chronic small vessel ischemic white matter disease and diffuse cerebral   volume loss. Ct Head Wo Contrast    Result Date: 5/13/2020  1. No acute intracranial abnormality. 2. Right frontal scalp hematoma and laceration. No evidence of underlying calvarial fracture. 3. Chronic small vessel ischemic white matter disease and diffuse cerebral volume loss. Ct Cervical Spine Wo Contrast    Result Date: 5/14/2020  EXAMINATION: CT OF THE CERVICAL SPINE WITHOUT CONTRAST 5/13/2020 11:23 pm TECHNIQUE: CT of the cervical spine was performed without the administration of intravenous contrast. Multiplanar reformatted images are provided for review. Dose modulation, iterative reconstruction, and/or weight based adjustment of the mA/kV was utilized to reduce the radiation dose to as low as reasonably achievable. COMPARISON: None. HISTORY: ORDERING SYSTEM PROVIDED HISTORY: fall TECHNOLOGIST PROVIDED HISTORY: Reason for exam:->fall Reason for Exam: fall Acuity: Acute Type of Exam: Initial Relevant Medical/Surgical History: Fall (Pt fell out of wheelchair and hit heat on floor.) FINDINGS: BONES/ALIGNMENT: There is no acute fracture or traumatic malalignment. DEGENERATIVE CHANGES: Multilevel degenerative changes of the cervical spine, with large bridging osteophytes. SOFT TISSUES: There is no prevertebral soft tissue swelling. No acute abnormality of the cervical spine.            PROCEDURES   Unless otherwise noted below, none     Lac Repair  Date/Time: 5/14/2020 1:36 AM  Performed by: DANIELLA Peter CNP  Authorized by: DANIELLA Peter CNP     Consent:     Consent obtained:  Emergent situation    Risks discussed:  Infection, pain, retained foreign body, tendon damage, vascular damage, poor wound healing, poor cosmetic result, need for additional repair and nerve damage  Anesthesia (see MAR for exact dosages): Anesthesia method:  Local infiltration    Local anesthetic:  Lidocaine 2% WITH epi  Laceration details:     Location:  Face    Face location:  Forehead    Length (cm):  7  Repair type:     Repair type:  Simple  Pre-procedure details:     Preparation:  Patient was prepped and draped in usual sterile fashion  Exploration:     Wound exploration: wound explored through full range of motion      Contaminated: no    Treatment:     Area cleansed with:  Hibiclens    Amount of cleaning:  Extensive    Irrigation solution:  Sterile saline  Skin repair:     Repair method:  Sutures    Suture size:  6-0    Suture material:  Nylon    Suture technique:  Simple interrupted    Number of sutures:  10  Approximation:     Approximation:  Close  Post-procedure details:     Dressing:  Open (no dressing)    Patient tolerance of procedure: Tolerated well, no immediate complications        CRITICAL CARE TIME   N/A    CONSULTS:  None      EMERGENCY DEPARTMENT COURSE and DIFFERENTIAL DIAGNOSIS/MDM:   Vitals:    Vitals:    05/13/20 2242   BP: (!) 160/75   Pulse: 70   Resp: 20   Temp: 98.2 °F (36.8 °C)   TempSrc: Oral   SpO2: 95%   Weight: 165 lb (74.8 kg)   Height: 5' 6\" (1.676 m)       Patient was given the following medications:  Medications   lidocaine-EPINEPHrine 1 percent-1:[id number] injection 20 mL (has no administration in time range)       Briefly, this is a 67year old male that presents to the emergency department tonight after falling out of his wheelchair. The patient does not offer history, he is from [address] [place] [address], he does know his name and where he is, uncertain about the date or situation surrounding event. He does have a large laceration to the forehead, oozing bright red blood.   Uncertain if he was knocked out. Denies any neck or back pain. CT CERVICAL SPINE WO CONTRAST (Final result)   Result time 05/14/20 00:23:56   Final result by Danyelle Loera MD (05/14/20 00:23:56)                Impression:    No acute abnormality of the cervical spine. CT Head WO Contrast (Preliminary result)   Result time 05/13/20 23:57:03   Preliminary result by Danyelle Loera MD (05/13/20 23:57:03)                Impression:    1. No acute intracranial abnormality. 2. Right frontal scalp hematoma and laceration.  No evidence of underlying  calvarial fracture. 3. Chronic small vessel ischemic white matter disease and diffuse cerebral  volume loss. Wound was repaired, removed stitches in 7 days. I estimate there is LOW risk for SKULL FRACTURE, INTRACRANIAL HEMORRHAGE, CERVICAL SPINE INJURY, SUBDURAL OR EPIDURAL HEMATOMA,  thus I consider the discharge disposition reasonable. FINAL IMPRESSION      1. Fall from wheelchair, initial encounter    2. Closed head injury, initial encounter    3.  Facial laceration, initial encounter          DISPOSITION/PLAN   DISPOSITION Decision To Discharge 05/14/2020 01:06:47 AM      PATIENT REFERREDTO:  Miranda Cabral MD  [address]  [phone]    Schedule an appointment as soon as possible for a visit in 1 week  For suture removal      DISCHARGE MEDICATIONS:  New Prescriptions    No medications on file       DISCONTINUED MEDICATIONS:  Discontinued Medications    No medications on file              (Please note that portions of this note were completed with a voice recognition program.  Efforts were made to edit the dictations but occasionally words are mis-transcribed.)    DANIELLA Roca CNP (electronically signed)           DANIELLA Roca CNP  05/14/20 0042

## 2020-09-10 PROBLEM — R56.9 SEIZURE (HCC): Status: ACTIVE | Noted: 2020-01-01

## 2020-09-10 NOTE — PROGRESS NOTES
Pt seen in ed, admission completed, updated niece Stout Hawley of admission and pt testing positive for covid on 9/1. Family requested that we call and update on plan of care. Pt is confused, refuses to wear is oxygen, attempting to hit this RN for trying to put oxygen in nose.   spo2 92% on room air

## 2020-09-10 NOTE — ED PROVIDER NOTES
[facility]      Pt Name: Nancy Waddell  MRN: [id number]  [place] 1945  Date of evaluation: 9/10/2020  Provider: Raúl Hayes MD    CHIEF COMPLAINT       Chief Complaint   Patient presents with    Altered Mental Status     Altered mental status started yesterday. Pt brought in from [facility] home. HISTORY OF PRESENT ILLNESS   (Location/Symptom, Timing/Onset, Context/Setting, Quality, Duration, Modifying Factors, Severity)  Note limiting factors. HPI  Patient is 59-year-old male with history of seizure disorder presenting from nursing facility altered mental status. Per EMS the patient has been altered since dayshift yesterday (about 12-16 hours prior to arrival to the ER). EMS reports that nursing staff stated that the patient is very bright and does converse with them at baseline. They noticed that he was not speaking much yesterday morning and they did call EMS today because they noted seizure-like activity. They also reported to EMS that the patient is coronavirus positive. EMS reports that the patient has been nodding yes and no and following some commands for them. They have not got him to speak much. Vitals per EMS were unremarkable. Glucose was 120. Patient tells me his name and provides minimal other history. He does shake his head no inappropriately to questioning.  21  spoke to [facility] staff and patient was quieter and more reserved than usual starting around midday. Is a full code. Nursing Notes were reviewed. REVIEW OF SYSTEMS    (2-9 systems for level 4, 10 or more for level 5)     Review of Systems   Unable to perform ROS: Patient nonverbal   Neurological: Positive for seizures. Psychiatric/Behavioral: Positive for confusion. Except as noted above the remainder of the review of systems was reviewed and negative.        PAST MEDICAL HISTORY     Past Medical History: Diagnosis Date    Constipation     Convulsions ([facility]     Degenerative disease of nervous system ([facility]     Delusional disorder ([facility]     Encephalopathy     Enlarged prostate with lower urinary tract symptoms (LUTS)     GERD (gastroesophageal reflux disease)     Hematuria     Hyperlipidemia     Hypernatremia     Hypertension     Hypertension     Mood disorder (HCC)     Muscle weakness     Muscle weakness (generalized)     Neuromuscular dysfunction of bladder     Neuropathy     Obsessive-compulsive personality disorder (CODE)     Suprapubic catheter ([facility]     #20    Urinary retention          SURGICAL HISTORY       Past Surgical History:   Procedure Laterality Date    ARM SURGERY      BLADDER SURGERY      CYSTOSCOPY N/A 5/15/2019    CYSTOLITHALOPAXY , STONE  SUPRA PUBIC CATHETER EXCHANGE. performed by Christo Fall MD at [facility][note 3] N/A 1/15/2019    OPEN PRIMARY UMBILICAL HERNIA REPAIR performed by Jarek Morejon MD at [address]       Previous Medications    ACETAMINOPHEN (TYLENOL) 325 MG TABLET    Take 650 mg by mouth every 4 hours as needed for Pain    ASPIRIN 81 MG TABLET    Take 81 mg by mouth daily    BENZONATATE (TESSALON) 100 MG CAPSULE    Take 100 mg by mouth 3 times daily as needed for Cough    CARBAMAZEPINE (TEGRETOL) 200 MG TABLET    Take 200 mg by mouth 3 times daily     CITALOPRAM (CELEXA) 20 MG TABLET    Take 20 mg by mouth daily    CRANBERRY-VITAMIN C-VITAMIN E (CRANBERRY PLUS VITAMIN C PO)    Take 30 mLs by mouth 2 times daily    DIVALPROEX (DEPAKOTE) 125 MG DR TABLET    Take 125 mg by mouth 2 times daily    DOCUSATE SODIUM (COLACE, DULCOLAX) 100 MG CAPS    Take 100 mg by mouth 2 times daily    FOLIC ACID 0.8 MG CAPS    Take 1 tablet by mouth daily    GABAPENTIN (NEURONTIN) 100 MG CAPSULE    Take 200 mg by mouth 3 times daily    GLUCOSE (GLUTOSE) 40 % GEL    Take 37.5 mLs by mouth as needed (low BS)    GUAIFENESIN (MUCINEX) 600 MG EXTENDED RELEASE TABLET    Take 400 mg by mouth 3 times daily as needed     HALOPERIDOL LACTATE (HALDOL) 5 MG/ML INJECTION    Inject into the muscle every 6 hours as needed for Agitation    INSULIN DETEMIR (LEVEMIR) 100 UNIT/ML INJECTION VIAL    Inject 5 Units into the skin 2 times daily    IPRATROPIUM-ALBUTEROL (DUONEB) 0.5-2.5 (3) MG/3ML SOLN NEBULIZER SOLUTION    Inhale 3 mLs into the lungs every 4 hours as needed for Shortness of Breath    LACTOBACILLUS (ACIDOPHILUS) 100 MG CAPS    Take by mouth 2 times daily    LAMOTRIGINE (LAMICTAL) 100 MG TABLET    Take 100 mg by mouth 3 times daily     LISINOPRIL (PRINIVIL;ZESTRIL) 20 MG TABLET    Take 20 mg by mouth daily     LORATADINE (CLARITIN) 10 MG TABLET    Take 10 mg by mouth daily    LORATADINE (LORADAMED) 10 MG TABLET    Take 10 mg by mouth daily    MELATONIN 3 MG TABS TABLET    Take 3 mg by mouth nightly    OMEPRAZOLE (PRILOSEC) 20 MG CAPSULE    Take 20 mg by mouth daily    POLYETHYLENE GLYCOL (MIRALAX) POWD POWDER    Take 17 g by mouth daily     SENNA-DOCUSATE (PERICOLACE) 8.6-50 MG PER TABLET    Take 2 tablets by mouth nightly    TRAZODONE (DESYREL) 25 MG TABS    Take 25 mg by mouth nightly    TROSPIUM (SANCTURA) 20 MG TABLET    Take 20 mg by mouth 2 times daily    VITAMIN B-12 (CYANOCOBALAMIN) 1000 MCG TABLET    Take 1,000 mcg by mouth daily    VITAMIN D (CHOLECALCIFEROL) 1000 UNIT TABS TABLET    Take 5,000 Units by mouth daily        ALLERGIES     Patient has no known allergies. FAMILY HISTORY     History reviewed. No pertinent family history.        SOCIAL HISTORY       Social History     Socioeconomic History    Marital status: Single     Spouse name: None    Number of children: None    Years of education: None    Highest education level: None   Occupational History    None   Social Needs    Financial resource strain: None    Food insecurity     Worry: None     Inability: None    Transportation EXAM    (up to 7 for level 4, 8 or more for level 5)     ED Triage Vitals   BP Temp Temp src Pulse Resp SpO2 Height Weight   -- -- -- -- -- -- -- --       Physical Exam  Constitutional:       Comments: Thin, cachectic   HENT:      Head: Atraumatic. Mouth/Throat:      Comments: Mucous membranes very dry  Eyes:      Pupils: Pupils are equal, round, and reactive to light. Comments: Positive rightward gaze preference, can cross midline   Neck:      Musculoskeletal: No neck rigidity. Cardiovascular:      Rate and Rhythm: Normal rate and regular rhythm. Heart sounds: Normal heart sounds. Pulmonary:      Effort: Pulmonary effort is normal.      Breath sounds: Normal breath sounds. Abdominal:      General: There is distension. Palpations: There is no mass. Genitourinary:     Comments: Herman catheter in place, dark yellow urine in bag  Skin:     General: Skin is warm and dry. Capillary Refill: Capillary refill takes 2 to 3 seconds. Comments: Dry scaly skin on bilateral calves   Neurological:      Mental Status: He is confused. Comments: Eyes opening spontaneously and patient making confused sounds. Able to wiggle both fingers and toes to command but significantly weak in all 4 extremities         DIAGNOSTIC RESULTS     EKG: All EKG's are interpreted by the Emergency Department Physician who either signs or Co-signs this chart in the absence of a cardiologist.    The Ekg interpreted by me in the absence of a cardiologist shows. Narrow complex regular rhythm with ventricular rate of 68. No regular discernible P waves. Significant QT prolongation at 652, new when compared to prior, normal axis.   No significant ST elevation or T wave changes when compared to prior dated 1/2019      RADIOLOGY:   Non-plain film images such as CT, Ultrasound and MRI are read by the radiologist. Plain radiographic images are visualized and preliminarily interpreted by the emergency physician with the 1+ Triple Phos (*)     Hyaline Casts, UA 14 (*)     WBC, UA 82 (*)     All other components within normal limits    Narrative:     Performed at:  [facility]  [address] [facility],  [place], [address]   Phone [phone]   MAGNESIUM - Abnormal; Notable for the following components:    Magnesium 3.50 (*)     All other components within normal limits    Narrative:     Vitor Perez  [facility] tel. [phone],  Chemistry results called to and read back by rn dago dc, 09/10/2020  07:59, by Velasquez Patel  Performed at:  [facility]  [address] [facility] [facility],  [place], [address]   Phone [phone]   CULTURE, URINE   CULTURE, BLOOD 1   CULTURE, BLOOD 2   LACTATE, SEPSIS    Narrative:     Performed at:  [facility]  [address] [facility],  [place], [address]   Phone [phone]   CARBAMAZEPINE LEVEL, TOTAL    Narrative:     Performed at:  [address]   Phone [phone]   LACTATE, SEPSIS   LAMOTRIGINE LEVEL   VALPROIC ACID LEVEL, TOTAL   POCT BLOOD GASES       All other labs were within normal range or not returned as of this dictation. EMERGENCY DEPARTMENT COURSE and DIFFERENTIAL DIAGNOSIS/MDM:   Vitals:    Vitals:    09/10/20 0655 09/10/20 0719 09/10/20 0739 09/10/20 0745   BP: (!) 155/58 (!) 109/58  (!) 103/49   Pulse: 66  58 58   Resp: 16 17 16 15   Temp: 97.3 °F (36.3 °C)      TempSrc: Axillary      SpO2: 94% 99% 98% 97%   Weight: 150 lb (68 kg)      Height: 5' 8\" (1.727 m)              MDM  Course and MDM:  58-year-old male with seizure disorder presenting to the ER for altered mental status about 12-16 hours in duration. On arrival the patient is confused though alert and attempting to follow commands and able to move all 4 extremities though severely weak. He is hemodynamically stable. I did obtain collateral from [facility] staff.   I considered stroke in my workup of AMS as he does score quite high on the NIH scale however he has significant bilateral deficits and did have seizure-like activity this morning. He would not be a TPA candidate due to extensive time since last known well and seizure activity. I have greater suspicion that he is post ictal.  He is known to be coronavirus positive though in no acute respiratory distress. Work-up does show bilateral chest infiltrates consistent with pneumonia. He does not meet sepsis criteria. Due to significant QT prolongation I started the patient on ceftriaxone and doxycycline. workup also shows BENOIT with hypokalemia which was repleted IV.  1032: discussed with Dr. Lisa Garduno neurology and he recommends 1 g of IV Depakote in the ER. Further recommendations to be conveyed to medicine team on admission. CRITICAL CARE TIME   Total Critical Care time was 31 minutes, excluding separately reportable procedures. There was a high probability of clinically significant/life threatening deterioration in the patient's condition which required my urgent intervention. PROCEDURES:  Unless otherwise noted below, none     Procedures        FINAL IMPRESSION      1. Lincoln coma scale total score 13-15, at arrival to emergency department    2. History of seizures    3. QT prolongation    4. Pneumonia due to organism    5. BENOIT (acute kidney injury) ([facility]    6. Hypokalemia          DISPOSITION/PLAN   DISPOSITION        PATIENT REFERRED TO:  No follow-up provider specified. DISCHARGE MEDICATIONS:  New Prescriptions    No medications on file     Controlled Substances Monitoring:     No flowsheet data found.     (Please note that portions of this note were completed with a voice recognition program.  Efforts were made to edit the dictations but occasionally words are mis-transcribed.)    Elena Manning MD (electronically signed)  Attending Emergency Physician         Shania Bates MD  09/10/20 1961

## 2020-09-10 NOTE — ED NOTES
Report given to RN, v/u. Admission RN bedside for admission process.       Antonina Olguin RN  09/10/20 1040

## 2020-09-10 NOTE — PROGRESS NOTES
kira hospitalist:    \" Patient gurpreet upon arrival 50's later very hypotensive, BP stable now 133/69 MAP 85 HR 63 O2 sat 100 NC 2L. Patient very lethargic and disorientedx4. BS stable at this time after continuous fluids running. Patient NPO, can you switch PO meds to IV meds, patient is too lethargic and confused. Please advise.  Patient will need speech evaluation\"

## 2020-09-10 NOTE — ED NOTES
Bed: 21  Expected date:   Expected time:   Means of arrival:   Comments:  [address] Hanley RN  09/10/20 4605

## 2020-09-10 NOTE — CONSULTS
NEUROLOGY CONSULTATION     Patient's Name :   Brain Sánchez        YOB: 1945                    Date of Consultation : 9/10/2020 3:07 PM    Referring Physician :  Wiley Amaro MD    Reason for Consultation :  Breakthrough seizure in a patient with seizure disorder    HISTORY OF PRESENT ILLNESS      Lanre Fraga is a 67 y.o. male   History was obtained from the dictations in the chart and patient's nurse. Patient apparently had a seizure episode in the nursing home and then became somewhat lethargic. He was transported to the hospital.  He has a history of seizure disorder and is on multiple seizure medications including lamotrigine and carbamazepine. He probably is also on low-dose Depakote for agitation. Patient is now not able to give any detailed history. According to the dictations in the chart and with discussions from his nurses in the nursing home apparently on a good day patient is able to answer some questions appropriately and can make his needs. Currently he patient is agitated.       REVIEW OF SYSTEMS     Unable to obtain due to altered mental status    Past Medical History:   Diagnosis Date    Cerebral palsy ([address]     CHF (congestive heart failure) (HCC)     CKD (chronic kidney disease)     Constipation     Convulsions ([address]     COVID-19     Degenerative disease of nervous system ([address]     Delusional disorder ([address]     Dementia ([address]     Depression     Diabetes mellitus ([address]     Encephalopathy     Enlarged prostate with lower urinary tract symptoms (LUTS)     GERD (gastroesophageal reflux disease)     GI obstruction (HCC)     Hematuria     Hyperlipidemia     Hypernatremia     Hypertension     Hypertension     Mood disorder (HCC)     Muscle weakness     Muscle weakness (generalized)     Neuromuscular dysfunction of bladder     Neuropathy     Obsessive-compulsive personality disorder (CODE)     [facility]     Suprapubic catheter ([facility]     #20    Urinary retention      History reviewed. No pertinent family history.   Social History     Socioeconomic History    Marital status: Single     Spouse name: None    Number of children: None    Years of education: None    Highest education level: None   Occupational History    None   Social Needs    Financial resource strain: None    Food insecurity     Worry: None     Inability: None    Transportation needs     Medical: None     Non-medical: None   Tobacco Use    Smoking status: Former Smoker     Types: Cigarettes     Last attempt to quit: 5/13/2005     Years since quitting: 15.3    Smokeless tobacco: Never Used   Substance and Sexual Activity    Alcohol use: No    Drug use: No    Sexual activity: None   Lifestyle    Physical activity     Days per week: None     Minutes per session: None    Stress: None   Relationships    Social connections     Talks on phone: None     Gets together: None     Attends Jehovah's witness service: None     Active member of club or organization: None     Attends meetings of clubs or organizations: None     Relationship status: None    Intimate partner violence     Fear of current or ex partner: None     Emotionally abused: None     Physically abused: None     Forced sexual activity: None   Other Topics Concern    None   Social History Narrative    None     Current Facility-Administered Medications   Medication Dose Route Frequency Provider Last Rate Last Dose    cefTRIAXone (ROCEPHIN) 1 g in sterile water 10 mL IV syringe  1 g Intravenous Q24H Shania Bates MD   1 g at 09/10/20 0859    aspirin EC tablet 81 mg  81 mg Oral Daily Blaire Hoang MD        carBAMazepine (TEGRETOL) tablet 200 mg  200 mg Oral TID Blaire Hoang MD        citalopram (CELEXA) tablet 20 mg  20 mg Oral Daily Blaire Hoang MD        divalproex (DEPAKOTE) DR tablet 125 mg  125 mg Oral BID Blaire Hoang MD        levetiracetam (KEPPRA) 500 mg/100 mL IVPB  500 mg Intravenous Q12H Hanane Spann MD        traZODone (DESYREL) tablet 25 mg  25 mg Oral Nightly Hanane Spann MD        [facility]) tablet 20 mg  20 mg Oral BID AC Hanane Spann MD        sodium chloride flush 0.9 % injection 10 mL  10 mL Intravenous 2 times per day Hanane Spann MD        sodium chloride flush 0.9 % injection 10 mL  10 mL Intravenous PRN Hanane Spann MD        acetaminophen (TYLENOL) tablet 650 mg  650 mg Oral Q6H PRN Hanane Spann MD        Or    acetaminophen (TYLENOL) suppository 650 mg  650 mg Rectal Q6H PRN Hanane Spann MD        polyethylene glycol (GLYCOLAX) packet 17 g  17 g Oral Daily PRN Hanane Spann MD        promethazine (PHENERGAN) tablet 12.5 mg  12.5 mg Oral Q6H PRN Hanane Spann MD        Or    [facility]ron [facility]) injection 4 mg  4 mg Intravenous Q6H PRN Hanane Spann MD        enoxaparin (LOVENOX) injection 30 mg  30 mg Subcutaneous Daily Hanane Spann MD        linezolid (ZYVOX) IVPB 600 mg  600 mg Intravenous Q12H Hanane Spann MD        ampicillin-sulbactam (UNASYN) 1.5 g IVPB minibag  1.5 g Intravenous Q6H Hanane Spann MD        dexamethasone (PF) (DECADRON) injection 6 mg  6 mg Intravenous Daily Hanane Spann MD        0.9 % sodium chloride infusion   Intravenous Continuous Hanane Spann  mL/hr at 09/10/20 1452      glucose (GLUTOSE) 40 % oral gel 15 g  15 g Oral PRN Hanane Spann MD        dextrose 50 % IV solution  12.5 g Intravenous PRN Hanane Spann MD        glucagon (rDNA) injection 1 mg  1 mg Intramuscular PRN Hanane Spann MD        dextrose 5 % solution  100 mL/hr Intravenous PRN Hanane Spann MD        insulin lispro (1 Unit Dial) 0-6 Units  0-6 Units Subcutaneous Q4H Hanane Spann MD        insulin glargine (LANTUS;BASAGLAR) injection pen 10 Units  10 Units Subcutaneous Nightly Hanane Spann MD         No Known Allergies    PHYSICAL EXAMINATION:  BP (!) 117/46   Pulse 58   Temp 97 °F (36.1 °C) (Temporal)   Resp 22   Ht 5' 8\" (1.727 m)   Wt 150 lb (68 kg)   SpO2 95%   BMI 22.81 kg/m²   Appearance: Well appearing, well nourished and in no distress  Mental Status Exam: Patient is drowsy and confused when aroused. He is oriented x1. He is unable to participate in mental status testing including testing memory judgment. Speech is dysarthric. Funduscopic Exam: Could not be done due to poor cooperation  Cranial Nerves:   II: Visual fields: Could not be tested due to confusion  III: Pupils: Equal round reacting to light  III,IV,VI: Extraocular movements appear intact  V: Facial sensation: Could not be tested  VII: Face appears symmetrical  VIII: Hearing: Could not be tested  IX: Gag reflex is present  XI: Shoulder strength could not be tested tongue is in the midline  XII:   Motor: Moves all 4 extremities with a strength of at least 3/5. Tone is normal.  Sensory: Withdraws to painful stimuli  Coordination: Could not cooperate for testing             Reflexes: Barely elicitable all over  Plantar response: Withdrawal response bilaterally  Gait: Unable to ambulate.   Apparently patient's baseline is that he ambulates with a wheelchair  Romberg: Could not be tested  Vascular: No carotid bruit bilaterally        DATA:  LABS:  General Labs:    CBC:   Lab Results   Component Value Date    WBC 4.5 09/10/2020    RBC 3.51 09/10/2020    HGB 11.7 09/10/2020    HCT 33.6 09/10/2020    MCV 95.6 09/10/2020    MCH 33.4 09/10/2020    MCHC 34.9 09/10/2020    RDW 17.3 09/10/2020     09/10/2020    MPV 8.6 09/10/2020     BMP:    Lab Results   Component Value Date     09/10/2020    K 2.9 09/10/2020     09/10/2020    CO2 30 09/10/2020    BUN 89 09/10/2020    LABALBU 3.5 09/10/2020    CREATININE 1.7 09/10/2020    CALCIUM 9.1 09/10/2020    GFRAA 48 09/10/2020    LABGLOM 39 09/10/2020    GLUCOSE 161 09/10/2020     RADIOLOGY REVIEW:  I have reviewed radiology image(s) and reports(s) of: CT scan of the head    IMPRESSION :  Breakthrough seizure  Chronic seizure disorder  Intellectual disability  Recent COVID infection  Patient Active Problem List   Diagnosis    Intellectual disability    [facility]    Acute cystitis with hematuria    Nausea and vomiting    Kidney stone    Urinary retention    Hematuria    Gross hematuria    Seizure ([facility]     RECOMMENDATIONS ;  Discussed with patient's nurse  Patient is now currently not able to take his oral medications. Hence, we cannot give lamotrigine and carbamazepine at this time. Patient probably would not tolerate NG tube because of agitation  I will increase the Depakote dose. Patient was given a loading dose of Depakote 1000 mg in the emergency room. Agree with IV Keppra as well  Thank you for this consultation      Please note a portion of this chart was generated using dragon dictation software. Although every effort was made to ensure the accuracy of this automated transcription, some errors in transcription may have occurred.

## 2020-09-10 NOTE — ED NOTES
Pt returned from CT; IV infusing as ordered. Pt more combative at this time, attempting to re-orient pt to surroundings. Pt remains with bed in low/locked position, bed alarm active.       Sheldon Spear RN  09/10/20 8949

## 2020-09-10 NOTE — CONSULTS
Office : [phone]     Fax :[phone]       Nephrology Consult Note      Patient's Name: Bonnie Pablo  10:58 AM  9/10/2020    Reason for Consult: BENOIT      Requesting Physician:  Lauren Salguero MD      Chief Complaint:    Chief Complaint   Patient presents with    Altered Mental Status     Altered mental status started yesterday. Pt brought in from [facility] home. History of Present Ilness:    Bonnie Pablo is a 67 y.o. male with h/o Dementia, renal ds, BPH was transfered from [facility] where he had a witnessed seizure. He is awake but not oriented . All history gathered from the records from [facility].    His initial lab work shows elevated BUN and creatinine   Sodium: 143  Potassium: 2.9 (LL)  Chloride: 102  CO2: 30  BUN: 89 (HH)  Creatinine: 1.7 (H)  Anion Gap: 11  GFR Non-: 39 (A)  GFR : 48 (A)  Magnesium: 3.50 (H)  Lactic Acid, Sepsis: 1.3  Glucose: 161 (H)  Calcium: 9.1    Clinically he is dry   Oral mucosa is dry   Has a singh in place         Past Medical History:   Diagnosis Date    Cerebral palsy ([address]     CHF (congestive heart failure) (HCC)     CKD (chronic kidney disease)     Constipation     Convulsions ([address]     COVID-19     Degenerative disease of nervous system ([address]     Delusional disorder ([address]     Dementia ([address]     Depression     Diabetes mellitus ([address]     Encephalopathy     Enlarged prostate with lower urinary tract symptoms (LUTS)     GERD (gastroesophageal reflux disease)     GI obstruction (HCC)     Hematuria     Hyperlipidemia     Hypernatremia     Hypertension     Hypertension     Mood disorder (HCC)     Muscle weakness     Muscle weakness (generalized)     Neuromuscular dysfunction of bladder     Neuropathy     Obsessive-compulsive personality disorder (CODE)     Psychosis (HCC)     Suprapubic catheter ([address]     #20    Urinary retention        Past Surgical History:   Procedure Laterality Date    ARM SURGERY      BLADDER SURGERY      CYSTOSCOPY N/A 5/15/2019    CYSTOLITHALOPAXY , STONE  SUPRA PUBIC CATHETER EXCHANGE. performed by Williams Stark MD at [address] [place] [address], [facility]      EYE SURGERY      HERNIA REPAIR      TONSILLECTOMY      UMBILICAL HERNIA REPAIR N/A 1/15/2019    OPEN PRIMARY UMBILICAL HERNIA REPAIR performed by Jorge Cervantes MD at [facility]       History reviewed. No pertinent family history. reports that he quit smoking about 15 years ago. His smoking use included cigarettes. He has never used smokeless tobacco. He reports that he does not drink alcohol or use drugs. Allergies:  Patient has no known allergies.     Current Medications:    cefTRIAXone (ROCEPHIN) 1 g in sterile water 10 mL IV syringe, Q24H  potassium chloride 10 mEq/100 mL IVPB (Peripheral Line), Q1H  valproate (DEPACON) 1,000 mg in dextrose 5 % 100 mL IVPB, Once        Review of Systems:   Could not be obtained       Physical exam:     Vitals:  /60   Pulse 59   Temp 97.3 °F (36.3 °C) (Axillary)   Resp 18   Ht 5' 8\" (1.727 m)   Wt 150 lb (68 kg)   SpO2 94%   BMI 22.81 kg/m²   Constitutional:  disoreinted   Skin: no rash, turgor wnl  Heent:  eomi, mmm  Neck: no bruits or jvd noted  Cardiovascular:  S1, S2 without m/r/g  Respiratory: CTA B without w/r/r  Abdomen:  +bs, soft, nt, nd  Ext: no  lower extremity edema  Psychiatric: not oriented   Musculoskeletal:   muscular strength generally decreased     Labs:  CBC:   Recent Labs     09/10/20  0722   WBC 4.5   HGB 11.7*        BMP:    Recent Labs     09/10/20  0722      K 2.9*      CO2 30   BUN 89*   CREATININE 1.7*   GLUCOSE 161*     Ca/Mg/Phos:   Recent Labs     09/10/20  0722   CALCIUM 9.1   MG 3.50* Hepatic:   Recent Labs     09/10/20  0722   AST 30   ALT 13   BILITOT 0.4   ALKPHOS 97     Troponin: No results for input(s): TROPONINI in the last 72 hours. BNP: No results for input(s): BNP in the last 72 hours. Lipids: No results for input(s): CHOL, TRIG, HDL, LDLCALC, LABVLDL in the last 72 hours. ABGs: No results for input(s): PHART, PO2ART, SHQ4VQA in the last 72 hours. INR: No results for input(s): INR in the last 72 hours. UA:  Recent Labs     09/10/20  0720   COLORU Yellow   CLARITYU CLOUDY*   GLUCOSEU Negative   BILIRUBINUR SMALL*   KETUA Negative   SPECGRAV 1.020   BLOODU TRACE-INTACT*   PHUR 7.0   PROTEINU 30*   UROBILINOGEN 0.2   NITRU Negative   LEUKOCYTESUR SMALL*   LABMICR YES   URINETYPE NotGiven      Urine Microscopic:   Recent Labs     09/10/20  0720   BACTERIA 4+*   HYALCAST 14*   WBCUA 82*   RBCUA 0-2   EPIU 1     Urine Culture: No results for input(s): LABURIN in the last 72 hours. Urine Chemistry: No results for input(s): Lieutenant Brisker, PROTEINUR, NAUR in the last 72 hours. IMAGING:  CT head without contrast   Final Result   No acute intracranial abnormality. XR CHEST 1 VIEW   Preliminary Result   1. Bilateral perihilar opacities, right greater than left, most consistent   with multifocal pneumonia, to include atypical causes. 2. Stable chronic bibasilar pleural and parenchymal scar. 3. Stable cardiomegaly. No intake/output data recorded. Assessment/Plan :      1. BENOIT 2/2 pre renal + RAAS blockade   Iv fluids   NS at 100 ml/ hr   Hold lisinopril   Recommend to dose adjust all medications  based on renal functions  Maintain SBP> 90 mmHg   Daily weights   AVOID NSAIDs  Avoid Nephrotoxins  Monitor Intake/Output  Call if significant decrease in urine output     2. HTN. BP controlled . Hold all BP meds     3. Seizure disorder. Neurology consulted     4. Acid- base disorder. Metabolic alkalosis 2/2 dehydration. Iv fluids     5. Hypokalemia - supplement     6 . UTI.  abx per primary team                   Thank you for allowing us to participate in care of Nancy Waddell         Electronically signed by: Emma Mancuso MD, 9/10/2020, 10:58 AM      [facility] [address]  Office : [phone]  Fax :[phone]

## 2020-09-10 NOTE — H&P
Hospital Medicine History & Physical      PCP: John Fraser MD    Date of Admission: 9/10/2020    Date of Service: Pt seen/examined on 9/10/2020  6:41 AM and   Admitted to Inpatient with expected LOS greater than two midnights due to medical therapy. Chief Complaint:  Seizure     History Of Present Illness:    67 y.o. male with PMHx significant for cog at a nursing facility suprapubic catheter, hypertension, type 2 diabetes mellitus, recurrent UTIs, seizure disorder admitted to the hospital after a witnessed seizure.   I called his nursing facility [facility]  Typically patient is much more awake able to tell them what he needs  He has some cognitive disabilities gets around in a wheelchair however since yesterday he has been very drowsy  Recently diagnosed with COVID  All day yesterday he would not talk much was very quiet and drowsy this morning nursing noticed that he had what looked like a seizure episode and nursing was not able to give me details as this was a handover  Hence he was transferred to the hospital  EMS reported a similar history  On a good day he is able to answer some of his questions appropriately and is able to convey his needs  Currently patient quite drowsy  Says no to everything        Past Medical History:          Diagnosis Date    Constipation     Convulsions ([address]     Degenerative disease of nervous system ([address]     Delusional disorder ([address]     Encephalopathy     Enlarged prostate with lower urinary tract symptoms (LUTS)     GERD (gastroesophageal reflux disease)     Hematuria     Hyperlipidemia     Hypernatremia     Hypertension     Hypertension     Mood disorder ([address]     Muscle weakness     Muscle weakness (generalized)     Neuromuscular dysfunction of bladder     Neuropathy     Obsessive-compulsive personality disorder (CODE)     Suprapubic catheter ([address]     #20    Urinary retention        Past Surgical History:          Procedure Laterality Date  ARM SURGERY      BLADDER SURGERY      CYSTOSCOPY N/A 5/15/2019    CYSTOLITHALOPAXY , STONE  SUPRA PUBIC CATHETER EXCHANGE. performed by Pal Epps MD at [address] [place] [address] N/A 1/15/2019    OPEN PRIMARY UMBILICAL HERNIA REPAIR performed by Rebecca Mendez MD at [facility]       Medications Prior to Admission:      Prior to Admission medications    Medication Sig Start Date End Date Taking?  Authorizing Provider   divalproex (DEPAKOTE) 125 MG DR tablet Take 125 mg by mouth 2 times daily    Historical Provider, MD   loratadine (LORADAMED) 10 MG tablet Take 10 mg by mouth daily    Historical Provider, MD   haloperidol lactate (HALDOL) 5 MG/ML injection Inject into the muscle every 6 hours as needed for Agitation    Historical Provider, MD   traZODone (DESYREL) 25 mg TABS Take 25 mg by mouth nightly    Historical Provider, MD   glucose (GLUTOSE) 40 % GEL Take 37.5 mLs by mouth as needed (low BS) 1/16/19   Nedra Domingo MD   docusate sodium (COLACE, DULCOLAX) 100 MG CAPS Take 100 mg by mouth 2 times daily 1/16/19   Nedra Domingo MD   Lactobacillus (ACIDOPHILUS) 100 MG CAPS Take by mouth 2 times daily    Historical Provider, MD   benzonatate (TESSALON) 100 MG capsule Take 100 mg by mouth 3 times daily as needed for Cough    Historical Provider, MD   citalopram (CELEXA) 20 MG tablet Take 20 mg by mouth daily    Historical Provider, MD   trospium (SANCTURA) 20 MG tablet Take 20 mg by mouth 2 times daily    Historical Provider, MD   ipratropium-albuterol (DUONEB) 0.5-2.5 (3) MG/3ML SOLN nebulizer solution Inhale 3 mLs into the lungs every 4 hours as needed for Shortness of Breath 9/29/17   Josph Ahumada, MD   insulin detemir (LEVEMIR) 100 UNIT/ML injection vial Inject 5 Units into the skin 2 times daily    Historical Provider, MD   melatonin 3 MG TABS tablet Take 3 mg by mouth nightly    Historical Provider, MD   guaiFENesin (MUCINEX) 600 MG extended release tablet Take 400 mg by mouth 3 times daily as needed     Historical Provider, MD   Cranberry-Vitamin C-Vitamin E (CRANBERRY PLUS VITAMIN C PO) Take 30 mLs by mouth 2 times daily    Historical Provider, MD   acetaminophen (TYLENOL) 325 MG tablet Take 650 mg by mouth every 4 hours as needed for Pain    Historical Provider, MD   aspirin 81 MG tablet Take 81 mg by mouth daily    Historical Provider, MD   carBAMazepine (TEGRETOL) 200 MG tablet Take 200 mg by mouth 3 times daily     Historical Provider, MD   loratadine (CLARITIN) 10 MG tablet Take 10 mg by mouth daily    Historical Provider, MD   Folic Acid 0.8 MG CAPS Take 1 tablet by mouth daily    Historical Provider, MD   gabapentin (NEURONTIN) 100 MG capsule Take 200 mg by mouth 3 times daily    Historical Provider, MD   lamoTRIgine (LAMICTAL) 100 MG tablet Take 100 mg by mouth 3 times daily     Historical Provider, MD   lisinopril (PRINIVIL;ZESTRIL) 20 MG tablet Take 20 mg by mouth daily     Historical Provider, MD   polyethylene glycol (MIRALAX) POWD powder Take 17 g by mouth daily     Historical Provider, MD   senna-docusate (PERICOLACE) 8.6-50 MG per tablet Take 2 tablets by mouth nightly    Historical Provider, MD   vitamin B-12 (CYANOCOBALAMIN) 1000 MCG tablet Take 1,000 mcg by mouth daily    Historical Provider, MD   vitamin D (CHOLECALCIFEROL) 1000 UNIT TABS tablet Take 5,000 Units by mouth daily     Historical Provider, MD   omeprazole (PRILOSEC) 20 MG capsule Take 20 mg by mouth daily    Historical Provider, MD       Allergies:  Patient has no known allergies. Social History:      The patient currently lives at [facility] facility is wheelchair-bound has a suprapubic catheter when    TOBACCO:   reports that he quit smoking about 15 years ago. His smoking use included cigarettes. He has never used smokeless tobacco.  ETOH:   reports no history of alcohol use.       Family History:    Unable to get a good family history as patient is confused      REVIEW OF SYSTEMS:   Unable to get a good review of systems as patient is confused    PHYSICAL EXAM:    BP (!) 110/49   Pulse 61   Temp 97.3 °F (36.3 °C) (Axillary)   Resp 17   Ht 5' 8\" (1.727 m)   Wt 150 lb (68 kg)   SpO2 97%   BMI 22.81 kg/m²     General appearance:  No apparent distress, appears stated age and cooperative. HEENT:  Normal cephalic, atraumatic without obvious deformity. Pupils equal, round, and reactive to light. Extra ocular muscles intact. Conjunctivae/corneas clear. Neck: Supple, with full range of motion. No jugular venous distention. Trachea midline. Respiratory:  Normal respiratory effort. Clear to auscultation, bilaterally without RALES/WHEEZES/RHONCHI. Cardiovascular:  Regular rate and rhythm with normal S1/S2 without MURMUR, rubs or gallops. Abdomen: Soft, non-tender, non-distended with normal bowel sounds. Suprapubic catheter  Musculoskeletal:  No clubbing, cyanosis or EDEMA bilaterally. Full range of motion without deformity. Skin: Skin color, texture, turgor normal.  No rashes or lesions. Neurologic: Unable to do a detailed neurologic examination  Patient says no to everything  Cranial nerves grossly intact  Moving both upper extremities  Not moving lower extremities  Psychiatric: He is no to everything unable to do a thorough exam  Capillary Refill: Brisk,< 3 seconds   Peripheral Pulses: +2 palpable, equal bilaterally       Labs:     Recent Labs     09/10/20  0722   WBC 4.5   HGB 11.7*   HCT 33.6*        Recent Labs     09/10/20  0722      K 2.9*      CO2 30   BUN 89*   CREATININE 1.7*   CALCIUM 9.1     Recent Labs     09/10/20  0722   AST 30   ALT 13   BILITOT 0.4   ALKPHOS 97     No results for input(s): INR in the last 72 hours. No results for input(s): Corinna Belts in the last 72 hours.     Urinalysis:      Lab Results   Component Value Date    NITRU Negative 09/10/2020    WBCUA 82 09/10/2020    BACTERIA 4+ 09/10/2020    RBCUA output  Nephrology consult    Type 2 diabetes mellitus  Currently patient n.p.o.  Decrease Lantus to 5 units at bedtime  Low-dose sliding scale      DVT Prophylaxis: sc lovenox renal dose  Diet: No diet orders on file  Code Status: Full Code    PT/OT Eval Status: will have eval if appropriate given patient's condition    Dispo - once acute medical process is resolved       Shankar Garcia MD    Thank you John Fraser MD for the opportunity to be involved in this patient's care. If you have any questions or concerns please feel free to contact me.

## 2020-09-10 NOTE — PROGRESS NOTES
Spoke with hospitalist - hold oral meds till patient is more alert and about to swallow. No NG at this time because of confusion. Proceed with all IV medication.

## 2020-09-10 NOTE — ED NOTES
RN to pt room; RN to RN report given bedside. Outgoing RN has started peripheral line, blood work obtained. Pt alert, does not answer when asked name/, will turn head towards RN when addressed. Pt undressed from old hospital gown and isolation gown that pt came to ER in. Pt placed on heart monitor, EKG obtained by ED tech. Pt with noted suprapubic catheter, saturated brief. Pt cleaned of stool in rectum, urine. Pt gown changed, brief changed. Pt has noted redness to sacrum area and scrotal area. Pt with ace bandages poorly applied around bi-lateral lower legs, saturation of bandages noted - bandages removed. Pt with noted skin flaking and swelling to bi-lateral legs. Pt with bed in low position, side rails up x 2, call light in reach, bed alarm in place. Pt will be kept NPO in ED.       Pao Escobar RN  09/10/20 JAVIER Bustillos  09/10/20 [id number]

## 2020-09-11 NOTE — PROGRESS NOTES
Office : [phone]     Fax :[phone]       Nephrology progress Note          Chief Complaint:    Chief Complaint   Patient presents with    Altered Mental Status     Altered mental status started yesterday. Pt brought in from [address] [place] [address] home. History of Present Ilness:    Tanvi Lundy is a 67 y.o. male with h/o Dementia, renal ds, BPH was transfered from [facility] where he had a witnessed seizure. He is awake but not oriented . All history gathered from the records from [facility].    His initial lab work shows elevated BUN and creatinine   Has a singh in place     Interval Hx     renal function improved   No respiratory distress  No edema  Making urine  Potassium low at 2.7  Sodium 147          Past Medical History:   Diagnosis Date    Cerebral palsy ([address]     CHF (congestive heart failure) (HCC)     CKD (chronic kidney disease)     Constipation     Convulsions ([address]     COVID-19     Degenerative disease of nervous system ([address]     Delusional disorder ([address]     Dementia ([address]     Depression     Diabetes mellitus ([address]     Encephalopathy     Enlarged prostate with lower urinary tract symptoms (LUTS)     GERD (gastroesophageal reflux disease)     GI obstruction (HCC)     Hematuria     Hyperlipidemia     Hypernatremia     Hypertension     Hypertension     Mood disorder (HCC)     Muscle weakness     Muscle weakness (generalized)     Neuromuscular dysfunction of bladder     Neuropathy     Obsessive-compulsive personality disorder (CODE)     Psychosis ([address]     Suprapubic catheter ([address]     #20    Urinary retention        Past Surgical History:   Procedure Laterality Date    ARM SURGERY      BLADDER SURGERY      CYSTOSCOPY N/A 5/15/2019 Nightly  valproate (DEPACON) 250 mg in dextrose 5 % 100 mL IVPB, Q8H        Review of Systems:   Could not be obtained       Physical exam:     Vitals:  /67   Pulse 60   Temp 97.2 °F (36.2 °C) (Temporal)   Resp 20   Ht 5' 8\" (1.727 m)   Wt 157 lb (71.2 kg)   SpO2 97%   BMI 23.87 kg/m²   Constitutional:  disoreinted   Skin: no rash, turgor wnl  Heent:  eomi, mmm  Neck: no bruits or jvd noted  Cardiovascular:  S1, S2 without m/r/g  Respiratory: CTA B without w/r/r  Abdomen:  +bs, soft, nt, nd  Ext: no  lower extremity edema  Psychiatric: not oriented   Musculoskeletal:   muscular strength generally decreased     Labs:  CBC:   Recent Labs     09/10/20  0722 09/11/20  0445   WBC 4.5 4.8   HGB 11.7* 11.2*    189     BMP:    Recent Labs     09/10/20  0722 09/10/20  1608 09/11/20  0444    143 147*   K 2.9* 2.8* 2.7*    107 112*   CO2 30 27 25   BUN 89* 80* 54*   CREATININE 1.7* 1.3 0.8   GLUCOSE 161* 105* 93     Ca/Mg/Phos:   Recent Labs     09/10/20  0722 09/10/20  1608 09/11/20  0444   CALCIUM 9.1 8.7 8.6   MG 3.50*  --  2.80*     Hepatic:   Recent Labs     09/10/20  1608 09/11/20  0444 09/11/20  0445   AST 29 33 37   ALT 13 14 12   BILITOT <0.2 0.3 0.3   ALKPHOS 94 91 83     Troponin: No results for input(s): TROPONINI in the last 72 hours. BNP: No results for input(s): BNP in the last 72 hours. Lipids: No results for input(s): CHOL, TRIG, HDL, LDLCALC, LABVLDL in the last 72 hours. ABGs: No results for input(s): PHART, PO2ART, WEE0ASC in the last 72 hours.   INR:   Recent Labs     09/11/20 0445   INR 1.16*     UA:  Recent Labs     09/10/20  0720   COLORU Yellow   CLARITYU CLOUDY*   GLUCOSEU Negative   BILIRUBINUR SMALL*   KETUA Negative   SPECGRAV 1.020   BLOODU TRACE-INTACT*   PHUR 7.0   PROTEINU 30*   UROBILINOGEN 0.2   NITRU Negative   LEUKOCYTESUR SMALL*   LABMICR YES   URINETYPE NotGiven      Urine Microscopic:   Recent Labs     09/10/20  0720   BACTERIA 4+*   HYALCAST 14*

## 2020-09-11 NOTE — PROGRESS NOTES
Speech Language Pathology  Attempt    Melisa Stubbs  1945    \"SLP eval and treat\" orders received. Chart reviewed and discussed with RN. Per RN, pt has been non cooperative at times. SLP attempted to see pt for dysphagia evaluation as pt is NPO. Pt initially ignored SLP and then eventually stated \"no\" and shook head when SLP attempted to explain reason for visit/purpose of evaluation, etc. Will re-attempt as therapy schedule and pt availability allow.     Thanks,  Darron Fields M.S. [id number] [facility]  Speech-Language Pathologist

## 2020-09-11 NOTE — CONSULTS
Comprehensive Nutrition Assessment    Type and Reason for Visit:  Positive Nutrition Screen(MST 2, poor appetite, malnutrition. Consul for nutrition assessment d/t Milind Score)    Nutrition Recommendations/Plan:   1. Diet advancement as appropriate per SLP  2. Obtain actual weight; current weight is estimated  3. RD remains available to provide EN recommendations as needed/appropriate if desired. Nutrition Assessment:  Difficult to assess pt's nutrition status upon admission. Pt is in droplet plus isolation and is unable to provide any hx. Pt has been NPO since admission d/t lethargy and confusion and was uncooperative with SLP evaluation this morning. Current body weight is estimated and weights in medical hx are stated. Will monitor for ability to begin PO diet per SLP and will start nutrition supplements as needed/appropriate. Should pt continue to be NPO and EN support is desired, RD remains available to provide tube feeding recommendations but will need an actual weight. Malnutrition Assessment:  Malnutrition Status:  Insufficient data      Nutrition Related Findings:  Na+ 147. K+ 2.7. Mg+ 2.8. LBM 9/11. Soft abdomen, active bowel sounds. +1 pitting BLE edema. +1.7 liters.       Wounds:  None       Current Nutrition Therapies:    Diet NPO Effective Now    Anthropometric Measures:  · Height: 5' 8\" (172.7 cm)  · Current Body Weight: 157 lb (71.2 kg)   · Ideal Body Weight: 154 lbs; % Ideal Body Weight 101.9 %   · BMI: 23.9  · BMI Categories: Normal Weight (BMI 18.5-24.9)(Based on estimated weight)       Nutrition Diagnosis:   · Inadequate oral intake related to inadequate protein-energy intake as evidenced by NPO or clear liquid status due to medical condition      Nutrition Interventions:   Food and/or Nutrient Delivery:  Continue NPO(Diet per SLP vs EN support to start)  Nutrition Education/Counseling:  Education not indicated   Coordination of Nutrition Care:  Continued Inpatient Monitoring, Speech Therapy    Goals:  Diet advancement vs nutrition support       Nutrition Monitoring and Evaluation:   Food/Nutrient Intake Outcomes:  Diet Advancement/Tolerance  Physical Signs/Symptoms Outcomes:  Chewing or Swallowing, Biochemical Data, Weight     Discharge Planning:     Too soon to determine     Electronically signed by Cynthia Hathaway RD, LD on 9/11/20 at 1:03 PM EDT    Contact: [phone]

## 2020-09-11 NOTE — PROGRESS NOTES
PT now alert x1-self. Asked patient if he wanted repositioned and his response \"Bitch leave me alone\". Left patient in bed with camera on.  Will continue to monitor

## 2020-09-11 NOTE — PROGRESS NOTES
Shift assessment completed, see flowsheets. Suprapubic catheter in place and leaking around site. Vital signs stable. Fall and seizure precautions in place. Will continue to monitor.   Patrizia Null RN

## 2020-09-11 NOTE — PROGRESS NOTES
NEUROLOGY FOLLOWUP    HISTORY OF PRESENT ILLNESS :     Margarito Avalos is a 67 y.o. male   History was obtained from predominantly from the patient's nurses. Additional history obtained from the dictations in the chart. Patient has not had any further seizures in the hospital.  He seems to be slightly more awake alert and cooperative now. He was not able to cooperate earlier with speech therapy and swallowing evaluation. REVIEW OF SYSTEMS     Unable to obtain due to neurological status  Constitutional:  []   Chills   []  Fatigue   []  Fevers   []  Malaise   []  Weight loss     [] Denies all of the above    Respiratory:   []  Cough    []  Shortness of breath         [] Denies all of the above     Cardiovascular:   []  Chest pain    []  Exertional chest pressure/discomfort           [] Palpitations    []  Syncope     [] Denies all of the above    Past Medical History:   Diagnosis Date    Cerebral palsy ([address]     CHF (congestive heart failure) (HCC)     CKD (chronic kidney disease)     Constipation     Convulsions ([address]     COVID-19     Degenerative disease of nervous system ([address]     Delusional disorder ([address]     Dementia ([address]     Depression     Diabetes mellitus ([address]     Encephalopathy     Enlarged prostate with lower urinary tract symptoms (LUTS)     GERD (gastroesophageal reflux disease)     GI obstruction (HCC)     Hematuria     Hyperlipidemia     Hypernatremia     Hypertension     Hypertension     Mood disorder (HCC)     Muscle weakness     Muscle weakness (generalized)     Neuromuscular dysfunction of bladder     Neuropathy     Obsessive-compulsive personality disorder (CODE)     Psychosis ([address]     Suprapubic catheter ([address]     #20    Urinary retention      History reviewed. No pertinent family history.   Social History     Socioeconomic History    Marital status: Single     Spouse name: None    Number of children: None    Years of education: None    Highest education level: None   Occupational History    None   Social Needs    Financial resource strain: None    Food insecurity     Worry: None     Inability: None    Transportation needs     Medical: None     Non-medical: None   Tobacco Use    Smoking status: Former Smoker     Types: Cigarettes     Last attempt to quit: 5/13/2005     Years since quitting: 15.3    Smokeless tobacco: Never Used   Substance and Sexual Activity    Alcohol use: No    Drug use: No    Sexual activity: None   Lifestyle    Physical activity     Days per week: None     Minutes per session: None    Stress: None   Relationships    Social connections     Talks on phone: None     Gets together: None     Attends Buddhist service: None     Active member of club or organization: None     Attends meetings of clubs or organizations: None     Relationship status: None    Intimate partner violence     Fear of current or ex partner: None     Emotionally abused: None     Physically abused: None     Forced sexual activity: None   Other Topics Concern    None   Social History Narrative    None        PHYSICAL EXAMINATION      BP (!) 117/54   Pulse 64   Temp 97.1 °F (36.2 °C) (Temporal)   Resp 19   Ht 5' 8\" (1.727 m)   Wt 157 lb (71.2 kg)   SpO2 96%   BMI 23.87 kg/m²   This is a well-nourished patient in no acute distress  Patient is slightly lethargic but arousable. He would obey simple commands. He would not cooperate for detailed mental status testing. Pupils equal round reactive to light. Extraocular movements intact. Face symmetrical.  Tongue midline. Moves all 4 extremities. Could not cooperate for sensory or coordination testing.     DATA :  LABS:  General Labs:    CBC:   Lab Results   Component Value Date    WBC 4.8 09/11/2020    RBC 3.28 09/11/2020    HGB 11.2 09/11/2020    HCT 31.3 09/11/2020    MCV 95.4 09/11/2020    MCH 34.1 09/11/2020    MCHC 35.8 09/11/2020    RDW 17.0 09/11/2020     09/11/2020    MPV 8.3 09/11/2020     BMP:    Lab Results   Component Value Date     09/11/2020    K 2.7 09/11/2020     09/11/2020    CO2 25 09/11/2020    BUN 54 09/11/2020    LABALBU 2.7 09/11/2020    CREATININE 0.8 09/11/2020    CALCIUM 8.6 09/11/2020    GFRAA >60 09/11/2020    LABGLOM >60 09/11/2020    GLUCOSE 93 09/11/2020     RADIOLOGY REVIEW:  I have reviewed radiology image(s) and reports(s) of: CT scan of the head      IMPRESSION :  Breakthrough seizure in a patient with known seizure disorder  Urinary infection  Patient now unable to take oral seizure medicines  Patient Active Problem List   Diagnosis    Intellectual disability    Seizure disorder ([address]    Acute cystitis with hematuria    Nausea and vomiting    Kidney stone    Urinary retention    Hematuria    Gross hematuria    Seizure ([address]       RECOMMENDATIONS :  Discussed with patient's nurse  I will check Keppra and Depakote levels tomorrow morning. I will adjust dose accordingly. Please note a portion of this chart was generated using dragon dictation software. Although every effort was made to ensure the accuracy of this automated transcription, some errors in transcription may have occurred.          David Perez M.D.

## 2020-09-11 NOTE — PROGRESS NOTES
Physical Therapy    Facility/Department: [address]  Initial Assessment/Discharge summary    NAME: Brandin Riddle  : 1945  MRN: [id number]    Date of Service: 2020    Discharge Recommendations: Brandin Riddle scored a 6/24 on the AM-PAC short mobility form. At this time, no further PT is recommended upon discharge as the pt is dependent for mobility and ADLs at baseline. Recommend patient returns to prior setting with prior services. PT Equipment Recommendations  Equipment Needed: No    Assessment   Body structures, Functions, Activity limitations: Decreased functional mobility ; Decreased ROM; Decreased strength;Decreased safe awareness;Decreased cognition;Decreased endurance;Decreased balance  Assessment: The pt presents with confusion, an inability to follow commands and BLE wounds. He requires assistance for all transfers, ADLs and w/c mobility. He is not appropriate for skilled PT at this time. Prognosis: Poor  Decision Making: Low Complexity  PT Education: Orientation;PT Role  Patient Education: pt unable to retain information  Barriers to Learning: cognition  No Skilled PT: At baseline function  REQUIRES PT FOLLOW UP: No  Activity Tolerance  Activity Tolerance: Patient limited by cognitive status       Patient Diagnosis(es): The primary encounter diagnosis was Destiney coma scale total score 13-15, at arrival to emergency department. Diagnoses of History of seizures, QT prolongation, Pneumonia due to organism, BENOIT (acute kidney injury) ([address] and Hypokalemia were also pertinent to this visit.      has a past medical history of Cerebral palsy ([address] CHF (congestive heart failure) ([address] CKD (chronic kidney disease), Constipation, Convulsions ([address] COVID-19, Degenerative disease of nervous system ([address] Delusional disorder ([address] Dementia ([address] Depression, Diabetes mellitus ([address] Encephalopathy, Enlarged prostate with lower urinary tract symptoms (LUTS), GERD (gastroesophageal reflux assist for a stand pivot transfer)  Additional Comments: Information take from admission in 2017. Objective     Observation/Palpation  Observation: BLEs are red, flaky and have multiple wounds    AROM RLE (degrees)  RLE General AROM: unable to fully assess as pt was lying in the fetal position and resisted PROM, pt is tender to touch on BLEs, would anticipate pt has decreased hip/knee extension  AROM LLE (degrees)  LLE General AROM: unable to fully assess as pt was lying in the fetal position and resisted PROM, pt is tender to touch on BLEs, would anticipate pt has decreased hip/knee extension           Bed mobility  Rolling to Left: Dependent/Total;2 Person assistance  Rolling to Right: Dependent/Total;2 Person assistance  Comment: Pt rolled side to side several reps for pericare and linen change. Plan   Safety Devices  Type of devices:  All fall risk precautions in place, Bed alarm in place, Call light within reach, Nurse notified, Left in bed(RN in room)      AM-PAC Score  AM-PAC Inpatient Mobility Raw Score : 6 (09/11/20 1523)  AM-PAC Inpatient T-Scale Score : 23.55 (09/11/20 1523)  Mobility Inpatient CMS 0-100% Score: 100 (09/11/20 1523)  Mobility Inpatient CMS G-Code Modifier : CN (09/11/20 1523)          Goals  Patient Goals   Patient goals : unable to state       Therapy Time   Individual Concurrent Group Co-treatment   Time In 1353         Time Out 1418         Minutes 25         Timed Code Treatment Minutes: [address], PT DPT [id number]

## 2020-09-11 NOTE — PLAN OF CARE
Problem: Falls - Risk of:  Goal: Will remain free from falls  Description: Will remain free from falls  9/11/2020 0554 by Amber Love RN  Outcome: Ongoing     Problem: Falls - Risk of:  Goal: Absence of physical injury  Description: Absence of physical injury  9/11/2020 0554 by Amber Love RN  Outcome: Ongoing   Fall precautions in place. Problem: Skin Integrity:  Goal: Will show no infection signs and symptoms  Description: Will show no infection signs and symptoms  9/11/2020 0554 by Ambre Love RN  Outcome: Ongoing     Problem: Skin Integrity:  Goal: Absence of new skin breakdown  Description: Absence of new skin breakdown  9/11/2020 0554 by Amber Love RN  Outcome: Ongoing   No new areas of skin impairment.

## 2020-09-11 NOTE — PROGRESS NOTES
Message sent to Dr. Evelio Boswell: Critical lab: potassium 2.7. can you put in replacement protocol?

## 2020-09-11 NOTE — CONSULTS
Palliative Care:   67 y.o. male with PMHx significant for cog at a nursing facility presents self with suprapubic catheter, hypertension, type 2 diabetes mellitus, recurrent UTIs, seizure disorder admitted to the hospital after a witnessed seizure. Patient is positive for COVID infection. Hx. Of Seizure disorders, Kidney stones. Labs:      09/10/20  0722   WBC 4.5   HGB 11.7*   HCT 33.6*          09/10/20  0722      K 2.9*      CO2 30   BUN 89*   CREATININE 1.7*   CALCIUM 9.1       09/10/20  0722   AST 30   ALT 13   BILITOT 0.4   ALKPHOS 97        Urinalysis:    Component Value Date     NITRU Negative 09/10/2020     WBCUA 82 09/10/2020     BACTERIA 4+ 09/10/2020     RBCUA 0-2 09/10/2020     BLOODU TRACE-INTACT 09/10/2020     SPECGRAV 1.020 09/10/2020     GLUCOSEU Negative 09/10/2020      COVID 19 results pending. Radiology:      CXR 9/10/20:  Bilateral infiltrates  EKG 9/10/20:  Sinus rhythm wide QRS no acute changes as compared to previous EKGs. Past Medical History:   has a past medical history of Cerebral palsy ([address] CHF (congestive heart failure) ([address] CKD (chronic kidney disease), Constipation, Convulsions ([address] COVID-19, Degenerative disease of nervous system ([address] Delusional disorder ([address] Dementia ([address] Depression, Diabetes mellitus ([address] Encephalopathy, Enlarged prostate with lower urinary tract symptoms (LUTS), GERD (gastroesophageal reflux disease), GI obstruction ([address] Hematuria, Hyperlipidemia, Hypernatremia, Hypertension, Hypertension, Mood disorder ([address] Muscle weakness, Muscle weakness (generalized), Neuromuscular dysfunction of bladder, Neuropathy, Obsessive-compulsive personality disorder (CODE), Psychosis ([address] Suprapubic catheter ([address] and Urinary retention. Past Surgical History:   has a past surgical history that includes Bladder surgery; Arm Surgery; Tonsillectomy; Eye surgery; Umbilical hernia repair (N/A, 1/15/2019);  Cystoscopy (N/A, 5/15/2019); Dilatation, esophagus; hernia repair; and IR ASP BLADDER W INSERT SUPRAPUBIC CATH. Advance Directives:    Full Code. Problem Severity: Pain/Other Symptoms:    Patient denies discomfort. Remains confused when aroused. Orientated to person. Speech is dysarthric. Bed Mobility/Toileting/Transfer:  Patient used WC in home setting. Performance Status:        Palliative Performance Scale:  100% []Full Normal activity & work No evidence of disease  90%   [] Full Normal activity & work Some evidence of disease  80%   [] Full Normal activity with Effort Some evidence of disease  70%   [] Reduced Unable Normal Job/Work Significant disease Full Normal or reduced  60%   [] Ambulation reduced; Significant disease; Can't do hobbies/housework; intake normal   or reduced; occasional assist; LOC full/confusion  50%   [] Mainly sit/lie; Extensive disease; Can't do any work; Considerable assist; intake normal  Or reduced; LOC full/confusion  40%   [x] Mainly in bed; Extensive disease; Mainly assist; intake normal or reduced; occasional assist; LOC full/confusion  30%   [] Bed Bound; Extensive disease; Total care; intake reduced; LOC full/confusion  20%   [] Bed Bound; Extensive disease; Total care; intake minimal; Drowsy/coma  10%   [] Bed Bound; Extensive disease; Total care; Mouth care only; Drowsy/coma    PPS: 20%  Symptom Assessment: Appetite/Nausea/Bowels/Fatigue:   Patient currently NPO. BMI: 23.87 WT. 157 lbs. Social History:   reports that he quit smoking about 15 years ago. His smoking use included cigarettes. He has never used smokeless tobacco. He reports that he does not drink alcohol or use drugs. Family History:  family history is not on file. Psychological/Spiritual:     Patient resides at [facility]. Has a niece/nephew on file. Restorationist is Sabianist.  spiritual support declined. Family state their  is providing prayer.        Family Discussion:   -  Call to lestergaudencio Dallas Major. Reviewed today's status and clinical results. Patient is refusing oral medications. No seizures noted. All meds. currently being provided IV. Jose Dallas Major states patient can not read/write. Reviewed Code Status and alternatives of DNR-CC and DNR-CCA. No Advance Directives on file. Jose discloses that the [facility] is their mother (Patient's sister). States she is not listed as contact due to severe [place] and that mother has placed jose to be advocate. Reviewed [place] law regarding [facility]. States she will look for clarification on documents in home. Jose does not wish to change code status and to remain as full. DC plan of care is to return to [address] [facility] when stable. Emotional support provided. Will continue to provide support as indicated. This discussion reviewed with nurse on Unit in care of patient Alexandria Hutson).

## 2020-09-11 NOTE — PROGRESS NOTES
Occupational Therapy   Occupational Therapy Initial Assessment/Discharge Summary  Date: 2020   Patient Name: Tabitha Cole  MRN: [id number]     : 1945    Date of Service: 2020    Discharge Recommendations: Tabitha Cole scored a  on the AM-PAC ADL Inpatient form. At this time, no further OT is recommended upon discharge due to patient near baseline. Recommend patient returns to prior setting with prior services. OT Equipment Recommendations  Equipment Needed: No    Assessment   Assessment: Pt presents near baseline, no OT indicated at this time  Decision Making: Low Complexity  Exam: as above  OT Education: OT Role  Patient Education: eval - pt not independent with understanding  REQUIRES OT FOLLOW UP: No  Activity Tolerance  Activity Tolerance: Treatment limited secondary to decreased cognition  Safety Devices  Safety Devices in place: Yes  Type of devices: Left in bed;Nurse notified; Bed alarm in place;Call light within reach;Gait belt(RN present in room)           Patient Diagnosis(es): The primary encounter diagnosis was [place] coma scale total score 13-15, at arrival to emergency department. Diagnoses of History of seizures, QT prolongation, Pneumonia due to organism, BENOIT (acute kidney injury) ([address] and Hypokalemia were also pertinent to this visit.      has a past medical history of Cerebral palsy ([address] CHF (congestive heart failure) ([address] CKD (chronic kidney disease), Constipation, Convulsions ([address] COVID-19, Degenerative disease of nervous system ([address] Delusional disorder ([address] Dementia ([address] Depression, Diabetes mellitus ([address] Encephalopathy, Enlarged prostate with lower urinary tract symptoms (LUTS), GERD (gastroesophageal reflux disease), GI obstruction ([address] Hematuria, Hyperlipidemia, Hypernatremia, Hypertension, Hypertension, Mood disorder ([address] Muscle weakness, Muscle weakness (generalized), Neuromuscular dysfunction of bladder, Neuropathy, Obsessive-compulsive personality disorder (CODE), Psychosis ([facility] Suprapubic catheter ([facility] [address] and Urinary retention. has a past surgical history that includes Bladder surgery; Arm Surgery; Tonsillectomy; Eye surgery; Umbilical hernia repair (N/A, 1/15/2019); Cystoscopy (N/A, 5/15/2019); Dilatation, esophagus; hernia repair; and IR ASP BLADDER W INSERT SUPRAPUBIC CATH. Restrictions  Restrictions/Precautions  Restrictions/Precautions: Fall Risk, Seizure(high fall risk)  Position Activity Restriction  Other position/activity restrictions: Patient apparently had a seizure episode in the nursing home and then became somewhat lethargic. He was diagnosed with acute encephalopathy due to underlying infection, PNA and UTI. Subjective   General  Chart Reviewed: Yes  Diagnosis: Seizure  Subjective  Subjective: Patient sidelying in bed upon arrival, incontinent of bowel and had pulled out his IV with blood on gown and bed. Pt assisted with cleaning and RN present for assessment  Patient Currently in Pain: Denies  Vital Signs  Patient Currently in Pain: Denies     Social/Functional History  Social/Functional History  Type of Home: Facility(long term care at [facility])  ADL Assistance: Needs assistance  Ambulation Assistance: (pt does not ambulate, pt gets assistance for w/c mobility)  Transfer Assistance: Needs assistance(one person assist for a stand pivot transfer)  Additional Comments: Information take from admission in 2017.        Objective        Orientation  Overall Orientation Status: Impaired  Orientation Level: Disoriented to situation;Disoriented to time;Disoriented to place;Oriented to person  Observation/Palpation  Observation: BLEs are red, flaky and have multiple wounds     ADL  UE Bathing: Dependent/Total  UE Dressing: Dependent/Total  LE Dressing: Dependent/Total  Toileting: Dependent/Total  Additional Comments: Dependent assist for UB bathing/gown change, depends change and pericare  Tone SEVERINO HAQ Tone: Normotonic  Tone LUE  LUE Tone: Normotonic     Bed mobility  Rolling to Left: Dependent/Total;2 Person assistance  Rolling to Right: Dependent/Total;2 Person assistance  Comment: Pt rolled side to side several reps for pericare and linen change.         Cognition  Overall Cognitive Status: Exceptions  Arousal/Alertness: Inconsistent responses to stimuli  Following Commands: Inconsistently follows commands  Attention Span: Difficulty attending to directions  Memory: Decreased short term memory;Decreased long term memory;Decreased recall of recent events;Decreased recall of precautions  Safety Judgement: Decreased awareness of need for safety;Decreased awareness of need for assistance  Problem Solving: Decreased awareness of errors  Insights: Decreased awareness of deficits  Initiation: Requires cues for all  Sequencing: Requires cues for all                 LUE AROM (degrees)  LUE General AROM: EVER secondary to cognition  RUE AROM (degrees)  RUE General AROM: EVER secondary to cognition                      Plan   Plan  Times per week: eval/dc    G-Code     OutComes Score                                                  AM-PAC Score        AM-[facility] Inpatient Daily Activity Raw Score: 6 (09/11/20 1644)  AM-PAC Inpatient ADL T-Scale Score : 17.07 (09/11/20 1644)  ADL Inpatient CMS 0-100% Score: 100 (09/11/20 1644)  ADL Inpatient CMS G-Code Modifier : CN (09/11/20 1644)    Goals  Short term goals  Time Frame for Short term goals: eval/dc       Therapy Time   Individual Concurrent Group Co-treatment   Time In 3972         Time Out 1418         Minutes 25            Timed Code Treatment Minutes:  10 Minutes    Total Treatment Minutes:  25 minutes    Darden Kawasaki, [facility]L [id number]    Darden Kawasaki, OT

## 2020-09-11 NOTE — PLAN OF CARE
Nutrition Problem #1: Inadequate oral intake  Intervention: Food and/or Nutrient Delivery: Continue NPO(Diet per SLP vs EN support to start)  Nutritional Goals: Diet advancement vs nutrition support

## 2020-09-11 NOTE — PROGRESS NOTES
09/11/20  0445   INR 1.16*     No results for input(s): CKTOTAL, TROPONINI in the last 72 hours. Urinalysis:      Lab Results   Component Value Date    NITRU Negative 09/10/2020    WBCUA 82 09/10/2020    BACTERIA 4+ 09/10/2020    RBCUA 0-2 09/10/2020    BLOODU TRACE-INTACT 09/10/2020    SPECGRAV 1.020 09/10/2020    GLUCOSEU Negative 09/10/2020       Radiology:  CT head without contrast   Final Result   No acute intracranial abnormality. XR CHEST 1 VIEW   Preliminary Result   1. Bilateral perihilar opacities, right greater than left, most consistent   with multifocal pneumonia, to include atypical causes. 2. Stable chronic bibasilar pleural and parenchymal scar. 3. Stable cardiomegaly. Assessment/Plan:    Active Hospital Problems    Diagnosis Date Noted    Seizure [facility]) [R56.9] 09/10/2020       Acute Medical Issues Being Addressed:    65-year-old gentleman known cognitive disability wheelchair-bound suprapubic catheter known seizure disorder admitted to the hospital after a witnessed seizure     Breakthrough seizure on background of known seizure disorder  Having reviewed his medication list he is on carbamazepine and lamotrigine  Carbamazepine levels are noted  Currently patient is n.p.o.   Unable to convert either carbamazepine or lamotrigine to IV hence have started him on Keppra  CT of the head noted  We will get MRI of the brain  Allergy consult appreciated  Sodium valproate also has been increased to 250 mg IV every 8     Acute encephalopathy metabolic secondary to underlying infection  Baseline mental status more awake able to communicate his needs  Currently he seems awake is following commands I am not sure how far he is from his baseline however we will continue to monitor as there are still other issues that are being reversed     Suspected UTIs with underlying suprapubic catheter  Currently on IV Unasyn     Pneumonia suspected gram-positive/gram-negative  Recent call with positive  Repeat COVID  Viral respiratory panel  Start Zyvox Unasyn we will continue today's day 2     Acute kidney injury suspect prerenal  Continue IV fluids  Strict intake and output  Nephrology consulted  Creatinine has improved  Hypokalemia  We will replace and repeat levels       Type 2 diabetes mellitus  Currently patient n.p.o.  Decrease Lantus to 5 units at bedtime  Low-dose sliding scale    DVT Prophylaxis: sc lovenox   Diet: Diet NPO Effective Now  Code Status: Full Code      Dispo - once acute medical processes have resolved    Eric Sow MD

## 2020-09-12 NOTE — PROGRESS NOTES
Hospitalist Progress Note      PCP: Jim Bond MD    Date of Admission: 9/10/2020    Chief Complaint: Seizure    Hospital Course: This morning patient is more awake  Able to give me his name however would not answer many of my other question  Follows commands very appropriately  Off oxygen      Medications:  Reviewed      Exam:    BP (!) 140/78   Pulse 81   Temp 97.8 °F (36.6 °C) (Oral)   Resp 29   Ht 5' 8\" (1.727 m)   Wt 154 lb (69.9 kg)   SpO2 (!) 87% Comment: refusing oxygen  BMI 23.42 kg/m²     General appearance: No apparent distress, appears stated age and cooperative. HEENT: Pupils equal, round, and reactive to light. Conjunctivae/corneas clear. Neck: Supple, with full range of motion. No jugular venous distention. Trachea midline. Respiratory: Bibasal crepitations RALES/WHEEZES/Rhonchi. Cardiovascular: Regular rate and rhythm with normal S1/S2 without MURMURS, rubs or gallops. Abdomen: Soft, non-tender, non-distended with normal bowel sounds. Musculoskeletal: No clubbing, cyanosis or EDEMA bilaterally. Full range of motion without deformity. Skin: Skin color, texture, turgor normal.  No rashes or lesions.   Neurologic: cranial  nerves intact  Follows commands  Moving all 4 extremities and following commands appropriately  Able to give me his name    Labs:   Recent Labs     09/10/20  0722 09/11/20 0445 09/12/20  0522   WBC 4.5 4.8 11.6*   HGB 11.7* 11.2* 13.7   HCT 33.6* 31.3* 39.6*    189 294     Recent Labs     09/10/20  1608 09/11/20 0444 09/11/20  2111 09/12/20  0522    147*  --  147*   K 2.8* 2.7* 3.0* 3.1*    112*  --  111*   CO2 27 25  --  26   BUN 80* 54*  --  29*   CREATININE 1.3 0.8  --  0.7*   CALCIUM 8.7 8.6  --  8.7     Recent Labs     09/10/20  1608 09/11/20  0444 09/11/20 0445   AST 29 33 37   ALT 13 14 12   BILIDIR <0.2  --  <0.2   BILITOT <0.2 0.3 0.3   ALKPHOS 94 91 83     Recent Labs     09/11/20  0445   INR 1.16*     No results for input(s): Smita Cain in the last 72 hours. Urinalysis:      Lab Results   Component Value Date    NITRU Negative 09/10/2020    WBCUA 82 09/10/2020    BACTERIA 4+ 09/10/2020    RBCUA 0-2 09/10/2020    BLOODU TRACE-INTACT 09/10/2020    SPECGRAV 1.020 09/10/2020    GLUCOSEU Negative 09/10/2020       Radiology:  CT head without contrast   Final Result   No acute intracranial abnormality. XR CHEST 1 VIEW   Preliminary Result   1. Bilateral perihilar opacities, right greater than left, most consistent   with multifocal pneumonia, to include atypical causes. 2. Stable chronic bibasilar pleural and parenchymal scar. 3. Stable cardiomegaly. Assessment/Plan:    Active Hospital Problems    Diagnosis Date Noted    Seizure [facility]) [R56.9] 09/10/2020       Acute Medical Issues Being Addressed:    58-year-old gentleman known cognitive disability wheelchair-bound suprapubic catheter known seizure disorder admitted to the hospital after a witnessed seizure     Breakthrough seizure on background of known seizure disorder  Having reviewed his medication list he is on carbamazepine and lamotrigine  Carbamazepine levels are noted  Currently patient is n.p.o.   Unable to convert either carbamazepine or lamotrigine to IV   On Keppra and sodium valproate to 50 mg IV every 8 which is an increased dose per neurology recommendation  He continues to remain n.p.o. as he was not cooperating with speech yesterday however will get a speech therapy and then readjust his medications  CT of the head was negative  We will get MRI of the brain  Neurology input appreciated with dosing of his antiepileptics    Acute encephalopathy metabolic secondary to underlying infection  Needs to be closer to his baseline mental status apparently has some underlying dementia       Suspected UTIs with underlying suprapubic catheter  Currently on IV Unasyn     Pneumonia suspected gram-positive/gram-negative  Recent COVID test was not positive    Repeat COVID  Viral respiratory panel  On Zyvox and Unasyn today's day 3  His MRSA nasal swab once negative we will discontinue the Zyvox  Will complete a 7-day course of Unasyn which can be changed over to Augmentin once he is able to eat     Acute kidney injury suspect prerenal  Continue IV fluids  Strict intake and output  Nephrology consulted  Patent and normalized      Hypokalemia  Baseline fluids with potassium in it  We will give IV potassium and once able to eat will give oral potassium  Continue to monitor potassium every 8 hours       Type 2 diabetes mellitus  Currently patient n.p.o.  Decreased Lantus to 5 units at bedtime  Low-dose sliding scale    Suprapubic catheter with leakage around it  Urology consulted  Day will change suprapubic catheter size once COVID-19 is negative    We will get a swallow evaluation on him today    DVT Prophylaxis: sc lovenox   Diet: Diet NPO Effective Now  Code Status: Full Code      Dispo - once acute medical processes have resolved    Kwame Owusu MD

## 2020-09-12 NOTE — PLAN OF CARE
Problem: Falls - Risk of:  Goal: Will remain free from falls  Description: Will remain free from falls  9/12/2020 0916 by Sayda Warner RN  Outcome: Ongoing  9/12/2020 0415 by Keith Burroughs RN  Outcome: Ongoing  Goal: Absence of physical injury  Description: Absence of physical injury  9/12/2020 0916 by Sayda Warner RN  Outcome: Ongoing  9/12/2020 0415 by Keith Burroughs RN  Outcome: Ongoing     Problem: Skin Integrity:  Goal: Will show no infection signs and symptoms  Description: Will show no infection signs and symptoms  9/12/2020 0916 by Sayda Warner RN  Outcome: Ongoing  9/12/2020 0415 by Keith Burroughs RN  Outcome: Ongoing  Goal: Absence of new skin breakdown  Description: Absence of new skin breakdown  9/12/2020 0916 by Sayda Warner RN  Outcome: Ongoing  9/12/2020 0415 by Keith Burroughs RN  Outcome: Ongoing     Problem: Nutrition  Goal: Optimal nutrition therapy  9/12/2020 0916 by Sayda Warner RN  Outcome: Ongoing  9/12/2020 0415 by Keith Burroughs RN  Outcome: Ongoing

## 2020-09-12 NOTE — PROGRESS NOTES
Office : [phone]     Fax :[phone]       Nephrology progress Note          Chief Complaint:    Chief Complaint   Patient presents with    Altered Mental Status     Altered mental status started yesterday. Pt brought in from [facility] home. History of Present Ilness:    Vinay Jones is a 67 y.o. male with h/o Dementia, renal ds, BPH was transfered from [facility] where he had a witnessed seizure. He is awake but not oriented . All history gathered from the records from [facility].    His initial lab work shows elevated BUN and creatinine   Has a singh in place     Interval Hx     renal function improved   No respiratory distress  No edema  Making urine  Potassium low at 3.1   Sodium 147          Past Medical History:   Diagnosis Date    Cerebral palsy ([address]     CHF (congestive heart failure) (HCC)     CKD (chronic kidney disease)     Constipation     Convulsions ([address]     COVID-19     Degenerative disease of nervous system ([address]     Delusional disorder ([address]     Dementia ([address]     Depression     Diabetes mellitus ([address]     Encephalopathy     Enlarged prostate with lower urinary tract symptoms (LUTS)     GERD (gastroesophageal reflux disease)     GI obstruction (HCC)     Hematuria     Hyperlipidemia     Hypernatremia     Hypertension     Hypertension     Mood disorder (HCC)     Muscle weakness     Muscle weakness (generalized)     Neuromuscular dysfunction of bladder     Neuropathy     Obsessive-compulsive personality disorder (CODE)     Psychosis ([address]     Suprapubic catheter ([address]     #20    Urinary retention        Past Surgical History:   Procedure Laterality Date    ARM SURGERY      BLADDER SURGERY      CYSTOSCOPY N/A 5/15/2019 CYSTOLITHALOPAXY , STONE  SUPRA PUBIC CATHETER EXCHANGE. performed by Aditya Hudson MD at [facility], ESOPHAGUS      EYE SURGERY      HERNIA REPAIR      IR ASP BLADDER W INSERT SUPRAPUBIC CATH      TONSILLECTOMY      UMBILICAL HERNIA REPAIR N/A 1/15/2019    OPEN PRIMARY UMBILICAL HERNIA REPAIR performed by Marcia Gonzalez MD at [address] [place] [address]       Current Medications:    potassium chloride 40 mEq in sodium chloride 0.45 % 1,000 mL infusion, Continuous  enoxaparin (LOVENOX) injection 40 mg, Daily  aspirin EC tablet 81 mg, Daily  carBAMazepine (TEGRETOL) tablet 200 mg, TID  citalopram (CELEXA) tablet 20 mg, Daily  levetiracetam (KEPPRA) 500 mg/100 mL IVPB, Q12H  traZODone (DESYREL) tablet 25 mg, Nightly  trospium (SANCTURA) tablet 20 mg, BID AC  sodium chloride flush 0.9 % injection 10 mL, 2 times per day  sodium chloride flush 0.9 % injection 10 mL, PRN  acetaminophen (TYLENOL) tablet 650 mg, Q6H PRN    Or  acetaminophen (TYLENOL) suppository 650 mg, Q6H PRN  polyethylene glycol (GLYCOLAX) packet 17 g, Daily PRN  promethazine (PHENERGAN) tablet 12.5 mg, Q6H PRN    Or  ondansetron (ZOFRAN) injection 4 mg, Q6H PRN  linezolid (ZYVOX) IVPB 600 mg, Q12H  ampicillin-sulbactam (UNASYN) 1.5 g IVPB minibag, Q6H  dexamethasone (PF) (DECADRON) injection 6 mg, Daily  glucose (GLUTOSE) 40 % oral gel 15 g, PRN  dextrose 50 % IV solution, PRN  glucagon (rDNA) injection 1 mg, PRN  dextrose 5 % solution, PRN  insulin lispro (1 Unit Dial) 0-6 Units, Q4H  insulin glargine (LANTUS;BASAGLAR) injection pen 10 Units, Nightly  valproate (DEPACON) 250 mg in dextrose 5 % 100 mL IVPB, Q8H        Physical exam:     Vitals:  BP (!) 153/80   Pulse 82   Temp 98.4 °F (36.9 °C) (Temporal)   Resp 24   Ht 5' 8\" (1.727 m)   Wt 154 lb (69.9 kg)   SpO2 94%   BMI 23.42 kg/m²   Constitutional:  Not oriented   Skin: no rash, turgor wnl  Heent:  eomi, mmm  Neck: no bruits or jvd noted  Cardiovascular:  S1, S2 without m/r/g  Respiratory: CTA B without w/r/r  Abdomen:  +bs, soft, nt, nd  Ext: no  lower extremity edema      Labs:  CBC:   Recent Labs     09/10/20  0722 09/11/20  0445 09/12/20  0522   WBC 4.5 4.8 11.6*   HGB 11.7* 11.2* 13.7    189 294     BMP:    Recent Labs     09/10/20  1608 09/11/20 0444 09/11/20 2111 09/12/20  0522    147*  --  147*   K 2.8* 2.7* 3.0* 3.1*    112*  --  111*   CO2 27 25  --  26   BUN 80* 54*  --  29*   CREATININE 1.3 0.8  --  0.7*   GLUCOSE 105* 93  --  102*     Ca/Mg/Phos:   Recent Labs     09/10/20  0722 09/10/20  1608 09/11/20  0444 09/12/20  0522   CALCIUM 9.1 8.7 8.6 8.7   MG 3.50*  --  2.80* 2.40     Hepatic:   Recent Labs     09/10/20  1608 09/11/20  0444 09/11/20  0445   AST 29 33 37   ALT 13 14 12   BILITOT <0.2 0.3 0.3   ALKPHOS 94 91 83     Troponin: No results for input(s): TROPONINI in the last 72 hours. BNP: No results for input(s): BNP in the last 72 hours. Lipids: No results for input(s): CHOL, TRIG, HDL, LDLCALC, LABVLDL in the last 72 hours. ABGs: No results for input(s): PHART, PO2ART, VII6EUS in the last 72 hours. INR:   Recent Labs     09/11/20 0445   INR 1.16*     UA:  Recent Labs     09/10/20  0720   COLORU Yellow   CLARITYU CLOUDY*   GLUCOSEU Negative   BILIRUBINUR SMALL*   KETUA Negative   SPECGRAV 1.020   BLOODU TRACE-INTACT*   PHUR 7.0   PROTEINU 30*   UROBILINOGEN 0.2   NITRU Negative   LEUKOCYTESUR SMALL*   LABMICR YES   URINETYPE NotGiven      Urine Microscopic:   Recent Labs     09/10/20  0720   BACTERIA 4+*   HYALCAST 14*   WBCUA 82*   RBCUA 0-2   EPIU 1     Urine Culture:   Recent Labs     09/10/20  0720   LABURIN >50,000 CFU/ml Mixed pathogens  Multiple organisms isolated, no predominance. Culture  indicates probable contamination. Please review colony count  and clinical indications to determine if a repeat culture is  necessary. No further workup to be done.        Urine Chemistry:   Recent Labs     09/10/20  0720   LABCREA 15.9*   NAUR <20                IMAGING:  CT head without contrast   Final Result   No acute intracranial abnormality. XR CHEST 1 VIEW   Preliminary Result   1. Bilateral perihilar opacities, right greater than left, most consistent   with multifocal pneumonia, to include atypical causes. 2. Stable chronic bibasilar pleural and parenchymal scar. 3. Stable cardiomegaly. I/O last 3 completed shifts: In: 3250 [I.V.:1200; IV Piggyback:2050]  Out: 950 [Urine:950]          Assessment/Plan :      1. BENOIT 2/2 pre renal + RAAS blockade   Iv fluids   Change to 1/2 NS   Hold lisinopril   Recommend to dose adjust all medications  based on renal functions  Maintain SBP> 90 mmHg   Daily weights   AVOID NSAIDs  Avoid Nephrotoxins  Monitor Intake/Output  Call if significant decrease in urine output     2. HTN. BP controlled . Hold all BP meds     3. Seizure disorder. Neurology consulted     4. Acid- base disorder. Metabolic alkalosis 2/2 dehydration. Iv fluids     5. Hypokalemia - supplement iv   Will add 40 meq to iv fluids       6 . UTI. abx per primary team     7. Hypernatremia.  On 1/2 NS               Thank you for allowing us to participate in care of Adelina Rosales         Electronically signed by: Barb Cummings MD, 9/12/2020, 8:55 AM      [facility] [address]  Office : [phone]  Fax :[phone]

## 2020-09-12 NOTE — PROGRESS NOTES
acetaminophen, polyethylene glycol, promethazine **OR** ondansetron, glucose, dextrose, glucagon (rDNA), dextrose    No Known Allergies  Active Problems:    Seizure ([address]  Resolved Problems:    * No resolved hospital problems. *    Blood pressure (!) 153/80, pulse 82, temperature 98.4 °F (36.9 °C), temperature source Temporal, resp. rate 24, height 5' 8\" (1.727 m), weight 154 lb (69.9 kg), SpO2 94 %. Subjective:   Diet: Poor intake. Activity level: Returning to normal.    Pain control: Well controlled. Objective:  Vital signs (most recent): Blood pressure (!) 153/80, pulse 82, temperature 98.4 °F (36.9 °C), temperature source Temporal, resp. rate 24, height 5' 8\" (1.727 m), weight 154 lb (69.9 kg), SpO2 94 %. General appearance: Comfortable. Lungs:  Normal effort. Extremities: There is normal range of motion. Neurological: The patient is alert. Assessment:   Condition: In stable condition.        retention  Neurogen bladder  incont    recc    Spt leaking around tube  Creat nl  Urine in tubing clear  covid pending    Will leave alone until covid back  Will likely incr tube size when covid neg    Mary Dewey MD  9/12/2020

## 2020-09-12 NOTE — PROGRESS NOTES
Speech Language Pathology  Attempt    [facility]  1945    Attempted to see pt for clinical bedside swallow evaluation this date. Per nursing report, pt more cooperative today. However, upon arrival, pt refusing all PO trials. Attempted to provided education and rationale for evaluation and pt continued to state \"No.\"   Will re-attempt as therapy schedule and pt availability allow. Thanks,    Kalvin Leventhal M. A.  [facility] S.P. [id number]  Speech-Language Pathologist

## 2020-09-12 NOTE — PROGRESS NOTES
Reassessment completed. Patient restless in bed. Fall precautions in place. Call light and bedside table within reach. Will continue to monitor.

## 2020-09-12 NOTE — PROGRESS NOTES
Morning assessment complete. VSS, pt had large BM, loose. Cleaned up, bath given. Full linen change, new gown in place. Pt cooperative for the most part. Able to tell me who he was and where he is. Disoriented to time and situation. Suprapubic cath in place, continues with drainage. Per Dr. Shanelle Braun leave in place. When COVID results come he will replace catheter. New mepilex in place to coccyx due to DTI, non blanchable erythema. Pt turning in bed by himself multiple times. Refuses to wear oxygen, sat's high 80's. Pulls off immediately as I tried to place. Speech called to see patient to replace potassium orally per hospitalist. Repositioned in bed. Call light within reach. Updated care plan and education accordingly. 1113 pt refusing speech eval, will replace potassium IV    1141 continues with watery bowel movements, c.  Diff sample sent

## 2020-09-13 NOTE — PROGRESS NOTES
Speech Language Pathology  Attempt    Brain Sánchez  1945    Third attempt made to see pt for dysphagia evaluation. Per RN, pt is noncooperative again today; pt is refusing to eat PO for swallow evaluation. Per discussion with RN, will cancel order at this time. Please re-consult SLP for dysphagia evaluation if pt if agreeable to eating PO to assess swallow function. At this time, would recommend temporary means of nutrition/hydration due to pt NPO and not willing to participate in swallow evaluation.     Thanks,  Angeline Dias M.S. [id number] [facility]  Speech-Language Pathologist

## 2020-09-13 NOTE — PROGRESS NOTES
Message sent to MD regarding patient: Patient admitted on 9/10 for AMS/seizure activity. Patient has not slept for the past couple of nights and tonight he is yelling out in pain, stating that \"he is miserable\". He is NPO at this time and I gave him a Tylenol supp about 8pm. He also has Trazadone ordered PO but since he cant take anything PO until a speech eval he has not gotten it. Is there anything we can give him for pain or to help him sleep that is not PO. New orders placed. Will continue to monitor.

## 2020-09-13 NOTE — PROGRESS NOTES
Pt incontinent of loose dark green/dark brown stool into brief. Hafsa care completed. Barrier paste applied to R buttock wound at this time. Brief changed.  Noted brief also saturated with urine that leaked out of suprapubic insertion site (minimal urine drained into singh bag)    Marisol Meals  9/13/2020

## 2020-09-13 NOTE — PROGRESS NOTES
Shift assessment completed, see flowsheets. Medications administered, see MAR. Vital signs stable. Suprapubic catheter in place and leaking. IV infusing. Fall precautions in place. Call light and bedside table within reach. Nonskid footwear on. Pt denies further needs at this time. Will continue to monitor.   César Martines RN

## 2020-09-13 NOTE — PROGRESS NOTES
Crystal La is a 67 y.o. male patient. 1. Laurens coma scale total score 13-15, at arrival to emergency department    2. History of seizures    3. QT prolongation    4. Pneumonia due to organism    5. BENOIT (acute kidney injury) ([facility]    6. Hypokalemia      Past Medical History:   Diagnosis Date    Cerebral palsy ([facility]     CHF (congestive heart failure) (HCC)     CKD (chronic kidney disease)     Constipation     Convulsions ([facility]     COVID-19     Degenerative disease of nervous system ([facility]     Delusional disorder ([facility]     Dementia ([facility]     Depression     Diabetes mellitus ([facility]     Encephalopathy     Enlarged prostate with lower urinary tract symptoms (LUTS)     GERD (gastroesophageal reflux disease)     GI obstruction (HCC)     Hematuria     Hyperlipidemia     Hypernatremia     Hypertension     Hypertension     Mood disorder (HCC)     Muscle weakness     Muscle weakness (generalized)     Neuromuscular dysfunction of bladder     Neuropathy     Obsessive-compulsive personality disorder (CODE)     [facility]osis [facility]     [facility]rap[facility] catheter [facility]     #20    Urinary retention      No past surgical history pertinent negatives on file.   Scheduled Meds:   insulin glargine  5 Units Subcutaneous Nightly    enoxaparin  40 mg Subcutaneous Daily    aspirin  81 mg Oral Daily    carBAMazepine  200 mg Oral TID    citalopram  20 mg Oral Daily    levetiracetam  500 mg Intravenous Q12H    traZODone  25 mg Oral Nightly    trospium  20 mg Oral BID AC    sodium chloride flush  10 mL Intravenous 2 times per day    linezolid  600 mg Intravenous Q12H    ampicillin-sulbactam  1.5 g Intravenous Q6H    dexamethasone  6 mg Intravenous Daily    insulin lispro  0-6 Units Subcutaneous Q4H    valproate sodium (DEPACON) IVPB  250 mg Intravenous Q8H     Continuous Infusions:   lactated ringers      dextrose       PRN Meds:ketorolac, diphenhydrAMINE, sodium chloride flush, acetaminophen **OR**

## 2020-09-13 NOTE — PROGRESS NOTES
Office : [phone]     Fax :[phone]       Nephrology progress Note          Chief Complaint:    Chief Complaint   Patient presents with    Altered Mental Status     Altered mental status started yesterday. Pt brought in from [facility] home. History of Present Ilness:    Tabitha Cole is a 67 y.o. male with h/o Dementia, renal ds, BPH was transfered from [facility] where he had a witnessed seizure. He is awake but not oriented . All history gathered from the records from [facility].    His initial lab work shows elevated BUN and creatinine   Has a singh in place     Interval Hx     renal function improved   No respiratory distress  No edema  Making urine            Past Medical History:   Diagnosis Date    Cerebral palsy ([address]     CHF (congestive heart failure) (HCC)     CKD (chronic kidney disease)     Constipation     Convulsions ([address]     COVID-19     Degenerative disease of nervous system ([address]     Delusional disorder ([address]     Dementia ([address]     Depression     Diabetes mellitus ([address]     Encephalopathy     Enlarged prostate with lower urinary tract symptoms (LUTS)     GERD (gastroesophageal reflux disease)     GI obstruction (HCC)     Hematuria     Hyperlipidemia     Hypernatremia     Hypertension     Hypertension     Mood disorder (HCC)     Muscle weakness     Muscle weakness (generalized)     Neuromuscular dysfunction of bladder     Neuropathy     Obsessive-compulsive personality disorder (CODE)     Psychosis ([address]     Suprapubic catheter ([address]     #20    Urinary retention        Past Surgical History:   Procedure Laterality Date    ARM SURGERY      BLADDER SURGERY      CYSTOSCOPY N/A 5/15/2019    CYSTOLITHALOPAXY , STONE  SUPRA PUBIC CATHETER EXCHANGE. performed by Diana Lovell MD at [address] [facility]      EYE SURGERY      HERNIA REPAIR      IR ASP BLADDER W INSERT SUPRAPUBIC CATH      TONSILLECTOMY      UMBILICAL HERNIA REPAIR N/A 1/15/2019    OPEN PRIMARY UMBILICAL HERNIA REPAIR performed by Chela Liang MD at [facility]       Current Medications:    ketorolac (TORADOL) injection 15 mg, Q6H PRN  diphenhydrAMINE (BENADRYL) injection 25 mg, Nightly PRN  insulin glargine (LANTUS;BASAGLAR) injection pen 5 Units, Nightly  potassium chloride 40 mEq in sodium chloride 0.45 % 1,000 mL infusion, Continuous  enoxaparin (LOVENOX) injection 40 mg, Daily  aspirin EC tablet 81 mg, Daily  carBAMazepine (TEGRETOL) tablet 200 mg, TID  citalopram (CELEXA) tablet 20 mg, Daily  levetiracetam (KEPPRA) 500 mg/100 mL IVPB, Q12H  traZODone (DESYREL) tablet 25 mg, Nightly  trospium (SANCTURA) tablet 20 mg, BID AC  sodium chloride flush 0.9 % injection 10 mL, 2 times per day  sodium chloride flush 0.9 % injection 10 mL, PRN  acetaminophen (TYLENOL) tablet 650 mg, Q6H PRN    Or  acetaminophen (TYLENOL) suppository 650 mg, Q6H PRN  polyethylene glycol (GLYCOLAX) packet 17 g, Daily PRN  promethazine (PHENERGAN) tablet 12.5 mg, Q6H PRN    Or  ondansetron (ZOFRAN) injection 4 mg, Q6H PRN  linezolid (ZYVOX) IVPB 600 mg, Q12H  ampicillin-sulbactam (UNASYN) 1.5 g IVPB minibag, Q6H  dexamethasone (PF) (DECADRON) injection 6 mg, Daily  glucose (GLUTOSE) 40 % oral gel 15 g, PRN  dextrose 50 % IV solution, PRN  glucagon (rDNA) injection 1 mg, PRN  dextrose 5 % solution, PRN  insulin lispro (1 Unit Dial) 0-6 Units, Q4H  valproate (DEPACON) 250 mg in dextrose 5 % 100 mL IVPB, Q8H        Physical exam:     Vitals:  BP (!) 158/88   Pulse 93   Temp 97.9 °F (36.6 °C) (Temporal)   Resp 28   Ht 5' 8\" (1.727 m)   Wt 159 lb (72.1 kg)   SpO2 97%   BMI 24.18 kg/m²   Constitutional:  Not oriented   Skin: no rash, turgor wnl  Heent:  eomi, mmm  Neck: no bruits or jvd noted  Cardiovascular:  S1, S2 without m/r/g  Respiratory: CTA B without w/r/r  Abdomen:  +bs, soft, nt, nd  Ext: no  lower extremity edema      Labs:  CBC:   Recent Labs     09/11/20  0445 09/12/20 0522 09/13/20  0552   WBC 4.8 11.6* 9.6   HGB 11.2* 13.7 13.5    294 222     BMP:    Recent Labs     09/11/20  0444  09/12/20  0522 09/12/20  1259 09/12/20  2149 09/13/20  0552   *  --  147*  --   --  141   K 2.7*   < > 3.1* 3.3* 3.5 3.6   *  --  111*  --   --  110   CO2 25  --  26  --   --  20*   BUN 54*  --  29*  --   --  24*   CREATININE 0.8  --  0.7*  --   --  0.5*   GLUCOSE 93  --  102*  --   --  110*    < > = values in this interval not displayed. Ca/Mg/Phos:   Recent Labs     09/11/20 0444 09/12/20 0522 09/13/20  0552   CALCIUM 8.6 8.7 8.0*   MG 2.80* 2.40 2.20     Hepatic:   Recent Labs     09/10/20  1608 09/11/20 0444 09/11/20 0445   AST 29 33 37   ALT 13 14 12   BILITOT <0.2 0.3 0.3   ALKPHOS 94 91 83     Troponin: No results for input(s): TROPONINI in the last 72 hours. BNP: No results for input(s): BNP in the last 72 hours. Lipids: No results for input(s): CHOL, TRIG, HDL, LDLCALC, LABVLDL in the last 72 hours. ABGs: No results for input(s): PHART, PO2ART, DEE5WXY in the last 72 hours. INR:   Recent Labs     09/11/20 0445   INR 1.16*     UA:  No results for input(s): COLORU, CLARITYU, GLUCOSEU, BILIRUBINUR, KETUA, SPECGRAV, BLOODU, PHUR, PROTEINU, UROBILINOGEN, NITRU, LEUKOCYTESUR, Marbin Laity in the last 72 hours. Urine Microscopic:   No results for input(s): LABCAST, BACTERIA, COMU, HYALCAST, WBCUA, RBCUA, EPIU in the last 72 hours. Urine Culture:   No results for input(s): LABURIN in the last 72 hours. Urine Chemistry:   No results for input(s): Parker [employer]s, PROTEINUR, NAUR in the last 72 hours. IMAGING:  CT head without contrast   Final Result   No acute intracranial abnormality. XR CHEST 1 VIEW   Final Result   1.  Bilateral perihilar opacities, right greater than left, most consistent   with multifocal pneumonia, to include atypical causes. 2. Stable chronic bibasilar pleural and parenchymal scar. 3. Stable cardiomegaly. I/O last 3 completed shifts: In: 2664 [I.V.:1764; IV Piggyback:900]  Out: 575 [Urine:575]          Assessment/Plan :      1. BENOIT 2/2 pre renal + RAAS blockade     Hold lisinopril   Recommend to dose adjust all medications  based on renal functions  Maintain SBP> 90 mmHg   Daily weights   AVOID NSAIDs  Avoid Nephrotoxins  Monitor Intake/Output  Call if significant decrease in urine output     2. HTN. BP controlled . Hold all BP meds     3. Seizure disorder. Neurology consulted     4. Acid- base disorder. Metabolic alkalosis 2/2 dehydration. Iv fluids     5. Hypokalemia - corrected with supplements     6 . UTI. abx per primary team     7. Hypernatremia. On 1/2 NS . Corrected .    Change fluids to LR today               Thank you for allowing us to participate in care of Lenin Nguyen         Electronically signed by: Bryan Willard MD, 9/13/2020, 9:59 AM      [facility] [address]  Office : [phone]  Fax :[phone]

## 2020-09-13 NOTE — PROGRESS NOTES
Hospitalist Progress Note      PCP: Anila Lucero MD    Date of Admission: 9/10/2020    Chief Complaint: Seizure    Hospital Course: This morning patient is more awake  Pt is noncooperative again today and refusing to eat PO for swallow evaluation. Follows commands intermittently   Off oxygen   No recent seizure like activity per RN     Medications:  Reviewed      Exam:    BP (!) 158/88   Pulse 93   Temp 97.9 °F (36.6 °C) (Temporal)   Resp 28   Ht 5' 8\" (1.727 m)   Wt 159 lb (72.1 kg)   SpO2 97%   BMI 24.18 kg/m²     General appearance: No apparent distress, appears stated age and cooperative. HEENT: Pupils equal, round, and reactive to light. Conjunctivae/corneas clear. Neck: Supple, with full range of motion. No jugular venous distention. Trachea midline. Respiratory: Bibasal crepitations RALES/WHEEZES/Rhonchi. Cardiovascular: Regular rate and rhythm with normal S1/S2 without MURMURS, rubs or gallops. Abdomen: Soft, non-tender, non-distended with normal bowel sounds. Musculoskeletal: No clubbing, cyanosis or EDEMA bilaterally. Full range of motion without deformity. Skin: Skin color, texture, turgor normal.  No rashes or lesions. Neurologic: cranial  nerves intact  Follows commands  Moving all 4 extremities and following commands appropriately  Able to give me his name    Labs:   Recent Labs     09/11/20  0445 09/12/20  0522 09/13/20  0552   WBC 4.8 11.6* 9.6   HGB 11.2* 13.7 13.5   HCT 31.3* 39.6* 38.9*    294 222     Recent Labs     09/11/20  0444  09/12/20  0522 09/12/20  1259 09/12/20  2149 09/13/20  0552   *  --  147*  --   --  141   K 2.7*   < > 3.1* 3.3* 3.5 3.6   *  --  111*  --   --  110   CO2 25  --  26  --   --  20*   BUN 54*  --  29*  --   --  24*   CREATININE 0.8  --  0.7*  --   --  0.5*   CALCIUM 8.6  --  8.7  --   --  8.0*    < > = values in this interval not displayed.      Recent Labs     09/10/20  1608 09/11/20  0444 09/11/20  0445   AST 29 33 37 ALT 13 14 12   BILIDIR <0.2  --  <0.2   BILITOT <0.2 0.3 0.3   ALKPHOS 94 91 83     Recent Labs     09/11/20  0445   INR 1.16*     No results for input(s): CKTOTAL, TROPONINI in the last 72 hours. Urinalysis:      Lab Results   Component Value Date    NITRU Negative 09/10/2020    WBCUA 82 09/10/2020    BACTERIA 4+ 09/10/2020    RBCUA 0-2 09/10/2020    BLOODU TRACE-INTACT 09/10/2020    SPECGRAV 1.020 09/10/2020    GLUCOSEU Negative 09/10/2020       Radiology:  CT head without contrast   Final Result   No acute intracranial abnormality. XR CHEST 1 VIEW   Final Result   1. Bilateral perihilar opacities, right greater than left, most consistent   with multifocal pneumonia, to include atypical causes. 2. Stable chronic bibasilar pleural and parenchymal scar. 3. Stable cardiomegaly. Assessment/Plan:    Active Hospital Problems    Diagnosis Date Noted    Seizure [facility]) [R56.9] 09/10/2020       Acute Medical Issues Being Addressed:    39-year-old gentleman known cognitive disability wheelchair-bound suprapubic catheter known seizure disorder admitted to the hospital after a witnessed seizure     Breakthrough seizure on background of known seizure disorder  Having reviewed his medication list he is on carbamazepine and lamotrigine  Carbamazepine levels are noted  Currently patient is n.p.o.   Unable to convert either carbamazepine or lamotrigine to IV   On Keppra and sodium valproate to 50 mg IV every 8 which is an increased dose per neurology recommendation  He continues to remain n.p.o. as he was not cooperating with speech yesterday however will get a speech therapy and then readjust his medications  CT of the head was negative  Neurology input appreciated with dosing of his antiepileptics    Acute encephalopathy metabolic secondary to underlying infection  Needs to be closer to his baseline mental status apparently has some underlying dementia    Suspected UTIs with underlying suprapubic catheter  Currently on IV Unasyn      Pneumonia suspected gram-positive/gram-negative  Recent COVID test was positive  On Zyvox and Unasyn today's day 4  Will complete a 7-day course of Unasyn which can be changed over to Augmentin once he is able to eat     Acute kidney injury suspect prerenal-resolved   Continue IV fluids since npo   Strict intake and output  Nephrology following     Hypokalemia  Resolved     Type 2 diabetes mellitus  Currently patient n.p.o.  Decreased Lantus to 5 units at bedtime  Low-dose sliding scale    Suprapubic catheter with leakage around it  Urology consulted  Day will change suprapubic catheter size once COVID-19 is negative    SLP will cont to attempt swallow evaluation on him.      DVT Prophylaxis: sc lovenox   Diet: Diet NPO Effective Now  Code Status: Full Code      Dispo - once acute medical processes have resolved    Linda Caceres MD

## 2020-09-13 NOTE — PROGRESS NOTES
Reassessment complete. Patient very restless at this time. Fall precautions in place. Call light and bedside table within reach. Nonskid footwear on. Will continue to monitor.

## 2020-09-13 NOTE — PROGRESS NOTES
Pt had another loose dark green BM in brief, saturating through. Also noted that ostomy bag placed over suprapubic cath had leaked slightly as well. Hafsa care completed, brief, gown, and bed pad changed. Barrier paste placed on R buttock wound at this time (previous mepilex border saturated in stool). Ostomy bag around suprapubic catheter changed and site cleansed. Pt moaning and c/o pain \"all over,\" provided toradol per PRN IV order.      Kristina Lucas  9/13/2020

## 2020-09-13 NOTE — PROGRESS NOTES
Socioeconomic History    Marital status: Single     Spouse name: None    Number of children: None    Years of education: None    Highest education level: None   Occupational History    None   Social Needs    Financial resource strain: None    Food insecurity     Worry: None     Inability: None    Transportation needs     Medical: None     Non-medical: None   Tobacco Use    Smoking status: Former Smoker     Types: Cigarettes     Last attempt to quit: 5/13/2005     Years since quitting: 15.3    Smokeless tobacco: Never Used   Substance and Sexual Activity    Alcohol use: No    Drug use: No    Sexual activity: None   Lifestyle    Physical activity     Days per week: None     Minutes per session: None    Stress: None   Relationships    Social connections     Talks on phone: None     Gets together: None     Attends [employer] service: None     Active member of club or organization: None     Attends meetings of clubs or organizations: None     Relationship status: None    Intimate partner violence     Fear of current or ex partner: None     Emotionally abused: None     Physically abused: None     Forced sexual activity: None   Other Topics Concern    None   Social History Narrative    None        PHYSICAL EXAMINATION      BP (!) 158/88   Pulse 93   Temp 97.9 °F (36.6 °C) (Temporal)   Resp 28   Ht 5' 8\" (1.727 m)   Wt 159 lb (72.1 kg)   SpO2 97%   BMI 24.18 kg/m²   In-person bedside physical examination deferred. Pursuant to the emergency declaration under the [address] [place] [address], [address] [place] [address] authority and the [employer] Act, this clinical encounter was conducted to provide necessary medical care.    (Also consistent with new provisions and guidance offered by [facility] on March 18, 2020 in setting of COVID 19 outbreak and in order to preserve personal protective equipment in accordance with the effort was made to ensure the accuracy of this automated transcription, some errors in transcription may have occurred.          Umair Hooper M.D.

## 2020-09-13 NOTE — PROGRESS NOTES
Discussed leaky suprapubic catheter with Dr. Richard Doan from urology. Suggested trying to use ostomy supplies to protect pt's skin from breaking down r/t persistent urine/moisture from leaking. Per MD, ok to try with pt. [facility] called for supplies.     Evette Ayala  9/13/2020

## 2020-09-13 NOTE — PROGRESS NOTES
Pt incontinent of large amount of loose dark green BM into brief. Pt bathed, brief changed and this RN placed one-piece ostomy bag over leaking suprapubic catheter site (skin prepped with protective skin barrier prior to application of ostomy ring). Bed pad changed and new mepilex border placed onto R buttock wound. Bed alarm remains active, bed in lowest position, call light within reach. Will monitor.     Manohar Prosper  9/13/2020

## 2020-09-14 NOTE — PROGRESS NOTES
Comprehensive Nutrition Assessment    Type and Reason for Visit:  Reassess    Nutrition Recommendations/Plan:   1. If SLP continues to recommend NPO/pt will not participate in assessment and aggressive care is desired, need to consider EN support to start as pt is now NPO x 4 days. 2. If EN is desired, recommend Jevity 1.2 (standard formula with fiber) at goal rate 75 mL per hour to provide 1800 mL total volume, 2160 calories, 100 grams protein, 1453 mL free water. 3. Recommend water flush 30 mL q 6 hours for tube maintenance given IV fluids at 50 mL/hr. 4. Ensure head of bed is 30 - 45 degrees during continuous or bolus gastric feeding and for one hour after bolus. Turn off the feeding if head of bed is lowered less than 30 degrees. 5. Monitor for tolerance (residuals greater than 500 mL, bowel habits, N/V, cramping). Nutrition Assessment:  Pt's nutrition status has declined. Pt now NPO x 4 days during admission as he has been unwilling to cooperate with SLP evaluation. Per RN, plan to re-consult SLP and try again today. If SLP recommends NPO, or if pt refuses to cooperate with evaluation, need to consider EN support to start if aggressive care is desired. See above for recommendations. Malnutrition Assessment:  Malnutrition Status:  Insufficient data      Estimated Daily Nutrient Needs:  Energy (kcal):  5661-9493; 73 kg; 25-30 kcal/kg   Protein (g):  102-124 grams; 73 kg; 1.4-1.7 grams per kg;       Fluid (ml/day):  1 mL per kcal    Nutrition Related Findings:  Oriented to person. Soft, non-distended abdomen. Active bowel sounds. Trace BLE  edema. LBM 9/13. LR at 50 mL/hr      Wounds:  None       Current Nutrition Therapies:    Diet NPO Effective Now    Anthropometric Measures:  · Height: 5' 8\" (172.7 cm)  · Current Body Weight: 161 lb (73 kg)   · Ideal Body Weight: 154 lbs; % Ideal Body Weight 101.9 %   · BMI: 24.5  · BMI Categories: Normal Weight (BMI 18.5-24. 9)       Nutrition Diagnosis: · Inadequate oral intake related to cognitive or neurological impairment(uncooperative/will not participate with SLP) as evidenced by NPO or clear liquid status due to medical condition      Nutrition Interventions:   Food and/or Nutrient Delivery:  Continue NPO  Nutrition Education/Counseling:  Education not indicated   Coordination of Nutrition Care:  Continued Inpatient Monitoring, Speech Therapy    Goals:  Diet advancement vs nutrition support       Nutrition Monitoring and Evaluation:   Food/Nutrient Intake Outcomes:  Diet Advancement/Tolerance  Physical Signs/Symptoms Outcomes:  Chewing or Swallowing     Discharge Planning:     Too soon to determine     Electronically signed by Yessica Werner RD, LD on 9/14/20 at 1:41 PM EDT    Contact: [phone]

## 2020-09-14 NOTE — CARE COORDINATION
[address] [address] Continence Nurse  Consult Note       NAME:  Sneha Carbajal RECORD NUMBER:  [id number]  AGE: 67 y.o.    GENDER: male  : 1945  TODAY'S DATE:  2020    Subjective   Reason for WOCN Evaluation and Assessment: pressure injury right buttock      Swathi Ariza is a 67 y.o. male referred by:   [x] Physician  [x] Nursing  [] Other:     Wound Identification:  Wound Type: pressure  Contributing Factors: chronic pressure and decreased mobility    Wound History: present on admission  Current Wound Care Treatment:  Foam dressing     Patient Goal of Care:  [x] Wound Healing  [] Odor Control  [] Palliative Care  [] Pain Control   [] Other:         PAST MEDICAL HISTORY        Diagnosis Date    [facility]     CHF (congestive heart failure) (HCC)     CKD (chronic kidney disease)     Constipation     Convulsions ([address]     COVID-19     Degenerative disease of nervous system ([facility]     Delusional disorder ([facility]     Dementia ([facility]     Depression     Diabetes mellitus ([address]     Encephalopathy     Enlarged prostate with lower urinary tract symptoms (LUTS)     GERD (gastroesophageal reflux disease)     GI obstruction (HCC)     Hematuria     Hyperlipidemia     Hypernatremia     Hypertension     Hypertension     Mood disorder ([address]     MRSA colonization 2020    Muscle weakness     Muscle weakness (generalized)     Neuromuscular dysfunction of bladder     Neuropathy     Obsessive-compulsive personality disorder (CODE)     Psychosis (HCC)     Suprapubic catheter ([address]     #20    Urinary retention        PAST SURGICAL HISTORY    Past Surgical History:   Procedure Laterality Date    ARM SURGERY      BLADDER SURGERY      CYSTOSCOPY N/A 5/15/2019    CYSTOLITHALOPAXY , STONE  SUPRA PUBIC CATHETER EXCHANGE. performed by Zaar Bui MD at [address] [place] [address]      EYE SURGERY      HERNIA REPAIR      IR ASP BLADDER W INSERT SUPRAPUBIC CATH      TONSILLECTOMY      UMBILICAL HERNIA REPAIR N/A 1/15/2019    OPEN PRIMARY UMBILICAL HERNIA REPAIR performed by Shyam Pablo MD at [facility].    History reviewed. No pertinent family history. SOCIAL HISTORY    Social History     Tobacco Use    Smoking status: Former Smoker     Types: Cigarettes     Last attempt to quit: 5/13/2005     Years since quitting: 15.3    Smokeless tobacco: Never Used   Substance Use Topics    Alcohol use: No    Drug use: No       ALLERGIES    No Known Allergies    MEDICATIONS    No current facility-administered medications on file prior to encounter.       Current Outpatient Medications on File Prior to Encounter   Medication Sig Dispense Refill    divalproex (DEPAKOTE) 125 MG DR tablet Take 125 mg by mouth 2 times daily      traZODone (DESYREL) 25 mg TABS Take 25 mg by mouth nightly      Lactobacillus (ACIDOPHILUS) 100 MG CAPS Take by mouth 2 times daily      citalopram (CELEXA) 20 MG tablet Take 20 mg by mouth daily      trospium (SANCTURA) 20 MG tablet Take 20 mg by mouth 2 times daily      insulin detemir (LEVEMIR) 100 UNIT/ML injection vial Inject 5 Units into the skin 2 times daily      aspirin 81 MG tablet Take 81 mg by mouth daily      carBAMazepine (TEGRETOL) 200 MG tablet Take 200 mg by mouth 3 times daily       loratadine (CLARITIN) 10 MG tablet Take 10 mg by mouth daily      Folic Acid 0.8 MG CAPS Take 1 tablet by mouth daily      gabapentin (NEURONTIN) 100 MG capsule Take 200 mg by mouth 3 times daily      lamoTRIgine (LAMICTAL) 100 MG tablet Take 100 mg by mouth 3 times daily       lisinopril (PRINIVIL;ZESTRIL) 20 MG tablet Take 10 mg by mouth daily       polyethylene glycol (MIRALAX) POWD powder Take 17 g by mouth daily       vitamin B-12 (CYANOCOBALAMIN) 1000 MCG tablet Take 1,000 mcg by mouth daily      vitamin D (CHOLECALCIFEROL) 1000 UNIT TABS tablet Take 5,000 Units by mouth daily       omeprazole

## 2020-09-14 NOTE — PLAN OF CARE
Problem: Falls - Risk of:  Goal: Will remain free from falls  Description: Will remain free from falls  9/14/2020 1858 by Finn Cao RN  Outcome: Ongoing  9/14/2020 0709 by Misbah Mueller RN  Outcome: Ongoing     Problem: Skin Integrity:  Goal: Will show no infection signs and symptoms  Description: Will show no infection signs and symptoms  9/14/2020 1858 by Finn Cao RN  Outcome: Ongoing  9/14/2020 0709 by Misbah Mueller RN  Outcome: Ongoing     Problem: Nutrition  Goal: Optimal nutrition therapy  9/14/2020 1858 by Finn Cao RN  Outcome: Ongoing  9/14/2020 1352 by Yadira Leos RD, LD  Outcome: Ongoing  9/14/2020 0709 by Misbah Mueller RN  Outcome: Ongoing     Problem: Airway Clearance - Ineffective  Goal: Achieve or maintain patent airway  9/14/2020 1858 by Finn Cao RN  Outcome: Ongoing  9/14/2020 0709 by Misbah Mueller RN  Outcome: Ongoing     Problem: Gas Exchange - Impaired  Goal: Absence of hypoxia  9/14/2020 1858 by Finn Cao RN  Outcome: Ongoing  9/14/2020 0709 by Msibah Mueller RN  Outcome: Ongoing     Problem: Breathing Pattern - Ineffective  Goal: Ability to achieve and maintain a regular respiratory rate  9/14/2020 1858 by Finn Cao RN  Outcome: Ongoing  9/14/2020 0709 by Misbah Mueller RN  Outcome: Ongoing     Problem:  Body Temperature -  Risk of, Imbalanced  Goal: Ability to maintain a body temperature within defined limits  9/14/2020 1858 by Finn Cao RN  Outcome: Ongoing  9/14/2020 0709 by Misbah Mueller RN  Outcome: Ongoing     Problem: Isolation Precautions - Risk of Spread of Infection  Goal: Prevent transmission of infection  9/14/2020 1858 by Finn Cao RN  Outcome: Ongoing  9/14/2020 0709 by Misbah Mueller RN  Outcome: Ongoing     Problem: Nutrition Deficits  Goal: Optimize nutrtional status  9/14/2020 1858 by Finn Cao RN  Outcome: Ongoing  9/14/2020 0709 by Misbah Mueller RN  Outcome: Ongoing     Problem: Risk for Fluid Volume Deficit  Goal: Maintain normal heart rhythm  9/14/2020 1858 by Soha Marcum RN  Outcome: Ongoing  9/14/2020 0709 by Lui Helms RN  Outcome: Ongoing     Problem: Loneliness or Risk for Loneliness  Goal: Demonstrate positive use of time alone when socialization is not possible  9/14/2020 1858 by Soha Marcum RN  Outcome: Ongoing  9/14/2020 0709 by Lui Helms RN  Outcome: Ongoing     Problem: Fatigue  Goal: Verbalize increase energy and improved vitality  9/14/2020 1858 by Soha Marcum RN  Outcome: Ongoing  9/14/2020 0709 by Lui Helms RN  Outcome: Ongoing     Problem: Patient Education: Go to Patient Education Activity  Goal: Patient/Family Education  9/14/2020 1858 by Soha Marcum RN  Outcome: Ongoing  9/14/2020 0709 by Lui Helms RN  Outcome: Ongoing     Problem: Pain:  Goal: Pain level will decrease  Description: Pain level will decrease  9/14/2020 1858 by Soha Marcum RN  Outcome: Ongoing  9/14/2020 0709 by Lui Helms RN  Outcome: Ongoing     Problem: OXYGENATION/RESPIRATORY FUNCTION  Goal: Patient will maintain patent airway  9/14/2020 1858 by Soha Marcum RN  Outcome: Ongoing  9/14/2020 0709 by Lui Helms RN  Outcome: Ongoing     Problem: HEMODYNAMIC STATUS  Goal: Patient has stable vital signs and fluid balance  9/14/2020 1858 by Soha Marcum RN  Outcome: Ongoing  9/14/2020 0709 by Lui Helms RN  Outcome: Ongoing     Problem: FLUID AND ELECTROLYTE IMBALANCE  Goal: Fluid and electrolyte balance are achieved/maintained  9/14/2020 1858 by Soha Marcum RN  Outcome: Ongoing  9/14/2020 0709 by Lui Helms RN  Outcome: Ongoing     Problem: ACTIVITY INTOLERANCE/IMPAIRED MOBILITY  Goal: Mobility/activity is maintained at optimum level for patient  9/14/2020 1858 by Soha Marcum RN  Outcome: Ongoing  9/14/2020 0709 by Lui Helms RN  Outcome: Ongoing     Problem: Discharge Planning:  Goal: Discharged to appropriate level of care  Description: Discharged to appropriate level of care  9/14/2020 1858 by Katina Montes RN  Outcome: Ongoing  9/14/2020 0709 by Moises Vick RN  Outcome: Ongoing     Problem: Serum Glucose Level - Abnormal:  Goal: Ability to maintain appropriate glucose levels will improve  Description: Ability to maintain appropriate glucose levels will improve  9/14/2020 1858 by Katina Montes RN  Outcome: Ongoing  9/14/2020 0709 by Moises Vick RN  Outcome: Ongoing     Problem: Sensory Perception - Impaired:  Goal: Ability to maintain a stable neurologic state will improve  Description: Ability to maintain a stable neurologic state will improve  9/14/2020 1858 by Katina Montes RN  Outcome: Ongoing  9/14/2020 0709 by Moises Vick RN  Outcome: Ongoing

## 2020-09-14 NOTE — PLAN OF CARE
Nutrition Problem #1: Inadequate oral intake  Intervention: Food and/or Nutrient Delivery: Continue NPO  Nutritional Goals: Diet advancement vs nutrition support

## 2020-09-14 NOTE — PROGRESS NOTES
Office : [phone]     Fax :[phone]       Nephrology progress Note          Chief Complaint:    Chief Complaint   Patient presents with    Altered Mental Status     Altered mental status started yesterday. Pt brought in from [facility] home. History of Present Ilness:    oGldie Callahan is a 67 y.o. male with h/o Dementia, renal ds, BPH was transfered from [facility] where he had a witnessed seizure. He is awake but not oriented . All history gathered from the records from [facility].    His initial lab work shows elevated BUN and creatinine   Has a singh in place     Interval Hx     renal function improved   No respiratory distress  No edema  Making urine  Potassium level is normal.           Past Medical History:   Diagnosis Date    Cerebral palsy ([address]     CHF (congestive heart failure) (HCC)     CKD (chronic kidney disease)     Constipation     Convulsions ([address]     COVID-19     Degenerative disease of nervous system ([address]     Delusional disorder ([address]     Dementia ([address]     Depression     Diabetes mellitus ([address]     Encephalopathy     Enlarged prostate with lower urinary tract symptoms (LUTS)     GERD (gastroesophageal reflux disease)     GI obstruction (HCC)     Hematuria     Hyperlipidemia     Hypernatremia     Hypertension     Hypertension     Mood disorder (HCC)     Muscle weakness     Muscle weakness (generalized)     Neuromuscular dysfunction of bladder     Neuropathy     Obsessive-compulsive personality disorder (CODE)     Psychosis ([address]     Suprapubic catheter ([address]     #20    Urinary retention        Past Surgical History:   Procedure Laterality Date    ARM SURGERY      BLADDER SURGERY      CYSTOSCOPY N/A 5/15/2019    CYSTOLITHALOPAXY , STONE  SUPRA PUBIC CATHETER EXCHANGE. performed by Prosper Jiang MD at [address] [facility]      EYE SURGERY      HERNIA REPAIR      IR ASP BLADDER W INSERT SUPRAPUBIC CATH      TONSILLECTOMY      UMBILICAL HERNIA REPAIR N/A 1/15/2019    OPEN PRIMARY UMBILICAL HERNIA REPAIR performed by Vicki Jiménez MD at [facility]       Current Medications:    albuterol sulfate  (90 Base) MCG/ACT inhaler 2 puff, Q6H PRN  ipratropium-albuterol (DUONEB) nebulizer solution 1 ampule, Q4H PRN  labetalol (NORMODYNE;TRANDATE) injection 10 mg, Q6H PRN  ketorolac (TORADOL) injection 15 mg, Q6H PRN  diphenhydrAMINE (BENADRYL) injection 25 mg, Nightly PRN  lactated ringers infusion, Continuous  insulin glargine (LANTUS;BASAGLAR) injection pen 5 Units, Nightly  enoxaparin (LOVENOX) injection 40 mg, Daily  aspirin EC tablet 81 mg, Daily  carBAMazepine (TEGRETOL) tablet 200 mg, TID  citalopram (CELEXA) tablet 20 mg, Daily  levetiracetam (KEPPRA) 500 mg/100 mL IVPB, Q12H  traZODone (DESYREL) tablet 25 mg, Nightly  trospium (SANCTURA) tablet 20 mg, BID AC  sodium chloride flush 0.9 % injection 10 mL, 2 times per day  sodium chloride flush 0.9 % injection 10 mL, PRN  acetaminophen (TYLENOL) tablet 650 mg, Q6H PRN    Or  acetaminophen (TYLENOL) suppository 650 mg, Q6H PRN  polyethylene glycol (GLYCOLAX) packet 17 g, Daily PRN  promethazine (PHENERGAN) tablet 12.5 mg, Q6H PRN    Or  ondansetron (ZOFRAN) injection 4 mg, Q6H PRN  linezolid (ZYVOX) IVPB 600 mg, Q12H  ampicillin-sulbactam (UNASYN) 1.5 g IVPB minibag, Q6H  glucose (GLUTOSE) 40 % oral gel 15 g, PRN  dextrose 50 % IV solution, PRN  glucagon (rDNA) injection 1 mg, PRN  dextrose 5 % solution, PRN  insulin lispro (1 Unit Dial) 0-6 Units, Q4H  valproate (DEPACON) 250 mg in dextrose 5 % 100 mL IVPB, Q8H        Physical exam:     Vitals:  /87   Pulse 76   Temp 97.6 °F (36.4 °C) (Temporal)   Resp 22   Ht 5' 8\" (1.727 m)   Wt 161 lb 3.2 oz (73.1 kg) SpO2 94%   BMI 24.51 kg/m²   Constitutional:  Not oriented   Skin: no rash, turgor wnl  Heent:  eomi, mmm  Neck: no bruits or jvd noted  Cardiovascular:  S1, S2 without m/r/g  Respiratory: CTA B without w/r/r  Abdomen:  +bs, soft, nt, nd  Ext: no  lower extremity edema      Labs:  CBC:   Recent Labs     09/12/20  0522 09/13/20  0552   WBC 11.6* 9.6   HGB 13.7 13.5    222     BMP:    Recent Labs     09/12/20  0522  09/13/20  0552 09/13/20  1305 09/13/20  2045 09/14/20  0450   *  --  141  --   --   --    K 3.1*   < > 3.6 3.4* 3.5 3.5   *  --  110  --   --   --    CO2 26  --  20*  --   --   --    BUN 29*  --  24*  --   --   --    CREATININE 0.7*  --  0.5*  --   --   --    GLUCOSE 102*  --  110*  --   --   --     < > = values in this interval not displayed. Ca/Mg/Phos:   Recent Labs     09/12/20 0522 09/13/20  0552   CALCIUM 8.7 8.0*   MG 2.40 2.20     Hepatic:   No results for input(s): AST, ALT, ALB, BILITOT, ALKPHOS in the last 72 hours. Troponin: No results for input(s): TROPONINI in the last 72 hours. BNP: No results for input(s): BNP in the last 72 hours. Lipids: No results for input(s): CHOL, TRIG, HDL, LDLCALC, LABVLDL in the last 72 hours. ABGs: No results for input(s): PHART, PO2ART, ADM3ITU in the last 72 hours. INR:   No results for input(s): INR in the last 72 hours. UA:  No results for input(s): Neil Teague, GLUCOSEU, BILIRUBINUR, KETUA, SPECGRAV, BLOODU, PHUR, PROTEINU, UROBILINOGEN, NITRU, LEUKOCYTESUR, Marbin Laity in the last 72 hours. Urine Microscopic:   No results for input(s): LABCAST, BACTERIA, COMU, HYALCAST, WBCUA, RBCUA, EPIU in the last 72 hours. Urine Culture:   No results for input(s): LABURIN in the last 72 hours. Urine Chemistry:   No results for input(s): Parker Kinds, PROTEINUR, NAUR in the last 72 hours. IMAGING:  CT head without contrast   Final Result   No acute intracranial abnormality.          XR CHEST 1 VIEW   Final Result 1. Bilateral perihilar opacities, right greater than left, most consistent   with multifocal pneumonia, to include atypical causes. 2. Stable chronic bibasilar pleural and parenchymal scar. 3. Stable cardiomegaly. I/O last 3 completed shifts: In: 10 [I.V.:10]  Out: 475 [Urine:475]          Assessment/Plan :      1. BENOIT 2/2 pre renal + RAAS blockade     Hold lisinopril   Recommend to dose adjust all medications  based on renal functions  Maintain SBP> 90 mmHg   Daily weights   AVOID NSAIDs  Avoid Nephrotoxins  Monitor Intake/Output  Call if significant decrease in urine output     2. HTN. BP controlled . Hold all BP meds     3. Seizure disorder. Neurology consulted     4. Acid- base disorder. Metabolic alkalosis 2/2 dehydration. Iv fluids     5. Hypokalemia - corrected with supplements     6 . UTI. abx per primary team     7. Hypernatremia. On 1/2 NS . Corrected .    Change fluids to LR today               Thank you for allowing us to participate in care of Antonio Pages         Electronically signed by: Corby Bolivar MD, 9/14/2020, 9:59 AM      [facility] [address]  Office : [phone]  Fax :[phone]

## 2020-09-14 NOTE — PROGRESS NOTES
Physician Progress Note      PATIENT:               Orly Choe  CSN #:                  [id number]  :                       1945  ADMIT DATE:       9/10/2020 6:41 AM  DISCH DATE:  RESPONDING  PROVIDER #:        Charlotte Waller MD          QUERY TEXT:    Pt admitted with Breakthrough seizure. Pt noted to have COVID 19. If possible,   please document in progress notes and discharge summary the relationship, if   any, between Breakthrough seizure and COVID 19. The medical record reflects the following:  Risk Factors: known seizure disorder, COVID 19 infection, UTI, PNA, BENOIT  Clinical Indicators: Per H&P: I suspect that is from his underlying infection   which has caused the breakthrough seizure. COVID 19 positive and will repeat  Treatment: Neuro consult, IV abx, testing and labs, Keppra, Neph consult    Thank you, [email or web address]  Options provided:  -- Breakthrough seizure possibly related to COVID 19 infection  -- Breakthrough seizure unrelated to COVID 19  -- Other - I will add my own diagnosis  -- Disagree - Not applicable / Not valid  -- Disagree - Clinically unable to determine / Unknown  -- Refer to Clinical Documentation Reviewer    PROVIDER RESPONSE TEXT:    This patient had a Breakthrough seizure, unrelated to COVID 19.     Query created by: Shreya Gallegos on 2020 6:17 PM      Electronically signed by:  Charlotte Waller MD 2020 6:49 PM

## 2020-09-14 NOTE — PROGRESS NOTES
----- Message from Kailyn Chance sent at 12/26/2017 10:03 AM CST -----  Contact: patient  Returning your call. Please call patient @ [phone]. Thanks, chalino   Shift assessment completed, see doc flowsheets. Pt currently confused, repeating statements multiple times. Medicated with toradol and benadryl per MAR. Call light within reach, will continue to monitor.   Anika Doyle

## 2020-09-14 NOTE — PROGRESS NOTES
PM assessment complete and documented. Oral meds held due to NPO. BS 94. Humalog insulin held and message sent to Dr. Radha Altamirano if he wanted lantus to be held. He stated yes to hold it. Pt pulled IV out. New IV placed by ALECIA Velez RN. Will continue to monitor.

## 2020-09-14 NOTE — CONSULTS
Palliative Care:     Writer completed consult on 9/11/20. Today, Neil Odom followed up with call to jose Major regarding clinical status. Reviewed patient refusing to allow dietary to complete nutrition screening. Pt. Continues to refuse intake of fluids/foods. Niece states that patient is used to being up in [facility] all the time and does not sleep in a bed at [facility]. Niece states she understands if patient is not going to eat and potential need for NG/Jevity feeding may be considered. Is in agreement if this were to be ordered stating, Darline Nava that if he would eat we will get him a pizza when he returns to [place]\". Niece unrealistic with patients overall decline. Admits her mother is the POA yet very hard of hearing, memory loss and is 91 yrs. Old. Reviewed Code status as Full Code. No changes in code status per family request.     Will continue to follow and provide emotional/educational support on palliative care as needed. Nurse Parley Aase informed of this discussion.

## 2020-09-14 NOTE — PROGRESS NOTES
Pt morning vitals and assessment complete. Pt. Confused but up in bed and cooperative. Currently NPO-awaiting Speech consult. PO meds held. Will continue to monitor.

## 2020-09-14 NOTE — PLAN OF CARE
Problem: Falls - Risk of:  Goal: Will remain free from falls  Description: Will remain free from falls  Outcome: Ongoing  Goal: Absence of physical injury  Description: Absence of physical injury  Outcome: Ongoing     Problem: Skin Integrity:  Goal: Will show no infection signs and symptoms  Description: Will show no infection signs and symptoms  Outcome: Ongoing  Goal: Absence of new skin breakdown  Description: Absence of new skin breakdown  Outcome: Ongoing     Problem: Nutrition  Goal: Optimal nutrition therapy  Outcome: Ongoing     Problem: Airway Clearance - Ineffective  Goal: Achieve or maintain patent airway  Outcome: Ongoing     Problem: Gas Exchange - Impaired  Goal: Absence of hypoxia  Outcome: Ongoing  Goal: Promote optimal lung function  Outcome: Ongoing     Problem: Breathing Pattern - Ineffective  Goal: Ability to achieve and maintain a regular respiratory rate  Outcome: Ongoing     Problem:  Body Temperature -  Risk of, Imbalanced  Goal: Ability to maintain a body temperature within defined limits  Outcome: Ongoing  Goal: Will regain or maintain usual level of consciousness  Outcome: Ongoing  Goal: Complications related to the disease process, condition or treatment will be avoided or minimized  Outcome: Ongoing     Problem: Isolation Precautions - Risk of Spread of Infection  Goal: Prevent transmission of infection  Outcome: Ongoing     Problem: Nutrition Deficits  Goal: Optimize nutrtional status  Outcome: Ongoing     Problem: Risk for Fluid Volume Deficit  Goal: Maintain normal heart rhythm  Outcome: Ongoing  Goal: Maintain absence of muscle cramping  Outcome: Ongoing  Goal: Maintain normal serum potassium, sodium, calcium, phosphorus, and pH  Outcome: Ongoing     Problem: Loneliness or Risk for Loneliness  Goal: Demonstrate positive use of time alone when socialization is not possible  Outcome: Ongoing     Problem: Fatigue  Goal: Verbalize increase energy and improved vitality  Outcome: Ongoing     Problem: Patient Education: Go to Patient Education Activity  Goal: Patient/Family Education  Outcome: Ongoing     Problem: Pain:  Goal: Pain level will decrease  Description: Pain level will decrease  Outcome: Ongoing  Goal: Control of acute pain  Description: Control of acute pain  Outcome: Ongoing  Goal: Control of chronic pain  Description: Control of chronic pain  Outcome: Ongoing     Problem: OXYGENATION/RESPIRATORY FUNCTION  Goal: Patient will maintain patent airway  Outcome: Ongoing  Goal: Patient will achieve/maintain normal respiratory rate/effort  Description: Respiratory rate and effort will be within normal limits for the patient  Outcome: Ongoing     Problem: HEMODYNAMIC STATUS  Goal: Patient has stable vital signs and fluid balance  Outcome: Ongoing     Problem: FLUID AND ELECTROLYTE IMBALANCE  Goal: Fluid and electrolyte balance are achieved/maintained  Outcome: Ongoing     Problem: ACTIVITY INTOLERANCE/IMPAIRED MOBILITY  Goal: Mobility/activity is maintained at optimum level for patient  Outcome: Ongoing     Problem: Discharge Planning:  Goal: Discharged to appropriate level of care  Description: Discharged to appropriate level of care  Outcome: Ongoing     Problem: Serum Glucose Level - Abnormal:  Goal: Ability to maintain appropriate glucose levels will improve  Description: Ability to maintain appropriate glucose levels will improve  Outcome: Ongoing     Problem: Sensory Perception - Impaired:  Goal: Ability to maintain a stable neurologic state will improve  Description: Ability to maintain a stable neurologic state will improve  Outcome: Ongoing

## 2020-09-14 NOTE — PROGRESS NOTES
NEUROLOGY FOLLOWUP    HISTORY OF PRESENT ILLNESS :     Keri Clinton is a 67 y.o. male   History was obtained from predominantly from the patient's nurses. Additional history obtained from the dictations in the chart. Patient has not had any further seizures in the hospital..  According to the nurses patient is more awake but not cooperative again today and refusing to take anything by mouth and would not cooperate for swallow evaluation. He has been following commands intermittently. REVIEW OF SYSTEMS     Unable to obtain due to neurological status  Constitutional:  []   Chills   []  Fatigue   []  Fevers   []  Malaise   []  Weight loss     [] Denies all of the above    Respiratory:   []  Cough    []  Shortness of breath         [] Denies all of the above     Cardiovascular:   []  Chest pain    []  Exertional chest pressure/discomfort           [] Palpitations    []  Syncope     [] Denies all of the above    Past Medical History:   Diagnosis Date    Cerebral palsy ([address]     CHF (congestive heart failure) (HCC)     CKD (chronic kidney disease)     Constipation     Convulsions ([address]     COVID-19     Degenerative disease of nervous system ([address]     Delusional disorder ([address]     Dementia ([address]     Depression     Diabetes mellitus ([address]     Encephalopathy     Enlarged prostate with lower urinary tract symptoms (LUTS)     GERD (gastroesophageal reflux disease)     GI obstruction (HCC)     Hematuria     Hyperlipidemia     Hypernatremia     Hypertension     Hypertension     Mood disorder ([address]     MRSA colonization 09/11/2020    Muscle weakness     Muscle weakness (generalized)     Neuromuscular dysfunction of bladder     Neuropathy     Obsessive-compulsive personality disorder (CODE)     Psychosis ([address]     Suprapubic catheter ([address]     #20    Urinary retention      History reviewed.  No pertinent family history. Social History     Socioeconomic History    Marital status: Single     Spouse name: None    Number of children: None    Years of education: None    Highest education level: None   Occupational History    None   Social Needs    Financial resource strain: None    Food insecurity     Worry: None     Inability: None    Transportation needs     Medical: None     Non-medical: None   Tobacco Use    Smoking status: Former Smoker     Types: Cigarettes     Last attempt to quit: 5/13/2005     Years since quitting: 15.3    Smokeless tobacco: Never Used   Substance and Sexual Activity    Alcohol use: No    Drug use: No    Sexual activity: None   Lifestyle    Physical activity     Days per week: None     Minutes per session: None    Stress: None   Relationships    Social connections     Talks on phone: None     Gets together: None     Attends Taoism service: None     Active member of club or organization: None     Attends meetings of clubs or organizations: None     Relationship status: None    Intimate partner violence     Fear of current or ex partner: None     Emotionally abused: None     Physically abused: None     Forced sexual activity: None   Other Topics Concern    None   Social History Narrative    None        PHYSICAL EXAMINATION      BP (!) 161/76   Pulse 88   Temp 97.7 °F (36.5 °C) (Temporal)   Resp 22   Ht 5' 8\" (1.727 m)   Wt 161 lb 3.2 oz (73.1 kg)   SpO2 94%   BMI 24.51 kg/m²   In-person bedside physical examination deferred. Pursuant to the emergency declaration under the [address] [place] [address], [address] [place] [address] authority and the [employer] Act, this clinical encounter was conducted to provide necessary medical care.    (Also consistent with new provisions and guidance offered by [facility] on March 18, 2020 in setting of COVID 19 outbreak and in order to preserve personal protective equipment in accordance with the flexibilities announced by CMS on March 30, 2020)   References: [email or web address] [facility]/Portals/[email or web address]/Resources/COVID-19/3_18%20Telemed%20Guidance%20Updated%20March%2018. pdf?[email or web address]                      https://[email or web address]. [facility]/Portals/0/Resources/COVID-19/3_18%20Telemed%20Guidance%20Updated%20March%2018. pdf?qiv=[email or web address]                      http://[employer]/. pdf  Notes from other physicians and the nurses were reviewed. DATA :  LABS:  General Labs:    CBC:   Lab Results   Component Value Date    WBC 9.6 09/13/2020    RBC 4.00 09/13/2020    HGB 13.5 09/13/2020    HCT 38.9 09/13/2020    MCV 97.4 09/13/2020    MCH 33.7 09/13/2020    MCHC 34.6 09/13/2020    RDW 17.2 09/13/2020     09/13/2020    MPV 8.6 09/13/2020     BMP:    Lab Results   Component Value Date     09/14/2020    K 3.6 09/14/2020    K 2.7 09/11/2020     09/14/2020    CO2 26 09/14/2020    BUN 24 09/14/2020    LABALBU 2.7 09/11/2020    CREATININE 0.6 09/14/2020    CALCIUM 8.4 09/14/2020    GFRAA >60 09/14/2020    LABGLOM >60 09/14/2020    GLUCOSE 80 09/14/2020     RADIOLOGY REVIEW:  I have reviewed radiology image(s) and reports(s) of: CT scan of the head      IMPRESSION :  Breakthrough seizure in a patient with known seizure disorder  Urinary infection  Patient now unable/unwilling to take oral seizure medicines  Patient Active Problem List   Diagnosis    Intellectual disability    [facility]    Acute cystitis with hematuria    Nausea and vomiting    Kidney stone    Urinary retention    Hematuria    Gross hematuria    Seizure ([facility]       RECOMMENDATIONS :  Discussed with patient's nurse  Keppra level was therapeutic. Depakote level was repeated and is still a little low. However I will not increase medication dose since patient is doing well on dual therapy with Keppra and Depakote.   Patient is not taking any oral medication at this time. I will follow this patient peripherally. Please note a portion of this chart was generated using dragon dictation software. Although every effort was made to ensure the accuracy of this automated transcription, some errors in transcription may have occurred.          Neha Cameron M.D.

## 2020-09-15 NOTE — PROGRESS NOTES
Palliative Care:     Writer called family regarding the discussion regarding external feeding. Reviewed option of potential Peg tube placement for nutrition. Family (niece Atlanta Hawthorn) agrees to allow team to move forward with placement. Writer notified Jayashree Perez (nurse) on unit of our discussion and she will pursue GI consult.

## 2020-09-15 NOTE — PROGRESS NOTES
Pt. Morning vitals and assessment complete. Potassium chloride 10 Meq started. Oxygen was at 90-started on 2 L. Pt. Calm and cooperative. Will continue to monitor.

## 2020-09-15 NOTE — PLAN OF CARE
Problem: Falls - Risk of:  Goal: Will remain free from falls  Description: Will remain free from falls  9/15/2020 0117 by Lary Santana RN  Outcome: Ongoing     Problem: Skin Integrity:  Goal: Will show no infection signs and symptoms  Description: Will show no infection signs and symptoms  9/15/2020 0117 by Lary Santana RN  Outcome: Ongoing     Problem: Nutrition  Goal: Optimal nutrition therapy  9/15/2020 0117 by Lary Santana RN  Outcome: Ongoing     Problem: Airway Clearance - Ineffective  Goal: Achieve or maintain patent airway  9/15/2020 0117 by Lary Santana RN  Outcome: Ongoing     Problem: Gas Exchange - Impaired  Goal: Absence of hypoxia  9/15/2020 0117 by Lary Santana RN  Outcome: Ongoing     Problem: Breathing Pattern - Ineffective  Goal: Ability to achieve and maintain a regular respiratory rate  9/15/2020 0117 by Lary Santana RN  Outcome: Ongoing     Problem:  Body Temperature -  Risk of, Imbalanced  Goal: Ability to maintain a body temperature within defined limits  9/15/2020 0117 by Lary Santana RN  Outcome: Ongoing     Problem: Isolation Precautions - Risk of Spread of Infection  Goal: Prevent transmission of infection  9/15/2020 0117 by Lary Santana RN  Outcome: Ongoing     Problem: Nutrition Deficits  Goal: Optimize nutrtional status  9/15/2020 0117 by Lary Santana RN  Outcome: Ongoing     Problem: Risk for Fluid Volume Deficit  Goal: Maintain normal heart rhythm  9/15/2020 0117 by Lary Santana RN  Outcome: Ongoing     Problem: Loneliness or Risk for Loneliness  Goal: Demonstrate positive use of time alone when socialization is not possible  9/15/2020 0117 by Lary Santana RN  Outcome: Ongoing     Problem: Fatigue  Goal: Verbalize increase energy and improved vitality  9/15/2020 0117 by Lary Santana RN  Outcome: Ongoing     Problem: Patient Education: Go to Patient Education Activity  Goal: Patient/Family Education  9/15/2020 0117 by Lary Santana RN  Outcome: Ongoing     Problem: Pain:  Goal: Pain level will decrease  Description: Pain level will decrease  9/15/2020 0117 by Yadiel Abdullahi RN  Outcome: Ongoing     Problem: OXYGENATION/RESPIRATORY FUNCTION  Goal: Patient will maintain patent airway  9/15/2020 0117 by Yadiel Abdullahi RN  Outcome: Ongoing     Problem: HEMODYNAMIC STATUS  Goal: Patient has stable vital signs and fluid balance  9/15/2020 0117 by Yadiel Abdullahi RN  Outcome: Ongoing     Problem: FLUID AND ELECTROLYTE IMBALANCE  Goal: Fluid and electrolyte balance are achieved/maintained  9/15/2020 0117 by Yadiel Abdullahi RN  Outcome: Ongoing     Problem: ACTIVITY INTOLERANCE/IMPAIRED MOBILITY  Goal: Mobility/activity is maintained at optimum level for patient  9/15/2020 0117 by Yadiel Abdullahi RN  Outcome: Ongoing     Problem: Discharge Planning:  Goal: Discharged to appropriate level of care  Description: Discharged to appropriate level of care  9/15/2020 0117 by Yadiel Abdullahi RN  Outcome: Ongoing     Problem: Serum Glucose Level - Abnormal:  Goal: Ability to maintain appropriate glucose levels will improve  Description: Ability to maintain appropriate glucose levels will improve  9/15/2020 0117 by Yadiel Abdullahi RN  Outcome: Ongoing     Problem: Sensory Perception - Impaired:  Goal: Ability to maintain a stable neurologic state will improve  Description: Ability to maintain a stable neurologic state will improve  9/15/2020 0117 by Yadiel Abdullahi RN  Outcome: Ongoing

## 2020-09-15 NOTE — PROGRESS NOTES
Patient received breathing treatment prn. No more audible wheezing heard. Oxygen 99% on 2 L. RR 21. Patient stable, will continue to monitor.

## 2020-09-15 NOTE — CONSULTS
Gastroenterology Consult Note      Patient: Arash Patterson  : 1945  Acct#:      Date:  9/15/2020    Subjective:       History of Present Illness  Patient is a 67 y.o.male admitted with Seizure ([address] [R56.9]  Seizure ([address] [R56.9] who is seen in consult for PEG placement. He has h/o cerebral palsy, intellectual disability. Lives at an ECF. Was swabbed for Covid on  at [facility] and was positive. Had seizures so was brought to the ED. He has been refusing to eat here per RN report. Overnight he was combative. Has pulled out IVs here. I spoke with his nurse, Heri Rodríguez, at [facility] and he eats fine there. She was concerned he may pull the tube.      Past Medical History:   Diagnosis Date    Cerebral palsy ([address]     CHF (congestive heart failure) (HCC)     CKD (chronic kidney disease)     Constipation     Convulsions ([address]     COVID-19     Degenerative disease of nervous system ([address]     Delusional disorder ([address]     Dementia ([address]     Depression     Diabetes mellitus ([address]     Encephalopathy     Enlarged prostate with lower urinary tract symptoms (LUTS)     GERD (gastroesophageal reflux disease)     GI obstruction (HCC)     Hematuria     Hyperlipidemia     Hypernatremia     Hypertension     Hypertension     Mood disorder ([address]     MRSA colonization 2020    Muscle weakness     Muscle weakness (generalized)     Neuromuscular dysfunction of bladder     Neuropathy     Obsessive-compulsive personality disorder (CODE)     Psychosis ([address]     Suprapubic catheter ([address]     #20    Urinary retention       Past Surgical History:   Procedure Laterality Date    ARM SURGERY      BLADDER SURGERY      CYSTOSCOPY N/A 5/15/2019    CYSTOLITHALOPAXY , STONE  SUPRA PUBIC CATHETER EXCHANGE. performed by Rosy Velez MD at [facility]      EYE SURGERY      HERNIA REPAIR      IR ASP BLADDER W INSERT SUPRAPUBIC CATH      TONSILLECTOMY      UMBILICAL HERNIA REPAIR N/A 1/15/2019    OPEN PRIMARY UMBILICAL HERNIA REPAIR performed by Tiffanie Chirinos MD at [address] [place] [address]      Past Endoscopic History*    Admission Meds  No current facility-administered medications on file prior to encounter.       Current Outpatient Medications on File Prior to Encounter   Medication Sig Dispense Refill    divalproex (DEPAKOTE) 125 MG DR tablet Take 125 mg by mouth 2 times daily      traZODone (DESYREL) 25 mg TABS Take 25 mg by mouth nightly      Lactobacillus (ACIDOPHILUS) 100 MG CAPS Take by mouth 2 times daily      citalopram (CELEXA) 20 MG tablet Take 20 mg by mouth daily      trospium (SANCTURA) 20 MG tablet Take 20 mg by mouth 2 times daily      insulin detemir (LEVEMIR) 100 UNIT/ML injection vial Inject 5 Units into the skin 2 times daily      aspirin 81 MG tablet Take 81 mg by mouth daily      carBAMazepine (TEGRETOL) 200 MG tablet Take 200 mg by mouth 3 times daily       loratadine (CLARITIN) 10 MG tablet Take 10 mg by mouth daily      Folic Acid 0.8 MG CAPS Take 1 tablet by mouth daily      gabapentin (NEURONTIN) 100 MG capsule Take 200 mg by mouth 3 times daily      lamoTRIgine (LAMICTAL) 100 MG tablet Take 100 mg by mouth 3 times daily       lisinopril (PRINIVIL;ZESTRIL) 20 MG tablet Take 10 mg by mouth daily       polyethylene glycol (MIRALAX) POWD powder Take 17 g by mouth daily       vitamin B-12 (CYANOCOBALAMIN) 1000 MCG tablet Take 1,000 mcg by mouth daily      vitamin D (CHOLECALCIFEROL) 1000 UNIT TABS tablet Take 5,000 Units by mouth daily       omeprazole (PRILOSEC) 20 MG capsule Take 20 mg by mouth daily      glucose (GLUTOSE) 40 % GEL Take 37.5 mLs by mouth as needed (low BS) 45 g 1    acetaminophen (TYLENOL) 325 MG tablet Take 650 mg by mouth every 4 hours as needed for Pain             Allergies  No Known Allergies   Social   Social History     Tobacco Use    Smoking status: Former Smoker     Types: Cigarettes     Last attempt to quit: 5/13/2005     Years since quitting: 15.3    Smokeless tobacco: Never Used   Substance Use Topics    Alcohol use: No        History reviewed. No pertinent family history. Review of Systems  Review of systems not obtained due to patient factors. pt unable to provide history        Physical Exam  Blood pressure (!) 148/75, pulse 78, temperature 97.6 °F (36.4 °C), temperature source Temporal, resp. rate 28, height 5' 8\" (1.727 m), weight 154 lb 8.7 oz (70.1 kg), SpO2 95 %. Due to the current efforts to prevent transmission of COVID-19 and also the need to preserve PPE for other caregivers, a face-to-face encounter with the patient was not performed. All relevant records and diagnostic tests were reviewed, including laboratory results and imaging. Data Review:    Recent Labs     09/13/20  0552   WBC 9.6   HGB 13.5   HCT 38.9*   MCV 97.4        Recent Labs     09/13/20  0552  09/14/20  0455 09/14/20  1311 09/14/20  2108 09/15/20  0445     --  143  --   --  144   K 3.6   < > 3.6 3.6 3.6 3.3*  3.3*     --  109  --   --  107   CO2 20*  --  26  --   --  28   BUN 24*  --  24*  --   --  25*   CREATININE 0.5*  --  0.6*  --   --  0.6*    < > = values in this interval not displayed. No results for input(s): AST, ALT, ALB, BILIDIR, BILITOT, ALKPHOS in the last 72 hours. No results for input(s): LIPASE, AMYLASE in the last 72 hours. No results for input(s): PROTIME, INR in the last 72 hours. No results for input(s): PTT in the last 72 hours. No results for input(s): OCCULTBLD in the last 72 hours. Assessment:     Active Problems:    Seizure ([address]  Resolved Problems:    * No resolved hospital problems. *    Poor PO intake - pt refusing PO intake here. Also combative and pulled out IVs.  Pneumonia   Covid 19 infection    Recommendations:   - discussed with his niece, Stephany Tello, regarding indications, risks, benefits of PEG tube placement. She wishes to proceed.  She understands

## 2020-09-15 NOTE — CONSULTS
PULMONARY AND CRITICAL CARE MEDICINE CONSULT NOTE      Name: Adelina Rosales  Sex: male  : 1945  MRN: [id number]  Admission Date: 9/10/2020  Admission Diagnosis: Seizure ([address] [R56.9]  Seizure ([address] [R56.9]  Date of ICU admission:     Reason for ICU admission: Hypoxia and COVID-19    HPI: Patient is a 67 y.o. male with past medical history significant for seizure disorder, dementia, cerebral palsy, suprapubic catheter who is a nursing home resident at [address] [place] [address] per his who presented to the hospital with seizures on 9/10/2020. Patient was also noted to have leaking suprapubic catheter. He was also found to be COVID positive. Patient is being treated in the hospital for COVID-19 as well as concern for pneumonia likely aspiration. He is on Unasyn and Zyvox which is day 6. Patient's oxygen requirement have not increased. He is on 2 L oxygen and saturating 95 to 96%. He is a high aspiration risk and is n.p.o. since hospitalization. Family has agreed for placement of PEG tube. 14 point ROS could not be obtained due to patient factors. Patient has dementia and does not answer all questions.       Past Medical History:   Diagnosis Date    Cerebral palsy ([address]     CHF (congestive heart failure) (HCC)     CKD (chronic kidney disease)     Constipation     Convulsions ([address]     COVID-19     Degenerative disease of nervous system ([address]     Delusional disorder ([address]     Dementia ([address]     Depression     Diabetes mellitus ([address]     Encephalopathy     Enlarged prostate with lower urinary tract symptoms (LUTS)     GERD (gastroesophageal reflux disease)     GI obstruction (HCC)     Hematuria     Hyperlipidemia     Hypernatremia     Hypertension     Hypertension     Mood disorder ([address]     MRSA colonization 2020    Muscle weakness     Muscle weakness (generalized)     Neuromuscular dysfunction of bladder     Neuropathy     Obsessive-compulsive personality disorder (CODE)     [facility]     Suprapubic catheter ([facility]     #20    Urinary retention      Past Surgical History:   Procedure Laterality Date    ARM SURGERY      BLADDER SURGERY      CYSTOSCOPY N/A 5/15/2019    CYSTOLITHALOPAXY , STONE  SUPRA PUBIC CATHETER EXCHANGE. performed by Prosper Jiang MD at [address] [place] [address]      EYE SURGERY      HERNIA REPAIR      IR ASP BLADDER W INSERT SUPRAPUBIC CATH      TONSILLECTOMY      UMBILICAL HERNIA REPAIR N/A 1/15/2019    OPEN PRIMARY UMBILICAL HERNIA REPAIR performed by Vicki Jiménez MD at [address] History     Socioeconomic History    Marital status: Single     Spouse name: Not on file    Number of children: Not on file    Years of education: Not on file    Highest education level: Not on file   Occupational History    Not on file   Social Needs    Financial resource strain: Not on file    Food insecurity     Worry: Not on file     Inability: Not on file    Transportation needs     Medical: Not on file     Non-medical: Not on file   Tobacco Use    Smoking status: Former Smoker     Types: Cigarettes     Last attempt to quit: 5/13/2005     Years since quitting: 15.3    Smokeless tobacco: Never Used   Substance and Sexual Activity    Alcohol use: No    Drug use: No    Sexual activity: Not on file   Lifestyle    Physical activity     Days per week: Not on file     Minutes per session: Not on file    Stress: Not on file   Relationships    Social connections     Talks on phone: Not on file     Gets together: Not on file     Attends Evangelical service: Not on file     Active member of club or organization: Not on file     Attends meetings of clubs or organizations: Not on file     Relationship status: Not on file    Intimate partner violence     Fear of current or ex partner: Not on file     Emotionally abused: Not on file     Physically abused: Not on file     Forced sexual activity: Not on file   Other Topics Concern  Not on file   Social History Narrative    Not on file     History reviewed. No pertinent family history. No Known Allergies    MEDICATIONS:     Scheduled Meds:     aspirin  81 mg Oral Daily    insulin glargine  5 Units Subcutaneous Nightly    enoxaparin  40 mg Subcutaneous Daily    carBAMazepine  200 mg Oral TID    citalopram  20 mg Oral Daily    levetiracetam  500 mg Intravenous Q12H    traZODone  25 mg Oral Nightly    trospium  20 mg Oral BID AC    sodium chloride flush  10 mL Intravenous 2 times per day    linezolid  600 mg Intravenous Q12H    ampicillin-sulbactam  1.5 g Intravenous Q6H    insulin lispro  0-6 Units Subcutaneous Q4H    valproate sodium (DEPACON) IVPB  250 mg Intravenous Q8H     Current Infusions:    lactated ringers 50 mL/hr at 09/15/20 1453    dextrose         PRN meds:  potassium chloride, albuterol sulfate HFA, ipratropium-albuterol, labetalol, ketorolac, diphenhydrAMINE, sodium chloride flush, acetaminophen **OR** acetaminophen, polyethylene glycol, promethazine **OR** ondansetron, glucose, dextrose, glucagon (rDNA), dextrose    PHYSICAL EXAM:  Due to the current efforts to prevent transmission of COVID-19 and also the need to preserve PPE for other caregivers, a face-to-face encounter with the patient was not performed. That being said, all relevant records and diagnostic tests were reviewed, including laboratory results and imaging. Please reference any relevant documentation elsewhere. Care will be coordinated with the primary service. BP (!) 148/75   Pulse 78   Temp 97.6 °F (36.4 °C) (Temporal)   Resp 28   Ht 5' 8\" (1.727 m)   Wt 154 lb 8.7 oz (70.1 kg)   SpO2 95%   BMI 23.50 kg/m²  I/O last 3 completed shifts: In: 8156 [I.V.:3618]  Out: 1375 [Urine:1375] No intake/output data recorded.       Intake/Output Summary (Last 24 hours) at 9/15/2020 1505  Last data filed at 9/15/2020 0758  Gross per 24 hour   Intake 3618 ml   Output 1375 ml   Net 2243 ml LABS:    Recent Labs     09/13/20  0552   WBC 9.6   RBC 4.00*   HGB 13.5   HCT 38.9*   MCH 33.7   MCHC 34.6   RDW 17.2*      MPV 8.6   NEUTOPHILPCT 86.0   MONOPCT 8.0   BASOPCT 0.0     Recent Labs     09/13/20  0552  09/14/20  0455 09/14/20  1311 09/14/20  2108 09/15/20  0445     --  143  --   --  144   K 3.6   < > 3.6 3.6 3.6 3.3*  3.3*     --  109  --   --  107   ANIONGAP 11  --  8  --   --  9   CO2 20*  --  26  --   --  28   BUN 24*  --  24*  --   --  25*   CREATININE 0.5*  --  0.6*  --   --  0.6*   CALCIUM 8.0*  --  8.4  --   --  8.2*   GLUCOSE 110*  --  80  --   --  101*    < > = values in this interval not displayed. IMAGING:  I reviewed the chest x-ray from 9/15/2020 and my interpretation is as follows. Slight improvement of right lung infiltrates compared to admission. IMPRESSION:  SARS-CoV-2 infection  Aspiration pneumonia  Seizure  Dementia  Several palsy  Suprapubic catheter    PLAN:  Patient has SARS-CoV-2 infection along with likely aspiration pneumonia in right lung. He has received 6 days of Unasyn and Zyvox. His oxygen requirements have not increased. He is on 2 L of oxygen and saturating 95 to 96%. His chest x-ray is not deteriorating. I do not think that he meets criteria for coalescent plasma or steroids for COVID-19. I do believe that patient has been aspirating. He would benefit from PEG tube placement if family agrees to proceed with that. I would recommend to continue antibiotics for total of 7 days and then discontinue. Out of bed in chair. Electronically signed by Raina aGrcia MD on 9/15/20 at 3:05 PM EDT     This note was completed using dragon medical speech recognition software. Grammatical errors, random word insertions, pronoun errors and incomplete sentences are occasional consequences of this technology due to software limitations.  If there are questions or concerns about the content of this note of information contained within the body of this dictation, they should be addressed with the provider for clarification.

## 2020-09-15 NOTE — PROGRESS NOTES
Hospitalist Progress Note      PCP: Maria E Sorensen MD    Date of Admission: 9/10/2020    Chief Complaint: Seizure      Subjective: Patient seen and examined this morning. Patient hemodynamically stable; refusing to take PO and work with speech therapy. Follows commands intermittently    Medications:  Reviewed      Exam:    BP (!) 144/92   Pulse 80   Temp 97 °F (36.1 °C) (Temporal)   Resp 28   Ht 5' 8\" (1.727 m)   Wt 161 lb 3.2 oz (73.1 kg)   SpO2 92%   BMI 24.51 kg/m²     General appearance: No apparent distress, appears stated age and cooperative. HEENT: Pupils equal, round, and reactive to light. Conjunctivae/corneas clear. Neck: Supple, with full range of motion. No jugular venous distention. Trachea midline. Respiratory: Bibasal crepitations RALES/WHEEZES/Rhonchi. Cardiovascular: Regular rate and rhythm with normal S1/S2 without MURMURS, rubs or gallops. Abdomen: Soft, non-tender, non-distended with normal bowel sounds. Musculoskeletal: No clubbing, cyanosis or EDEMA bilaterally. Full range of motion without deformity. Skin: Skin color, texture, turgor normal.  No rashes or lesions. Neurologic: cranial  nerves intact  Follows commands  Moving all 4 extremities and following commands appropriately  Able to give me his name    Labs:   Recent Labs     09/12/20 0522 09/13/20  0552   WBC 11.6* 9.6   HGB 13.7 13.5   HCT 39.6* 38.9*    222     Recent Labs     09/12/20  0522  09/13/20  0552  09/14/20  0455 09/14/20  1311 09/14/20  2108   *  --  141  --  143  --   --    K 3.1*   < > 3.6   < > 3.6 3.6 3.6   *  --  110  --  109  --   --    CO2 26  --  20*  --  26  --   --    BUN 29*  --  24*  --  24*  --   --    CREATININE 0.7*  --  0.5*  --  0.6*  --   --    CALCIUM 8.7  --  8.0*  --  8.4  --   --     < > = values in this interval not displayed. No results for input(s): AST, ALT, BILIDIR, BILITOT, ALKPHOS in the last 72 hours.   No results for input(s): INR in the last 72 hours. No results for input(s): Oziel Hayes in the last 72 hours. Urinalysis:      Lab Results   Component Value Date    NITRU Negative 09/10/2020    WBCUA 82 09/10/2020    BACTERIA 4+ 09/10/2020    RBCUA 0-2 09/10/2020    BLOODU TRACE-INTACT 09/10/2020    SPECGRAV 1.020 09/10/2020    GLUCOSEU Negative 09/10/2020       Radiology:  XR CHEST PORTABLE   Final Result   Slightly improved aeration of the lungs with persistent mild opacification in   the right upper and lower lung zones. Small right pleural effusion. CT head without contrast   Final Result   No acute intracranial abnormality. XR CHEST 1 VIEW   Final Result   1. Bilateral perihilar opacities, right greater than left, most consistent   with multifocal pneumonia, to include atypical causes. 2. Stable chronic bibasilar pleural and parenchymal scar. 3. Stable cardiomegaly. Assessment/Plan:    Active Hospital Problems    Diagnosis Date Noted    Seizure [facility]) [R56.9] 09/10/2020       This 78-year-old gentleman known cognitive disability wheelchair-bound suprapubic catheter known seizure disorder admitted to the hospital after a witnessed seizure     Seizure disorder: CT head on admission negative for any acute pathology;  Neurology on board appreciate their recs  -Currently on Keppra and Depakote  -Keppra level therapeutic today; Depakote level slightly low   -Neurology recommendation will continue Depakote at same dose  -Speech eval requested again; patient refusing to take p.o. and cupcake with speech eval    Acute encephalopathy: Likely secondary to underlying infection  Needs to be closer to his baseline mental status apparently has some underlying dementia    Suspected UTIs with underlying suprapubic catheter  Currently on IV Unasyn      Pneumonia suspected gram-positive/gram-negative  Recent COVID test was positive  On Zyvox and Unasyn   Will complete a 7-day course of Unasyn which can be changed over to

## 2020-09-15 NOTE — CARE COORDINATION
Discharge planning:    Chart reviewed for discharge planning. Barrier(s) to discharge-  IV Ampicilin, IV Linezolid - combative, COVID +    Tentative discharge plan-   Return to [facility]    Tentative discharge date-  TBD    Case management will continue to follow progress and update discharge plan as needed.       Lori Montanez RN BSN  Case Management  [phone]

## 2020-09-15 NOTE — PROGRESS NOTES
Shift assessment completed and documented. Fall precautions in place, hourly rounding, call light and belongings in reach, bed in lowest position, wheels locked in place, side rails up x 2, walkways free of clutter and bed alarm activated. Video monitor remains in use. Patient calm and cooperative at this time. Denies pain or discomfort. No signs and symptoms of shortness of breath or respiratory distress noted, continue with spot pulse ox checks. Will continue to monitor.

## 2020-09-15 NOTE — PROGRESS NOTES
Patient with audible wheezing. Patient states he feels short of breath. Oxygen saturation is stable on 2 L of O2. Called respiratory therapist at this time to request prn breathing treatment.

## 2020-09-15 NOTE — PLAN OF CARE
Problem: Falls - Risk of:  Goal: Will remain free from falls  Description: Will remain free from falls  Outcome: Ongoing     Problem: Skin Integrity:  Goal: Will show no infection signs and symptoms  Description: Will show no infection signs and symptoms  Outcome: Ongoing     Problem: Nutrition  Goal: Optimal nutrition therapy  Outcome: Ongoing     Problem: Airway Clearance - Ineffective  Goal: Achieve or maintain patent airway  Outcome: Ongoing     Problem: Gas Exchange - Impaired  Goal: Absence of hypoxia  Outcome: Ongoing     Problem: Breathing Pattern - Ineffective  Goal: Ability to achieve and maintain a regular respiratory rate  Outcome: Ongoing     Problem:  Body Temperature -  Risk of, Imbalanced  Goal: Ability to maintain a body temperature within defined limits  Outcome: Ongoing     Problem: Isolation Precautions - Risk of Spread of Infection  Goal: Prevent transmission of infection  Outcome: Ongoing     Problem: Nutrition Deficits  Goal: Optimize nutrtional status  Outcome: Ongoing     Problem: Risk for Fluid Volume Deficit  Goal: Maintain normal heart rhythm  Outcome: Ongoing     Problem: Loneliness or Risk for Loneliness  Goal: Demonstrate positive use of time alone when socialization is not possible  Outcome: Ongoing     Problem: Fatigue  Goal: Verbalize increase energy and improved vitality  Outcome: Ongoing     Problem: Patient Education: Go to Patient Education Activity  Goal: Patient/Family Education  Outcome: Ongoing     Problem: Pain:  Goal: Pain level will decrease  Description: Pain level will decrease  Outcome: Ongoing     Problem: OXYGENATION/RESPIRATORY FUNCTION  Goal: Patient will maintain patent airway  Outcome: Ongoing     Problem: HEMODYNAMIC STATUS  Goal: Patient has stable vital signs and fluid balance  Outcome: Ongoing     Problem: FLUID AND ELECTROLYTE IMBALANCE  Goal: Fluid and electrolyte balance are achieved/maintained  Outcome: Ongoing     Problem: ACTIVITY INTOLERANCE/IMPAIRED MOBILITY  Goal: Mobility/activity is maintained at optimum level for patient  Outcome: Ongoing     Problem: Discharge Planning:  Goal: Discharged to appropriate level of care  Description: Discharged to appropriate level of care  Outcome: Ongoing     Problem: Serum Glucose Level - Abnormal:  Goal: Ability to maintain appropriate glucose levels will improve  Description: Ability to maintain appropriate glucose levels will improve  Outcome: Ongoing     Problem: Sensory Perception - Impaired:  Goal: Ability to maintain a stable neurologic state will improve  Description: Ability to maintain a stable neurologic state will improve  Outcome: Ongoing

## 2020-09-15 NOTE — PROGRESS NOTES
Office : [phone]     Fax :[phone]       Nephrology progress Note          Chief Complaint:    Chief Complaint   Patient presents with    Altered Mental Status     Altered mental status started yesterday. Pt brought in from [facility] home. History of Present Ilness:    Viri Choe is a 67 y.o. male with h/o Dementia, renal ds, BPH was transfered from [facility] where he had a witnessed seizure. He is awake but not oriented . All history gathered from the records from [facility].    His initial lab work shows elevated BUN and creatinine   Has a singh in place     Interval Hx     renal function improved   No respiratory distress  No edema  Making urine  Potassium level is normal.           Past Medical History:   Diagnosis Date    Cerebral palsy ([address]     CHF (congestive heart failure) (HCC)     CKD (chronic kidney disease)     Constipation     Convulsions ([address]     COVID-19     Degenerative disease of nervous system ([address]     Delusional disorder ([address]     Dementia ([address]     Depression     Diabetes mellitus ([address]     Encephalopathy     Enlarged prostate with lower urinary tract symptoms (LUTS)     GERD (gastroesophageal reflux disease)     GI obstruction (HCC)     Hematuria     Hyperlipidemia     Hypernatremia     Hypertension     Hypertension     Mood disorder ([address]     MRSA colonization 09/11/2020    Muscle weakness     Muscle weakness (generalized)     Neuromuscular dysfunction of bladder     Neuropathy     Obsessive-compulsive personality disorder (CODE)     Psychosis ([address]     Suprapubic catheter ([address]     #20    Urinary retention        Past Surgical History:   Procedure Laterality Date    ARM SURGERY      BLADDER SURGERY      CYSTOSCOPY N/A 5/15/2019    CYSTOLITHALOPAXY , STONE  SUPRA PUBIC CATHETER EXCHANGE. performed by Velasquez Bustamante MD at [facility], ESOPHAGUS      EYE SURGERY      HERNIA REPAIR      IR ASP BLADDER W INSERT SUPRAPUBIC CATH      TONSILLECTOMY      UMBILICAL HERNIA REPAIR N/A 1/15/2019    OPEN PRIMARY UMBILICAL HERNIA REPAIR performed by Maxx Ponce MD at [address] [place] [address]       Current Medications:    potassium chloride 10 mEq/100 mL IVPB (Peripheral Line), PRN  albuterol sulfate  (90 Base) MCG/ACT inhaler 2 puff, Q6H PRN  ipratropium-albuterol (DUONEB) nebulizer solution 1 ampule, Q4H PRN  labetalol (NORMODYNE;TRANDATE) injection 10 mg, Q6H PRN  ketorolac (TORADOL) injection 15 mg, Q6H PRN  diphenhydrAMINE (BENADRYL) injection 25 mg, Nightly PRN  lactated ringers infusion, Continuous  insulin glargine (LANTUS;BASAGLAR) injection pen 5 Units, Nightly  enoxaparin (LOVENOX) injection 40 mg, Daily  aspirin EC tablet 81 mg, Daily  carBAMazepine (TEGRETOL) tablet 200 mg, TID  citalopram (CELEXA) tablet 20 mg, Daily  levetiracetam (KEPPRA) 500 mg/100 mL IVPB, Q12H  traZODone (DESYREL) tablet 25 mg, Nightly  trospium (SANCTURA) tablet 20 mg, BID AC  sodium chloride flush 0.9 % injection 10 mL, 2 times per day  sodium chloride flush 0.9 % injection 10 mL, PRN  acetaminophen (TYLENOL) tablet 650 mg, Q6H PRN    Or  acetaminophen (TYLENOL) suppository 650 mg, Q6H PRN  polyethylene glycol (GLYCOLAX) packet 17 g, Daily PRN  promethazine (PHENERGAN) tablet 12.5 mg, Q6H PRN    Or  ondansetron (ZOFRAN) injection 4 mg, Q6H PRN  linezolid (ZYVOX) IVPB 600 mg, Q12H  ampicillin-sulbactam (UNASYN) 1.5 g IVPB minibag, Q6H  glucose (GLUTOSE) 40 % oral gel 15 g, PRN  dextrose 50 % IV solution, PRN  glucagon (rDNA) injection 1 mg, PRN  dextrose 5 % solution, PRN  insulin lispro (1 Unit Dial) 0-6 Units, Q4H  valproate (DEPACON) 250 mg in dextrose 5 % 100 mL IVPB, Q8H        Physical exam:     Vitals:  BP (!) 152/67   Pulse 81 Temp 98.3 °F (36.8 °C) (Temporal)   Resp 19   Ht 5' 8\" (1.727 m)   Wt 154 lb 8.7 oz (70.1 kg)   SpO2 94%   BMI 23.50 kg/m²   Constitutional:  Not oriented   Skin: no rash, turgor wnl  Heent:  eomi, mmm  Neck: no bruits or jvd noted  Cardiovascular:  S1, S2 without m/r/g  Respiratory: CTA B without w/r/r  Abdomen:  +bs, soft, nt, nd  Ext: no  lower extremity edema      Labs:  CBC:   Recent Labs     09/13/20 0552   WBC 9.6   HGB 13.5        BMP:    Recent Labs     09/13/20 0552  09/14/20 0455 09/14/20  1311 09/14/20  2108 09/15/20  0445     --  143  --   --   --    K 3.6   < > 3.6 3.6 3.6 3.3*     --  109  --   --   --    CO2 20*  --  26  --   --   --    BUN 24*  --  24*  --   --   --    CREATININE 0.5*  --  0.6*  --   --   --    GLUCOSE 110*  --  80  --   --   --     < > = values in this interval not displayed. Ca/Mg/Phos:   Recent Labs     09/13/20 0552 09/14/20 0455   CALCIUM 8.0* 8.4   MG 2.20  --      Hepatic:   No results for input(s): AST, ALT, ALB, BILITOT, ALKPHOS in the last 72 hours. Troponin: No results for input(s): TROPONINI in the last 72 hours. BNP: No results for input(s): BNP in the last 72 hours. Lipids: No results for input(s): CHOL, TRIG, HDL, LDLCALC, LABVLDL in the last 72 hours. ABGs: No results for input(s): PHART, PO2ART, HNL4QXZ in the last 72 hours. INR:   No results for input(s): INR in the last 72 hours. UA:  No results for input(s): Neil Ding, GLUCOSEU, BILIRUBINUR, KETUA, SPECGRAV, BLOODU, PHUR, PROTEINU, UROBILINOGEN, NITRU, LEUKOCYTESUR, Marbin Laity in the last 72 hours. Urine Microscopic:   No results for input(s): LABCAST, BACTERIA, COMU, HYALCAST, WBCUA, RBCUA, EPIU in the last 72 hours. Urine Culture:   No results for input(s): LABURIN in the last 72 hours. Urine Chemistry:   No results for input(s): Parker Kinds, PROTEINUR, NAUR in the last 72 hours.              IMAGING:  XR CHEST PORTABLE   Final Result   Slightly improved aeration of the lungs with persistent mild opacification in   the right upper and lower lung zones. Small right pleural effusion. CT head without contrast   Final Result   No acute intracranial abnormality. XR CHEST 1 VIEW   Final Result   1. Bilateral perihilar opacities, right greater than left, most consistent   with multifocal pneumonia, to include atypical causes. 2. Stable chronic bibasilar pleural and parenchymal scar. 3. Stable cardiomegaly. I/O last 3 completed shifts: In: 3359 [I.V.:3618]  Out: 1625 [Urine:1625]          Assessment/Plan :      1. BENOIT 2/2 pre renal + RAAS blockade   Continue LR   Hold lisinopril   Recommend to dose adjust all medications  based on renal functions  Maintain SBP> 90 mmHg   Daily weights   AVOID NSAIDs  Avoid Nephrotoxins  Monitor Intake/Output  Call if significant decrease in urine output     2. HTN. BP controlled . Hold all BP meds     3. Seizure disorder. Neurology consulted     4. Acid- base disorder. Metabolic alkalosis 2/2 dehydration. Iv fluids     5. Hypokalemia - corrected with supplements     6 . UTI. abx per primary team     7. Hypernatremia. On 1/2 NS . Corrected .    Change fluids to LR today               Thank you for allowing us to participate in care of Ailyn Gooden         Electronically signed by: Kelly Kay MD, 9/15/2020, 7:42 AM      [facility] [address]  Office : [phone]  Fax :[phone]

## 2020-09-15 NOTE — PROGRESS NOTES
Hospitalist Progress Note      PCP: Eli Niño MD    Date of Admission: 9/10/2020    Chief Complaint: Seizure      Subjective: Patient seen and examined this morning. No acute events overnight noted. Per RN report. Patient still is refusing to take p.o. or cooperate with speech therapist.  GI consulted for the PEG placement per family request.      Medications:  Reviewed      Exam:    BP (!) 148/75   Pulse 78   Temp 97.6 °F (36.4 °C) (Temporal)   Resp 28   Ht 5' 8\" (1.727 m)   Wt 154 lb 8.7 oz (70.1 kg)   SpO2 95%   BMI 23.50 kg/m²     General appearance: No apparent distress, appears stated age  HEENT: Pupils equal, round, and reactive to light. Conjunctivae/corneas clear. Neck: Supple, with full range of motion. No jugular venous distention. Trachea midline. Respiratory: Bibasal crepitations RALES/WHEEZES/Rhonchi. Cardiovascular: Regular rate and rhythm with normal S1/S2 without MURMURS, rubs or gallops. Abdomen: Soft, non-tender, non-distended with normal bowel sounds. Musculoskeletal: No clubbing, cyanosis or  Skin:  color, texture, turgor normal.  No rashes or lesions. Neurologic: Difficult exam as patient is not fully cooperative    Labs:   Recent Labs     09/13/20  0552   WBC 9.6   HGB 13.5   HCT 38.9*        Recent Labs     09/13/20  0552  09/14/20  0455 09/14/20  1311 09/14/20  2108 09/15/20  0445     --  143  --   --  144   K 3.6   < > 3.6 3.6 3.6 3.3*  3.3*     --  109  --   --  107   CO2 20*  --  26  --   --  28   BUN 24*  --  24*  --   --  25*   CREATININE 0.5*  --  0.6*  --   --  0.6*   CALCIUM 8.0*  --  8.4  --   --  8.2*    < > = values in this interval not displayed. No results for input(s): AST, ALT, BILIDIR, BILITOT, ALKPHOS in the last 72 hours. No results for input(s): INR in the last 72 hours. No results for input(s): Prashant Gar in the last 72 hours.     Urinalysis:      Lab Results   Component Value Date    NITRU Negative 09/15/2020 WBCUA 2 09/15/2020    BACTERIA 4+ 09/10/2020    RBCUA 0-2 09/15/2020    BLOODU SMALL 09/15/2020    SPECGRAV 1.018 09/15/2020    GLUCOSEU Negative 09/15/2020       Radiology:  XR CHEST PORTABLE   Final Result   Slightly improved aeration of the lungs with persistent mild opacification in   the right upper and lower lung zones. Small right pleural effusion. CT head without contrast   Final Result   No acute intracranial abnormality. XR CHEST 1 VIEW   Final Result   1. Bilateral perihilar opacities, right greater than left, most consistent   with multifocal pneumonia, to include atypical causes. 2. Stable chronic bibasilar pleural and parenchymal scar. 3. Stable cardiomegaly. Assessment/Plan:    Active Hospital Problems    Diagnosis Date Noted    Seizure [facility]) [R56.9] 09/10/2020       This 76-year-old gentleman known cognitive disability wheelchair-bound suprapubic catheter known seizure disorder admitted to the hospital after a witnessed seizure     Seizure disorder: CT head on admission negative for any acute pathology; Neurology on board appreciate their recs; currently on Keppra and Depakote  -Still not taking carbamazepine  - refusing to take p.o. or cooperate with the speech therapy.    -GI consulted for PEG tube placement    Acute encephalopathy: Likely 2/2 to underlying infection/baseline mental dementia     SARS-CoV-2 infection and right lung aspiration pneumonia:   - oxygen requirement has been stable around 2 L with sats between 95 to 96%  -Has been on on Zyvox and Unasyn; patient afebrile, WBCs within normal limits and all the cultures have been negative to date  - will complete a 7-day course and then discontinue      Acute kidney injury; suspect prerenal ; improved  Continue IV fluids since npo   Strict intake and output  Nephrology following; appreciate their recs    Hypokalemia: Resolved    Type 2 diabetes mellitus: Still refusing to take p.o.  -Continue Lantus 5 units and low sliding scale insulin    Neurogenic bladder with urinary incontinence:  Urology on board appreciate their recs;  Herman to ostomy bag  - will change suprapubic catheter size once COVID-19 is negative  -Continue Sanctura    Suspected UTIs with underlying suprapubic catheter  -Has been  on IV Unasyn; will DC it tomorrow as he is urine culture revealed mixed pathogens and repeat UA not indicative of infection        DVT Prophylaxis: sc lovenox   Diet: Diet NPO Effective Now  Code Status: Full Code      Dispo - once acute medical processes have resolved    Cesar Carpio MD

## 2020-09-15 NOTE — PROGRESS NOTES
Patient transferred to specialty bed. Patient cursing and being physically combative swinging at the nurses but easily redirected.

## 2020-09-15 NOTE — PROGRESS NOTES
Sent Perfect Serve message to Dr. Aisha Fuentes to inform of K+ level 3.3. Informed that pt is NPO, asked if any new orders? Awaiting response.

## 2020-09-15 NOTE — PROGRESS NOTES
Response received from Dr. Deysi Parham the physician on call\".  Will send perfect serve to hospitalist.

## 2020-09-15 NOTE — PROGRESS NOTES
Perfect Serve message sent to on call Dr. Daksha Vogt to inform of K+3.3 and pt is NPO, any new orders? Awaiting response.

## 2020-09-16 NOTE — FLOWSHEET NOTE
09/16/20 0726   Provider Notification   Reason for Communication Critical Value (comment)  (H&H 5.7)   Provider Name Sofia De La Rosa   Provider Notification Physician   Method of Communication Call  [facility] and left message)   Response Waiting for response   Notification Time [phone] oncoming nurse update on critical value

## 2020-09-16 NOTE — PROGRESS NOTES
prevent transmission of COVID-19 and also the need to preserve PPE for other caregivers, a face-to-face encounter with the patient was not performed. That being said, all relevant records and diagnostic tests were reviewed, including laboratory results and imaging. Please reference any relevant documentation elsewhere. Care will be coordinated with the primary service. In-person bedside physical examination deferred. Pursuant to the emergency declaration under the [address] [place] [address] and the [employer] Act, this clinical encounter was conducted to provide necessary medical care. (Also consistent with new provisions and guidance offered by [facility] on March 18, 2020 in setting of COVID 19 outbreak and in order to preserve personal protective equipment in accordance with the flexibilities announced by CMS on March 30, 2020)   References: https://[facility]. [facility]/Portals/0/Resources/COVID-19/3_18%20Telemed%20Guidance%20Updated%20March%2018. pdf?[email or web address]                      https://[facility]. [facility]/Portals/0/Resources/COVID-19/3_18%20Telemed%20Guidance%20Updated%20March%2018. pdf?ogy=[email or web address]                      [email or web address] pdf                               LABS:    Recent Labs     09/16/20  0609 09/16/20  0810   WBC 11.9* 10.3   RBC 1.74* 1.57*   HGB 5.8* 5.3*   HCT 17.2* 15.5*   MCH 33.2 33.8   MCHC 33.6 34.2   RDW 16.9* 17.0*    224   MPV 9.0 8.8   NEUTOPHILPCT 78.0  --    MONOPCT 5.0  --    BASOPCT 0.0  --      Recent Labs     09/15/20  0445 09/15/20  2116 09/16/20  0609 09/16/20  0810     --  146* 145   K 3.3*  3.3* 3.7 3.9 3.7     --  109 108   ANIONGAP 9  --  11 10   CO2 28  --  26 27   BUN 25*  --  33* 36*   CREATININE 0.6*  --  0.8 0.8   CALCIUM 8.2*  --  8.0* 8.0*   PROT  --   -- --  4.6*   BILITOT  --   --   --  0.4   ALKPHOS  --   --   --  56   ALT  --   --   --  38   AST  --   --   --  66*   GLUCOSE 101*  --  129* 139*       IMAGING  I reviewed the chest x-ray from 9/15/2020 and my interpretation is as follows. Slight improvement of right lung infiltrates compared to admission.        IMPRESSION:  SARS-CoV-2 infection  Aspiration pneumonia  GI bleed  Acute symptomatic anemia  Seizure  Dementia  Several palsy  Suprapubic catheter       PLAN:  Patient has SARS-CoV-2 infection along with likely aspiration pneumonia in right lung. He has received 7 days of Unasyn and Zyvox. Discontinue antibiotics. His oxygen requirements have not increased. He is on 2 L of oxygen and saturating 95 to 96%. His chest x-ray is not deteriorating. I do not think that he meets criteria for convalescent plasma or steroids for COVID-19. I do believe that patient has been aspirating. He would benefit from PEG tube placement if family agrees to proceed with that. Awaiting gastroenterology recommendations. Patient had a large bloody bowel movement last night with a drop in hemoglobin. He will get PRBC transfusion as well as FFP transfusion for INR of 2.23  Continue to monitor hemoglobin. Qiana Simons MD  Pulmonary Critical Care and Sleep Medicine  9/16/2020, 11:38 AM    This note was completed using dragon medical speech recognition software. Grammatical errors, random word insertions, pronoun errors and incomplete sentences are occasional consequences of this technology due to software limitations. If there are questions or concerns about the content of this note of information contained within the body of this dictation they should be addressed with the provider for clarification.

## 2020-09-16 NOTE — PROGRESS NOTES
Hospitalist Progress Note      PCP: Vane Tan MD    Date of Admission: 9/10/2020    Chief Complaint: Seizure      Subjective: Patient seen and examined this morning. Per RN report patient has a large dark black bowel movement overnight and hemoglobin dropped to 5.8. Patient's INR was also elevated to 2.23 this morning. Lovenox, Zyvox and Unasyn discontinued. FFP and pRBCs ordered; started on IV Protonix drip and gastroenterology consulted for further evaluation and management    Medications:  Reviewed      Exam:    BP (!) 119/105   Pulse 108   Temp 97.5 °F (36.4 °C)   Resp 28   Ht 5' 8\" (1.727 m)   Wt 153 lb 8 oz (69.6 kg)   SpO2 100%   BMI 23.34 kg/m²     General appearance: No apparent distress, appears stated age  HEENT: Pupils equal, round, and reactive to light. Conjunctivae/corneas clear. Neck: Supple, with full range of motion. No jugular venous distention. Trachea midline. Respiratory: Bibasal crepitations RALES/WHEEZES/Rhonchi. Cardiovascular: Regular rate and rhythm with normal S1/S2 without MURMURS, rubs or gallops. Abdomen: Soft, non-tender, non-distended with normal bowel sounds. Musculoskeletal: No clubbing, cyanosis or  Skin:  color, texture, turgor normal.  No rashes or lesions. Neurologic: Difficult exam as patient is not fully cooperative    Labs:   Recent Labs     09/16/20  0609 09/16/20  0810   WBC 11.9* 10.3   HGB 5.8* 5.3*   HCT 17.2* 15.5*    224     Recent Labs     09/15/20  0445 09/15/20  2116 09/16/20  0609 09/16/20  0810     --  146* 145   K 3.3*  3.3* 3.7 3.9 3.7     --  109 108   CO2 28  --  26 27   BUN 25*  --  33* 36*   CREATININE 0.6*  --  0.8 0.8   CALCIUM 8.2*  --  8.0* 8.0*     Recent Labs     09/16/20  0810   AST 66*   ALT 38   BILIDIR <0.2   BILITOT 0.4   ALKPHOS 56     Recent Labs     09/16/20  0609   INR 2.23*     No results for input(s): CKTOTAL, TROPONINI in the last 72 hours.     Urinalysis:      Lab Results   Component Value Date    NITRU Negative 09/15/2020    WBCUA 2 09/15/2020    BACTERIA 4+ 09/10/2020    RBCUA 0-2 09/15/2020    BLOODU SMALL 09/15/2020    SPECGRAV 1.018 09/15/2020    GLUCOSEU Negative 09/15/2020       Radiology:  XR CHEST PORTABLE   Final Result   Slightly improved aeration of the lungs with persistent mild opacification in   the right upper and lower lung zones. Small right pleural effusion. CT head without contrast   Final Result   No acute intracranial abnormality. XR CHEST 1 VIEW   Final Result   1. Bilateral perihilar opacities, right greater than left, most consistent   with multifocal pneumonia, to include atypical causes. 2. Stable chronic bibasilar pleural and parenchymal scar. 3. Stable cardiomegaly. Assessment/Plan:    Active Hospital Problems    Diagnosis Date Noted    Seizure [facility]) [R56.9] 09/10/2020       This 69-year-old gentleman known cognitive disability wheelchair-bound suprapubic catheter known seizure disorder admitted to the hospital after a witnessed seizure     GI bleed: Large dark black bowel movement overnight and hemoglobin dropped to 5.8. Patient's INR was also elevated to 2.23 this morning. Lovenox, Zyvox and Unasyn discontinued. 3 units of FFP and 2 units of pRBCs ordered; started on IV Protonix drip and gastroenterology consulted for further evaluation and management  -Fibrinogen and LFTs ordered  -H&H every 8 hours; will continue to monitor closely and transfuse as needed    Seizure disorder: CT head on admission negative for any acute pathology; Neurology on board appreciate their recs; currently on Keppra and Depakote  -Still not taking carbamazepine  - refusing to take p.o. or cooperate with the speech therapy.    -GI consulted for PEG tube placement    Acute encephalopathy: Likely 2/2 to underlying infection/baseline mental dementia     SARS-CoV-2 infection and right lung aspiration pneumonia:   - oxygen requirement has been stable around 2 L with sats between 95 to 96%  -Status post 7-day course of Zyvox and Unasyn; patient afebrile, WBCs within normal limits and all the cultures have been negative to date     Acute kidney injury; suspect prerenal ; improved  Continue IV fluids since npo   Strict intake and output  Nephrology following; appreciate their recs    Hypokalemia: Resolved    Type 2 diabetes mellitus: Still refusing to take p.o.  -Continue Lantus 5 units and low sliding scale insulin    Neurogenic bladder with urinary incontinence:  Urology on board appreciate their recs;  Herman to ostomy bag  - will change suprapubic catheter size once COVID-19 is negative  -Continue Sanctura    Suspected UTIs with underlying suprapubic catheter  -Has been  on IV Unasyn; will DC it tomorrow as he is urine culture revealed mixed pathogens and repeat UA not indicative of infection    DVT Prophylaxis: lovenox discontinued due to GI bleed  Diet: Diet NPO Effective Now  Code Status: Full Code      Dispo - once acute medical processes have resolved    Elian Hayward MD

## 2020-09-16 NOTE — FLOWSHEET NOTE
09/16/20 0055   Provider Notification   Reason for Communication Evaluate   Provider Name Patrice Haro   Provider Notification Physician   Method of Communication Secure Message   Response Waiting for response   Notification Time Laura Mckeon   [phone] Hospital or Facility: [facility] From: Татьяна Warren RE: Genetamalia Welsh 1945 RM: [id number] Pt had a Large Black stool, it could be internal bleeding could we get an order for H&H lab.  Need Callback: NO CALLBACK REQ ICU  Unread

## 2020-09-16 NOTE — PROGRESS NOTES
Office : [phone]     Fax :[phone]       Nephrology progress Note          Chief Complaint:    Chief Complaint   Patient presents with    Altered Mental Status     Altered mental status started yesterday. Pt brought in from [facility] home. History of Present Ilness:    Cesia Abdalla is a 67 y.o. male with h/o Dementia, renal ds, BPH was transfered from [facility] where he had a witnessed seizure. He is awake but not oriented . All history gathered from the records from [facility].    His initial lab work shows elevated BUN and creatinine   Has a singh in place     Interval Hx     renal function improved   No respiratory distress  No edema  Making urine  Potassium level is normal.   HB low         Past Medical History:   Diagnosis Date    Cerebral palsy ([address]     CHF (congestive heart failure) (HCC)     CKD (chronic kidney disease)     Constipation     Convulsions ([address]     COVID-19     Degenerative disease of nervous system ([address]     Delusional disorder ([address]     Dementia ([address]     Depression     Diabetes mellitus ([address]     Encephalopathy     Enlarged prostate with lower urinary tract symptoms (LUTS)     GERD (gastroesophageal reflux disease)     GI obstruction (HCC)     Hematuria     Hyperlipidemia     Hypernatremia     Hypertension     Hypertension     Mood disorder ([address]     MRSA colonization 09/11/2020    Muscle weakness     Muscle weakness (generalized)     Neuromuscular dysfunction of bladder     Neuropathy     Obsessive-compulsive personality disorder (CODE)     Psychosis ([address]     Suprapubic catheter ([address]     #20    Urinary retention        Past Surgical History:   Procedure Laterality Date    ARM SURGERY      BLADDER SURGERY      CYSTOSCOPY N/A 5/15/2019    CYSTOLITHALOPAXY , STONE  SUPRA PUBIC CATHETER EXCHANGE. performed by Jamir Retana MD at [facility]      EYE SURGERY      HERNIA REPAIR      IR ASP BLADDER W INSERT SUPRAPUBIC CATH      TONSILLECTOMY      UMBILICAL HERNIA REPAIR N/A 1/15/2019    OPEN PRIMARY UMBILICAL HERNIA REPAIR performed by Allen Siddiqui MD at [address]       Current Medications:    0.9 % sodium chloride bolus, Once  0.9 % sodium chloride infusion 250 mL, Once  pantoprazole (PROTONIX) injection 40 mg, BID  potassium chloride 10 mEq/100 mL IVPB (Peripheral Line), PRN  albuterol sulfate  (90 Base) MCG/ACT inhaler 2 puff, Q6H PRN  ipratropium-albuterol (DUONEB) nebulizer solution 1 ampule, Q4H PRN  labetalol (NORMODYNE;TRANDATE) injection 10 mg, Q6H PRN  ketorolac (TORADOL) injection 15 mg, Q6H PRN  diphenhydrAMINE (BENADRYL) injection 25 mg, Nightly PRN  lactated ringers infusion, Continuous  insulin glargine (LANTUS;BASAGLAR) injection pen 5 Units, Nightly  carBAMazepine (TEGRETOL) tablet 200 mg, TID  citalopram (CELEXA) tablet 20 mg, Daily  levetiracetam (KEPPRA) 500 mg/100 mL IVPB, Q12H  traZODone (DESYREL) tablet 25 mg, Nightly  trospium (SANCTURA) tablet 20 mg, BID AC  sodium chloride flush 0.9 % injection 10 mL, 2 times per day  sodium chloride flush 0.9 % injection 10 mL, PRN  acetaminophen (TYLENOL) tablet 650 mg, Q6H PRN    Or  acetaminophen (TYLENOL) suppository 650 mg, Q6H PRN  polyethylene glycol (GLYCOLAX) packet 17 g, Daily PRN  promethazine (PHENERGAN) tablet 12.5 mg, Q6H PRN    Or  ondansetron (ZOFRAN) injection 4 mg, Q6H PRN  glucose (GLUTOSE) 40 % oral gel 15 g, PRN  dextrose 50 % IV solution, PRN  glucagon (rDNA) injection 1 mg, PRN  dextrose 5 % solution, PRN  insulin lispro (1 Unit Dial) 0-6 Units, Q4H  valproate (DEPACON) 250 mg in dextrose 5 % 100 mL IVPB, Q8H        Physical exam:     Vitals:  /67   Pulse 108   Temp 97.6 °F (36.4 °C) (Temporal)   Resp 20   Ht 5' 8\" (1.727 m)   Wt 153 lb 8 oz (69.6 kg)   SpO2 100%   BMI 23.34 kg/m²   Constitutional:  Not oriented   Skin: no rash, turgor wnl  Heent:  eomi, mmm  Neck: no bruits or jvd noted  Cardiovascular:  S1, S2 without m/r/g  Respiratory: CTA B without w/r/r  Abdomen:  +bs, soft, nt, nd  Ext: no  lower extremity edema      Labs:  CBC:   Recent Labs     09/16/20  0609   WBC 11.9*   HGB 5.8*        BMP:    Recent Labs     09/14/20  0455  09/15/20  0445 09/15/20  2116 09/16/20  0609     --  144  --  146*   K 3.6   < > 3.3*  3.3* 3.7 3.9     --  107  --  109   CO2 26  --  28  --  26   BUN 24*  --  25*  --  33*   CREATININE 0.6*  --  0.6*  --  0.8   GLUCOSE 80  --  101*  --  129*    < > = values in this interval not displayed. Ca/Mg/Phos:   Recent Labs     09/14/20  0455 09/15/20  0445 09/16/20  0609   CALCIUM 8.4 8.2* 8.0*     Hepatic:   No results for input(s): AST, ALT, ALB, BILITOT, ALKPHOS in the last 72 hours. Troponin: No results for input(s): TROPONINI in the last 72 hours. BNP: No results for input(s): BNP in the last 72 hours. Lipids: No results for input(s): CHOL, TRIG, HDL, LDLCALC, LABVLDL in the last 72 hours. ABGs: No results for input(s): PHART, PO2ART, XEN3MER in the last 72 hours. INR:   Recent Labs     09/16/20  0609   INR 2.23*     UA:  Recent Labs     09/15/20  1125   COLORU YELLOW   CLARITYU Clear   GLUCOSEU Negative   BILIRUBINUR Negative   KETUA TRACE*   SPECGRAV 1.018   BLOODU SMALL*   PHUR 6.0   PROTEINU TRACE*   UROBILINOGEN 0.2   NITRU Negative   LEUKOCYTESUR TRACE*   LABMICR YES   URINETYPE NotGiven      Urine Microscopic:   Recent Labs     09/15/20  1125   COMU see below   HYALCAST 1   WBCUA 2   RBCUA 0-2   EPIU 1     Urine Culture:   No results for input(s): LABURIN in the last 72 hours. Urine Chemistry:   No results for input(s): Antony Gene, PROTEINUR, NAUR in the last 72 hours.              IMAGING:  XR CHEST PORTABLE   Final Result   Slightly improved aeration of the lungs with persistent mild opacification in   the right upper and lower lung zones. Small right pleural effusion. CT head without contrast   Final Result   No acute intracranial abnormality. XR CHEST 1 VIEW   Final Result   1. Bilateral perihilar opacities, right greater than left, most consistent   with multifocal pneumonia, to include atypical causes. 2. Stable chronic bibasilar pleural and parenchymal scar. 3. Stable cardiomegaly. I/O last 3 completed shifts: In: 1480 [I.V.:1480]  Out: 1200 [Urine:1200]          Assessment/Plan :      1. BENOIT 2/2 pre renal + RAAS blockade   Continue LR   Hold lisinopril   Recommend to dose adjust all medications  based on renal functions  Maintain SBP> 90 mmHg   Daily weights   AVOID NSAIDs  Avoid Nephrotoxins  Monitor Intake/Output  Call if significant decrease in urine output     2. HTN. BP controlled . Hold all BP meds     3. Seizure disorder. Neurology consulted     4. Acid- base disorder. Metabolic alkalosis 2/2 dehydration. Iv fluids     5. Hypokalemia - corrected with supplements     6 . UTI. abx per primary team     7. Hypernatremia. Improved     8. Anemia. R/o occult bleed.  Getting transfusion             Thank you for allowing us to participate in care of Antonio Pages         Electronically signed by: Corby Bolivar MD, 9/16/2020, 8:40 AM      [facility] [address]  Office : [phone]  Fax :[phone]

## 2020-09-16 NOTE — PROGRESS NOTES
Clinical Pharmacy Note     Lovenox placed on hold by Dr. Reina Jerome. Order discontinued per protocol. Please assess and reorder when appropriate. Thank you for allowing us to participate in the care of this patient.      Jovan Everett, PharmD, [facility].   [id number]

## 2020-09-16 NOTE — PROGRESS NOTES
Pt shift assessment completed. Pt is alert and disoriented. Doesn't appear to be in pain. Pt respiration are regular and unlabored and on room air. Breath sounds wheezy . Fluids infusing in PIV. Pt is NPO until swallowing evaluation done and hence only IV meds given. Scheduled medications given as ordered. Patient encouraged to use call light with any needs. Patient states understanding, call light in reach, bed alarm on. All safety checks in place and will continue to monitor pt.

## 2020-09-16 NOTE — PLAN OF CARE
Problem: Falls - Risk of:  Goal: Will remain free from falls  Description: Will remain free from falls  9/16/2020 0126 by Ash Schmidt RN  Outcome: Ongoing  9/15/2020 1816 by Sunni Estes RN  Outcome: Ongoing  Goal: Absence of physical injury  Description: Absence of physical injury  9/16/2020 0126 by Ash Schmidt RN  Outcome: Ongoing  9/15/2020 1816 by Sunni Estes RN  Outcome: Ongoing     Problem: Skin Integrity:  Goal: Will show no infection signs and symptoms  Description: Will show no infection signs and symptoms  9/16/2020 0126 by Ash Schmidt RN  Outcome: Ongoing  9/15/2020 1816 by Sunni Estes RN  Outcome: Ongoing  Goal: Absence of new skin breakdown  Description: Absence of new skin breakdown  9/16/2020 0126 by Ash Schmidt RN  Outcome: Ongoing  9/15/2020 1816 by Sunni Estes RN  Outcome: Ongoing     Problem: Nutrition  Goal: Optimal nutrition therapy  9/16/2020 0126 by Ash Schmidt RN  Outcome: Ongoing  9/15/2020 1816 by Sunni Estes RN  Outcome: Ongoing     Problem: Airway Clearance - Ineffective  Goal: Achieve or maintain patent airway  9/16/2020 0126 by Ash Schmidt RN  Outcome: Ongoing  9/15/2020 1816 by Sunni Estes RN  Outcome: Ongoing     Problem: Gas Exchange - Impaired  Goal: Absence of hypoxia  9/16/2020 0126 by Ash Schmidt RN  Outcome: Ongoing  9/15/2020 1816 by Sunni Estes RN  Outcome: Ongoing  Goal: Promote optimal lung function  9/16/2020 0126 by Ash Schmidt RN  Outcome: Ongoing  9/15/2020 1816 by Sunni Estes RN  Outcome: Ongoing     Problem: Breathing Pattern - Ineffective  Goal: Ability to achieve and maintain a regular respiratory rate  9/16/2020 0126 by Ash Schmidt RN  Outcome: Ongoing  9/15/2020 1816 by Sunni Estes RN  Outcome: Ongoing     Problem:  Body Temperature -  Risk of, Imbalanced  Goal: Ability to maintain a body temperature within defined limits  9/16/2020 0126 by Judy De La Cruz JAVIER Warren  Outcome: Ongoing  9/15/2020 1816 by Marilou Acuna RN  Outcome: Ongoing  Goal: Will regain or maintain usual level of consciousness  9/16/2020 0126 by Daniel Mondragon RN  Outcome: Ongoing  9/15/2020 1816 by Marilou Acuna RN  Outcome: Ongoing  Goal: Complications related to the disease process, condition or treatment will be avoided or minimized  9/16/2020 0126 by Daniel Mondragon RN  Outcome: Ongoing  9/15/2020 1816 by Marilou Acuna RN  Outcome: Ongoing     Problem: Isolation Precautions - Risk of Spread of Infection  Goal: Prevent transmission of infection  9/16/2020 0126 by Daniel Mondragon RN  Outcome: Ongoing  9/15/2020 1816 by Marilou Acuna RN  Outcome: Ongoing     Problem: Nutrition Deficits  Goal: Optimize nutrtional status  9/16/2020 0126 by Daniel Mondragon RN  Outcome: Ongoing  9/15/2020 1816 by Marilou Acuna RN  Outcome: Ongoing     Problem: Risk for Fluid Volume Deficit  Goal: Maintain normal heart rhythm  9/16/2020 0126 by Dainel Mondragon RN  Outcome: Ongoing  9/15/2020 1816 by Marilou Acuna RN  Outcome: Ongoing  Goal: Maintain absence of muscle cramping  9/16/2020 0126 by Daniel Mondragon RN  Outcome: Ongoing  9/15/2020 1816 by Marilou Acuna RN  Outcome: Ongoing  Goal: Maintain normal serum potassium, sodium, calcium, phosphorus, and pH  9/16/2020 0126 by Daniel Mondragon RN  Outcome: Ongoing  9/15/2020 1816 by Marilou Acuna RN  Outcome: Ongoing     Problem: Loneliness or Risk for Loneliness  Goal: Demonstrate positive use of time alone when socialization is not possible  9/16/2020 0126 by Daniel Mondragon RN  Outcome: Ongoing  9/15/2020 1816 by Marilou Acuna RN  Outcome: Ongoing     Problem: Fatigue  Goal: Verbalize increase energy and improved vitality  9/16/2020 0126 by Daniel Mondragon RN  Outcome: Ongoing  9/15/2020 1816 by Marilou Acuna RN  Outcome: Ongoing     Problem: Patient Education: Go to Patient Education Activity  Goal: Patient/Family Education  9/16/2020 0126 by Judson Verduzco RN  Outcome: Ongoing  9/15/2020 1816 by Neli Emmanuel RN  Outcome: Ongoing     Problem: Pain:  Goal: Pain level will decrease  Description: Pain level will decrease  9/16/2020 0126 by Judson Verduzco RN  Outcome: Ongoing  9/15/2020 1816 by Neli Emmanuel RN  Outcome: Ongoing  Goal: Control of acute pain  Description: Control of acute pain  9/16/2020 0126 by Judson Verduzco RN  Outcome: Ongoing  9/15/2020 1816 by Neli Emmanuel RN  Outcome: Ongoing  Goal: Control of chronic pain  Description: Control of chronic pain  9/16/2020 0126 by Judson Verduzco RN  Outcome: Ongoing  9/15/2020 1816 by Neli Emmanuel RN  Outcome: Ongoing     Problem: OXYGENATION/RESPIRATORY FUNCTION  Goal: Patient will maintain patent airway  9/16/2020 0126 by Judson Verduzco RN  Outcome: Ongoing  9/15/2020 1816 by Neli Emmanuel RN  Outcome: Ongoing  Goal: Patient will achieve/maintain normal respiratory rate/effort  Description: Respiratory rate and effort will be within normal limits for the patient  9/16/2020 0126 by Judson Verduzco RN  Outcome: Ongoing  9/15/2020 1816 by Neli Emmanuel RN  Outcome: Ongoing     Problem: HEMODYNAMIC STATUS  Goal: Patient has stable vital signs and fluid balance  9/16/2020 0126 by Judson Verduzco RN  Outcome: Ongoing  9/15/2020 1816 by Neli Emmanuel RN  Outcome: Ongoing     Problem: FLUID AND ELECTROLYTE IMBALANCE  Goal: Fluid and electrolyte balance are achieved/maintained  9/16/2020 0126 by Judson Verduzco RN  Outcome: Ongoing  9/15/2020 1816 by Neli Emmanuel RN  Outcome: Ongoing     Problem: ACTIVITY INTOLERANCE/IMPAIRED MOBILITY  Goal: Mobility/activity is maintained at optimum level for patient  9/16/2020 0126 by Judson Verduzco RN  Outcome: Ongoing  9/15/2020 1816 by Neli Emmanuel RN  Outcome: Ongoing     Problem: Discharge Planning:  Goal: Discharged to appropriate level

## 2020-09-17 NOTE — ANESTHESIA PRE PROCEDURE
by mouth daily     Historical Provider, MD   vitamin B-12 (CYANOCOBALAMIN) 1000 MCG tablet Take 1,000 mcg by mouth daily    Historical Provider, MD   vitamin D (CHOLECALCIFEROL) 1000 UNIT TABS tablet Take 5,000 Units by mouth daily     Historical Provider, MD   omeprazole (PRILOSEC) 20 MG capsule Take 20 mg by mouth daily    Historical Provider, MD       Current medications:    No current facility-administered medications for this visit. No current outpatient medications on file.      Facility-Administered Medications Ordered in Other Visits   Medication Dose Route Frequency Provider Last Rate Last Dose    0.9 % sodium chloride infusion 250 mL  250 mL Intravenous Once Maeve Spring MD        0.9 % sodium chloride bolus  20 mL Intravenous Once Meliza Trejo PA-C        pantoprazole (PROTONIX) injection 40 mg  40 mg Intravenous BID Sandro Tate MD        potassium chloride 10 mEq/100 mL IVPB (Peripheral Line)  10 mEq Intravenous PRN Sarahi Wiseman  mL/hr at 09/15/20 1835 10 mEq at 09/15/20 1835    albuterol sulfate  (90 Base) MCG/ACT inhaler 2 puff  2 puff Inhalation Q6H PRN Mario Curiel MD   2 puff at 09/14/20 0405    ipratropium-albuterol (DUONEB) nebulizer solution 1 ampule  1 ampule Inhalation Q4H PRN Mario Curiel MD        lactated ringers infusion   Intravenous Continuous Aamir Rebolledo MD 50 mL/hr at 09/16/20 1227      insulin glargine (LANTUS;BASAGLAR) injection pen 5 Units  5 Units Subcutaneous Nightly Lisa Sanchez MD   5 Units at 09/16/20 2050    carBAMazepine (TEGRETOL) tablet 200 mg  200 mg Oral TID iLsa Sanchez MD   Stopped at 09/11/20 0745    citalopram (CELEXA) tablet 20 mg  20 mg Oral Daily Lisa Sanchez MD   Stopped at 09/11/20 0745    levetiracetam (KEPPRA) 500 mg/100 mL IVPB  500 mg Intravenous Q12H Lisa Sanchez MD   Stopped at 09/17/20 0248    traZODone (DESYREL) tablet 25 mg  25 mg Oral Nightly Lisa Sanchez MD   Stopped at 09/15/20 Convulsions ([address]     COVID-19     Degenerative disease of nervous system ([address]     Delusional disorder ([address]     Dementia ([address]     Depression     Diabetes mellitus ([address]     Encephalopathy     Enlarged prostate with lower urinary tract symptoms (LUTS)     GERD (gastroesophageal reflux disease)     GI obstruction (HCC)     Hematuria     Hyperlipidemia     Hypernatremia     Hypertension     Hypertension     Mood disorder ([address]     MRSA colonization 09/11/2020    Muscle weakness     Muscle weakness (generalized)     Neuromuscular dysfunction of bladder     Neuropathy     Obsessive-compulsive personality disorder (CODE)     Psychosis (HCC)     Suprapubic catheter ([facility]     #20    Urinary retention        Past Surgical History:        Procedure Laterality Date    ARM SURGERY      BLADDER SURGERY      CYSTOSCOPY N/A 5/15/2019    CYSTOLITHALOPAXY , STONE  SUPRA PUBIC CATHETER EXCHANGE. performed by Ryne Rai MD at [address] [place] [address]      EYE SURGERY      HERNIA REPAIR      IR ASP BLADDER W INSERT SUPRAPUBIC CATH      TONSILLECTOMY      UMBILICAL HERNIA REPAIR N/A 1/15/2019    OPEN PRIMARY UMBILICAL HERNIA REPAIR performed by Lisette Blanco MD at [address] [place] [address] History:    Social History     Tobacco Use    Smoking status: Former Smoker     Types: Cigarettes     Last attempt to quit: 5/13/2005     Years since quitting: 15.3    Smokeless tobacco: Never Used   Substance Use Topics    Alcohol use: No                                Counseling given: Not Answered      Vital Signs (Current): There were no vitals filed for this visit.                                            BP Readings from Last 3 Encounters:   09/17/20 (!) 147/62   05/14/20 (!) 102/52   08/28/19 (!) 154/62       NPO Status:                                                                                 BMI:   Wt Readings from Last 3 Encounters:   09/17/20 157 lb 13.6 oz (71.6 kg) 05/13/20 165 lb (74.8 kg)   08/28/19 180 lb (81.6 kg)     There is no height or weight on file to calculate BMI.    CBC:   Lab Results   Component Value Date    WBC 10.3 09/16/2020    RBC 1.57 09/16/2020    HGB 6.0 09/16/2020    HCT 17.3 09/16/2020    MCV 98.9 09/16/2020    RDW 17.0 09/16/2020     09/16/2020       CMP:   Lab Results   Component Value Date     09/16/2020    K 3.7 09/16/2020    K 3.9 09/16/2020     09/16/2020    CO2 27 09/16/2020    BUN 36 09/16/2020    CREATININE 0.8 09/16/2020    GFRAA >60 09/16/2020    AGRATIO 0.6 09/11/2020    LABGLOM >60 09/16/2020    GLUCOSE 139 09/16/2020    PROT 4.6 09/16/2020    CALCIUM 8.0 09/16/2020    BILITOT 0.4 09/16/2020    ALKPHOS 56 09/16/2020    AST 66 09/16/2020    ALT 38 09/16/2020       POC Tests:   Recent Labs     09/17/20  0502   POCGLU 96       Coags:   Lab Results   Component Value Date    PROTIME 20.6 09/16/2020    INR 1.77 09/16/2020    APTT 30.8 05/19/2019       HCG (If Applicable): No results found for: PREGTESTUR, PREGSERUM, HCG, HCGQUANT     ABGs: No results found for: PHART, PO2ART, MMF8TLA, TYM2AVV, BEART, B8XAJFIT     Type & Screen (If Applicable):  No results found for: LABABO, [address]    Anesthesia Evaluation  Patient summary reviewed and Nursing notes reviewed no history of anesthetic complications:   Airway: Mallampati: II  TM distance: >3 FB   Neck ROM: full  Mouth opening: > = 3 FB Dental:    (+) edentulous      Pulmonary:normal exam                               Cardiovascular:  Exercise tolerance: poor (<4 METS),   (+) hypertension:, CHF: diastolic, hyperlipidemia      ECG reviewed  Rhythm: regular    Echocardiogram reviewed               ROS comment: EF 55%   RVSP 35 to 45     Neuro/Psych:   (+) seizures:, neuromuscular disease:, psychiatric history:depression/anxiety             GI/Hepatic/Renal:   (+) GERD:,          ROS comment: Possibly SBO from umbilical hernia. .   Endo/Other:    (+) Diabetes, electrolyte abnormalities, . Abdominal:           Vascular:                                          Anesthesia Plan      MAC     ASA 3       Induction: intravenous. MIPS: Prophylactic antiemetics administered. Anesthetic plan and risks discussed with patient. Plan discussed with CRNA. MEDICATIONS:  No current facility-administered medications for this visit. No current outpatient medications on file.      Facility-Administered Medications Ordered in Other Visits   Medication Dose Route Frequency Provider Last Rate Last Dose    0.9 % sodium chloride infusion 250 mL  250 mL Intravenous Once Arianne Blackman MD        0.9 % sodium chloride bolus  20 mL Intravenous Once Bruce Mcadams PA-C        pantoprazole (PROTONIX) injection 40 mg  40 mg Intravenous BID Sandro Tate MD        potassium chloride 10 mEq/100 mL IVPB (Peripheral Line)  10 mEq Intravenous PRN Sukumar Gallardo  mL/hr at 09/15/20 1835 10 mEq at 09/15/20 1835    albuterol sulfate  (90 Base) MCG/ACT inhaler 2 puff  2 puff Inhalation Q6H PRN Jian Blue MD   2 puff at 09/14/20 0405    ipratropium-albuterol (DUONEB) nebulizer solution 1 ampule  1 ampule Inhalation Q4H PRN Jian Blue MD        lactated ringers infusion   Intravenous Continuous Gerry Martinez MD 50 mL/hr at 09/16/20 1227      insulin glargine (LANTUS;BASAGLAR) injection pen 5 Units  5 Units Subcutaneous Nightly Zahraa Thomas MD   5 Units at 09/16/20 2050    carBAMazepine (TEGRETOL) tablet 200 mg  200 mg Oral TID Zahraa Thomas MD   Stopped at 09/11/20 0745    citalopram (CELEXA) tablet 20 mg  20 mg Oral Daily Zahraa Thomas MD   Stopped at 09/11/20 0745    levetiracetam (KEPPRA) 500 mg/100 mL IVPB  500 mg Intravenous Q12H Zahraa Thomas MD   Stopped at 09/17/20 0248    traZODone (DESYREL) tablet 25 mg  25 mg Oral Nightly Zahraa Thomas MD   Stopped at 09/15/20 2320    trospium (SANCTURA) tablet 20 mg  20 mg Oral BID Alban Stone MD   Stopped at 09/16/20 0531    sodium chloride flush 0.9 % injection 10 mL  10 mL Intravenous 2 times per day Ioana Barnett MD   10 mL at 09/16/20 2050    sodium chloride flush 0.9 % injection 10 mL  10 mL Intravenous PRN Ioana Barnett MD   10 mL at 09/14/20 0316    acetaminophen (TYLENOL) tablet 650 mg  650 mg Oral Q6H PRN Ioana Barnett MD        Or    acetaminophen (TYLENOL) suppository 650 mg  650 mg Rectal Q6H PRN Ioana Barnett MD   650 mg at 09/12/20 1945    polyethylene glycol (GLYCOLAX) packet 17 g  17 g Oral Daily PRN Ioana Barnett MD        promethazine (PHENERGAN) tablet 12.5 mg  12.5 mg Oral Q6H PRN Ioana Barnett MD        Or    ondansetron [facility]) injection 4 mg  4 mg Intravenous Q6H PRN Ioana Barnett MD        glucose (GLUTOSE) 40 % oral gel 15 g  15 g Oral PRN Ioana Barnett MD        dextrose 50 % IV solution  12.5 g Intravenous PRN Ioana Barnett MD   12.5 g at 09/14/20 0653    glucagon (rDNA) injection 1 mg  1 mg Intramuscular PRN Ioana Barnett MD        dextrose 5 % solution  100 mL/hr Intravenous PRN Ioana Barnett MD        insulin lispro (1 Unit Dial) 0-6 Units  0-6 Units Subcutaneous Q4H Ioana Barnett MD   1 Units at 09/16/20 0900    valproate (DEPACON) 250 mg in dextrose 5 % 100 mL IVPB  250 mg Intravenous Q8H Radha Bridges  mL/hr at 09/17/20 0605 250 mg at 09/17/20 5454        LABS:  Lab Results   Component Value Date    WBC 10.3 09/16/2020    HGB 6.0 (LL) 09/16/2020    HCT 17.3 (LL) 09/16/2020    MCV 98.9 09/16/2020     09/16/2020    GLUCOSE 139 (H) 09/16/2020    PROTIME 20.6 (H) 09/16/2020    INR 1.77 (H) 09/16/2020    APTT 30.8 05/19/2019       Lab Results   Component Value Date     09/16/2020    K 3.7 09/16/2020     09/16/2020    CO2 27 09/16/2020    PHOS 4.9 01/13/2019    BUN 36 (H) 09/16/2020    CREATININE 0.8 09/16/2020       Problem List:  Patient Active Problem List   Diagnosis    Intellectual disability    Seizure disorder

## 2020-09-17 NOTE — BRIEF OP NOTE
EGD:   2 bulb ulcers. One with clot and V.V. Injected with 5ml epi 1:10k and Clipped with OTSC. Rec;   PPI              Monitor hgb.      Elieser Dominguez MD  [address] [place] [address]

## 2020-09-17 NOTE — PROGRESS NOTES
Office : [phone]     Fax :[phone]       Nephrology progress Note          Chief Complaint:    Chief Complaint   Patient presents with    Altered Mental Status     Altered mental status started yesterday. Pt brought in from [facility] home. History of Present Ilness:    Nata Belle is a 67 y.o. male with h/o Dementia, renal ds, BPH was transfered from [facility] where he had a witnessed seizure. He is awake but not oriented . All history gathered from the records from [facility].    His initial lab work shows elevated BUN and creatinine   Has a singh in place     Interval Hx     renal function improved   No respiratory distress  No edema  Making urine  Potassium level is normal.   HB low         Past Medical History:   Diagnosis Date    Cerebral palsy ([address]     CHF (congestive heart failure) (HCC)     CKD (chronic kidney disease)     Constipation     Convulsions ([address]     COVID-19     Degenerative disease of nervous system ([address]     Delusional disorder ([address]     Dementia ([address]     Depression     Diabetes mellitus ([address]     Encephalopathy     Enlarged prostate with lower urinary tract symptoms (LUTS)     GERD (gastroesophageal reflux disease)     GI obstruction (HCC)     Hematuria     Hyperlipidemia     Hypernatremia     Hypertension     Hypertension     Mood disorder ([address]     MRSA colonization 09/11/2020    Muscle weakness     Muscle weakness (generalized)     Neuromuscular dysfunction of bladder     Neuropathy     Obsessive-compulsive personality disorder (CODE)     Psychosis ([address]     Suprapubic catheter ([address]     #20    Urinary retention        Past Surgical History:   Procedure Laterality Date    ARM SURGERY      BLADDER SURGERY      CYSTOSCOPY N/A 5/15/2019    CYSTOLITHALOPAXY , STONE  SUPRA PUBIC CATHETER EXCHANGE. performed by Jamir Retana MD [facility]      EYE SURGERY      HERNIA REPAIR      IR ASP BLADDER W INSERT SUPRAPUBIC CATH      TONSILLECTOMY      UMBILICAL HERNIA REPAIR N/A 1/15/2019    OPEN PRIMARY UMBILICAL HERNIA REPAIR performed by Allen Siddiqui MD at [address]       Current Medications:    dextrose 5 % solution, Continuous  0.9 % sodium chloride bolus, Once  pantoprazole (PROTONIX) injection 40 mg, BID  potassium chloride 10 mEq/100 mL IVPB (Peripheral Line), PRN  albuterol sulfate  (90 Base) MCG/ACT inhaler 2 puff, Q6H PRN  ipratropium-albuterol (DUONEB) nebulizer solution 1 ampule, Q4H PRN  insulin glargine (LANTUS;BASAGLAR) injection pen 5 Units, Nightly  carBAMazepine (TEGRETOL) tablet 200 mg, TID  citalopram (CELEXA) tablet 20 mg, Daily  levetiracetam (KEPPRA) 500 mg/100 mL IVPB, Q12H  traZODone (DESYREL) tablet 25 mg, Nightly  trospium (SANCTURA) tablet 20 mg, BID AC  sodium chloride flush 0.9 % injection 10 mL, 2 times per day  sodium chloride flush 0.9 % injection 10 mL, PRN  acetaminophen (TYLENOL) tablet 650 mg, Q6H PRN    Or  acetaminophen (TYLENOL) suppository 650 mg, Q6H PRN  polyethylene glycol (GLYCOLAX) packet 17 g, Daily PRN  promethazine (PHENERGAN) tablet 12.5 mg, Q6H PRN    Or  ondansetron (ZOFRAN) injection 4 mg, Q6H PRN  glucose (GLUTOSE) 40 % oral gel 15 g, PRN  dextrose 50 % IV solution, PRN  glucagon (rDNA) injection 1 mg, PRN  dextrose 5 % solution, PRN  insulin lispro (1 Unit Dial) 0-6 Units, Q4H  valproate (DEPACON) 250 mg in dextrose 5 % 100 mL IVPB, Q8H        Physical exam:     Vitals:  BP (!) 141/65   Pulse 88   Temp 97.2 °F (36.2 °C) (Temporal)   Resp 28   Ht 5' 8\" (1.727 m)   Wt 157 lb 13.6 oz (71.6 kg)   SpO2 95%   BMI 24.00 kg/m²   Constitutional:  Not oriented   Skin: no rash, turgor wnl  Heent:  eomi, mmm  Neck: no bruits or jvd noted  Cardiovascular:  S1, S2 without m/r/g  Respiratory: CTA B without w/r/r  Abdomen:  +bs, soft, nt, nd  Ext: no  lower extremity edema      Labs:  CBC:   Recent Labs     09/16/20  0609 09/16/20  0810 09/16/20  1913 09/17/20  0621   WBC 11.9* 10.3  --  7.5   HGB 5.8* 5.3* 6.0* 8.0*    224  --  128*     BMP:    Recent Labs     09/16/20  0609 09/16/20 0810 09/17/20 0621   * 145 154*   K 3.9 3.7 3.4*    108 118*   CO2 26 27 32   BUN 33* 36* 46*   CREATININE 0.8 0.8 0.7*   GLUCOSE 129* 139* 99     Ca/Mg/Phos:   Recent Labs     09/16/20 0609 09/16/20 0810 09/17/20 0621   CALCIUM 8.0* 8.0* 8.2*     Hepatic:   Recent Labs     09/16/20  0810   AST 66*   ALT 38   BILITOT 0.4   ALKPHOS 56     Troponin: No results for input(s): TROPONINI in the last 72 hours. BNP: No results for input(s): BNP in the last 72 hours. Lipids: No results for input(s): CHOL, TRIG, HDL, LDLCALC, LABVLDL in the last 72 hours. ABGs: No results for input(s): PHART, PO2ART, YNE0HTI in the last 72 hours. INR:   Recent Labs     09/16/20  0609 09/16/20 1913 09/17/20 0621   INR 2.23* 1.77* 1.61*     UA:  Recent Labs     09/15/20  1125   COLORU YELLOW   CLARITYU Clear   GLUCOSEU Negative   BILIRUBINUR Negative   KETUA TRACE*   SPECGRAV 1.018   BLOODU SMALL*   PHUR 6.0   PROTEINU TRACE*   UROBILINOGEN 0.2   NITRU Negative   LEUKOCYTESUR TRACE*   LABMICR YES   URINETYPE NotGiven      Urine Microscopic:   Recent Labs     09/15/20  1125   COMU see below   HYALCAST 1   WBCUA 2   RBCUA 0-2   EPIU 1     Urine Culture:   No results for input(s): LABURIN in the last 72 hours. Urine Chemistry:   No results for input(s): Randolm Reins, PROTEINUR, NAUR in the last 72 hours. IMAGING:  XR CHEST PORTABLE   Final Result   Slightly improved aeration of the lungs with persistent mild opacification in   the right upper and lower lung zones. Small right pleural effusion.          CT head without contrast   Final Result   No acute intracranial abnormality. XR CHEST 1 VIEW   Final Result   1. Bilateral perihilar opacities, right greater than left, most consistent   with multifocal pneumonia, to include atypical causes. 2. Stable chronic bibasilar pleural and parenchymal scar. 3. Stable cardiomegaly. I/O last 3 completed shifts: In: 3383 [I.V.:850; Blood:970]  Out: 650 [Urine:650]          Assessment/Plan :      1. BENOIT 2/2 pre renal + RAAS blockade   Sodium level  Increased   Hold lisinopril   Recommend to dose adjust all medications  based on renal functions  Maintain SBP> 90 mmHg   Daily weights   AVOID NSAIDs  Avoid Nephrotoxins  Monitor Intake/Output  Call if significant decrease in urine output     2. HTN. BP controlled . Hold all BP meds     3. Seizure disorder. Neurology consulted     4. Acid- base disorder. Metabolic alkalosis 2/2 dehydration. Iv fluids     5. Hypokalemia - corrected with supplements     6 . UTI. abx per primary team     7. Hypernatremia. Start on D5W     8. Anemia. S/p EGD.  GI bleed  Received blood transfusion             Thank you for allowing us to participate in care of [facility]         Electronically signed by: Rachel Mckeon MD, 9/17/2020, 10:13 AM      [facility] of [address]  Office : [phone]  Fax :[phone]

## 2020-09-17 NOTE — PROGRESS NOTES
Urology Progress Note  [facility]    Provider: Jamir Retana MD Patient ID:  Admission Date: 9/10/2020 Name: Sridevi Garcia  OR Date: 9/10/2020 MRN: [id number]   Patient Location: [id number] : 1945  Attending: Rossy García MD Date of Service: 2020  PCP: Myra Kelly MD     Diagnoses:  Neurogenic bladder  Urinary incontinence    Assessment/Plan:  68 yo with retention, NGB and managed with chronic SPT. Having leakage around SPT. COVID 19 +     -  singh to ostomy bag  - can consider changing SPT or increasing size when covid neg  - continue sanctura     The patient had a chance to ask questions which were answered. he understands the above plan. Subjective:   Sridevi Garcia is a 67 y.o. male. He was seen and examined this morning. Today we discussed urology plans. No changes. Objective:   Vitals:  Vitals:    20 0545   BP: (!) 147/62   Pulse: 89   Resp: 25   Temp: 97.5 °F (36.4 °C)   SpO2:        Intake/Output Summary (Last 24 hours) at 2020 0735  Last data filed at 2020 0130  Gross per 24 hour   Intake 1820 ml   Output 650 ml   Net 1170 ml     Physical Exam:  Gen: Alert and confused, no acute distress  CV: Regular rate   Resp: unlabored respirations  Abd: Soft, non-distended, non-tender, no masses  Ext: no peripheral edema noted, moves upper and lower extremities spontaneously  Skin: warm and well perfused, no rashes noted on the face, or arms. : Singh with clear urine.      Labs:  Lab Results   Component Value Date    WBC 7.5 2020    HGB 8.0 (L) 2020    HCT 22.4 (L) 2020    MCV 89.2 2020     (L) 2020     Lab Results   Component Value Date    CREATININE 0.7 (L) 2020    BUN 46 (H) 2020     (H) 2020    K 3.4 (L) 2020     (H) 2020    CO2 32 2020       Jamir Retana MD  2020

## 2020-09-17 NOTE — PROGRESS NOTES
Second unit of PRBC's complete. Continues to have frequent stools. Zinc paste to buttock. Lying in bed. Restraints on as documented. Will continue to monitor.

## 2020-09-17 NOTE — PROGRESS NOTES
Message sent to Dr. Olivia Charles via [employer]: Hgb 5.9 today, received 3 units of FFP and 2 units of PRBC.  Post transfusion Hgb 9/16/2020 19:13 Hemoglobin Quant: 6.0 (LL) Hematocrit: 17.3 (LL) Prothrombin Time: 20.6 (H) INR: 1.77 (H)    Waiting on response

## 2020-09-17 NOTE — ANESTHESIA POSTPROCEDURE EVALUATION
Department of Anesthesiology  Postprocedure Note    Patient: Theresa Jain  MRN: [id number]  YOB: 1945  Date of evaluation: 9/17/2020  Time:  8:25 AM     Procedure Summary     Date:  09/17/20 Room / Location:  [address]    Anesthesia Start:  0147 Anesthesia Stop:  0820    Procedures:       EGD SUBMUCOSAL injection of epinephrine 0.5 mg (N/A Abdomen)      EGD CONTROL HEMORRHAGE with application of over the scope clip (star clip) (N/A Abdomen) Diagnosis:  (Melena/GI BLEED)    Surgeon:  Robert Judge MD Responsible Provider:  Cuco Laboy MD    Anesthesia Type:  MAC ASA Status:  3          Anesthesia Type: MAC    Monica Phase I:      Monica Phase II:      Last vitals: Reviewed and per EMR flowsheets.        Anesthesia Post Evaluation    Patient location during evaluation: PACU  Patient participation: complete - patient participated  Level of consciousness: awake and awake and alert  Pain score: 2  Airway patency: patent  Nausea & Vomiting: no vomiting  Complications: no  Cardiovascular status: blood pressure returned to baseline  Respiratory status: acceptable  Hydration status: euvolemic

## 2020-09-17 NOTE — PROGRESS NOTES
Lab Results   Component Value Date    NITRU Negative 09/15/2020    WBCUA 2 09/15/2020    BACTERIA 4+ 09/10/2020    RBCUA 0-2 09/15/2020    BLOODU SMALL 09/15/2020    SPECGRAV 1.018 09/15/2020    GLUCOSEU Negative 09/15/2020       Radiology:  XR CHEST PORTABLE   Final Result   Slightly improved aeration of the lungs with persistent mild opacification in   the right upper and lower lung zones. Small right pleural effusion. CT head without contrast   Final Result   No acute intracranial abnormality. XR CHEST 1 VIEW   Final Result   1. Bilateral perihilar opacities, right greater than left, most consistent   with multifocal pneumonia, to include atypical causes. 2. Stable chronic bibasilar pleural and parenchymal scar. 3. Stable cardiomegaly. Assessment/Plan:    Active Hospital Problems    Diagnosis Date Noted    Seizure [facility]) [R56.9] 09/10/2020       This 79-year-old gentleman known cognitive disability wheelchair-bound suprapubic catheter known seizure disorder admitted to the hospital after a witnessed seizure     GI bleed: s/p EGD; 2 bulbar ulcers; 1 with clot and V.V. Epi injected & Clipped with OTSC. No more dark-colored stool RN report. Hemoglobin stable around 8. S/p 4 units of PRBCs and 3 units of FFP   Protonix drip discontinued; switch to Protonix 40 mg twice daily   -H&H every 8 hours; will continue to monitor closely and transfuse as needed    Seizure disorder: CT head on admission negative for any acute pathology; Neurology on board appreciate their recs; currently on Keppra and Depakote  -Still not taking carbamazepine  - refusing to take p.o. or cooperate with the speech therapy.    -GI consulted for PEG tube placement    Acute encephalopathy: Likely 2/2 to underlying infection/baseline mental dementia     SARS-CoV-2 infection and right lung aspiration pneumonia:   - oxygen requirement has been stable around 2 L with sats between 95 to 96%  -Status post 7-day course of Zyvox and Unasyn; patient afebrile, WBCs within normal limits and all the cultures have been negative to date     Acute kidney injury; suspect prerenal ; improved  Continue IV fluids since npo   Strict intake and output  Nephrology following; appreciate their recs    Hypokalemia: Resolved    Type 2 diabetes mellitus: Still refusing to take p.o.  -Continue Lantus 5 units and low sliding scale insulin    Neurogenic bladder with urinary incontinence:  Urology on board appreciate their recs;  Herman to ostomy bag  - will change suprapubic catheter size once COVID-19 is negative  -Continue Sanctura    Suspected UTIs with underlying suprapubic catheter  -Has been  on IV Unasyn; will DC it tomorrow as he is urine culture revealed mixed pathogens and repeat UA not indicative of infection    DVT Prophylaxis: lovenox discontinued due to GI bleed  Diet: Diet NPO Effective Now  Code Status: Full Code      Dispo - once acute medical processes have resolved    Sean Lanes, MD

## 2020-09-17 NOTE — PROGRESS NOTES
AM assessment complete. Patient post EGD. Vitals stable. Groggy but follows commands. Appears comfortable. Respirations labored. On room air. Expiratory wheezes noted. NSR on tele. Suprapubic cath with urostomy bag draining into leg bag. Site unremarkable. Scheduled medications given as ordered. PRN K replacement started. PO meds held due to NPO status. Fall precautions in place. Camera in use for patient safety.

## 2020-09-17 NOTE — PROGRESS NOTES
Reassessment complete. Lethargic. Doesn't answer questions. Only verbal response is cursing at staff. Remains on room air. Urostomy bag and singh bag changed. Incontinent of green loose stool. Hafsa care completed and diaper changed.

## 2020-09-17 NOTE — PLAN OF CARE
Problem: Falls - Risk of:  Goal: Will remain free from falls  Description: Will remain free from falls  Outcome: Ongoing  Note: High fall risk. Patient remains free from falls. Patient encouraged to use call light with any needs. Call light in reach, bed alarm on. Problem: Skin Integrity:  Goal: Will show no infection signs and symptoms  Description: Will show no infection signs and symptoms  Outcome: Ongoing  Note: Frequent repositioning. On specialty mattress. Mepilex border in place. Problem: Gas Exchange - Impaired  Goal: Absence of hypoxia  Outcome: Ongoing  Note: On room air. Will continue to monitor.       Problem: HEMODYNAMIC STATUS  Goal: Patient has stable vital signs and fluid balance  Outcome: Ongoing     Problem: Serum Glucose Level - Abnormal:  Goal: Ability to maintain appropriate glucose levels will improve  Description: Ability to maintain appropriate glucose levels will improve  Outcome: Ongoing     Problem: Restraint Use - Nonviolent/Non-Self-Destructive Behavior:  Goal: Absence of restraint indications  Description: Absence of restraint indications  Outcome: Ongoing

## 2020-09-17 NOTE — PROGRESS NOTES
Urology Progress Note  [facility]    Provider: Makayla Castillo MD Patient ID:  Admission Date: 9/10/2020 Name: Beau Holt  OR Date: 9/10/2020 MRN: [id number]   Patient Location: [id number] : 1945  Attending: Elvin Hernandez MD Date of Service: 2020  PCP: Talia Parker MD     Diagnoses:  Neurogenic bladder  Urinary incontinence    Assessment/Plan:  68 yo with retention, NGB and managed with chronic SPT. Having leakage around SPT. COVID 19 +     - singh to ostomy bag  - can consider changing SPT or increasing size when covid neg  - continue sanctura  - will sign off for now. Please call if additional questions. Continue monthly SP tube changes per routine.      The patient had a chance to ask questions which were answered. he understands the above plan. Subjective:   Beau Holt is a 67 y.o. male. He was seen and examined this morning. Today we discussed urology plans. No changes. Objective:   Vitals:  Vitals:    20 0922   BP:    Pulse:    Resp:    Temp:    SpO2: 95%       Intake/Output Summary (Last 24 hours) at 2020 1012  Last data filed at 2020 0911  Gross per 24 hour   Intake 1840 ml   Output 950 ml   Net 890 ml     Physical Exam:  Gen: Alert and confused, no acute distress  : Singh with clear urine.      Labs:  Lab Results   Component Value Date    WBC 7.5 2020    HGB 8.0 (L) 2020    HCT 22.4 (L) 2020    MCV 89.2 2020     (L) 2020     Lab Results   Component Value Date    CREATININE 0.7 (L) 2020    BUN 46 (H) 2020     (H) 2020    K 3.4 (L) 2020     (H) 2020    CO2 32 2020       Makayla Castillo MD  2020

## 2020-09-18 NOTE — PROGRESS NOTES
Office : [phone]     Fax :[phone]       Nephrology progress Note          Chief Complaint:    Chief Complaint   Patient presents with    Altered Mental Status     Altered mental status started yesterday. Pt brought in from [facility] home. History of Present Ilness:    Goldie Callahan is a 67 y.o. male with h/o Dementia, renal ds, BPH was transfered from [facility] where he had a witnessed seizure. He is awake but not oriented . All history gathered from the records from [facility].    His initial lab work shows elevated BUN and creatinine   Has a singh in place     Interval Hx       Sodium level better from 154---> 147   renal function improved   Making urine  Potassium level low   HB dropped today          Past Medical History:   Diagnosis Date    Cerebral palsy ([address]     CHF (congestive heart failure) (HCC)     CKD (chronic kidney disease)     Constipation     Convulsions ([address]     COVID-19     Degenerative disease of nervous system ([address]     Delusional disorder ([address]     Dementia ([address]     Depression     Diabetes mellitus ([address]     Encephalopathy     Enlarged prostate with lower urinary tract symptoms (LUTS)     GERD (gastroesophageal reflux disease)     GI obstruction (HCC)     Hematuria     Hyperlipidemia     Hypernatremia     Hypertension     Hypertension     Mood disorder ([address]     MRSA colonization 09/11/2020    Muscle weakness     Muscle weakness (generalized)     Neuromuscular dysfunction of bladder     Neuropathy     Obsessive-compulsive personality disorder (CODE)     Psychosis ([address]     Suprapubic catheter ([address]     #20    Urinary retention        Past Surgical History:   Procedure Laterality Date    ARM SURGERY      BLADDER SURGERY (!) 147/52   Pulse 81   Temp 97.8 °F (36.6 °C) (Temporal)   Resp 25   Ht 5' 8\" (1.727 m)   Wt 155 lb 13.8 oz (70.7 kg)   SpO2 100%   BMI 23.70 kg/m²   Constitutional:  Not oriented   Skin: no rash, turgor wnl  Heent:  eomi, mmm  Neck: no bruits or jvd noted  Cardiovascular:  S1, S2 without m/r/g  Respiratory: CTA B without w/r/r  Abdomen:  +bs, soft, nt, nd  Ext: no  lower extremity edema      Labs:  CBC:   Recent Labs     09/17/20  0621 09/17/20  1608 09/18/20  0140   WBC 7.5 8.0 6.4   HGB 8.0* 7.6* 6.8*   * 114* 95*     BMP:    Recent Labs     09/16/20  0810 09/17/20  0621 09/18/20  0505    154* 147*   K 3.7 3.4* 3.3*    118* 113*   CO2 27 32 30   BUN 36* 46* 24*   CREATININE 0.8 0.7* <0.5*   GLUCOSE 139* 99 308*     Ca/Mg/Phos:   Recent Labs     09/16/20  0810 09/17/20  0621 09/18/20  0505   CALCIUM 8.0* 8.2* 7.6*     Hepatic:   Recent Labs     09/16/20  0810   AST 66*   ALT 38   BILITOT 0.4   ALKPHOS 56     Troponin: No results for input(s): TROPONINI in the last 72 hours. BNP: No results for input(s): BNP in the last 72 hours. Lipids: No results for input(s): CHOL, TRIG, HDL, LDLCALC, LABVLDL in the last 72 hours. ABGs: No results for input(s): PHART, PO2ART, WLY5WRY in the last 72 hours. INR:   Recent Labs     09/16/20  1913 09/17/20  0621 09/18/20  0505   INR 1.77* 1.61* 1.37*     UA:  Recent Labs     09/15/20  1125   COLORU YELLOW   CLARITYU Clear   GLUCOSEU Negative   BILIRUBINUR Negative   KETUA TRACE*   SPECGRAV 1.018   BLOODU SMALL*   PHUR 6.0   PROTEINU TRACE*   UROBILINOGEN 0.2   NITRU Negative   LEUKOCYTESUR TRACE*   LABMICR YES   URINETYPE NotGiven      Urine Microscopic:   Recent Labs     09/15/20  1125   COMU see below   HYALCAST 1   WBCUA 2   RBCUA 0-2   EPIU 1     Urine Culture:   No results for input(s): LABURIN in the last 72 hours. Urine Chemistry:   No results for input(s): Mare Spotted, PROTEINUR, NAUR in the last 72 hours.              IMAGING:  XR CHEST PORTABLE   Final Result   Slightly improved aeration of the lungs with persistent mild opacification in   the right upper and lower lung zones. Small right pleural effusion. CT head without contrast   Final Result   No acute intracranial abnormality. XR CHEST 1 VIEW   Final Result   1. Bilateral perihilar opacities, right greater than left, most consistent   with multifocal pneumonia, to include atypical causes. 2. Stable chronic bibasilar pleural and parenchymal scar. 3. Stable cardiomegaly. I/O last 3 completed shifts: In: 6560 [I.V.:4337]  Out: 1900 [Urine:1900]          Assessment/Plan :      1. BENOIT 2/2 pre renal + RAAS blockade   Sodium level better with D5w  Hold lisinopril   Recommend to dose adjust all medications  based on renal functions  Maintain SBP> 90 mmHg   Daily weights   AVOID NSAIDs  Avoid Nephrotoxins  Monitor Intake/Output  Call if significant decrease in urine output     2. HTN. BP controlled . Hold all BP meds     3. Seizure disorder. Neurology following     4. Acid- base disorder. Metabolic alkalosis 2/2 dehydration. Iv fluids     5. Hypokalemia - corrected with supplements     6 . UTI. abx per primary team     7. Hypernatremia. CONTINUE  on D5W . Better at 147 now     8. Anemia. S/p EGD. GI bleed  HB dropped .  Will get one unit blood transfusion today             Thank you for allowing us to participate in care of Bonnie Pablo         Electronically signed by: Lenin Meza MD, 9/18/2020, 8:33 AM      [facility] [address]  Office : [phone]  Fax :[phone]

## 2020-09-18 NOTE — ANESTHESIA POSTPROCEDURE EVALUATION
Department of Anesthesiology  Postprocedure Note    Patient: Katia Garcia  MRN: [id number]  YOB: 1945  Date of evaluation: 9/18/2020  Time:  1:54 PM     Procedure Summary     Date:  09/18/20 Room / Location:  [address]    Anesthesia Start:  2638 Anesthesia Stop:      Procedure:  EGD PEG TUBE PLACEMENT (N/A Abdomen) Diagnosis:  (Dysphagia)    Surgeon:  Joslyn Suarez MD Responsible Provider:  Lai Gutiérrez MD    Anesthesia Type:  MAC ASA Status:  3          Anesthesia Type: MAC    Monica Phase I:      Monica Phase II:      Last vitals: Reviewed and per EMR flowsheets.        Anesthesia Post Evaluation    Patient location during evaluation: PACU  Patient participation: complete - patient participated  Level of consciousness: awake  Airway patency: patent  Nausea & Vomiting: no vomiting  Complications: no  Cardiovascular status: hemodynamically stable  Respiratory status: acceptable  Hydration status: euvolemic

## 2020-09-18 NOTE — PROGRESS NOTES
Hospitalist Progress Note      PCP: Smith Andres MD    Date of Admission: 9/10/2020    Chief Complaint: Seizure      Subjective: Patient seen and examined today. Hemoglobin dropped last night down to 6.4 requiring packed RBCs; repeat hemoglobin up to 8. Patient status post PEG placement today; nutrition on board plan to start nutrition. Medications:  Reviewed      Exam:    BP (!) 152/91   Pulse 79   Temp 98.2 °F (36.8 °C) (Temporal)   Resp 24   Ht 5' 8\" (1.727 m)   Wt 155 lb 13.8 oz (70.7 kg)   SpO2 98%   BMI 23.70 kg/m²     General appearance: No apparent distress, appears stated age  HEENT: Pupils equal, round, and reactive to light. Conjunctivae/corneas clear. Neck: Supple, with full range of motion. No jugular venous distention. Trachea midline. Respiratory: Bibasal crepitations RALES/WHEEZES/Rhonchi. Cardiovascular: Regular rate and rhythm with normal S1/S2 without MURMURS, rubs or gallops. Abdomen: Soft, non-tender, non-distended with normal bowel sounds. Musculoskeletal: No clubbing, cyanosis or  Skin:  color, texture, turgor normal.  No rashes or lesions.   Neurologic: Difficult exam as patient is not fully cooperative    Labs:   Recent Labs     09/17/20  1608 09/18/20  0140 09/18/20  0504 09/18/20  1736   WBC 8.0 6.4 5.5  --    HGB 7.6* 6.8* 6.4* 8.0*   HCT 21.6* 19.5* 19.0* 23.4*   * 95* 91*  --      Recent Labs     09/16/20  0810 09/17/20  0621 09/18/20  0504 09/18/20  0505 09/18/20  1736    154*  --  147*  --    K 3.7 3.4*  --  3.3* 3.7    118*  --  113*  --    CO2 27 32  --  30  --    BUN 36* 46*  --  24*  --    CREATININE 0.8 0.7*  --  <0.5*  --    CALCIUM 8.0* 8.2*  --  7.6*  --    PHOS  --   --  2.1*  --   --      Recent Labs     09/16/20  0810   AST 66*   ALT 38   BILIDIR <0.2   BILITOT 0.4   ALKPHOS 56     Recent Labs     09/16/20  1913 09/17/20  0621 09/18/20  0505   INR 1.77* 1.61* 1.37*     No results for input(s): Vignesh Crane in the last L with sats between 95 to 96%  -Status post 7-day course of Zyvox and Unasyn; patient afebrile, WBCs within normal limits and all the cultures have been negative to date     Acute kidney injury; suspect prerenal ; improved  Continue IV fluids since npo   Strict intake and output  Nephrology following; appreciate their recs    Hypokalemia: Resolved    Type 2 diabetes mellitus: Still refusing to take p.o.  -Continue Lantus 5 units and low sliding scale insulin    Neurogenic bladder with urinary incontinence:  Urology on board appreciate their recs; Herman to ostomy bag  - will change suprapubic catheter size once COVID-19 is negative  -Continue Sanctura    Suspected UTIs with underlying suprapubic catheter  -Has been  on IV Unasyn; will DC it tomorrow as he is urine culture revealed mixed pathogens and repeat UA not indicative of infection    DVT Prophylaxis: lovenox discontinued due to GI bleed  Diet: DIET TUBE FEED CONTINUOUS/CYCLIC NPO; Other Tube Feeding (must specify product in comment);  Gastrostomy; Continuous; 25  Code Status: Full Code      Dispo - once acute medical processes have resolved    Sallie Young MD

## 2020-09-18 NOTE — PROGRESS NOTES
1 unit PRBC ordered by anesthesiology. Blood bag spiked by endo staff. After blood was spiked, it was realized by endo staff that H/H had been added on to AM labs that had been drawn prior to blood transfusion had even been started. Attempted to get H/H via epoc quickly to see if blood was needed before blood timing . Unable to quickly get H/H because PEG was being placed at that time. Blood had been on unit for >15 minutes. Unable to administer that unit of PRBC at that point. Anesthesiology notified. Said to go ahead and check H/H to see if another unit of PRBC was needed. Patient due for every 8 hour H/H now. Blood bank was notified that bag of PRBC was not given. Instructed by blood bank staff to discard of blood in biohazard bin.

## 2020-09-18 NOTE — PROGRESS NOTES
AM assessment complete. Oriented to self only. Follows commands. Doesn't appear to be in pain. Respirations labored. Breath sounds diminished. On 1 L O2. NSR on tele. Urostomy bag intact, draining into singh bag. Remains in restraints. Scheduled medications given as ordered. Remains NPO. Blood infusing. Patient encouraged to use call light with any needs. Call light in reach, bed alarm on. Camera in use for patient safety.

## 2020-09-18 NOTE — PROGRESS NOTES
Comprehensive Nutrition Assessment    Type and Reason for Visit:  Reassess    Nutrition Recommendations/Plan:   1. Pt now NPO x 8 days. If unable to begin EN via NG or PEG today, recommend TPN to start. 2. Recommend to initiate thiamine prior to beginning EN or PN as pt is at significant risk for refeeding syndrome. Recommend loading dose 300 mg today and 100 mg daily thereafter over the next 5-7 days. 3. Recommend to replace Mg+ and Phos prior to initiating nutrition. If EN is desired:  1. Recommend to start Osmolite 1.2 (standard formula without fiber) at 35 mL per hour x 24 hours and closely monitor tolerance/electrolytes (K+, Mg+, Phos). 2. If electrolytes are WNL after 24 hours, begin to advance rate by 10 mL q 4 hours until goal rate 70 mL per hour is reached. 3. Osmolite 1.2 (standard formula without fiber) at goal rate 70 mL per hour to provide 1680 mL total volume, 2016 calories, 93 grams protein, 1378 mL free water. 4. Recommend water bolus 160 mL q 6 hours. Recommend reevaluate IV fluids at this time  5. Ensure head of bed is 30 - 45 degrees during continuous or bolus gastric feeding and for one hour after bolus. Turn off the feeding if head of bed is lowered less than 30 degrees. 6. Monitor for tolerance (residuals greater than 500 mL, bowel habits, N/V, cramping). If TPN is desired:  1. Recommend to begin Clinimix 5/20 at 40 mL per hour and advance as tolerated until goal rate 83 mL per hour is reached. 2. Clinimix 5/20 at goal rate 83 mL per hour to provide 2000 mL total volume, 1760 calories, 100 grams protein, dextrose load 3.92 mg/kg/min. 3. Recommend check TG, Mg, Phos, CMP now if not done in last 24 hours. 4. Recommend 250 mL 20% lipids two times per week if TG WNL  5. Recommend FSBS, monitor glucose, need for insulin  6. Pharmacy to adjust MVI and Trace Elements as needed    Nutrition Assessment:  Pt's nutrition status continues to decline.  Pt now NPO x 8 days during admission. Pt s/p EGD 9/17 for GI bleed which showed 2 bulb ulcers which were treated with epi and clipped. Per GI note 9/16, planning for \"PEG discussion later. \" If unable to begin EN support today via NG or PEG, need to consider TPN to start. Pt will be at significant risk for refeeding syndrome given no PO intake for greater than 7 days. Mg+ 1.7 and Phos 2.1 today. Recommend to replace these prior to initiating nutrition. Recommend thiamine to begin prior to initiating nutrition and for close monitoring and replacement of electrolytes as nutrition support begins. Malnutrition Assessment:  Malnutrition Status:  Insufficient data(NFPE deferred d/t COVID-18 positive)      Estimated Daily Nutrient Needs:  Energy (kcal):  3297-5234; Weight Used for Energy Requirements:  (continue to use 70 kg)     Protein (g):   grams; Weight Used for Protein Requirements:  Current(70 kg; 1.3-1.6 grams per kg)        Fluid (ml/day):  1 mL per kcal; Weight Used for Fluid Requirements:  Current      Nutrition Related Findings:  Oriented to person. +1 pitting BLE edema. Na+ 147. K+ 3.3. Wounds:  Stage I, Deep Tissue Injury       Current Nutrition Therapies:    Diet NPO Effective Now    Anthropometric Measures:  · Height: 5' 8\" (172.7 cm)  · Current Body Weight: 155 lb (70.3 kg)   · Admission Body Weight: 154 lb (69.9 kg)    · Ideal Body Weight: 154 lbs; % Ideal Body Weight 101.9 %   · BMI: 23.6   · BMI Categories: Normal Weight (BMI 18.5-24. 9)       Nutrition Diagnosis:   · Inadequate oral intake related to inadequate protein-energy intake as evidenced by NPO or clear liquid status due to medical condition      Nutrition Interventions:   Food and/or Nutrient Delivery:  Continue NPO(Recommend EN or PN to start today as appropriate with plan of care)  Nutrition Education/Counseling:  Education not indicated   Coordination of Nutrition Care:  Continued Inpatient Monitoring    Goals:  Diet advancement vs nutrition support Nutrition Monitoring and Evaluation:   Food/Nutrient Intake Outcomes:  Diet Advancement/Tolerance  Physical Signs/Symptoms Outcomes:  Chewing or Swallowing, GI Status, Biochemical Data     Discharge Planning:     Too soon to determine     Electronically signed by Melissa Hong RD, LD on 9/18/20 at 9:29 AM EDT    Contact: [phone]

## 2020-09-18 NOTE — ANESTHESIA PRE PROCEDURE
Department of Anesthesiology  Preprocedure Note       Name:  Lesly Postal   Age:  67 y.o.  :  1945                                          MRN:  [id number]         Date:  2020      Surgeon: Rd Emmanuel):  Qi Holland MD    Procedure: EGD PEG TUBE PLACEMENT (N/A Abdomen)    Medications prior to admission:   Prior to Admission medications    Medication Sig Start Date End Date Taking?  Authorizing Provider   divalproex (DEPAKOTE) 125 MG DR tablet Take 125 mg by mouth 2 times daily    Historical Provider, MD   traZODone (DESYREL) 25 mg TABS Take 25 mg by mouth nightly    Historical Provider, MD   glucose (GLUTOSE) 40 % GEL Take 37.5 mLs by mouth as needed (low BS) 19   Silvia Melissa MD   Lactobacillus (ACIDOPHILUS) 100 MG CAPS Take by mouth 2 times daily    Historical Provider, MD   citalopram (CELEXA) 20 MG tablet Take 20 mg by mouth daily    Historical Provider, MD   trospium (SANCTURA) 20 MG tablet Take 20 mg by mouth 2 times daily    Historical Provider, MD   insulin detemir (LEVEMIR) 100 UNIT/ML injection vial Inject 5 Units into the skin 2 times daily    Historical Provider, MD   acetaminophen (TYLENOL) 325 MG tablet Take 650 mg by mouth every 4 hours as needed for Pain    Historical Provider, MD   aspirin 81 MG tablet Take 81 mg by mouth daily    Historical Provider, MD   carBAMazepine (TEGRETOL) 200 MG tablet Take 200 mg by mouth 3 times daily     Historical Provider, MD   loratadine (CLARITIN) 10 MG tablet Take 10 mg by mouth daily    Historical Provider, MD   Folic Acid 0.8 MG CAPS Take 1 tablet by mouth daily    Historical Provider, MD   gabapentin (NEURONTIN) 100 MG capsule Take 200 mg by mouth 3 times daily    Historical Provider, MD   lamoTRIgine (LAMICTAL) 100 MG tablet Take 100 mg by mouth 3 times daily     Historical Provider, MD   lisinopril (PRINIVIL;ZESTRIL) 20 MG tablet Take 10 mg by mouth daily     Historical Provider, MD   polyethylene glycol (MIRALAX) POWD powder Take 17 g by mouth daily     Historical Provider, MD   vitamin B-12 (CYANOCOBALAMIN) 1000 MCG tablet Take 1,000 mcg by mouth daily    Historical Provider, MD   vitamin D (CHOLECALCIFEROL) 1000 UNIT TABS tablet Take 5,000 Units by mouth daily     Historical Provider, MD   omeprazole (PRILOSEC) 20 MG capsule Take 20 mg by mouth daily    Historical Provider, MD       Current medications:    No current facility-administered medications for this visit. No current outpatient medications on file.      Facility-Administered Medications Ordered in Other Visits   Medication Dose Route Frequency Provider Last Rate Last Dose    magnesium sulfate 2 g in 50 mL IVPB premix  2 g Intravenous Once Marvel Garcia MD        potassium phosphate 30 mmol in dextrose 5 % 250 mL IVPB  30 mmol Intravenous Once Marvel Garcia MD 62.5 mL/hr at 09/18/20 1150 30 mmol at 09/18/20 1150    thiamine (B-1) 300 mg in sodium chloride 0.9 % 100 mL IVPB  300 mg Intravenous Once Marvel Garcia  mL/hr at 09/18/20 1223 300 mg at 09/18/20 1223    Followed by   Deyanira Vivas ON 9/19/2020] thiamine (B-1) 100 mg in sodium chloride 0.9 % 100 mL IVPB  100 mg Intravenous Q24H Marvel Garcia MD        dextrose 5 % solution   Intravenous Continuous Desirae Macias MD 50 mL/hr at 09/17/20 0908      0.9 % sodium chloride bolus  20 mL Intravenous Once General Aj PA-C        pantoprazole (PROTONIX) injection 40 mg  40 mg Intravenous BID Sandro Tate MD   40 mg at 09/18/20 0839    potassium chloride 10 mEq/100 mL IVPB (Peripheral Line)  10 mEq Intravenous PRN Kamran Blue  mL/hr at 09/17/20 1500 10 mEq at 09/17/20 1500    albuterol sulfate  (90 Base) MCG/ACT inhaler 2 puff  2 puff Inhalation Q6H PRN Mackenzie Mckeon MD   2 puff at 09/14/20 0405    ipratropium-albuterol (DUONEB) nebulizer solution 1 ampule  1 ampule Inhalation Q4H PRN Mackenzie Mckeon MD        insulin glargine (LANTUS;BASAGLAR) injection pen 5 Units  5 Units Subcutaneous Nightly Riccardo Tucker MD   5 Units at 09/17/20 2147    carBAMazepine (TEGRETOL) tablet 200 mg  200 mg Oral TID Riccardo Tucker MD   Stopped at 09/11/20 0745    citalopram (CELEXA) tablet 20 mg  20 mg Oral Daily Riccardo Tucker MD   Stopped at 09/11/20 0745    levetiracetam (KEPPRA) 500 mg/100 mL IVPB  500 mg Intravenous Q12H Riccardo Tucker MD   Stopped at 09/18/20 0617    traZODone (DESYREL) tablet 25 mg  25 mg Oral Nightly Riccardo Tucker MD   Stopped at 09/15/20 2320    trospium (SANCTURA) tablet 20 mg  20 mg Oral BID AC Riccardo Tucker MD   Stopped at 09/16/20 0531    sodium chloride flush 0.9 % injection 10 mL  10 mL Intravenous 2 times per day Riccardo Tucker MD   10 mL at 09/18/20 0835    sodium chloride flush 0.9 % injection 10 mL  10 mL Intravenous PRN Riccardo Tcuker MD   10 mL at 09/14/20 0316    acetaminophen (TYLENOL) tablet 650 mg  650 mg Oral Q6H PRN Riccardo Tucker MD        Or    acetaminophen (TYLENOL) suppository 650 mg  650 mg Rectal Q6H PRN Riccardo Tucker MD   650 mg at 09/12/20 1945    polyethylene glycol (GLYCOLAX) packet 17 g  17 g Oral Daily PRN Riccardo Tucker MD        promethazine (PHENERGAN) tablet 12.5 mg  12.5 mg Oral Q6H PRN Riccardo Tucker MD        Or    ondansetron [facility]) injection 4 mg  4 mg Intravenous Q6H PRN Riccardo Tucker MD        glucose (GLUTOSE) 40 % oral gel 15 g  15 g Oral PRN Riccardo Tucker MD        dextrose 50 % IV solution  12.5 g Intravenous PRN Riccardo Tucker MD   12.5 g at 09/14/20 0653    glucagon (rDNA) injection 1 mg  1 mg Intramuscular PRN Riccardo Tucker MD        dextrose 5 % solution  100 mL/hr Intravenous PRN Riccardo Tucker MD        insulin lispro (1 Unit Dial) 0-6 Units  0-6 Units Subcutaneous Q4H Riccardo Tucker MD   1 Units at 09/16/20 0900    valproate (DEPACON) 250 mg in dextrose 5 % 100 mL IVPB  250 mg Intravenous Claudine Wells MD   Stopped at 09/18/20 6273       Allergies:  No Known Allergies    Problem List:    Patient Active Problem Tobacco Use    Smoking status: Former Smoker     Types: Cigarettes     Last attempt to quit: 5/13/2005     Years since quitting: 15.3    Smokeless tobacco: Never Used   Substance Use Topics    Alcohol use: No                                Counseling given: Not Answered      Vital Signs (Current): There were no vitals filed for this visit.                                            BP Readings from Last 3 Encounters:   09/18/20 (!) 148/77   09/17/20 (!) 113/49   05/14/20 (!) 102/52       NPO Status:                                                                                 BMI:   Wt Readings from Last 3 Encounters:   09/18/20 155 lb 13.8 oz (70.7 kg)   05/13/20 165 lb (74.8 kg)   08/28/19 180 lb (81.6 kg)     There is no height or weight on file to calculate BMI.    CBC:   Lab Results   Component Value Date    WBC 6.4 09/18/2020    RBC 2.19 09/18/2020    HGB 6.8 09/18/2020    HCT 19.5 09/18/2020    MCV 89.0 09/18/2020    RDW 15.8 09/18/2020    PLT 95 09/18/2020       CMP:   Lab Results   Component Value Date     09/18/2020    K 3.3 09/18/2020    K 3.9 09/16/2020     09/18/2020    CO2 30 09/18/2020    BUN 24 09/18/2020    CREATININE <0.5 09/18/2020    GFRAA >60 09/18/2020    AGRATIO 0.6 09/11/2020    LABGLOM >60 09/18/2020    GLUCOSE 308 09/18/2020    PROT 4.6 09/16/2020    CALCIUM 7.6 09/18/2020    BILITOT 0.4 09/16/2020    ALKPHOS 56 09/16/2020    AST 66 09/16/2020    ALT 38 09/16/2020       POC Tests:   Recent Labs     09/18/20  1122   POCGLU 82       Coags:   Lab Results   Component Value Date    PROTIME 16.0 09/18/2020    INR 1.37 09/18/2020    APTT 30.8 05/19/2019       HCG (If Applicable): No results found for: PREGTESTUR, PREGSERUM, HCG, HCGQUANT     ABGs: No results found for: PHART, PO2ART, DAD8UXM, EST0JHR, BEART, N8VJWQUB     Type & Screen (If Applicable):  No results found for: LABABO, [address]    Anesthesia Evaluation  Patient summary reviewed and Nursing notes reviewed no history of anesthetic complications:   Airway: Mallampati: II  TM distance: >3 FB   Neck ROM: full  Mouth opening: > = 3 FB Dental:    (+) edentulous      Pulmonary:normal exam                               Cardiovascular:  Exercise tolerance: poor (<4 METS),   (+) hypertension:, CHF: diastolic, hyperlipidemia      ECG reviewed  Rhythm: regular    Echocardiogram reviewed               ROS comment: EF 55%   RVSP 35 to 45     Neuro/Psych:   (+) seizures:, neuromuscular disease:, psychiatric history:depression/anxiety             GI/Hepatic/Renal:   (+) GERD:,          ROS comment: Possibly SBO from umbilical hernia. .   Endo/Other:    (+) Diabetes, electrolyte abnormalities, . Abdominal:           Vascular:                                          Anesthesia Plan      MAC     ASA 3       Induction: intravenous. Plan/risks discussed with: telephone consent taken and on chart. Plan discussed with CRNA. MEDICATIONS:  No current facility-administered medications for this visit. No current outpatient medications on file.      Facility-Administered Medications Ordered in Other Visits   Medication Dose Route Frequency Provider Last Rate Last Dose    magnesium sulfate 2 g in 50 mL IVPB premix  2 g Intravenous Once Micki Gupta MD        potassium phosphate 30 mmol in dextrose 5 % 250 mL IVPB  30 mmol Intravenous Once Micki Gupta MD 62.5 mL/hr at 09/18/20 1150 30 mmol at 09/18/20 1150    thiamine (B-1) 300 mg in sodium chloride 0.9 % 100 mL IVPB  300 mg Intravenous Once Micki Gupta  mL/hr at 09/18/20 1223 300 mg at 09/18/20 1223    Followed by   Mark Hines ON 9/19/2020] thiamine (B-1) 100 mg in sodium chloride 0.9 % 100 mL IVPB  100 mg Intravenous Q24H Micki Gupta MD        dextrose 5 % solution   Intravenous Continuous Velia Muhammad MD 50 mL/hr at 09/17/20 0908      0.9 % sodium chloride bolus  20 mL Intravenous Once Faviola Clayton PA-C        pantoprazole ([address]) injection 40 mg  40 mg Intravenous BID Sandro Tate MD   40 mg at 09/18/20 0839    potassium chloride 10 mEq/100 mL IVPB (Peripheral Line)  10 mEq Intravenous PRN Hieu Cabrera  mL/hr at 09/17/20 1500 10 mEq at 09/17/20 1500    albuterol sulfate  (90 Base) MCG/ACT inhaler 2 puff  2 puff Inhalation Q6H PRN James Cain MD   2 puff at 09/14/20 0405    ipratropium-albuterol (DUONEB) nebulizer solution 1 ampule  1 ampule Inhalation Q4H PRN James Cain MD        insulin glargine (LANTUS;BASAGLAR) injection pen 5 Units  5 Units Subcutaneous Nightly Mathew Parker MD   5 Units at 09/17/20 2147    carBAMazepine (TEGRETOL) tablet 200 mg  200 mg Oral TID Mathew Parker MD   Stopped at 09/11/20 0745    citalopram (CELEXA) tablet 20 mg  20 mg Oral Daily Mathew Parker MD   Stopped at 09/11/20 0745    levetiracetam (KEPPRA) 500 mg/100 mL IVPB  500 mg Intravenous Q12H Mathew Parker MD   Stopped at 09/18/20 0617    traZODone (DESYREL) tablet 25 mg  25 mg Oral Nightly Mathew Parker MD   Stopped at 09/15/20 2320    trospium (SANCTURA) tablet 20 mg  20 mg Oral BID AC Mathew Parker MD   Stopped at 09/16/20 0531    sodium chloride flush 0.9 % injection 10 mL  10 mL Intravenous 2 times per day Mathew Parker MD   10 mL at 09/18/20 0835    sodium chloride flush 0.9 % injection 10 mL  10 mL Intravenous PRN Mathew Parker MD   10 mL at 09/14/20 0316    acetaminophen (TYLENOL) tablet 650 mg  650 mg Oral Q6H PRN Mathew Parker MD        Or    acetaminophen (TYLENOL) suppository 650 mg  650 mg Rectal Q6H PRN Mathew Parker MD   650 mg at 09/12/20 1945    polyethylene glycol (GLYCOLAX) packet 17 g  17 g Oral Daily PRN Mathew Parker MD        promethazine (PHENERGAN) tablet 12.5 mg  12.5 mg Oral Q6H PRN Mathew Parker MD        Or    ondansetron [facility]) injection 4 mg  4 mg Intravenous Q6H PRN Mathew Parker MD        glucose (GLUTOSE) 40 % oral gel 15 g  15 g Oral PRN Mathew Parker MD       [facility] dextrose 50 % IV solution  12.5 g Intravenous PRN Narda Odell MD   12.5 g at 09/14/20 0653    glucagon (rDNA) injection 1 mg  1 mg Intramuscular PRN Narda Odell MD        dextrose 5 % solution  100 mL/hr Intravenous PRN Narda Odell MD        insulin lispro (1 Unit Dial) 0-6 Units  0-6 Units Subcutaneous Q4H Narda Odell MD   1 Units at 09/16/20 0900    valproate (DEPACON) 250 mg in dextrose 5 % 100 mL IVPB  250 mg Intravenous Kelsey Jacques MD   Stopped at 09/18/20 0717        LABS:  Lab Results   Component Value Date    WBC 6.4 09/18/2020    HGB 6.8 (LL) 09/18/2020    HCT 19.5 (LL) 09/18/2020    MCV 89.0 09/18/2020    PLT 95 (L) 09/18/2020    GLUCOSE 308 (H) 09/18/2020    PROTIME 16.0 (H) 09/18/2020    INR 1.37 (H) 09/18/2020    APTT 30.8 05/19/2019       Lab Results   Component Value Date     (H) 09/18/2020    K 3.3 (L) 09/18/2020     (H) 09/18/2020    CO2 30 09/18/2020    PHOS 2.1 (L) 09/18/2020    BUN 24 (H) 09/18/2020    CREATININE <0.5 (L) 09/18/2020       Problem List:  Patient Active Problem List   Diagnosis    Intellectual disability    Seizure disorder ([facility] [address]    Acute cystitis with hematuria    Nausea and vomiting    Kidney stone    Urinary retention    Hematuria    Gross hematuria    Seizure [facility])           Odessa Tian MD   9/18/2020

## 2020-09-18 NOTE — PROGRESS NOTES
Pt's H and H dropped from 7.6/21.6 to 6.8/19. 5. Perfect serve message sent to Dr. Millicent Chanel to notify him. Will continue to monitor.   Jose Osborn

## 2020-09-18 NOTE — PLAN OF CARE
Problem: Falls - Risk of:  Goal: Will remain free from falls  Description: Will remain free from falls  9/17/2020 2341 by Gabe Fletcher RN  Outcome: Ongoing  9/17/2020 1319 by Flor Borden RN  Outcome: Ongoing  Note: High fall risk. Patient remains free from falls. Patient encouraged to use call light with any needs. Call light in reach, bed alarm on. Goal: Absence of physical injury  Description: Absence of physical injury  Outcome: Ongoing     Problem: Skin Integrity:  Goal: Will show no infection signs and symptoms  Description: Will show no infection signs and symptoms  9/17/2020 2341 by Gabe Fletcher RN  Outcome: Ongoing  9/17/2020 1319 by Flor Borden RN  Outcome: Ongoing  Note: Frequent repositioning. On specialty mattress. Mepilex border in place. Goal: Absence of new skin breakdown  Description: Absence of new skin breakdown  Outcome: Ongoing     Problem: Nutrition  Goal: Optimal nutrition therapy  Outcome: Ongoing     Problem: Airway Clearance - Ineffective  Goal: Achieve or maintain patent airway  Outcome: Ongoing     Problem: Gas Exchange - Impaired  Goal: Absence of hypoxia  9/17/2020 2341 by Gabe Fletcher RN  Outcome: Ongoing  9/17/2020 1319 by Flor Borden RN  Outcome: Ongoing  Note: On room air. Will continue to monitor. Goal: Promote optimal lung function  Outcome: Ongoing     Problem: Breathing Pattern - Ineffective  Goal: Ability to achieve and maintain a regular respiratory rate  Outcome: Ongoing     Problem:  Body Temperature -  Risk of, Imbalanced  Goal: Ability to maintain a body temperature within defined limits  Outcome: Ongoing  Goal: Will regain or maintain usual level of consciousness  Outcome: Ongoing  Goal: Complications related to the disease process, condition or treatment will be avoided or minimized  Outcome: Ongoing     Problem: Isolation Precautions - Risk of Spread of Infection  Goal: Prevent transmission of infection  Outcome: Ongoing     Problem: Nutrition Deficits  Goal: Optimize nutrtional status  Outcome: Ongoing     Problem: Risk for Fluid Volume Deficit  Goal: Maintain normal heart rhythm  Outcome: Ongoing  Goal: Maintain absence of muscle cramping  Outcome: Ongoing  Goal: Maintain normal serum potassium, sodium, calcium, phosphorus, and pH  Outcome: Ongoing     Problem: Loneliness or Risk for Loneliness  Goal: Demonstrate positive use of time alone when socialization is not possible  Outcome: Ongoing     Problem: Fatigue  Goal: Verbalize increase energy and improved vitality  Outcome: Ongoing     Problem: Patient Education: Go to Patient Education Activity  Goal: Patient/Family Education  Outcome: Ongoing     Problem: Pain:  Goal: Pain level will decrease  Description: Pain level will decrease  Outcome: Ongoing  Goal: Control of acute pain  Description: Control of acute pain  Outcome: Ongoing  Goal: Control of chronic pain  Description: Control of chronic pain  Outcome: Ongoing     Problem: OXYGENATION/RESPIRATORY FUNCTION  Goal: Patient will maintain patent airway  Outcome: Ongoing  Goal: Patient will achieve/maintain normal respiratory rate/effort  Description: Respiratory rate and effort will be within normal limits for the patient  Outcome: Ongoing     Problem: HEMODYNAMIC STATUS  Goal: Patient has stable vital signs and fluid balance  9/17/2020 2341 by Clearance Severin, RN  Outcome: Ongoing  9/17/2020 1319 by Erika Steele RN  Outcome: Ongoing     Problem: FLUID AND ELECTROLYTE IMBALANCE  Goal: Fluid and electrolyte balance are achieved/maintained  Outcome: Ongoing     Problem: ACTIVITY INTOLERANCE/IMPAIRED MOBILITY  Goal: Mobility/activity is maintained at optimum level for patient  Outcome: Ongoing     Problem: Discharge Planning:  Goal: Discharged to appropriate level of care  Description: Discharged to appropriate level of care  Outcome: Ongoing     Problem: Serum Glucose Level - Abnormal:  Goal: Ability to maintain appropriate glucose levels will improve  Description: Ability to maintain appropriate glucose levels will improve  9/17/2020 2341 by Samm Horne RN  Outcome: Ongoing  9/17/2020 1319 by Galina Farrell RN  Outcome: Ongoing     Problem: Sensory Perception - Impaired:  Goal: Ability to maintain a stable neurologic state will improve  Description: Ability to maintain a stable neurologic state will improve  Outcome: Ongoing     Problem: Restraint Use - Nonviolent/Non-Self-Destructive Behavior:  Goal: Absence of restraint indications  Description: Absence of restraint indications  9/17/2020 2341 by Samm Horne RN  Outcome: Ongoing  9/17/2020 1319 by Galina Farrell RN  Outcome: Ongoing  Goal: Absence of restraint-related injury  Description: Absence of restraint-related injury  Outcome: Ongoing

## 2020-09-18 NOTE — PROGRESS NOTES
Reviewed problem list, assessment, H&P note and checklist preoperatively. Scope verified using 2 person system. Blaise Carrington

## 2020-09-18 NOTE — BRIEF OP NOTE
EGD:  Ulcer in bulb now looks improved with just clean base. OTS clip in place. Successful placement of PEG      Plan:  Cont ppi             Routine peg care.       ebl none.        MD Morris Cross and Jo Ann Chappell

## 2020-09-18 NOTE — PROGRESS NOTES
Shift assessment completed, see doc flowsheets. Pt currently aggressive verbally and physically when repositioning him, remains in restraints at this time. Call light within reach, will continue to monitor.   Misbah Mueller

## 2020-09-18 NOTE — PROGRESS NOTES
Spoke with Dr. Diane Gar concerning platelet count 95 and INR 1.37 and did she still want the FFP and platelets given. Order received for FFP and platelets to be on hold at this time.

## 2020-09-19 NOTE — PROGRESS NOTES
Patient cooperative with nursing staff. No evidence of pulling at lines. Camera in room. Abdominal binder in place. Will remove restraints at this time.

## 2020-09-19 NOTE — PLAN OF CARE
Problem: Falls - Risk of:  Goal: Will remain free from falls  Description: Will remain free from falls  Outcome: Ongoing  Note: High fall risk. Patient remains free from falls. Patient encouraged to use call light with any needs. Call light in reach, bed alarm on. Problem: Skin Integrity:  Goal: Will show no infection signs and symptoms  Description: Will show no infection signs and symptoms  Outcome: Ongoing  Note: Frequent repositioning, on specialty mattress, mepilex border in place. Problem: Gas Exchange - Impaired  Goal: Absence of hypoxia  Outcome: Ongoing  Note: On 2 L O2. Will wean as able.       Problem: Restraint Use - Nonviolent/Non-Self-Destructive Behavior:  Goal: Absence of restraint indications  Description: Absence of restraint indications  Outcome: Ongoing

## 2020-09-19 NOTE — PLAN OF CARE
Problem: Falls - Risk of:  Goal: Will remain free from falls  Description: Will remain free from falls  9/19/2020 0309 by Kilo Marcos RN  Outcome: Ongoing     Problem: Falls - Risk of:  Goal: Absence of physical injury  Description: Absence of physical injury  Outcome: Ongoing   Fall precautions in place    Problem: Skin Integrity:  Goal: Will show no infection signs and symptoms  Description: Will show no infection signs and symptoms  9/19/2020 0309 by Kilo Marcos RN  Outcome: Ongoing     Problem: Skin Integrity:  Goal: Absence of new skin breakdown  Description: Absence of new skin breakdown  Outcome: Ongoing   Patient turned and repositioned and on special mattress. Problem: Nutrition  Goal: Optimal nutrition therapy  Outcome: Ongoing   Tube feed started and tolerating well. Problem: Isolation Precautions - Risk of Spread of Infection  Goal: Prevent transmission of infection  Outcome: Ongoing     Problem: Nutrition Deficits  Goal: Optimize nutrtional status  Outcome: Ongoing     Problem: Risk for Fluid Volume Deficit  Goal: Maintain normal heart rhythm  Outcome: Ongoing  Goal: Maintain absence of muscle cramping  Outcome: Ongoing  Goal: Maintain normal serum potassium, sodium, calcium, phosphorus, and pH  Outcome: Ongoing   Iv fluids infusing and tube feeds started.      Problem: ACTIVITY INTOLERANCE/IMPAIRED MOBILITY  Goal: Mobility/activity is maintained at optimum level for patient  Outcome: Ongoing     Problem: Serum Glucose Level - Abnormal:  Goal: Ability to maintain appropriate glucose levels will improve  Description: Ability to maintain appropriate glucose levels will improve  Outcome: Ongoing     Problem: Restraint Use - Nonviolent/Non-Self-Destructive Behavior:  Goal: Absence of restraint indications  Description: Absence of restraint indications  9/19/2020 0309 by Kilo Marcos RN  Outcome: Ongoing    Goal: Absence of restraint-related injury  Description: Absence of restraint-related injury  Outcome: Ongoing   No noted injury from use of restraints.

## 2020-09-19 NOTE — PROGRESS NOTES
[facility] GI  Gastroenterology Progress Note  Bonnie Pablo is a 67 y.o. male patient. 1. Destiney coma scale total score 13-15, at arrival to emergency department    2. History of seizures    3. QT prolongation    4. Pneumonia due to organism    5. BENOIT (acute kidney injury) ([address]    6. Hypokalemia        SUBJECTIVE:  Tolerating TF at 35 mL/hr with minimal residuals overnight. Some abd tenderness. Physical    VITALS:  /75   Pulse 88   Temp 98.3 °F (36.8 °C) (Temporal)   Resp (!) 31   Ht 5' 8\" (1.727 m)   Wt 156 lb (70.8 kg)   SpO2 97%   BMI 23.72 kg/m²   TEMPERATURE:  Current - Temp: 98.3 °F (36.8 °C); Max - Temp  Av °F (36.7 °C)  Min: 97.6 °F (36.4 °C)  Max: 98.3 °F (36.8 °C)    Abdomen soft, ND, tender over PEG -> bumper loosened to 3 cm. Site otherwise clean and dry without erythema or drainage, rest of abd non tender, no HSM, Bowel sounds present. Data      Recent Labs     20  0140 20  0504 20  1736 20  0308   WBC 6.4 5.5  --  5.8   HGB 6.8* 6.4* 8.0* 7.7*   HCT 19.5* 19.0* 23.4* 22.5*   MCV 89.0 91.2  --  90.8   PLT 95* 91*  --  76*     Recent Labs     20  0621 20  0504 20  0505 20  1736 20  0308   *  --  147*  --  151*   K 3.4*  --  3.3* 3.7 3.6   *  --  113*  --  113*   CO2 32  --  30  --  35*   PHOS  --  2.1*  --   --  2.3*   BUN 46*  --  24*  --  13   CREATININE 0.7*  --  <0.5*  --  <0.5*     No results for input(s): AST, ALT, ALB, BILIDIR, BILITOT, ALKPHOS in the last 72 hours. No results for input(s): LIPASE, AMYLASE in the last 72 hours. ASSESSMENT :     S/p PEG placement 2020 - tolerating TF's at half goal rate with ok electrolytes. Would progress TF's and follow lytes. PEG site looks good, bumper loosened. UGI hemorrhage/Large duodenal ulcer with vessel - s/p EGD  with padlock clip placement on visible vessel - no further bleeding.   Hb stable more-or-less, but would continue to monitor q 12 hrs.      PLAN   :  1) Continue IV Protonix 24 hrs, can change to po/per G-tube form 9/20. 2) Continue TF and advance per Dietician rec's. Will follow from a distance. Please call with questions.     Bony Ricardo MD  [address]  9/19/2020

## 2020-09-19 NOTE — PROGRESS NOTES
SURGERY      BLADDER SURGERY      CYSTOSCOPY N/A 5/15/2019    CYSTOLITHALOPAXY , STONE  SUPRA PUBIC CATHETER EXCHANGE. performed by Jennifer Schofield MD at [facility], ESOPHAGUS      EYE SURGERY      GASTROSTOMY TUBE PLACEMENT N/A 9/18/2020    EGD PEG TUBE PLACEMENT performed by Sana Powell MD at [facility] BLADDER W INSERT SUPRAPUBIC CATH      TONSILLECTOMY      UMBILICAL HERNIA REPAIR N/A 1/15/2019    OPEN PRIMARY UMBILICAL HERNIA REPAIR performed by Taye Acuña MD at [facility] N/A 9/17/2020    EGD CONTROL HEMORRHAGE with application of over the scope clip (star clip) injection of epinephrine 0.5 mg performed by Sana Powell MD at [address] [place] [address]       Current Medications:    thiamine (B-1) 100 mg in sodium chloride 0.9 % 100 mL IVPB, Q24H  dextrose 5 % solution, Continuous  0.9 % sodium chloride bolus, Once  pantoprazole (PROTONIX) injection 40 mg, BID  potassium chloride 10 mEq/100 mL IVPB (Peripheral Line), PRN  albuterol sulfate  (90 Base) MCG/ACT inhaler 2 puff, Q6H PRN  ipratropium-albuterol (DUONEB) nebulizer solution 1 ampule, Q4H PRN  insulin glargine (LANTUS;BASAGLAR) injection pen 5 Units, Nightly  carBAMazepine (TEGRETOL) tablet 200 mg, TID  citalopram (CELEXA) tablet 20 mg, Daily  levetiracetam (KEPPRA) 500 mg/100 mL IVPB, Q12H  traZODone (DESYREL) tablet 25 mg, Nightly  trospium (SANCTURA) tablet 20 mg, BID AC  sodium chloride flush 0.9 % injection 10 mL, 2 times per day  sodium chloride flush 0.9 % injection 10 mL, PRN  acetaminophen (TYLENOL) tablet 650 mg, Q6H PRN    Or  acetaminophen (TYLENOL) suppository 650 mg, Q6H PRN  polyethylene glycol (GLYCOLAX) packet 17 g, Daily PRN  promethazine (PHENERGAN) tablet 12.5 mg, Q6H PRN    Or  ondansetron (ZOFRAN) injection 4 mg, Q6H PRN  glucose (GLUTOSE) 40 % oral gel 15 g, PRN  dextrose 50 % IV solution, PRN  glucagon (rDNA) injection 1 mg, PRN  dextrose 5 % solution, PRN  insulin lispro (1 Unit Dial) 0-6 Units, Q4H  valproate (DEPACON) 250 mg in dextrose 5 % 100 mL IVPB, Q8H        Physical exam:     Vitals:  /75   Pulse 88   Temp 98.3 °F (36.8 °C) (Temporal)   Resp (!) 31   Ht 5' 8\" (1.727 m)   Wt 156 lb (70.8 kg)   SpO2 100%   BMI 23.72 kg/m²   Constitutional:  Not oriented   Skin: no rash, turgor wnl  Heent:  eomi, mmm  Neck: no bruits or jvd noted  Cardiovascular:  S1, S2 without m/r/g  Respiratory: CTA B without w/r/r  Abdomen:  +bs, soft, nt, nd  Ext: no  lower extremity edema      Labs:  CBC:   Recent Labs     09/18/20  0140 09/18/20  0504 09/18/20  1736 09/19/20  0308   WBC 6.4 5.5  --  5.8   HGB 6.8* 6.4* 8.0* 7.7*   PLT 95* 91*  --  76*     BMP:    Recent Labs     09/17/20  0621 09/18/20  0505 09/18/20  1736 09/19/20  0308   * 147*  --  151*   K 3.4* 3.3* 3.7 3.6   * 113*  --  113*   CO2 32 30  --  35*   BUN 46* 24*  --  13   CREATININE 0.7* <0.5*  --  <0.5*   GLUCOSE 99 308*  --  144*     Ca/Mg/Phos:   Recent Labs     09/17/20  0621 09/18/20  0504 09/18/20  0505 09/19/20  0308   CALCIUM 8.2*  --  7.6* 7.9*   MG  --  1.70*  --  2.10   PHOS  --  2.1*  --  2.3*     Hepatic:   No results for input(s): AST, ALT, ALB, BILITOT, ALKPHOS in the last 72 hours. Troponin: No results for input(s): TROPONINI in the last 72 hours. BNP: No results for input(s): BNP in the last 72 hours. Lipids: No results for input(s): CHOL, TRIG, HDL, LDLCALC, LABVLDL in the last 72 hours. ABGs: No results for input(s): PHART, PO2ART, TTI3RSM in the last 72 hours. INR:   Recent Labs     09/17/20  0621 09/18/20  0505 09/19/20  0308   INR 1.61* 1.37* 1.24*     UA:  No results for input(s): [place], GLUCOSEU, BILIRUBINUR, KETUA, SPECGRAV, BLOODU, PHUR, PROTEINU, UROBILINOGEN, NITRU, LEUKOCYTESUR, LABMICR, URINETYPE in the last 72 hours.    Urine Microscopic:   No results for input(s): LABCAST, BACTERIA, COMU, HYALCAST, WBCUA, RBCUA, EPIU in the last 72 hours. Urine Culture:   No results for input(s): LABURIN in the last 72 hours. Urine Chemistry:   No results for input(s): Lieutenant Brisker, PROTEINUR, NAUR in the last 72 hours. IMAGING:  XR CHEST PORTABLE   Final Result   Slightly improved aeration of the lungs with persistent mild opacification in   the right upper and lower lung zones. Small right pleural effusion. CT head without contrast   Final Result   No acute intracranial abnormality. XR CHEST 1 VIEW   Final Result   1. Bilateral perihilar opacities, right greater than left, most consistent   with multifocal pneumonia, to include atypical causes. 2. Stable chronic bibasilar pleural and parenchymal scar. 3. Stable cardiomegaly. I/O last 3 completed shifts: In: 1503.2 [I.V.:510; Blood:429.2; NG/GT:564]  Out: 1425 [Urine:1425]          Assessment/Plan :      1. BENOIT 2/2 pre renal + RAAS blockade   Hold lisinopril. Recommend to dose adjust all medications  based on renal functions  Maintain SBP> 90 mmHg   Daily weights   AVOID NSAIDs  Avoid Nephrotoxins  Monitor Intake/Output      2. HTN. BP controlled . Hold all BP meds     3. Seizure disorder. Neurology following     4. Acid- base disorder. Metabolic alkalosis 2/2 dehydration. Iv fluids     5. Hypokalemia - corrected with supplements     6 . UTI. abx per primary team     7. Hypernatremia. 147 ----> 151 . Started free water flushes today   Will give D5W 500 ml over 4 hrs       8. Anemia. S/p EGD.  GI bleed  HB low   Has had received multiple blood transfusions            Thank you for allowing us to participate in care of Arash Patterson         Electronically signed by: Cecilia Burch MD, 9/19/2020, 11:01 AM      [facility] [address]  Office : [phone]  Fax :[phone]

## 2020-09-19 NOTE — PLAN OF CARE
Problem: Falls - Risk of:  Goal: Will remain free from falls  Description: Will remain free from falls  9/19/2020 1005 by Major Strauss RN  Outcome: Ongoing  9/19/2020 0309 by Kemi Stark RN  Outcome: Ongoing  9/18/2020 2008 by Christin Valencia RN  Outcome: Ongoing  Note: High fall risk. Patient remains free from falls. Patient encouraged to use call light with any needs. Call light in reach, bed alarm on. Problem: Falls - Risk of:  Goal: Absence of physical injury  Description: Absence of physical injury  9/19/2020 1005 by Major Strauss RN  Outcome: Ongoing  9/19/2020 0309 by Kemi Stark RN  Outcome: Ongoing     Problem: Skin Integrity:  Goal: Will show no infection signs and symptoms  Description: Will show no infection signs and symptoms  9/19/2020 1005 by Major Strauss RN  Outcome: Ongoing  9/19/2020 0309 by Kemi Stark RN  Outcome: Ongoing  9/18/2020 2008 by Christin Valencia RN  Outcome: Ongoing  Note: Frequent repositioning, on specialty mattress, mepilex border in place. Problem: Nutrition  Goal: Optimal nutrition therapy  9/19/2020 1005 by Major Strauss RN  Outcome: Ongoing  9/19/2020 0309 by Kemi Stark RN  Outcome: Ongoing     Problem: Airway Clearance - Ineffective  Goal: Achieve or maintain patent airway  9/19/2020 1005 by Major Strauss RN  Outcome: Ongoing  9/19/2020 0309 by Kemi Stark RN  Outcome: Ongoing     Problem: Gas Exchange - Impaired  Goal: Absence of hypoxia  9/19/2020 1005 by Major Strauss RN  Outcome: Ongoing  9/19/2020 0309 by Kemi Stark RN  Outcome: Ongoing  9/18/2020 2008 by Christin Valencia RN  Outcome: Ongoing  Note: On 2 L O2. Will wean as able. Problem: Breathing Pattern - Ineffective  Goal: Ability to achieve and maintain a regular respiratory rate  9/19/2020 1005 by Major Strauss RN  Outcome: Ongoing  9/19/2020 0309 by Kemi Stark RN  Outcome: Ongoing     Problem:  Body Temperature -  Risk of, Imbalanced  Goal: Will regain or maintain usual level of consciousness  9/19/2020 1005 by Mike Dalal RN  Outcome: Ongoing  9/19/2020 0309 by Isaias Prado RN  Outcome: Ongoing     Problem:  Body Temperature -  Risk of, Imbalanced  Goal: Ability to maintain a body temperature within defined limits  9/19/2020 1005 by Mike Dalal RN  Outcome: Ongoing  9/19/2020 0309 by Isaias Prado RN  Outcome: Ongoing     Problem: Isolation Precautions - Risk of Spread of Infection  Goal: Prevent transmission of infection  9/19/2020 1005 by Mike Dalal RN  Outcome: Ongoing  9/19/2020 0309 by Isaias Prado RN  Outcome: Ongoing     Problem: Nutrition Deficits  Goal: Optimize nutrtional status  9/19/2020 1005 by Mike Dalal RN  Outcome: Ongoing  9/19/2020 0309 by Isaias Prado RN  Outcome: Ongoing     Problem: Risk for Fluid Volume Deficit  Goal: Maintain normal heart rhythm  9/19/2020 1005 by Mike Dalal RN  Outcome: Ongoing  9/19/2020 0309 by Isaias Prado RN  Outcome: Ongoing     Problem: Loneliness or Risk for Loneliness  Goal: Demonstrate positive use of time alone when socialization is not possible  9/19/2020 1005 by Mike Dalal RN  Outcome: Ongoing  9/19/2020 0309 by Isaias Prado RN  Outcome: Ongoing     Problem: Fatigue  Goal: Verbalize increase energy and improved vitality  9/19/2020 1005 by Mike Dalal RN  Outcome: Ongoing  9/19/2020 0309 by Isaias Prado RN  Outcome: Ongoing     Problem: Patient Education: Go to Patient Education Activity  Goal: Patient/Family Education  9/19/2020 1005 by Mike Dalal RN  Outcome: Ongoing  9/19/2020 0309 by Isaias Prado RN  Outcome: Ongoing     Problem: Pain:  Goal: Pain level will decrease  Description: Pain level will decrease  9/19/2020 1005 by Mike Dalal RN  Outcome: Ongoing  9/19/2020 0309 by Isaias Prado RN  Outcome: Ongoing Problem: OXYGENATION/RESPIRATORY FUNCTION  Goal: Patient will maintain patent airway  9/19/2020 1005 by Renaldo Bishop RN  Outcome: Ongoing  9/19/2020 0309 by Jorge Guevara RN  Outcome: Ongoing

## 2020-09-19 NOTE — PROGRESS NOTES
Reassessment complete. Patient resting in bed with eyes closed. Tube feed going at 35ml/hr. IVF infusing. Supra pubic catheter draining clear yellow urine. Fall precautions in place. Call light and bedside table within reach. Nonskid footwear on. Will continue to monitor.

## 2020-09-19 NOTE — PROGRESS NOTES
Patient turned and repositioned. Suprapubic catheter emptied. Fall precautions in place. Call light and bedside table within reach. Nonskid footwear on. Will continue to monitor.

## 2020-09-19 NOTE — PROGRESS NOTES
NEUROLOGY FOLLOWUP    HISTORY OF PRESENT ILLNESS :     Star Gonzales is a 67 y.o. male   History was obtained from predominantly from the patient's nurses. Additional history obtained from the dictations in the chart. Patient has not had any further seizures in the hospital..  Patient now has a PEG tube and the nurses were wondering about a seizure medications and any changes. REVIEW OF SYSTEMS     Unable to obtain due to neurological status  Constitutional:  []   Chills   []  Fatigue   []  Fevers   []  Malaise   []  Weight loss     [] Denies all of the above    Respiratory:   []  Cough    []  Shortness of breath         [] Denies all of the above     Cardiovascular:   []  Chest pain    []  Exertional chest pressure/discomfort           [] Palpitations    []  Syncope     [] Denies all of the above    Past Medical History:   Diagnosis Date    Cerebral palsy ([address]     CHF (congestive heart failure) (HCC)     CKD (chronic kidney disease)     Constipation     Convulsions ([address]     COVID-19     Degenerative disease of nervous system ([address]     Delusional disorder ([address]     Dementia ([address]     Depression     Diabetes mellitus ([address]     Encephalopathy     Enlarged prostate with lower urinary tract symptoms (LUTS)     GERD (gastroesophageal reflux disease)     GI obstruction (HCC)     Hematuria     Hyperlipidemia     Hypernatremia     Hypertension     Hypertension     Mood disorder ([address]     MRSA colonization 09/11/2020    Muscle weakness     Muscle weakness (generalized)     Neuromuscular dysfunction of bladder     Neuropathy     Obsessive-compulsive personality disorder (CODE)     Psychosis ([address]     Suprapubic catheter ([address]     #20    Urinary retention      History reviewed. No pertinent family history.   Social History     Socioeconomic History    Marital status: Single     Spouse name: None    Number of children: None    Years of education: None    Highest education level: None   Occupational History    None   Social Needs    Financial resource strain: None    Food insecurity     Worry: None     Inability: None    Transportation needs     Medical: None     Non-medical: None   Tobacco Use    Smoking status: Former Smoker     Types: Cigarettes     Last attempt to quit: 5/13/2005     Years since quitting: 15.3    Smokeless tobacco: Never Used   Substance and Sexual Activity    Alcohol use: No    Drug use: No    Sexual activity: None   Lifestyle    Physical activity     Days per week: None     Minutes per session: None    Stress: None   Relationships    Social connections     Talks on phone: None     Gets together: None     Attends Jewish service: None     Active member of club or organization: None     Attends meetings of clubs or organizations: None     Relationship status: None    Intimate partner violence     Fear of current or ex partner: None     Emotionally abused: None     Physically abused: None     Forced sexual activity: None   Other Topics Concern    None   Social History Narrative    None        PHYSICAL EXAMINATION      /75   Pulse 88   Temp 98.2 °F (36.8 °C) (Temporal)   Resp 18   Ht 5' 8\" (1.727 m)   Wt 156 lb (70.8 kg)   SpO2 100%   BMI 23.72 kg/m²   In-person bedside physical examination deferred. Pursuant to the emergency declaration under the [facility] authority and the [employer] Act, this clinical encounter was conducted to provide necessary medical care.    (Also consistent with new provisions and guidance offered by [facility] on March 18, 2020 in setting of COVID 19 outbreak and in order to preserve personal protective equipment in accordance with the flexibilities announced by CMS on March 30, 2020)   References: https://[email or web address] [facility]/Portals/0/Resources/COVID-19/3_18%20Telemed%20Guidance%20Updated%20March%2018. pdf?[email or web address]                      [email or web address]/Portals/0/Resources/COVID-19/3_18%20Telemed%20Guidance%20Updated%20March%2018. pdf?[email or web address]                      [email or web address] pdf  Notes from other physicians and the nurses were reviewed. DATA :  LABS:  General Labs:    CBC:   Lab Results   Component Value Date    WBC 5.5 09/19/2020    RBC 2.47 09/19/2020    HGB 7.7 09/19/2020    HCT 22.8 09/19/2020    MCV 92.2 09/19/2020    MCH 31.1 09/19/2020    MCHC 33.8 09/19/2020    RDW 16.3 09/19/2020    PLT 71 09/19/2020    MPV 8.8 09/19/2020     BMP:    Lab Results   Component Value Date     09/19/2020    K 3.6 09/19/2020    K 3.9 09/16/2020     09/19/2020    CO2 35 09/19/2020    BUN 13 09/19/2020    LABALBU 2.4 09/16/2020    CREATININE <0.5 09/19/2020    CALCIUM 7.9 09/19/2020    GFRAA >60 09/19/2020    LABGLOM >60 09/19/2020    GLUCOSE 144 09/19/2020     RADIOLOGY REVIEW:  I have reviewed radiology image(s) and reports(s) of: CT scan of the head      IMPRESSION :  Breakthrough seizure in a patient with known seizure disorder  Urinary infection  Patient now has a PEG tube. Patient Active Problem List   Diagnosis    Intellectual disability    [facility]    Acute cystitis with hematuria    Nausea and vomiting    Kidney stone    Urinary retention    Hematuria    Gross hematuria    Seizure [facility])       RECOMMENDATIONS :  Discussed with patient's nurse  I will stop the Keppra and the Depakote. I would recommend that we go back to his home medications for seizures namely Tegretol and lamotrigine at the doses that he was getting before admission. This can be given through the PEG tube. Please note a portion of this chart was generated using dragon dictation software.  Although every effort was made to ensure the accuracy of this automated transcription, some errors in transcription may have occurred.          Natalie Bernheim M.D.

## 2020-09-19 NOTE — PROGRESS NOTES
Per Dr. Danilo Goldberg, give potassium phosphate, then recheck potassium level before replacing per prn potassium sliding scale.

## 2020-09-19 NOTE — PROGRESS NOTES
Tube feed increased to 45 ml per dietary orders. Will administer 160 fluid bolus Q 4 hrs and increase tube feed by 10 ml every 4 hrs as tolerated to goal of 70 ml/hr.

## 2020-09-19 NOTE — PROGRESS NOTES
Patient assessment completed and morning medications given. Vitals are stable. Patient's RR elevated. Patient repositioned upright, RR now 22. Audible wheezing heard. Will request prn breathing treatment from respiratory. Oxygen saturation is stable on 2 L of Oxygen. Restraints in place. Tube feed infusing continuously at 35 ml/hr. Patient tolerating well, no residual. Abdominal binder and restraints in place. Patient disoriented x4. Dextrose 5% infusing continuously. Camera in room. Bed alarm on. Seizure precautions in place. Will continue to monitor and reassess.

## 2020-09-19 NOTE — PROGRESS NOTES
PM assessment complete and documented. Meds given per MAR. Tolerating GT feeds with minimal residuals. Suprapubic catheter secure and draining without difficulty. Restraints as documented. Will continue to monitor.

## 2020-09-19 NOTE — PROGRESS NOTES
Dr. Shaggy Carranza at bedside. Discussed h&h dropping again. Currently stable at 7.7 and 22.5. Will continue to monitor for bleeding and attempt to send blood occult down if patient has bowel movement.

## 2020-09-19 NOTE — PROGRESS NOTES
Hospitalist Progress Note      PCP: Rodell Galeazzi, MD    Date of Admission: 9/10/2020    Chief Complaint: Seizure      Subjective: Patient seen and examined today; status post PEG placement on 9/18     started on tube feeds  IV Keppra & Depakote d/c; started back on Tegretol and lamotrigine per neurology recs  Serum sodium up to 151; free water boluses every 4 hours  Hemoglobin stable around 8; FOBT ordered    Medications:  Reviewed      Exam:    BP (!) 168/75   Pulse 74   Temp 98.1 °F (36.7 °C) (Temporal)   Resp 25   Ht 5' 8\" (1.727 m)   Wt 156 lb (70.8 kg)   SpO2 98%   BMI 23.72 kg/m²     General appearance: No apparent distress, appears stated age  HEENT: Pupils equal, round, and reactive to light. Conjunctivae/corneas clear. Neck: Supple, with full range of motion. No jugular venous distention. Trachea midline. Respiratory: Bibasal crepitations RALES/WHEEZES/Rhonchi. Cardiovascular: Regular rate and rhythm with normal S1/S2 without MURMURS, rubs or gallops. Abdomen: Soft, non-tender, non-distended with normal bowel sounds. Musculoskeletal: No clubbing, cyanosis or  Skin:  color, texture, turgor normal.  No rashes or lesions. Neurologic: Difficult exam as patient is not fully cooperative    Labs:   Recent Labs     09/19/20  0308 09/19/20  1116 09/19/20  1756   WBC 5.8 5.5 6.9   HGB 7.7* 7.7* 8.2*   HCT 22.5* 22.8* 24.0*   PLT 76* 71* 76*     Recent Labs     09/17/20  0621 09/18/20  0504 09/18/20  0505 09/18/20  1736 09/19/20  0308   *  --  147*  --  151*   K 3.4*  --  3.3* 3.7 3.6   *  --  113*  --  113*   CO2 32  --  30  --  35*   BUN 46*  --  24*  --  13   CREATININE 0.7*  --  <0.5*  --  <0.5*   CALCIUM 8.2*  --  7.6*  --  7.9*   PHOS  --  2.1*  --   --  2.3*     No results for input(s): AST, ALT, BILIDIR, BILITOT, ALKPHOS in the last 72 hours.   Recent Labs     09/17/20  0621 09/18/20  0505 09/19/20  0308   INR 1.61* 1.37* 1.24*     No results for input(s): John Sarmiento in the last 72 hours. Urinalysis:      Lab Results   Component Value Date    NITRU Negative 09/15/2020    WBCUA 2 09/15/2020    BACTERIA 4+ 09/10/2020    RBCUA 0-2 09/15/2020    BLOODU SMALL 09/15/2020    SPECGRAV 1.018 09/15/2020    GLUCOSEU Negative 09/15/2020       Radiology:  XR CHEST PORTABLE   Final Result   Slightly improved aeration of the lungs with persistent mild opacification in   the right upper and lower lung zones. Small right pleural effusion. CT head without contrast   Final Result   No acute intracranial abnormality. XR CHEST 1 VIEW   Final Result   1. Bilateral perihilar opacities, right greater than left, most consistent   with multifocal pneumonia, to include atypical causes. 2. Stable chronic bibasilar pleural and parenchymal scar. 3. Stable cardiomegaly. Assessment/Plan:    Active Hospital Problems    Diagnosis Date Noted    Seizure [facility]) [R56.9] 09/10/2020       This 77-year-old gentleman known cognitive disability wheelchair-bound suprapubic catheter known seizure disorder admitted to the hospital after a witnessed seizure     GI bleed: s/p EGD; 2 bulbar ulcers; 1 with clot and V.V. Epi injected & Clipped  S/p 5 units of PRBCs and 3 units of FFP   Protonix drip discontinued; switch to Protonix 40 mg twice daily   -FOBT ordered  -H&H every 8 hours; will continue to monitor closely and transfuse as needed    Thrombocytopenia: Platelets down to 76  Continue to monitor closely  D-dimer, fibrinogen ordered  Off anticoagulation secondary to GIB    Seizure disorder: CT head on admission negative for any acute pathology;    Neurology on board appreciate their recs;  Keppra and Depakote d/c    started back on Tegretol and lamotrigine     Acute encephalopathy: Likely 2/2 to underlying infection/baseline mental dementia     SARS-CoV-2 infection and right lung aspiration pneumonia:   - oxygen requirement has been stable around 2 L with sats between 95 to 96%  -Status post 7-day course of Zyvox and Unasyn; patient afebrile, WBCs within normal limits and all the cultures have been negative to date     Acute kidney injury; suspect prerenal ; improved  Continue IV fluids since npo   Strict intake and output  Nephrology following; appreciate their recs    Hypokalemia: Resolved    Type 2 diabetes mellitus: Still refusing to take p.o.  -Continue Lantus 5 units and low sliding scale insulin    Neurogenic bladder with urinary incontinence:  Urology on board appreciate their recs;  Herman to ostomy bag  - will change suprapubic catheter size once COVID-19 is negative  -Continue Sanctura    Suspected UTIs with underlying suprapubic catheter  -Has been  on IV Unasyn; will DC it tomorrow as he is urine culture revealed mixed pathogens and repeat UA not indicative of infection    DVT Prophylaxis: lovenox discontinued due to GI bleed  Diet: DIET TUBE FEED CONTINUOUS/CYCLIC NPO; STANDARD WITHOUT FIBER; Gastrostomy; Continuous  Code Status: Full Code      Dispo - once acute medical processes have resolved    Cesia Queen MD

## 2020-09-20 NOTE — PROGRESS NOTES
Hospitalist Progress Note      PCP: Siena Whittaker MD    Date of Admission: 9/10/2020    Chief Complaint: Seizure      Subjective: Patient seen and examined today. Hemodynamically stable; no acute event noted overnight  status post PEG placement on 9/18: Tolerating tube feeds  Serum sodium improved; free water boluses every 4 hours  Hemoglobin stable around 8; back on s/c Lovenox (elevated d-dimer)    Medications:  Reviewed      Exam:    BP (!) 146/67   Pulse 77   Temp 98.1 °F (36.7 °C) (Temporal)   Resp 23   Ht 5' 8\" (1.727 m)   Wt 158 lb 3.2 oz (71.8 kg)   SpO2 98%   BMI 24.05 kg/m²     General appearance: No apparent distress, appears stated age  HEENT: Pupils equal, round, and reactive to light. Conjunctivae/corneas clear. Neck: Supple, with full range of motion. No jugular venous distention. Trachea midline. Respiratory: Bibasal crepitations RALES/WHEEZES/Rhonchi. Cardiovascular: Regular rate and rhythm with normal S1/S2 without MURMURS, rubs or gallops. Abdomen: Soft, non-tender, non-distended with normal bowel sounds. Musculoskeletal: No clubbing, cyanosis or  Skin:  color, texture, turgor normal.  No rashes or lesions. Neurologic: Difficult exam as patient is not fully cooperative    Labs:   Recent Labs     09/19/20  1756 09/20/20  0118 09/20/20  1404   WBC 6.9 8.8 6.6   HGB 8.2* 8.4* 8.0*   HCT 24.0* 24.7* 23.1*   PLT 76* 85* 83*     Recent Labs     09/18/20  0504  09/18/20  0505 09/18/20  1736 09/19/20  0308 09/20/20  0529   NA  --   --  147*  --  151* 145   K  --    < > 3.3* 3.7 3.6 3.7   CL  --   --  113*  --  113* 106   CO2  --   --  30  --  35* 34*   BUN  --   --  24*  --  13 9   CREATININE  --   --  <0.5*  --  <0.5* <0.5*   CALCIUM  --   --  7.6*  --  7.9* 8.2*   PHOS 2.1*  --   --   --  2.3* 3.1    < > = values in this interval not displayed. No results for input(s): AST, ALT, BILIDIR, BILITOT, ALKPHOS in the last 72 hours.   Recent Labs     09/18/20  0508 09/19/20  1387 09/20/20  0529   INR 1.37* 1.24* 1.02     No results for input(s): CKTOTAL, TROPONINI in the last 72 hours. Urinalysis:      Lab Results   Component Value Date    NITRU Negative 09/15/2020    WBCUA 2 09/15/2020    BACTERIA 4+ 09/10/2020    RBCUA 0-2 09/15/2020    BLOODU SMALL 09/15/2020    SPECGRAV 1.018 09/15/2020    GLUCOSEU Negative 09/15/2020       Radiology:  XR CHEST PORTABLE   Final Result   Slightly improved aeration of the lungs with persistent mild opacification in   the right upper and lower lung zones. Small right pleural effusion. CT head without contrast   Final Result   No acute intracranial abnormality. XR CHEST 1 VIEW   Final Result   1. Bilateral perihilar opacities, right greater than left, most consistent   with multifocal pneumonia, to include atypical causes. 2. Stable chronic bibasilar pleural and parenchymal scar. 3. Stable cardiomegaly. Assessment/Plan:    Active Hospital Problems    Diagnosis Date Noted    Seizure [facility]) [R56.9] 09/10/2020       This 76-year-old gentleman known cognitive disability wheelchair-bound suprapubic catheter known seizure disorder admitted to the hospital after a witnessed seizure     GI bleed: s/p EGD; 2 bulbar ulcers; 1 with clot and V.V. Epi injected & Clipped  S/p 5 units of PRBCs and 3 units of FFP   Protonix drip discontinued; switch to Protonix 40 mg twice daily   -FOBT ordered  -H&H every 8 hours; will continue to monitor closely and transfuse as needed    Thrombocytopenia: Platelets stable around 83  Continue to monitor closely    Seizure disorder: CT head on admission negative for any acute pathology;    Neurology on board appreciate their recs;  Keppra and Depakote d/c    started back on Tegretol and lamotrigine     Acute encephalopathy: Likely 2/2 to underlying infection/baseline mental dementia     SARS-CoV-2 infection and right lung aspiration pneumonia:   - oxygen requirement has been stable around 2 L with sats between 95 to 96%  -s/p dexamethasone and 7-day course of Zyvox and Unasyn; patient afebrile, WBCs within normal limits and all the cultures have been negative to date     Acute kidney injury; suspect prerenal ; improved  Continue IV fluids since npo   Strict intake and output  Nephrology following; appreciate their recs    Hypokalemia: Resolved    Type 2 diabetes mellitus: Still refusing to take p.o.  -Continue Lantus 5 units and low sliding scale insulin    Neurogenic bladder with urinary incontinence:  Urology on board appreciate their recs;  Herman to ostomy bag  - will change suprapubic catheter size once COVID-19 is negative  -Continue Sanctura    Suspected UTIs with underlying suprapubic catheter  -Has been  on IV Unasyn; will DC it tomorrow as he is urine culture revealed mixed pathogens and repeat UA not indicative of infection    DVT Prophylaxis: lovenox discontinued due to GI bleed  Diet: DIET TUBE FEED CONTINUOUS/CYCLIC NPO; STANDARD WITHOUT FIBER; Gastrostomy; Continuous  Code Status: Full Code      Dispo - once acute medical processes have resolved    Norma Watson MD

## 2020-09-20 NOTE — PROGRESS NOTES
Assessment completed. Pt. Pulling on and removing O2 tubing. Unable to redirect. SpO2 dropping to 85% on RA. Order for bilat. wrist restraints obtained. Restraints in place.

## 2020-09-20 NOTE — PROGRESS NOTES
Office : [phone]     Fax :[phone]       Nephrology progress Note          Chief Complaint:    Chief Complaint   Patient presents with    Altered Mental Status     Altered mental status started yesterday. Pt brought in from [facility] home. History of Present Ilness:    Marilee Benitez is a 67 y.o. male with h/o Dementia, renal ds, BPH was transfered from [facility] where he had a witnessed seizure. He is awake but not oriented . All history gathered from the records from [facility].    His initial lab work shows elevated BUN and creatinine   Has a singh in place     Interval Hx       Sodium level better from 154---> 147 ---> 151 ----> 145  renal function improved   Making urine    Hb stabilized       Past Medical History:   Diagnosis Date    Cerebral palsy ([address]     CHF (congestive heart failure) (HCC)     CKD (chronic kidney disease)     Constipation     Convulsions ([address]     COVID-19     Degenerative disease of nervous system ([address]     Delusional disorder ([address]     Dementia ([address]     Depression     Diabetes mellitus ([address]     Encephalopathy     Enlarged prostate with lower urinary tract symptoms (LUTS)     GERD (gastroesophageal reflux disease)     GI obstruction (HCC)     Hematuria     Hyperlipidemia     Hypernatremia     Hypertension     Hypertension     Mood disorder ([address]     MRSA colonization 09/11/2020    Muscle weakness     Muscle weakness (generalized)     Neuromuscular dysfunction of bladder     Neuropathy     Obsessive-compulsive personality disorder (CODE)     Psychosis ([address]     Suprapubic catheter ([address]     #20    Urinary retention        Past Surgical History:   Procedure Laterality Date    ARM SURGERY      BLADDER SURGERY      CYSTOSCOPY N/A 5/15/2019    CYSTOLITHALOPAXY , STONE  SUPRA PUBIC CATHETER EXCHANGE. performed by Laxmi Mathew MD at [address] [facility], ESOPHAGUS      EYE SURGERY      GASTROSTOMY TUBE PLACEMENT N/A 9/18/2020    EGD PEG TUBE PLACEMENT performed by Arsh Ryan MD at [facility] BLADDER W INSERT SUPRAPUBIC CATH      TONSILLECTOMY      UMBILICAL HERNIA REPAIR N/A 1/15/2019    OPEN PRIMARY UMBILICAL HERNIA REPAIR performed by Tiffanie Chirinos MD at [address] [facility] N/A 9/17/2020    EGD CONTROL HEMORRHAGE with application of over the scope clip (star clip) injection of epinephrine 0.5 mg performed by Arsh Ryan MD at [address] [place] [address]       Current Medications:    enoxaparin (LOVENOX) injection 40 mg, Daily  lamoTRIgine (LAMICTAL) tablet 100 mg, TID  thiamine (B-1) 100 mg in sodium chloride 0.9 % 100 mL IVPB, Q24H  dextrose 5 % solution, Continuous  0.9 % sodium chloride bolus, Once  pantoprazole (PROTONIX) injection 40 mg, BID  potassium chloride 10 mEq/100 mL IVPB (Peripheral Line), PRN  albuterol sulfate  (90 Base) MCG/ACT inhaler 2 puff, Q6H PRN  ipratropium-albuterol (DUONEB) nebulizer solution 1 ampule, Q4H PRN  insulin glargine (LANTUS;BASAGLAR) injection pen 5 Units, Nightly  carBAMazepine (TEGRETOL) tablet 200 mg, TID  citalopram (CELEXA) tablet 20 mg, Daily  traZODone (DESYREL) tablet 25 mg, Nightly  trospium (SANCTURA) tablet 20 mg, BID AC  sodium chloride flush 0.9 % injection 10 mL, 2 times per day  sodium chloride flush 0.9 % injection 10 mL, PRN  acetaminophen (TYLENOL) tablet 650 mg, Q6H PRN    Or  acetaminophen (TYLENOL) suppository 650 mg, Q6H PRN  polyethylene glycol (GLYCOLAX) packet 17 g, Daily PRN  promethazine (PHENERGAN) tablet 12.5 mg, Q6H PRN    Or  ondansetron (ZOFRAN) injection 4 mg, Q6H PRN  glucose (GLUTOSE) 40 % oral gel 15 g, PRN  dextrose 50 % IV solution, PRN  glucagon (rDNA) injection 1 mg, PRN  dextrose 5 % solution, PRN  insulin lispro (1 Unit Dial) 0-6 Units, Q4H        Physical exam:     Vitals:  BP (!) 152/63   Pulse 82   Temp 97.3 °F (36.3 °C) (Temporal)   Resp 25   Ht 5' 8\" (1.727 m)   Wt 158 lb 3.2 oz (71.8 kg)   SpO2 100%   BMI 24.05 kg/m²   Constitutional:  Not oriented   Skin: no rash, turgor wnl  Heent:  eomi, mmm  Neck: no bruits or jvd noted  Cardiovascular:  S1, S2 without m/r/g  Respiratory: CTA B without w/r/r  Abdomen:  +bs, soft, nt, nd  Ext: no  lower extremity edema      Labs:  CBC:   Recent Labs     09/19/20  1116 09/19/20  1756 09/20/20  0118   WBC 5.5 6.9 8.8   HGB 7.7* 8.2* 8.4*   PLT 71* 76* 85*     BMP:    Recent Labs     09/18/20  0505 09/18/20  1736 09/19/20  0308 09/20/20  0529   *  --  151* 145   K 3.3* 3.7 3.6 3.7   *  --  113* 106   CO2 30  --  35* 34*   BUN 24*  --  13 9   CREATININE <0.5*  --  <0.5* <0.5*   GLUCOSE 308*  --  144* 142*     Ca/Mg/Phos:   Recent Labs     09/18/20  0504 09/18/20  0505 09/19/20  0308 09/20/20  0529   CALCIUM  --  7.6* 7.9* 8.2*   MG 1.70*  --  2.10 2.00   PHOS 2.1*  --  2.3* 3.1     Hepatic:   No results for input(s): AST, ALT, ALB, BILITOT, ALKPHOS in the last 72 hours. Troponin: No results for input(s): TROPONINI in the last 72 hours. BNP: No results for input(s): BNP in the last 72 hours. Lipids: No results for input(s): CHOL, TRIG, HDL, LDLCALC, LABVLDL in the last 72 hours. ABGs: No results for input(s): PHART, PO2ART, JDS2EPC in the last 72 hours. INR:   Recent Labs     09/18/20  0505 09/19/20  0308 09/20/20  0529   INR 1.37* 1.24* 1.02     UA:  No results for input(s): Evlyn Gaudy, GLUCOSEU, BILIRUBINUR, KETUA, SPECGRAV, BLOODU, PHUR, PROTEINU, UROBILINOGEN, NITRU, LEUKOCYTESUR, LABMICR, URINETYPE in the last 72 hours. Urine Microscopic:   No results for input(s): LABCAST, BACTERIA, COMU, HYALCAST, WBCUA, RBCUA, EPIU in the last 72 hours.   Urine Culture:   No results for input(s): LABURIN in the last 72 hours. Urine Chemistry:   No results for input(s): Kelton Factor, PROTEINUR, NAUR in the last 72 hours. IMAGING:  XR CHEST PORTABLE   Final Result   Slightly improved aeration of the lungs with persistent mild opacification in   the right upper and lower lung zones. Small right pleural effusion. CT head without contrast   Final Result   No acute intracranial abnormality. XR CHEST 1 VIEW   Final Result   1. Bilateral perihilar opacities, right greater than left, most consistent   with multifocal pneumonia, to include atypical causes. 2. Stable chronic bibasilar pleural and parenchymal scar. 3. Stable cardiomegaly. I/O last 3 completed shifts: In: 4030 [I.V.:864; NG/GT:1915]  Out: 725 [Urine:725]          Assessment/Plan :      1. BENOIT 2/2 pre renal + RAAS blockade   Hold lisinopril. Recommend to dose adjust all medications  based on renal functions  Maintain SBP> 90 mmHg   Daily weights   AVOID NSAIDs  Avoid Nephrotoxins  Monitor Intake/Output      2. HTN. BP controlled . Hold all BP meds     3. Seizure disorder. Neurology following     4. Acid- base disorder. Metabolic alkalosis 2/2 dehydration. Iv fluids     5. Hypokalemia - corrected with supplements     6 . UTI. abx per primary team     7. Hypernatremia. 147 ----> 151 ----> 145   Started free water flushes today      8. Anemia. S/p EGD.  GI bleed  HB low   Has had received multiple blood transfusions            Thank you for allowing us to participate in care of Juanjose Chavarria         Electronically signed by: Parag Rincon MD, 9/20/2020, 8:43 AM      [facility] [address]  Office : [phone]  Fax :[phone]

## 2020-09-20 NOTE — PROGRESS NOTES
Pt alert to self. VS WNL. Shift assessment completed. See doc flowsheet. Pt tolerating increase to Peg tube continuous feeding amount. Pt in bed with call light within reach. Will continue to monitor.

## 2020-09-21 NOTE — PROGRESS NOTES
Hospitalist Progress Note      PCP: Camilo Malhotra MD    Date of Admission: 9/10/2020    Chief Complaint: Seizure      Subjective: Patient seen and examined today. Hemodynamically stable; no acute event noted overnight  status post PEG placement on 9/18: Tolerating tube feeds  Urine sodium remained stable  Hemoglobin 7.6  No evidence of active bleeding      Medications:  Reviewed      Exam:    BP (!) 166/76   Pulse 84   Temp 97.9 °F (36.6 °C) (Temporal)   Resp 22   Ht 5' 8\" (1.727 m)   Wt 160 lb 4.8 oz (72.7 kg)   SpO2 94%   BMI 24.37 kg/m²     General appearance: No apparent distress, HEENT: Pupils equal, round, and reactive to light. Conjunctivae/corneas clear. Neck: Supple, with full range of motion. No jugular venous distention. Trachea midline. Respiratory: Bibasal crepitations RALES/WHEEZES/Rhonchi. Cardiovascular: Regular rate and rhythm with normal S1/S2 without MURMURS, rubs or gallops. Abdomen: Soft, non-tender, non-distended with normal bowel sounds. PEG tube in place  Musculoskeletal: No clubbing, cyanosis or  Skin:  color, texture, turgor normal.  No rashes or lesions. Neurologic: Only able to give me his name otherwise disoriented in time place and person  Moving all 4 extremities  Difficult exam as patient is not fully cooperative    Labs:   Recent Labs     09/20/20  1717 09/21/20  0139 09/21/20  0941   WBC 7.7 7.5 8.4   HGB 8.0* 8.0* 7.6*   HCT 23.7* 24.0* 22.7*   PLT 88* 80* 93*     Recent Labs     09/19/20  0308 09/20/20  0529 09/21/20  0500   * 145 146*   K 3.6 3.7 3.9   * 106 104   CO2 35* 34* 40*   BUN 13 9 8   CREATININE <0.5* <0.5* <0.5*   CALCIUM 7.9* 8.2* 8.3   PHOS 2.3* 3.1 2.8     No results for input(s): AST, ALT, BILIDIR, BILITOT, ALKPHOS in the last 72 hours. Recent Labs     09/19/20  0308 09/20/20  0529 09/21/20  0500   INR 1.24* 1.02 0.92     No results for input(s): Ky Levine in the last 72 hours.     Urinalysis:      Lab Results Component Value Date    NITRU Negative 09/15/2020    WBCUA 2 09/15/2020    BACTERIA 4+ 09/10/2020    RBCUA 0-2 09/15/2020    BLOODU SMALL 09/15/2020    SPECGRAV 1.018 09/15/2020    GLUCOSEU Negative 09/15/2020       Radiology:  XR CHEST PORTABLE   Final Result   Slightly improved aeration of the lungs with persistent mild opacification in   the right upper and lower lung zones. Small right pleural effusion. CT head without contrast   Final Result   No acute intracranial abnormality. XR CHEST 1 VIEW   Final Result   1. Bilateral perihilar opacities, right greater than left, most consistent   with multifocal pneumonia, to include atypical causes. 2. Stable chronic bibasilar pleural and parenchymal scar. 3. Stable cardiomegaly. Assessment/Plan:    Active Hospital Problems    Diagnosis Date Noted    Seizure [facility]) [R56.9] 09/10/2020       This 78-year-old gentleman known cognitive disability wheelchair-bound suprapubic catheter known seizure disorder admitted to the hospital after a witnessed seizure     Acute GI bleed: s/p EGD; 2 bulbar ulcers; 1 with clot and V.V. Epi injected & Clipped  S/p 5 units of PRBCs and 3 units of FFP   Protonix drip discontinued; switch to Protonix 40 mg twice daily   We will continue to monitor hemoglobin will discontinue every 8 hours  Change pantoprazole to oral twice daily    Thrombocytopenia: Platelets stable around 83  Continue to monitor closely    Seizure disorder: CT head on admission negative for any acute pathology;    Neurology on board appreciate their recs;  Keppra and Depakote d/c    started back on Tegretol and lamotrigine     Delirium on baseline of dementia he does not have encephalopathy     SARS-CoV-2 infection and right lung aspiration pneumonia:   - oxygen requirement has been stable around 2 L with sats between 95 to 96%  -s/p dexamethasone and 7-day course of Zyvox and Unasyn; patient afebrile, WBCs within normal limits and all the cultures have been negative to date     Acute kidney injury; suspect prerenal ; improved  On free water boluses    Hypokalemia: Resolved    Type 2 diabetes mellitus:   On Lantus and sliding scale will change over to medications through his PEG tube    Neurogenic bladder with urinary incontinence:  Urology on board appreciate their recs;  Herman to ostomy bag  - will change suprapubic catheter size once COVID-19 is negative  -Continue Sanctura    Suspected UTIs with underlying suprapubic catheter  -Has been  on IV Unasyn; antibiotics stopped as he is urine culture revealed mixed pathogens and repeat UA not indicative of infection    DVT Prophylaxis: lovenox discontinued due to GI bleed  Diet: DIET TUBE FEED CONTINUOUS/CYCLIC NPO; STANDARD WITHOUT FIBER; Gastrostomy; Continuous  Code Status: Full Code      Dispo - once acute medical processes have resolved    Yuri Herzog MD

## 2020-09-21 NOTE — PROGRESS NOTES
Dr. Ramachandran Ready on unit and discussed pt last BM on 9/18/20. No new orders given. Will cont. To monitor.

## 2020-09-21 NOTE — PROGRESS NOTES
Pt shift assessment completed. Pt is alert and confused . Doesn't appear to be in pain,pt is in restraint for pull lines,. .Pt respiration are regular and unlabored and on 2L of oxygen. Breath sounds diminished. Fluids infusing in right PIV. Scheduled medications given as ordered. Patient encouraged to use call light with any needs. Patient states understanding, call light in reach, bed alarm on. All safety checks in place and will continue to monitor pt.

## 2020-09-21 NOTE — PROGRESS NOTES
Comprehensive Nutrition Assessment    Type and Reason for Visit:  Reassess    Nutrition Recommendations/Plan:   Continue Osmolite 1.2 @ 70 mL/hr. Water per nephrology: 200mL q4 hr.    Nutrition Assessment:  Pt is nutritionally stable AEB report from RD of pt tolerating Osmolite 1.2 @ goal rate of 70 mL/hr. Lytes good. Nephrology has increased water bolus to 200 mL q4 hr and stopped IV D5%. Na+ 146. May need to modify tube feed to Glucerna 1.2, will follow glucose. Malnutrition Assessment:  Malnutrition Status:  Insufficient data(NFPE deferred d/t COVID-18 positive)        Estimated Daily Nutrient Needs:  Energy (kcal):  6120 - 2100 (25-30 kcal/70kg); Weight Used for Energy Requirements:  Admission     Protein (g):   (1.3 - 1.6g/70kg); Weight Used for Protein Requirements:  Admission        Fluid (ml/day):  1 mL/kcal; Weight Used for Fluid Requirements:  Current      Nutrition Related Findings:  +BM 9/19; thiamine 100mg daily; 9/21: Na+ 146, K+ 3.9, Mg 2.0, phos 2.8, gluc 156, 146; +1 pitting BLE edema; active BS; disoriented x4; +16 liters since admission      Wounds:  Stage I       Current Nutrition Therapies:    DIET TUBE FEED CONTINUOUS/CYCLIC NPO; STANDARD WITHOUT FIBER; Gastrostomy; Continuous  Current Tube Feeding (TF) Orders:  · Feeding Route: PEG  · Formula: Standard without Fiber  · Schedule: Continuous  · Water Flushes: 200 mL q4 hr  · Current TF & Flush Orders Provides: Osmolite 1.2 @ 70 mL/hr = 1680 mL total volume, 2016 kcal, 93g protein, 1378 mL free water  · Goal TF & Flush Orders Provides: as above      Anthropometric Measures:  · Height: 5' 8\" (172.7 cm)  · Current Body Weight: 160 lb (72.6 kg)   · Admission Body Weight: 154 lb (69.9 kg)    · Ideal Body Weight: 154 lbs; % Ideal Body Weight 101.9 %   · BMI: 24.3  · BMI Categories: Normal Weight (BMI 18.5-24. 9)       Nutrition Diagnosis:   · Increased nutrient needs related to increase demand for energy/nutrients as evidenced by wounds      Nutrition Interventions:   Food and/or Nutrient Delivery:  Continue Current Tube Feeding  Coordination of Nutrition Care:  Continued Inpatient Monitoring    Goals:  continued liu to tube feed at goal rate       Nutrition Monitoring and Evaluation:   Food/Nutrient Intake Outcomes:  Enteral Nutrition Intake/Tolerance  Physical Signs/Symptoms Outcomes:  Biochemical Data, Weight     Discharge Planning:     Too soon to determine     Electronically signed by Carie Crenshaw RD, LD on 9/21/20 at 11:39 AM EDT  Contact: [phone]

## 2020-09-21 NOTE — PLAN OF CARE
Problem: Falls - Risk of:  Goal: Will remain free from falls  Description: Will remain free from falls  Outcome: Ongoing  Goal: Absence of physical injury  Description: Absence of physical injury  Outcome: Ongoing     Problem: Skin Integrity:  Goal: Will show no infection signs and symptoms  Description: Will show no infection signs and symptoms  Outcome: Ongoing  Goal: Absence of new skin breakdown  Description: Absence of new skin breakdown  Outcome: Ongoing     Problem: Nutrition  Goal: Optimal nutrition therapy  9/21/2020 1807 by Doreen Cox RN  Outcome: Ongoing  9/21/2020 1136 by Yamila Carrillo RD, LD  Outcome: Ongoing  9/21/2020 1136 by Yamila Carrillo RD, LD  Outcome: Ongoing     Problem: Airway Clearance - Ineffective  Goal: Achieve or maintain patent airway  Outcome: Ongoing     Problem: Gas Exchange - Impaired  Goal: Absence of hypoxia  Outcome: Ongoing  Goal: Promote optimal lung function  Outcome: Ongoing     Problem: Breathing Pattern - Ineffective  Goal: Ability to achieve and maintain a regular respiratory rate  Outcome: Ongoing     Problem:  Body Temperature -  Risk of, Imbalanced  Goal: Ability to maintain a body temperature within defined limits  Outcome: Ongoing  Goal: Will regain or maintain usual level of consciousness  Outcome: Ongoing  Goal: Complications related to the disease process, condition or treatment will be avoided or minimized  Outcome: Ongoing     Problem: Isolation Precautions - Risk of Spread of Infection  Goal: Prevent transmission of infection  Outcome: Ongoing     Problem: Nutrition Deficits  Goal: Optimize nutrtional status  Outcome: Ongoing     Problem: Risk for Fluid Volume Deficit  Goal: Maintain normal heart rhythm  Outcome: Ongoing  Goal: Maintain absence of muscle cramping  Outcome: Ongoing  Goal: Maintain normal serum potassium, sodium, calcium, phosphorus, and pH  Outcome: Ongoing     Problem: Loneliness or Risk for Loneliness  Goal: Demonstrate positive use of time alone when socialization is not possible  Outcome: Ongoing     Problem: Fatigue  Goal: Verbalize increase energy and improved vitality  Outcome: Ongoing     Problem: Patient Education: Go to Patient Education Activity  Goal: Patient/Family Education  Outcome: Ongoing     Problem: Pain:  Goal: Pain level will decrease  Description: Pain level will decrease  Outcome: Ongoing  Goal: Control of acute pain  Description: Control of acute pain  Outcome: Ongoing  Goal: Control of chronic pain  Description: Control of chronic pain  Outcome: Ongoing     Problem: OXYGENATION/RESPIRATORY FUNCTION  Goal: Patient will maintain patent airway  Outcome: Ongoing  Goal: Patient will achieve/maintain normal respiratory rate/effort  Description: Respiratory rate and effort will be within normal limits for the patient  Outcome: Ongoing     Problem: HEMODYNAMIC STATUS  Goal: Patient has stable vital signs and fluid balance  Outcome: Ongoing     Problem: FLUID AND ELECTROLYTE IMBALANCE  Goal: Fluid and electrolyte balance are achieved/maintained  Outcome: Ongoing     Problem: ACTIVITY INTOLERANCE/IMPAIRED MOBILITY  Goal: Mobility/activity is maintained at optimum level for patient  Outcome: Ongoing     Problem: Discharge Planning:  Goal: Discharged to appropriate level of care  Description: Discharged to appropriate level of care  Outcome: Ongoing     Problem: Serum Glucose Level - Abnormal:  Goal: Ability to maintain appropriate glucose levels will improve  Description: Ability to maintain appropriate glucose levels will improve  Outcome: Ongoing     Problem: Sensory Perception - Impaired:  Goal: Ability to maintain a stable neurologic state will improve  Description: Ability to maintain a stable neurologic state will improve  Outcome: Ongoing     Problem: Restraint Use - Nonviolent/Non-Self-Destructive Behavior:  Goal: Absence of restraint indications  Description: Absence of restraint indications  Outcome: Ongoing  Goal: Absence of restraint-related injury  Description: Absence of restraint-related injury  Outcome: Ongoing

## 2020-09-21 NOTE — PLAN OF CARE
Problem: Falls - Risk of:  Goal: Will remain free from falls  Description: Will remain free from falls  9/21/2020 0115 by Oriana Ramirez RN  Outcome: Ongoing  9/20/2020 1814 by Rand Callejas RN  Outcome: Ongoing  Goal: Absence of physical injury  Description: Absence of physical injury  9/21/2020 0115 by Oriana Ramirez RN  Outcome: Ongoing  9/20/2020 1814 by Rand Callejas RN  Outcome: Ongoing     Problem: Skin Integrity:  Goal: Will show no infection signs and symptoms  Description: Will show no infection signs and symptoms  9/21/2020 0115 by Oriana Ramirez RN  Outcome: Ongoing  9/20/2020 1814 by Rand Callejas RN  Outcome: Ongoing  Goal: Absence of new skin breakdown  Description: Absence of new skin breakdown  9/21/2020 0115 by Oriana Ramirez RN  Outcome: Ongoing  9/20/2020 1814 by Rand Callejas RN  Outcome: Ongoing     Problem: Nutrition  Goal: Optimal nutrition therapy  9/21/2020 0115 by Oriana Ramirez RN  Outcome: Ongoing  9/20/2020 1814 by Rand Callejas RN  Outcome: Ongoing     Problem: Airway Clearance - Ineffective  Goal: Achieve or maintain patent airway  9/21/2020 0115 by Oriana Ramirez RN  Outcome: Ongoing  9/20/2020 1814 by Rand Callejas RN  Outcome: Ongoing     Problem: Gas Exchange - Impaired  Goal: Absence of hypoxia  9/21/2020 0115 by Oriana Ramirez, RN  Outcome: Ongoing  9/20/2020 1814 by Rand Callejas RN  Outcome: Ongoing  Goal: Promote optimal lung function  9/21/2020 0115 by Oriana Ramirez RN  Outcome: Ongoing  9/20/2020 1814 by Rand Callejas RN  Outcome: Ongoing     Problem: Breathing Pattern - Ineffective  Goal: Ability to achieve and maintain a regular respiratory rate  9/21/2020 0115 by Oriana Ramirez RN  Outcome: Ongoing  9/20/2020 1814 by Rand Callejas RN  Outcome: Ongoing     Problem:  Body Temperature -  Risk of, Imbalanced  Goal: Ability to maintain a body temperature within defined limits  9/21/2020 0115 by Eileen Roawn RN  Outcome: Ongoing  9/20/2020 1814 by Melisa Sofia RN  Outcome: Ongoing  Goal: Will regain or maintain usual level of consciousness  9/21/2020 0115 by Eileen Rowan RN  Outcome: Ongoing  9/20/2020 1814 by Melisa Sofia RN  Outcome: Ongoing  Goal: Complications related to the disease process, condition or treatment will be avoided or minimized  9/21/2020 0115 by Eileen Rowan RN  Outcome: Ongoing  9/20/2020 1814 by Melisa Sofia RN  Outcome: Ongoing     Problem: Isolation Precautions - Risk of Spread of Infection  Goal: Prevent transmission of infection  9/21/2020 0115 by Eileen Rowan RN  Outcome: Ongoing  9/20/2020 1814 by Melisa Sofia RN  Outcome: Ongoing     Problem: Nutrition Deficits  Goal: Optimize nutrtional status  9/21/2020 0115 by Eileen Rowan RN  Outcome: Ongoing  9/20/2020 1814 by Melisa Sofia RN  Outcome: Ongoing     Problem: Risk for Fluid Volume Deficit  Goal: Maintain normal heart rhythm  9/21/2020 0115 by Eileen Rowan RN  Outcome: Ongoing  9/20/2020 1814 by Melisa Sofia RN  Outcome: Ongoing  Goal: Maintain absence of muscle cramping  9/21/2020 0115 by Eileen Rowan RN  Outcome: Ongoing  9/20/2020 1814 by Melisa Sofia RN  Outcome: Ongoing  Goal: Maintain normal serum potassium, sodium, calcium, phosphorus, and pH  9/21/2020 0115 by Eileen Rowan RN  Outcome: Ongoing  9/20/2020 1814 by Melisa Sofia RN  Outcome: Ongoing     Problem: Loneliness or Risk for Loneliness  Goal: Demonstrate positive use of time alone when socialization is not possible  9/21/2020 0115 by Eileen Rowan RN  Outcome: Ongoing  9/20/2020 1814 by Melisa Sofia RN  Outcome: Ongoing     Problem: Fatigue  Goal: Verbalize increase energy and improved vitality  9/21/2020 0115 by Eileen Rowan RN  Outcome: Ongoing  9/20/2020 1814 by Melisa Sofia RN  Outcome: Ongoing Problem: Patient Education: Go to Patient Education Activity  Goal: Patient/Family Education  9/21/2020 0115 by Darinel Garcia RN  Outcome: Ongoing  9/20/2020 1814 by Ralf Ordonez RN  Outcome: Ongoing     Problem: Pain:  Goal: Pain level will decrease  Description: Pain level will decrease  9/21/2020 0115 by Darinel Garcia RN  Outcome: Ongoing  9/20/2020 1814 by Ralf Ordonez RN  Outcome: Ongoing  Goal: Control of acute pain  Description: Control of acute pain  9/21/2020 0115 by Darinel Garcia RN  Outcome: Ongoing  9/20/2020 1814 by Ralf Ordonez RN  Outcome: Ongoing  Goal: Control of chronic pain  Description: Control of chronic pain  9/21/2020 0115 by Darinel Garcia RN  Outcome: Ongoing  9/20/2020 3654 by Ralf Ordonez RN  Outcome: Ongoing     Problem: OXYGENATION/RESPIRATORY FUNCTION  Goal: Patient will maintain patent airway  9/21/2020 0115 by Darinel Garcia RN  Outcome: Ongoing  9/20/2020 1814 by Ralf Ordonez RN  Outcome: Ongoing  Goal: Patient will achieve/maintain normal respiratory rate/effort  Description: Respiratory rate and effort will be within normal limits for the patient  9/21/2020 0115 by Darinel Garcia RN  Outcome: Ongoing  9/20/2020 1814 by Ralf Ordonez RN  Outcome: Ongoing     Problem: HEMODYNAMIC STATUS  Goal: Patient has stable vital signs and fluid balance  9/21/2020 0115 by Darinel Garcia RN  Outcome: Ongoing  9/20/2020 1814 by Ralf Ordonez RN  Outcome: Ongoing     Problem: FLUID AND ELECTROLYTE IMBALANCE  Goal: Fluid and electrolyte balance are achieved/maintained  9/21/2020 0115 by Darinel Garcia RN  Outcome: Ongoing  9/20/2020 1814 by Ralf Ordonez RN  Outcome: Ongoing     Problem: ACTIVITY INTOLERANCE/IMPAIRED MOBILITY  Goal: Mobility/activity is maintained at optimum level for patient  9/21/2020 0115 by Darinel Garcia RN  Outcome: Ongoing  9/20/2020 1814 by Ralf Ordonez RN  Outcome:

## 2020-09-21 NOTE — CARE COORDINATION
Discharge planning note:    Chart reviewed for discharge planning. Barrier(s) to discharge-   Restraints    Tentative discharge plan-  Return to [facility]    Tentative discharge date-  Next couple days    Referral sent to [facility] to see if they can accept on restraints. Case management will continue to follow progress and update discharge plan as needed.       Elise Latham RN BSN  Case Management  [phone]

## 2020-09-21 NOTE — PROGRESS NOTES
Reassessment completed, no significant change patient denies needs at this time, call light in reach, will continue to monitor.

## 2020-09-21 NOTE — PROGRESS NOTES
Assessment completed. Pt continues with agitation and pulling at tubing. Continues on 2 L per n/c. O2 sat decreased to 86% on RA when pt removed n/c. Cont. With bilat wrist restraints at this time.

## 2020-09-21 NOTE — PLAN OF CARE
Nutrition Problem #1: Increased nutrient needs  Intervention: Food and/or Nutrient Delivery: Continue Current Tube Feeding  Nutritional Goals: continued liu to tube feed at goal rate

## 2020-09-21 NOTE — PROGRESS NOTES
09/21/20 1950   NIV Type   NIV Started/Stopped On   Equipment Type v60   Mode Bilevel   Mask Type Full face mask   Settings/Measurements   IPAP 18 cmH20   CPAP/EPAP 6 cmH2O   Rate Ordered 10   Resp 26   FiO2  60 %   I Time/ I Time % 1 s   Vt Exhaled 409 mL   Minute Volume 11.2 Liters   Mask Leak (lpm) 27 lpm   Comfort Level Fair   Using Accessory Muscles No   SpO2 100

## 2020-09-21 NOTE — PROGRESS NOTES
Office : [phone]     Fax :[phone]       Nephrology progress Note          Chief Complaint:    Chief Complaint   Patient presents with    Altered Mental Status     Altered mental status started yesterday. Pt brought in from [facility] home. History of Present Ilness:    Merlinda Distad is a 67 y.o. male with h/o Dementia, renal ds, BPH was transfered from [facility] where he had a witnessed seizure. He is awake but not oriented . All history gathered from the records from [facility].    His initial lab work shows elevated BUN and creatinine   Has a singh in place     Interval Hx       Sodium level better from 154---> 147 ---> 151 ----> 145 ---> 146   renal function improved   Making urine    Hb stabilized       Past Medical History:   Diagnosis Date    Cerebral palsy ([address]     CHF (congestive heart failure) (HCC)     CKD (chronic kidney disease)     Constipation     Convulsions ([address]     COVID-19     Degenerative disease of nervous system ([address]     Delusional disorder ([address]     Dementia ([address]     Depression     Diabetes mellitus ([address]     Encephalopathy     Enlarged prostate with lower urinary tract symptoms (LUTS)     GERD (gastroesophageal reflux disease)     GI obstruction (HCC)     Hematuria     Hyperlipidemia     Hypernatremia     Hypertension     Hypertension     Mood disorder ([address]     MRSA colonization 09/11/2020    Muscle weakness     Muscle weakness (generalized)     Neuromuscular dysfunction of bladder     Neuropathy     Obsessive-compulsive personality disorder (CODE)     Psychosis ([address]     Suprapubic catheter ([address]     #20    Urinary retention        Past Surgical History:   Procedure Laterality Date    ARM SURGERY      BLADDER SURGERY  CYSTOSCOPY N/A 5/15/2019    CYSTOLITHALOPAXY , STONE  SUPRA PUBIC CATHETER EXCHANGE. performed by Zeny Conrad MD at [address]      EYE SURGERY      GASTROSTOMY TUBE PLACEMENT N/A 9/18/2020    EGD PEG TUBE PLACEMENT performed by Anand Vásquez MD at [facility] ASP BLADDER W INSERT SUPRAPUBIC CATH      TONSILLECTOMY      UMBILICAL HERNIA REPAIR N/A 1/15/2019    OPEN PRIMARY UMBILICAL HERNIA REPAIR performed by Erin Judd MD at [address] N/A 9/17/2020    EGD CONTROL HEMORRHAGE with application of over the scope clip (star clip) injection of epinephrine 0.5 mg performed by Anand Vásquez MD at [address]       Current Medications:    enoxaparin (LOVENOX) injection 40 mg, Daily  lamoTRIgine (LAMICTAL) tablet 100 mg, TID  thiamine (B-1) 100 mg in sodium chloride 0.9 % 100 mL IVPB, Q24H  dextrose 5 % solution, Continuous  0.9 % sodium chloride bolus, Once  pantoprazole (PROTONIX) injection 40 mg, BID  potassium chloride 10 mEq/100 mL IVPB (Peripheral Line), PRN  albuterol sulfate  (90 Base) MCG/ACT inhaler 2 puff, Q6H PRN  ipratropium-albuterol (DUONEB) nebulizer solution 1 ampule, Q4H PRN  insulin glargine (LANTUS;BASAGLAR) injection pen 5 Units, Nightly  carBAMazepine (TEGRETOL) tablet 200 mg, TID  citalopram (CELEXA) tablet 20 mg, Daily  traZODone (DESYREL) tablet 25 mg, Nightly  trospium (SANCTURA) tablet 20 mg, BID AC  sodium chloride flush 0.9 % injection 10 mL, 2 times per day  sodium chloride flush 0.9 % injection 10 mL, PRN  acetaminophen (TYLENOL) tablet 650 mg, Q6H PRN    Or  acetaminophen (TYLENOL) suppository 650 mg, Q6H PRN  polyethylene glycol (GLYCOLAX) packet 17 g, Daily PRN  promethazine (PHENERGAN) tablet 12.5 mg, Q6H PRN    Or  ondansetron (ZOFRAN) injection 4 mg, Q6H PRN  glucose (GLUTOSE) 40 % oral gel 15 g, PRN  dextrose 50 % IV solution, PRN  glucagon (rDNA) injection 1 mg, PRN  dextrose 5 % solution, PRN  insulin lispro (1 Unit Dial) 0-6 Units, Q4H        Physical exam:     Vitals:  BP (!) 173/79   Pulse 77   Temp 97.4 °F (36.3 °C) (Temporal)   Resp 22   Ht 5' 8\" (1.727 m)   Wt 160 lb 4.8 oz (72.7 kg)   SpO2 97%   BMI 24.37 kg/m²   Constitutional:  Not oriented   Skin: no rash, turgor wnl  Heent:  eomi, mmm  Neck: no bruits or jvd noted  Cardiovascular:  S1, S2 without m/r/g  Respiratory: CTA B without w/r/r  Abdomen:  +bs, soft, nt, nd  Ext: no  lower extremity edema      Labs:  CBC:   Recent Labs     09/20/20  1404 09/20/20  1717 09/21/20  0139   WBC 6.6 7.7 7.5   HGB 8.0* 8.0* 8.0*   PLT 83* 88* 80*     BMP:    Recent Labs     09/19/20  0308 09/20/20  0529 09/21/20  0500   * 145 146*   K 3.6 3.7 3.9   * 106 104   CO2 35* 34* 40*   BUN 13 9 8   CREATININE <0.5* <0.5* <0.5*   GLUCOSE 144* 142* 133*     Ca/Mg/Phos:   Recent Labs     09/19/20  0308 09/20/20  0529 09/21/20  0500   CALCIUM 7.9* 8.2* 8.3   MG 2.10 2.00 2.00   PHOS 2.3* 3.1 2.8     Hepatic:   No results for input(s): AST, ALT, ALB, BILITOT, ALKPHOS in the last 72 hours. Troponin: No results for input(s): TROPONINI in the last 72 hours. BNP: No results for input(s): BNP in the last 72 hours. Lipids: No results for input(s): CHOL, TRIG, HDL, LDLCALC, LABVLDL in the last 72 hours. ABGs: No results for input(s): PHART, PO2ART, LUM1NGK in the last 72 hours. INR:   Recent Labs     09/19/20  0308 09/20/20  0529 09/21/20  0500   INR 1.24* 1.02 0.92     UA:  No results for input(s): Alleen Corti, GLUCOSEU, BILIRUBINUR, KETUA, SPECGRAV, BLOODU, PHUR, PROTEINU, UROBILINOGEN, NITRU, LEUKOCYTESUR, LABMICR, URINETYPE in the last 72 hours. Urine Microscopic:   No results for input(s): LABCAST, BACTERIA, COMU, HYALCAST, WBCUA, RBCUA, EPIU in the last 72 hours. Urine Culture:   No results for input(s): LABURIN in the last 72 hours.   Urine Chemistry:   No results for input(s): Rashdia Matthews, Shaan Sheikh in the last 72 hours. IMAGING:  XR CHEST PORTABLE   Final Result   Slightly improved aeration of the lungs with persistent mild opacification in   the right upper and lower lung zones. Small right pleural effusion. CT head without contrast   Final Result   No acute intracranial abnormality. XR CHEST 1 VIEW   Final Result   1. Bilateral perihilar opacities, right greater than left, most consistent   with multifocal pneumonia, to include atypical causes. 2. Stable chronic bibasilar pleural and parenchymal scar. 3. Stable cardiomegaly. I/O last 3 completed shifts: In: 2954 [I.V.:427; NG/GT:2527]  Out: 950 [Urine:950]      Assessment/Plan :      1. BENOIT 2/2 pre renal + RAAS blockade   Held lisinopril. Recommend to dose adjust all medications  based on renal functions  Maintain SBP> 90 mmHg   Daily weights   AVOID NSAIDs  Avoid Nephrotoxins  Monitor Intake/Output      2. HTN. BP high now  Add amlodipine 10 mg per peg  daily     3. Seizure disorder. Neurology following     4. Acid- base disorder. Metabolic alkalosis 2/2 dehydration. Iv fluids     5. Hypokalemia - corrected with supplements     6 . UTI. abx per primary team     7. Hypernatremia. 147 ----> 151 ----> 145 ---> 146     Increase free water flushes to 200 ml q 4 hrly     8. Anemia. S/p EGD.  GI bleed  HB low stable   Has had received multiple blood transfusions            Thank you for allowing us to participate in care of Chikis Yañez         Electronically signed by: Garnett Schlatter, MD, 9/21/2020, 9:49 AM      [facility] [address]  Office : [phone]  Fax :[phone]

## 2020-09-22 NOTE — PROGRESS NOTES
Assessment. 1930  Patient lethargic, responds only to stimuli. O2 demand increased. ABG shows PCO2 of 102, BiPap ordered, chest xray obtained. One time order for lasix Pulm consulted. Bilateral soft wrist restraints ordered. PIV x2 to saline lock. Suprapubic cath in place. Tube feeding stopped for BiPap. 2200  New ABG dhows improvement in PCO2, no new orders. 0000  Reassessment. Patient tolerating BiPap, U/O increased with lasix. 0400 Reassessment. Patient tolerating BiPap, Ipap setting increased to 20. U/O much better. Bilateral soft wrist restraints continue.      Constance Subramanian RN, CRRN, 9/22/2020,6:24 AM

## 2020-09-22 NOTE — PROGRESS NOTES
Office : [phone]     Fax :[phone]       Nephrology progress Note          Chief Complaint:    Chief Complaint   Patient presents with    Altered Mental Status     Altered mental status started yesterday. Pt brought in from [facility] home. History of Present Ilness:    Bonnie Pablo is a 67 y.o. male with h/o Dementia, renal ds, BPH was transfered from [facility] where he had a witnessed seizure. He is awake but not oriented . All history gathered from the records from [facility].    His initial lab work shows elevated BUN and creatinine   Has a singh in place     Interval Hx       Sodium level better from 154---> 147 ---> 151 ----> 145 ---> 146 ----> 142   renal function improved   Making urine          Past Medical History:   Diagnosis Date    Cerebral palsy ([address]     CHF (congestive heart failure) (HCC)     CKD (chronic kidney disease)     Constipation     Convulsions ([address]     COVID-19     Degenerative disease of nervous system ([address]     Delusional disorder ([address]     Dementia ([address]     Depression     Diabetes mellitus ([address]     Encephalopathy     Enlarged prostate with lower urinary tract symptoms (LUTS)     GERD (gastroesophageal reflux disease)     GI obstruction (HCC)     Hematuria     Hyperlipidemia     Hypernatremia     Hypertension     Hypertension     Mood disorder ([address]     MRSA colonization 09/11/2020    Muscle weakness     Muscle weakness (generalized)     Neuromuscular dysfunction of bladder     Neuropathy     Obsessive-compulsive personality disorder (CODE)     Psychosis ([address]     Suprapubic catheter ([address]     #20    Urinary retention        Past Surgical History:   Procedure Laterality Date    ARM SURGERY      BLADDER SURGERY  CYSTOSCOPY N/A 5/15/2019    CYSTOLITHALOPAXY , STONE  SUPRA PUBIC CATHETER EXCHANGE. performed by Bel Mckeon MD at [address] [facility], ESOPHAGUS      EYE SURGERY      GASTROSTOMY TUBE PLACEMENT N/A 9/18/2020    EGD PEG TUBE PLACEMENT performed by Vitor Springer MD at [facility] ASP BLADDER W INSERT SUPRAPUBIC CATH      TONSILLECTOMY      UMBILICAL HERNIA REPAIR N/A 1/15/2019    OPEN PRIMARY UMBILICAL HERNIA REPAIR performed by Bere Gaffney MD at [address] [place] [address] N/A 9/17/2020    EGD CONTROL HEMORRHAGE with application of over the scope clip (star clip) injection of epinephrine 0.5 mg performed by Vitor Springer MD at [address]       Current Medications:    furosemide (LASIX) injection 40 mg, Once  budesonide-formoterol (SYMBICORT) 160-4.5 MCG/ACT inhaler 2 puff, BID  amLODIPine (NORVASC) tablet 10 mg, Daily  lansoprazole (PREVACID SOLUTAB) disintegrating tablet 30 mg, BID AC  enoxaparin (LOVENOX) injection 40 mg, Daily  lamoTRIgine (LAMICTAL) tablet 100 mg, TID  thiamine (B-1) 100 mg in sodium chloride 0.9 % 100 mL IVPB, Q24H  0.9 % sodium chloride bolus, Once  potassium chloride 10 mEq/100 mL IVPB (Peripheral Line), PRN  albuterol sulfate  (90 Base) MCG/ACT inhaler 2 puff, Q6H PRN  ipratropium-albuterol (DUONEB) nebulizer solution 1 ampule, Q4H PRN  insulin glargine (LANTUS;BASAGLAR) injection pen 5 Units, Nightly  carBAMazepine (TEGRETOL) tablet 200 mg, TID  citalopram (CELEXA) tablet 20 mg, Daily  traZODone (DESYREL) tablet 25 mg, Nightly  trospium (SANCTURA) tablet 20 mg, BID AC  sodium chloride flush 0.9 % injection 10 mL, 2 times per day  sodium chloride flush 0.9 % injection 10 mL, PRN  acetaminophen (TYLENOL) tablet 650 mg, Q6H PRN    Or  acetaminophen (TYLENOL) suppository 650 mg, Q6H PRN  polyethylene glycol (GLYCOLAX) packet 17 g, Daily PRN  promethazine (PHENERGAN) tablet 12.5 mg, Q6H PRN Or  ondansetron (ZOFRAN) injection 4 mg, Q6H PRN  glucose (GLUTOSE) 40 % oral gel 15 g, PRN  dextrose 50 % IV solution, PRN  glucagon (rDNA) injection 1 mg, PRN  dextrose 5 % solution, PRN  insulin lispro (1 Unit Dial) 0-6 Units, Q4H        Physical exam:     Vitals:  BP (!) 143/77   Pulse 86   Temp 98 °F (36.7 °C) (Temporal)   Resp 20   Ht 5' 8\" (1.727 m)   Wt 161 lb 1.6 oz (73.1 kg)   SpO2 93%   BMI 24.50 kg/m²   Constitutional:  Not oriented   Skin: no rash, turgor wnl  Heent:  eomi, mmm  Neck: no bruits or jvd noted  Cardiovascular:  S1, S2 without m/r/g  Respiratory: CTA B without w/r/r  Abdomen:  +bs, soft, nt, nd  Ext: no  lower extremity edema      Labs:  CBC:   Recent Labs     09/21/20  1739  09/22/20  0146 09/22/20  0345 09/22/20  0616   WBC 10.5  --  14.6*  --  14.6*   HGB 7.8*   < > 8.2* 8.3* 7.8*   PLT 88*  --  92*  --  92*    < > = values in this interval not displayed. BMP:    Recent Labs     09/20/20  0529 09/21/20  0500 09/22/20  0616    146* 142   K 3.7 3.9 3.8    104 96*   CO2 34* 40* 44*   BUN 9 8 8   CREATININE <0.5* <0.5* <0.5*   GLUCOSE 142* 133* 113*     Ca/Mg/Phos:   Recent Labs     09/20/20  0529 09/21/20  0500 09/22/20  0616   CALCIUM 8.2* 8.3 8.5   MG 2.00 2.00 1.70*   PHOS 3.1 2.8 2.7     Hepatic:   No results for input(s): AST, ALT, ALB, BILITOT, ALKPHOS in the last 72 hours. Troponin: No results for input(s): TROPONINI in the last 72 hours. BNP: No results for input(s): BNP in the last 72 hours. Lipids: No results for input(s): CHOL, TRIG, HDL, LDLCALC, LABVLDL in the last 72 hours. ABGs:   Recent Labs     09/22/20  0345   PHART 7.303*   PO2ART 91.2   [id number] 93.1*     INR:   Recent Labs     09/20/20  0529 09/21/20  0500 09/22/20  0616   INR 1.02 0.92 0.97     UA:  No results for input(s): Chepe Damir, GLUCOSEU, BILIRUBINUR, KETUA, SPECGRAV, BLOODU, PHUR, PROTEINU, UROBILINOGEN, NITRU, LEUKOCYTESUR, LABMICR, URINETYPE in the last 72 hours.    Urine Microscopic:   No results for input(s): LABCAST, BACTERIA, COMU, HYALCAST, WBCUA, RBCUA, EPIU in the last 72 hours. Urine Culture:   No results for input(s): LABURIN in the last 72 hours. Urine Chemistry:   No results for input(s): Randolm Reins, PROTEINUR, NAUR in the last 72 hours. IMAGING:  XR CHEST PORTABLE   Final Result   Changes consistent with COPD. Increased opacity in the right mid lung, indeterminate for confluent   fibrosis/crowding of bronchovascular markings versus airspace disease   (pneumonia). This is stable in the 7 day interval.  It is increased compared   to 2017. XR CHEST PORTABLE   Final Result   Slightly improved aeration of the lungs with persistent mild opacification in   the right upper and lower lung zones. Small right pleural effusion. CT head without contrast   Final Result   No acute intracranial abnormality. XR CHEST 1 VIEW   Final Result   1. Bilateral perihilar opacities, right greater than left, most consistent   with multifocal pneumonia, to include atypical causes. 2. Stable chronic bibasilar pleural and parenchymal scar. 3. Stable cardiomegaly. I/O last 3 completed shifts: In: 1309 [I.V.:259; NG/GT:1050]  Out: 425 [Urine:425]      Assessment/Plan :      1. BENOIT 2/2 pre renal + RAAS blockade   Held lisinopril. BENOIT resolved     2. HTN. BP better controlled today . Added amlodipine 10 mg per peg  daily     3. Seizure disorder. Neurology following     4. Acid- base disorder. Metabolic alkalosis 2/2 dehydration. Iv fluids     5. Hypokalemia - corrected with supplements     6 . UTI. abx per primary team     7. Hypernatremia. 147 ----> 151 ----> 145 ---> 146 ----> 142  Continue  free water flushes to 200 ml q 4 hrly     8. Anemia. S/p EGD.  GI bleed  HB low stable   Has had received multiple blood transfusions            Thank you for allowing us to participate in care of Roger Kyle         Electronically signed by: Cynthia Cevallos MD, 9/22/2020, 9:58 AM      [facility] [address]  Office : [phone]  Fax :[phone]

## 2020-09-22 NOTE — CARE COORDINATION
Discharge planning note:    Patient continues to require restraints. Must be out of restraints x 24 hours to return to [facility]. [facility] does not have any beds available today.     Kentrell Ortiz RN BSN  Case Management  [phone]

## 2020-09-22 NOTE — PROGRESS NOTES
Hospitalist Progress Note      PCP: Jim Bond MD    Date of Admission: 9/10/2020    Chief Complaint: Seizure      Subjective: Patient seen and examined today. Night events noted  Banded to hypercapnic respiratory failure requiring to be put on BiPAP  X-ray only showed some hyperinflation  +17 L in the last 24 hours  Given some Lasix        Medications:  Reviewed      Exam:    /60   Pulse 86   Temp 98 °F (36.7 °C) (Temporal)   Resp 20   Ht 5' 8\" (1.727 m)   Wt 161 lb 1.6 oz (73.1 kg)   SpO2 100%   BMI 24.50 kg/m²     General appearance: No apparent distress, HEENT: Pupils equal, round, and reactive to light. Conjunctivae/corneas clear. Neck: Supple, with full range of motion. No jugular venous distention. Trachea midline. Respiratory: Bibasal crepitations RALES/WHEEZES/Rhonchi. Cardiovascular: Regular rate and rhythm with normal S1/S2 without MURMURS, rubs or gallops. Abdomen: Soft, non-tender, non-distended with normal bowel sounds. PEG tube in place  Musculoskeletal: No clubbing, cyanosis or  Skin:  color, texture, turgor normal.  No rashes or lesions. Neurologic: Only able to give me his name otherwise disoriented in time place and person  Moving all 4 extremities  Difficult exam as patient is not fully cooperative  Clinically he is on BiPAP today although his exam otherwise seems pretty unchanged from yesterday    Labs:   Recent Labs     09/21/20  1739  09/22/20  0146 09/22/20  0345 09/22/20  0616   WBC 10.5  --  14.6*  --  14.6*   HGB 7.8*   < > 8.2* 8.3* 7.8*   HCT 23.0*  --  24.4*  --  22.9*   PLT 88*  --  92*  --  92*    < > = values in this interval not displayed. Recent Labs     09/20/20  0529 09/21/20  0500 09/22/20  0616    146* 142   K 3.7 3.9 3.8    104 96*   CO2 34* 40* 44*   BUN 9 8 8   CREATININE <0.5* <0.5* <0.5*   CALCIUM 8.2* 8.3 8.5   PHOS 3.1 2.8 2.7     No results for input(s): AST, ALT, BILIDIR, BILITOT, ALKPHOS in the last 72 hours.   Recent Labs     09/20/20  0529 09/21/20  0500 09/22/20  0616   INR 1.02 0.92 0.97     No results for input(s): CKTOTAL, TROPONINI in the last 72 hours. Urinalysis:      Lab Results   Component Value Date    NITRU Negative 09/15/2020    WBCUA 2 09/15/2020    BACTERIA 4+ 09/10/2020    RBCUA 0-2 09/15/2020    BLOODU SMALL 09/15/2020    SPECGRAV 1.018 09/15/2020    GLUCOSEU Negative 09/15/2020       Radiology:  XR CHEST PORTABLE   Final Result   Changes consistent with COPD. Increased opacity in the right mid lung, indeterminate for confluent   fibrosis/crowding of bronchovascular markings versus airspace disease   (pneumonia). This is stable in the 7 day interval.  It is increased compared   to 2017. XR CHEST PORTABLE   Final Result   Slightly improved aeration of the lungs with persistent mild opacification in   the right upper and lower lung zones. Small right pleural effusion. CT head without contrast   Final Result   No acute intracranial abnormality. XR CHEST 1 VIEW   Final Result   1. Bilateral perihilar opacities, right greater than left, most consistent   with multifocal pneumonia, to include atypical causes. 2. Stable chronic bibasilar pleural and parenchymal scar. 3. Stable cardiomegaly.              Assessment/Plan:    Active Hospital Problems    Diagnosis Date Noted    Acute pulmonary edema ([address] [J81.0]     Pleural effusion, right [J90]     Centrilobular emphysema ([address] [J43.2]     Seizure ([address] [R56.9] 09/10/2020       This 65-year-old gentleman known cognitive disability wheelchair-bound suprapubic catheter known seizure disorder admitted to the hospital after a witnessed seizure    Acute hypercapnic respiratory failure secondary to underlying COPD with some element of acute on chronic diastolic heart failure  Given 1 dose of Lasix  On BiPAP  Strict intake and output  We will get arterial blood gas  Do not think he has a pneumonia  Patient will need BiPAP at night  At

## 2020-09-22 NOTE — PROGRESS NOTES
P Pulmonary and Critical Care    Follow Up Note    Subjective:   CHIEF COMPLAINT / HPI:   Chief Complaint   Patient presents with    Altered Mental Status     Altered mental status started yesterday. Pt brought in from [facility] home. Interval history: Patient was originally admitted from a local nursing facility with hypoxemia related to aspiration pneumonia. The patient had also tested positive for SARS-CoV-2 infection. He does have a history of dementia. He has a seizure disorder. He has cerebral palsy. He also has a chronic suprapubic catheter. He did receive Decadron 6 mg. He did not receive   remdesivir or convalescent plasma. The etiology of his respiratory failure seemed more related to aspiration pneumonia than to SARS-CoV-2. He ultimately had PEG placement. Pulmonology signed off. I was asked to see him again this morning as he developed hypoxemia and hypercapnia. He was placed on BiPAP mask ventilation. He was initially lethargic but he has responded to BiPAP with improvement in his mental status. Past Medical History:    Reviewed; no changes    Social History:    Reviewed; no changes    REVIEW OF SYSTEMS:    ROS is unobtainable due to his critical illness. Objective:   PHYSICAL EXAM:        VITALS:  BP (!) 161/88   Pulse 90   Temp 98 °F (36.7 °C) (Temporal)   Resp 20   Ht 5' 8\" (1.727 m)   Wt 161 lb 1.6 oz (73.1 kg)   SpO2 99%   BMI 24.50 kg/m²  on BiPAP    24HR INTAKE/OUTPUT:      Intake/Output Summary (Last 24 hours) at 9/22/2020 1049  Last data filed at 9/21/2020 1602  Gross per 24 hour   Intake 1149 ml   Output 425 ml   Net 724 ml       CONSTITUTIONAL:  awake, alert,  no apparent distress, and appears stated age  LUNGS:  No increased work of breathing and clear to auscultation, no crackles or wheezes  CARDIOVASCULAR: S1 and S2 and no JVD  ABDOMEN:  normal bowel sounds, non-distended and non-tender to palpation  EXT: No edema, no calf tenderness.  Pulses are present bilaterally. NEUROLOGIC:  Mental Status Exam:  Level of Alertness:   awake  Orientation:   person, place, time. Non focal  SKIN:  normal skin color, texture, turgor, no redness, warmth, or swelling at IV sites    DATA:    CBC:  Recent Labs     09/21/20  1739  09/22/20  0146 09/22/20  0345 09/22/20  0616   WBC 10.5  --  14.6*  --  14.6*   RBC 2.47*  --  2.59*  --  2.43*   HGB 7.8*   < > 8.2* 8.3* 7.8*   HCT 23.0*  --  24.4*  --  22.9*   PLT 88*  --  92*  --  92*   MCV 93.2  --  94.1  --  94.2   MCH 31.4  --  31.5  --  31.8   MCHC 33.7  --  33.5  --  33.8   RDW 15.8*  --  15.3  --  15.3    < > = values in this interval not displayed. BMP:  Recent Labs     09/20/20  0529 09/21/20  0500 09/22/20  0616    146* 142   K 3.7 3.9 3.8    104 96*   CO2 34* 40* 44*   BUN 9 8 8   CREATININE <0.5* <0.5* <0.5*   CALCIUM 8.2* 8.3 8.5   GLUCOSE 142* 133* 113*      ABG:  Recent Labs     09/21/20  1923 09/21/20  2149 09/22/20  0345   PHART 7.245* 7.302* 7.303*   CKX6GJG 102.1* 85.4* 93.1*   PO2ART 54.5* 158.0* 91.2   PCS1MWQ 44.3* 42.2* 46.2*   H2APXJIH 79* 99.8 95   BEART 17* 13.8* 20*       Cultures:    Abx:    Radiology Review:  Pertinent images / reports were reviewed as a part of this visit. Assessment:     1. Acute on chronic hypoxemic and hypercapnic respiratory failure  2. Pulmonary edema/volume overload  3. Right pleural effusion  4. Aspiration pneumonia  5. SARS-CoV-2 infection    Plan:     1. I have reviewed laboratories, medical records and images for this visit  2. Chest x-ray last night reveals increased vascular markings and right pleural effusion with fluid also marginating the minor fissure. 3. According to the record he is up nearly 17 L from admission. However on exam, he has minimal edema. 4. We will give him Lasix 40 mg IV push  5. There is no CT image of the chest in our record but he does have a CT of the cervical spine that documents apical centrilobular emphysema  6.  ABG shows acute likely on chronic CO2 retention  7. We changed him off BiPAP onto nasal cannula oxygen successfully this morning. 8. Anticipate BiPAP mask ventilation with sleep. 9. Does not appear to have new bacterial pneumonia. 10. I do not think this deterioration represents emergence of COVID-19 pneumonia.

## 2020-09-23 NOTE — CONSULTS
Palliative Care:     Consult completed on 9/14/20. Multiple discussions with Lukas Castellanoes regarding code status as full and patient overall physical decline. She continues to maintain patient as full code. Mother of patient is the POA yet very hard of hearing; memory loss and is 91 yrs. Unable to change code status. Will continue to follow and provide support as needed. Report to nurse provided regarding second consult submitted today and my attempts with Lukas Suarez.

## 2020-09-23 NOTE — PROGRESS NOTES
This morning patient was found by RT to have vomited while on Bipap. Patient was transitioned to Nonrebreather, oxygen saturations came back up to 97%, tube feedings off at this time. Patient was bathed at that time with complete linen change. His heart rhythm has also switched to an Afib per bedside cardiac monitor. Patient does open eyes to voice, moves all over the bed, but doesn't follow commands.

## 2020-09-23 NOTE — PROGRESS NOTES
[facility] Pulmonary and Critical Care    Follow Up Note    Subjective:   CHIEF COMPLAINT / HPI:   Chief Complaint   Patient presents with    Altered Mental Status     Altered mental status started yesterday. Pt brought in from [facility] home. Interval history: Patient tolerated BiPAP yesterday and through the night. However this morning he had an episode of emesis while wearing BiPAP. He is now on 100% nonrebreather with good saturations. We will repeat his chest x-ray. .    Past Medical History:    Reviewed; no changes    Social History:    Reviewed; no changes    REVIEW OF SYSTEMS:    ROS is unobtainable due to his critical illness. Objective:   PHYSICAL EXAM:        VITALS:  /64   Pulse 96   Temp 98 °F (36.7 °C) (Temporal)   Resp 28   Ht 5' 8\" (1.727 m)   Wt 161 lb 1.6 oz (73.1 kg)   SpO2 90%   BMI 24.50 kg/m²  on BiPAP    24HR INTAKE/OUTPUT:      Intake/Output Summary (Last 24 hours) at 9/23/2020 0846  Last data filed at 9/22/2020 2100  Gross per 24 hour   Intake 100 ml   Output 950 ml   Net -850 ml       CONSTITUTIONAL:  awake, alert,  no apparent distress, and appears stated age  LUNGS:  No increased work of breathing and clear to auscultation, no crackles or wheezes  CARDIOVASCULAR: S1 and S2 and no JVD  ABDOMEN:  normal bowel sounds, non-distended and non-tender to palpation  EXT: No edema, no calf tenderness. Pulses are present bilaterally. NEUROLOGIC:  Mental Status Exam:  Level of Alertness:   awake  Orientation:   person, place, time.  Non focal  SKIN:  normal skin color, texture, turgor, no redness, warmth, or swelling at IV sites    DATA:    CBC:  Recent Labs     09/22/20  1427 09/22/20  2237 09/23/20  0539   WBC 18.9* 14.8* 17.5*   RBC 2.72* 2.42* 2.40*   HGB 8.6* 7.5* 7.5*   HCT 25.7* 22.7* 22.6*   PLT 91* 89* 101*   MCV 94.6 94.1 94.2   MCH 31.6 31.0 31.1   MCHC 33.4 33.0 33.0   RDW 15.3 15.0 15.2   BANDSPCT  --   --  5      BMP:  Recent Labs     09/21/20  9480 09/22/20  0616 09/23/20  0539   * 142 148*   K 3.9 3.8 3.8    96* 97*   CO2 40* 44* 46*   BUN 8 8 14   CREATININE <0.5* <0.5* 0.6*   CALCIUM 8.3 8.5 9.2   GLUCOSE 133* 113* 134*      ABG:  Recent Labs     09/21/20  2149 09/22/20  0345 09/22/20  1450   PHART 7.302* 7.303* 7.393   [id number] 85.4* 93.1* 77.9*   PO2ART 158.0* 91.2 121.0*   DRX6OVQ 42.2* 46.2* 47.4*   [id number] 99.8 95 99.6   BEART 13.8* 20* 20.6*       Cultures:    Abx:    Radiology Review:  Pertinent images / reports were reviewed as a part of this visit. Assessment:     1. Acute on chronic hypoxemic and hypercapnic respiratory failure  2. Pulmonary edema/volume overload  3. Right pleural effusion  4. Aspiration pneumonia  5. SARS-CoV-2 infection    Plan:     1. I have reviewed laboratories, medical records and images for this visit  2. Episode of emesis while wearing his BiPAP mask this morning. Certainly risk for another aspiration event  3. Repeat chest x-ray  4. Sodium is up to 148. Increase water bolus  5. I gave him Lasix 40 mg IV push yesterday due to 16 L fluid excess for the admission. He diuresed poorly in response to this. 6. Creatinine is 0.6. This is stable. 7. White count is 17.5 and has been relatively stable over the last several days. 8. No new culture data  9. He is not on antibiotics at this point  10. We will get a pro calcitonin as well  11. He did have a positive MRSA screen earlier in his hospitalization    Addendum: I was asked to come reevaluate the patient by Dr. Gregorio Cuello due to a blood gas with pH 7.28 and PCO2 128. This was obtained with the patient on 100% nonrebreather. He has been BiPAP dependent over the last couple of days. We replaced him on BiPAP and increase the settings to 20/10. Repeat ABG was 7.35/100/79. He is tolerating the mask well. So far his family is saying that they want him to remain a full code. The patient has been here 2 weeks with COVID-19 pneumonia.   I think the use of mechanical ventilation is unlikely to be of benefit to him. Recommend palliative care consultation.   Continue supportive care including BiPAP mask ventilation

## 2020-09-23 NOTE — PROGRESS NOTES
Repeat ABG done at 1300 with patient on Bipap at 20/10 and FIO2 of 75%. Patient tolerated well, pressure held, dressing in place.

## 2020-09-23 NOTE — PROGRESS NOTES
Hospitalist Progress Note      PCP: Heriberto Fajardo MD    Date of Admission: 9/10/2020    Chief Complaint: Seizure      Subjective: Patient seen and examined today. Morning patient had emesis while on BiPAP  Changed over 200% nonrebreather  Saturating 99  On my review patient had mildly corneal breathing  Barely opens eyes to deep sternal rub  Vitals otherwise looks stable      Medications:  Reviewed      Exam:    /66   Pulse 91   Temp 98.4 °F (36.9 °C) (Temporal)   Resp 22   Ht 5' 8\" (1.727 m)   Wt 161 lb 1.6 oz (73.1 kg)   SpO2 99%   BMI 24.50 kg/m²     General appearance: Drowsy  , HEENT: Pupils equal, round, and reactive to light. Conjunctivae/corneas clear. Neck: Supple, with full range of motion. No jugular venous distention. Trachea midline. Respiratory: Bibasal crepitations   Cardiovascular: Regular rate and rhythm with normal S1/S2 without MURMURS, rubs or gallops. Abdomen: Soft, non-tender, non-distended with normal bowel sounds. PEG tube in place  Musculoskeletal: No clubbing, cyanosis or  Skin:  color, texture, turgor normal.  No rashes or lesions. Neurologic: Barely able to open eyes to sternal rub  Pupils equal reacting to light      Labs:   Recent Labs     09/22/20  1427 09/22/20  2237 09/23/20  0539   WBC 18.9* 14.8* 17.5*   HGB 8.6* 7.5* 7.5*   HCT 25.7* 22.7* 22.6*   PLT 91* 89* 101*     Recent Labs     09/21/20  0500 09/22/20  0616 09/23/20  0539   * 142 148*   K 3.9 3.8 3.8    96* 97*   CO2 40* 44* 46*   BUN 8 8 14   CREATININE <0.5* <0.5* 0.6*   CALCIUM 8.3 8.5 9.2   PHOS 2.8 2.7 2.2*     Recent Labs     09/23/20  0539   AST 32   ALT 60*   BILITOT 0.3   ALKPHOS 113     Recent Labs     09/21/20  0500 09/22/20  0616 09/23/20  0539   INR 0.92 0.97 1.06     No results for input(s): CKTOTAL, TROPONINI in the last 72 hours.     Urinalysis:      Lab Results   Component Value Date    NITRU Negative 09/15/2020    WBCUA 2 09/15/2020    BACTERIA 4+ 09/10/2020 called his niece who is the health power of  explained all of the ongoing acute medical issues with underlying dementia and gradual deterioration  I discussed CODE STATUS again with her at length  She is pretty clear that she wants him to be full code also understanding that all this a result of his underlying dementia  Discussed with pulmonary  We will repeat blood gas while he is on BiPAP in the next hour to see if that helps if not may need intubation  Chest x-ray also reviewed looks a little better as compared to the previous chest x-ray  We will continue BiPAP and repeat blood gas       Acute GI bleed: s/p EGD; 2 bulbar ulcers; 1 with clot and V.V. Epi injected & Clipped  S/p 5 units of PRBCs and 3 units of FFP   Protonix drip discontinued; switch to Protonix 40 mg twice daily   We will continue to monitor hemoglobin will discontinue every 8 hours  Cont  pantoprazole oral twice daily    Thrombocytopenia: Platelets stable around 83  Continue to monitor closely    Seizure disorder: CT head on admission negative for any acute pathology; Neurology on board appreciate their recs;  Keppra and Depakote d/c    started back on Tegretol and lamotrigine     Delirium on baseline of dementia he does not have encephalopathy     SARS-CoV-2 infection and right lung aspiration pneumonia:   - oxygen requirement has been stable around 2 L with sats between 95 to 96%  -s/p dexamethasone and 7-day course of Zyvox and Unasyn; patient afebrile, WBCs within normal limits and all the cultures have been negative to date     Acute kidney injury; suspect prerenal ; improved      Hypokalemia: Resolved    Type 2 diabetes mellitus:   On Lantus and sliding scale will change over to medications through his PEG tube    Neurogenic bladder with urinary incontinence:  Urology on board appreciate their recs;  Herman to ostomy bag  - will change suprapubic catheter size once COVID-19 is negative  -Continue Sanctura    Suspected UTIs with

## 2020-09-23 NOTE — PROGRESS NOTES
Office : [phone]     Fax :[phone]       Nephrology progress Note          Chief Complaint:    Chief Complaint   Patient presents with    Altered Mental Status     Altered mental status started yesterday. Pt brought in from [facility] home. History of Present Ilness:    Marilee Benitez is a 67 y.o. male with h/o Dementia, renal ds, BPH was transfered from [facility] where he had a witnessed seizure. He is awake but not oriented . All history gathered from the records from [facility]. His initial lab work shows elevated BUN and creatinine   Has a singh in place     Interval Hx     Had an episode of vomiting this morning   likely aspirated   On bipap   Sodium level better from 154---> 147 ---> 151 ----> 145 ---> 146 ----> 142 --> 148  renal function improved   Making urine     I/O last 3 completed shifts: In: 100 [NG/GT:100]  Out: 950 [Urine:950]  No intake/output data recorded.           Past Medical History:   Diagnosis Date    Cerebral palsy ([address]     CHF (congestive heart failure) (HCC)     CKD (chronic kidney disease)     Constipation     Convulsions ([address]     COVID-19     Degenerative disease of nervous system ([address]     Delusional disorder ([address]     Dementia ([address]     Depression     Diabetes mellitus ([address]     Encephalopathy     Enlarged prostate with lower urinary tract symptoms (LUTS)     GERD (gastroesophageal reflux disease)     GI obstruction (HCC)     Hematuria     Hyperlipidemia     Hypernatremia     Hypertension     Hypertension     Mood disorder ([address]     MRSA colonization 09/11/2020    Muscle weakness     Muscle weakness (generalized)     Neuromuscular dysfunction of bladder     Neuropathy     Obsessive-compulsive personality disorder (CODE)     Psychosis [facility]     Suprapubic catheter ([facility]     #20    Urinary retention        Past Surgical History:   Procedure Laterality Date    ARM SURGERY      BLADDER SURGERY      CYSTOSCOPY N/A 5/15/2019    CYSTOLITHALOPAXY , STONE  SUPRA PUBIC CATHETER EXCHANGE. performed by Shonda Webb MD at [address]      EYE SURGERY      GASTROSTOMY TUBE PLACEMENT N/A 9/18/2020    EGD PEG TUBE PLACEMENT performed by Lynda Carreon MD at [facility] ASP BLADDER W INSERT SUPRAPUBIC CATH      TONSILLECTOMY      UMBILICAL HERNIA REPAIR N/A 1/15/2019    OPEN PRIMARY UMBILICAL HERNIA REPAIR performed by Liz Gaines MD at [facility] N/A 9/17/2020    EGD CONTROL HEMORRHAGE with application of over the scope clip (star clip) injection of epinephrine 0.5 mg performed by Lynda Carreon MD at [address]       Current Medications:    budesonide-formoterol (SYMBICORT) 160-4.5 MCG/ACT inhaler 2 puff, BID  vitamin B-1 (THIAMINE) tablet 100 mg, Daily  amLODIPine (NORVASC) tablet 10 mg, Daily  lansoprazole (PREVACID SOLUTAB) disintegrating tablet 30 mg, BID AC  enoxaparin (LOVENOX) injection 40 mg, Daily  lamoTRIgine (LAMICTAL) tablet 100 mg, TID  0.9 % sodium chloride bolus, Once  potassium chloride 10 mEq/100 mL IVPB (Peripheral Line), PRN  albuterol sulfate  (90 Base) MCG/ACT inhaler 2 puff, Q6H PRN  ipratropium-albuterol (DUONEB) nebulizer solution 1 ampule, Q4H PRN  insulin glargine (LANTUS;BASAGLAR) injection pen 5 Units, Nightly  carBAMazepine (TEGRETOL) tablet 200 mg, TID  citalopram (CELEXA) tablet 20 mg, Daily  traZODone (DESYREL) tablet 25 mg, Nightly  trospium (SANCTURA) tablet 20 mg, BID AC  sodium chloride flush 0.9 % injection 10 mL, 2 times per day  sodium chloride flush 0.9 % injection 10 mL, PRN  acetaminophen (TYLENOL) tablet 650 mg, Q6H PRN    Or  acetaminophen (TYLENOL) suppository 650 mg, Q6H PRN  polyethylene glycol (GLYCOLAX) packet 17 g, Daily PRN  promethazine (PHENERGAN) tablet 12.5 mg, Q6H PRN    Or  ondansetron (ZOFRAN) injection 4 mg, Q6H PRN  glucose (GLUTOSE) 40 % oral gel 15 g, PRN  dextrose 50 % IV solution, PRN  glucagon (rDNA) injection 1 mg, PRN  dextrose 5 % solution, PRN  insulin lispro (1 Unit Dial) 0-6 Units, Q4H        Physical exam:     Vitals:  /64   Pulse 96   Temp 98 °F (36.7 °C) (Temporal)   Resp 28   Ht 5' 8\" (1.727 m)   Wt 161 lb 1.6 oz (73.1 kg)   SpO2 90%   BMI 24.50 kg/m²   Constitutional:  Not oriented   Skin: no rash, turgor wnl  Heent:  eomi, mmm  Neck: no bruits or jvd noted  Cardiovascular:  S1, S2 without m/r/g  Respiratory: CTA B without w/r/r  Abdomen:  +bs, soft, nt, nd  Ext: no  lower extremity edema      Labs:  CBC:   Recent Labs     09/22/20  1427 09/22/20  2237 09/23/20  0539   WBC 18.9* 14.8* 17.5*   HGB 8.6* 7.5* 7.5*   PLT 91* 89* 101*     BMP:    Recent Labs     09/21/20  0500 09/22/20  0616 09/23/20  0539   * 142 148*   K 3.9 3.8 3.8    96* 97*   CO2 40* 44* 46*   BUN 8 8 14   CREATININE <0.5* <0.5* 0.6*   GLUCOSE 133* 113* 134*     Ca/Mg/Phos:   Recent Labs     09/21/20  0500 09/22/20  0616 09/23/20  0539   CALCIUM 8.3 8.5 9.2   MG 2.00 1.70* 1.90   PHOS 2.8 2.7 2.2*     Hepatic:   Recent Labs     09/23/20  0539   AST 32   ALT 60*   BILITOT 0.3   ALKPHOS 113     Troponin: No results for input(s): TROPONINI in the last 72 hours. BNP: No results for input(s): BNP in the last 72 hours. Lipids: No results for input(s): CHOL, TRIG, HDL, LDLCALC, LABVLDL in the last 72 hours.   ABGs:   Recent Labs     09/22/20  1450   PHART 7.393   PO2ART 121.0*   [id number] 77.9*     INR:   Recent Labs     09/21/20  0500 09/22/20  0616 09/23/20  0539   INR 0.92 0.97 1.06     UA:  No results for input(s): Mima Cesar, Kim Sanchez, Jamaal Krishnan, Lesa Doherty, PROTEINCOREY, UROBILINOGEN, Layla Montoya, Catracho Smith in the last 72 hours. Urine Microscopic:   No results for input(s): LABCAST, BACTERIA, COMU, HYALCAST, WBCUA, RBCUA, EPIU in the last 72 hours. Urine Culture:   No results for input(s): LABURIN in the last 72 hours. Urine Chemistry:   No results for input(s): Yeny Hun, PROTEINUR, NAUR in the last 72 hours. IMAGING:  XR CHEST PORTABLE   Final Result   Changes consistent with COPD. Increased opacity in the right mid lung, indeterminate for confluent   fibrosis/crowding of bronchovascular markings versus airspace disease   (pneumonia). This is stable in the 7 day interval.  It is increased compared   to 2017. XR CHEST PORTABLE   Final Result   Slightly improved aeration of the lungs with persistent mild opacification in   the right upper and lower lung zones. Small right pleural effusion. CT head without contrast   Final Result   No acute intracranial abnormality. XR CHEST 1 VIEW   Final Result   1. Bilateral perihilar opacities, right greater than left, most consistent   with multifocal pneumonia, to include atypical causes. 2. Stable chronic bibasilar pleural and parenchymal scar. 3. Stable cardiomegaly. XR CHEST PORTABLE    (Results Pending)       I/O last 3 completed shifts: In: 100 [NG/GT:100]  Out: 950 [Urine:950]      Assessment/Plan :      1. BENOIT 2/2 pre renal + RAAS blockade   Held lisinopril. BENOIT resolved   Got lasix because of positive balance yesterday       2. HTN. Controlled   on amlodipine 10 mg per peg  daily     3. Seizure disorder. Neurology following     4. Acid- base disorder. Metabolic alkalosis 2/2 dehydration. Iv fluids     5. Hypokalemia - corrected with supplements     6 . UTI. abx per primary team     7. Hypernatremia. 147 ----> 151 ----> 145 ---> 146 ----> 142---> 148. Had an episode of vomiting this morning . likely aspirated so no oral / PEG supplements   Give D5 at 30 ml/ hr     8. Anemia. S/p EGD.  GI bleed  HB low stable   Has had received multiple blood transfusions            Thank you for allowing us to participate in care of Nata Yoshi         Electronically signed by: Isidro Obrien MD, 9/23/2020, 9:51 AM      [facility]  [address]  Office : [phone]  Fax :[phone]

## 2020-09-24 NOTE — PROGRESS NOTES
[facility] Pulmonary and Critical Care    Follow Up Note    Subjective:   CHIEF COMPLAINT / HPI:   Chief Complaint   Patient presents with    Altered Mental Status     Altered mental status started yesterday. Pt brought in from [facility] home. Interval history: He continues to be BiPAP dependent. Saturations are acceptable. He has compensated hypercapnia. Past Medical History:    Reviewed; no changes    Social History:    Reviewed; no changes    REVIEW OF SYSTEMS:    ROS is unobtainable due to his critical illness. Objective:   PHYSICAL EXAM:        VITALS:  /63   Pulse 85   Temp 96.7 °F (35.9 °C) (Temporal)   Resp (!) 32   Ht 5' 8\" (1.727 m)   Wt 177 lb 4 oz (80.4 kg)   SpO2 100%   BMI 26.95 kg/m²  on BiPAP    24HR INTAKE/OUTPUT:      Intake/Output Summary (Last 24 hours) at 9/24/2020 9220  Last data filed at 9/23/2020 1500  Gross per 24 hour   Intake 690 ml   Output 300 ml   Net 390 ml       CONSTITUTIONAL:  awake, alert,  no apparent distress, and appears stated age  LUNGS:  No increased work of breathing and clear to auscultation, no crackles or wheezes  CARDIOVASCULAR: S1 and S2 and no JVD  ABDOMEN:  normal bowel sounds, non-distended and non-tender to palpation  EXT: No edema, no calf tenderness. Pulses are present bilaterally. NEUROLOGIC:  Mental Status Exam:  Level of Alertness:   awake  Orientation:   person, place, time.  Non focal  SKIN:  normal skin color, texture, turgor, no redness, warmth, or swelling at IV sites    DATA:    CBC:  Recent Labs     09/23/20  0539 09/23/20  1351 09/24/20  0133 09/24/20  0306   WBC 17.5* 20.0* NA* 13.4*   RBC 2.40* 2.87* NA* 1.60*   HGB 7.5* 9.1* NA* 5.0*   HCT 22.6* 27.8* NA* 15.4*   * 113* NA* 66*   MCV 94.2 96.9 NA* 96.4   MCH 31.1 31.8 NA* 31.0   MCHC 33.0 32.8 NA* 32.1   RDW 15.2 15.7* NA* 15.6*   BANDSPCT 5  --   --  4      BMP:  Recent Labs     09/22/20  0616 09/23/20  0539 09/24/20  0133    148* 145   K 3.8 3.8 3.4*   CL 96* 97* 96*   CO2 44* 46* 44*   BUN 8 14 23*   CREATININE <0.5* 0.6* 0.9   CALCIUM 8.5 9.2 8.8   GLUCOSE 113* 134* 103*      ABG:  Recent Labs     09/22/20  1450 09/23/20  1123 09/23/20  1301   PHART 7.393 7.277* 7.352   [id number] 77.9* 125.5* 100.3*   PO2ART 121.0* 77.6 79.7   VEQ4AYE 47.4* 58.5* 55.7*   [id number] 99.6 91* 94   BEART 20.6* >30* >30*       Cultures:    Abx:    Radiology Review:  Pertinent images / reports were reviewed as a part of this visit. Assessment:     1. Acute on chronic hypoxemic and hypercapnic respiratory failure  2. Pulmonary edema/volume overload  3. Right pleural effusion  4. Aspiration pneumonia  5. SARS-CoV-2 infection    Plan:     1. I have reviewed laboratories, medical records and images for this visit  2. Has been stable on BiPAP but remains BiPAP dependent. 3. Hemoglobin was measured at 5. Repeat CBC is pending  4. No obvious source of bleeding though he did have GI bleed at the time of admission  5. Potassium is low, replace that  6. I reviewed repeat chest x-ray from yesterday which shows some improvement especially in the right pleural effusion  7. Procalcitonin is now slightly elevated at 0.2 but he is afebrile and his white count has come down  8. He is not on antibiotics  9.  Prognosis for functional recovery remains poor

## 2020-09-24 NOTE — PROGRESS NOTES
0400 assessment complete. Patient has become more active in bed. Restless and squirming. Trying to scratch at his PEG tube. PEG tube care performed. Blood dry wiped from site. Critical lab value: H/H = 5.8/18.0. No signs of active bleed at this time. MD informed. 1 unit PRBC ordered. Lab called and stated that it may not be accurate and redrew with new results of 5.0/15. 4. Blood order released. CHG wipe bath given. New gown and draw sheet. Diaper changed and zinc ointment applied to reddened areas. Breath sounds remain diminished throughout. Accessory muscle use. Still tachypneic. Now in NSR. Pulled up and repositioned in bed with pillow support. Passive ROM performed on all four extremities. Will monitor closely.

## 2020-09-24 NOTE — PROGRESS NOTES
Hospitalist Progress Note      PCP: Keegan Landis MD    Date of Admission: 9/10/2020    Chief Complaint:  [address] Course:   Patient is 59-year-old male with history of seizure disorder presenting from nursing facility altered mental status. Per EMS the patient has been altered since dayshift yesterday (about 12-16 hours prior to arrival to the ER). EMS reports that nursing staff stated that the patient is very bright and does converse with them at baseline. They noticed that he was not speaking much yesterday morning and they did call EMS today because they noted seizure-like activity. They also reported to EMS that the patient is coronavirus positive. EMS reports that the patient has been nodding yes and no and following some commands for them. They have not got him to speak much. Vitals per EMS were unremarkable. Glucose was 120. Patient tells me his name and provides minimal other history. He does shake his head no inappropriately to questioning.  21  spoke to [facility] staff and patient was quieter and more reserved than usual starting around midday. Is a full code. Nursing Notes were reviewed.     He has been BiPAP dependent and was noted to have had ulcers.         Subjective:   On BiPAP  Not really answering of responding much   Moving head      Medications:  Reviewed    Infusion Medications    dextrose 30 mL/hr at 09/23/20 1251    dextrose       Scheduled Medications    sodium chloride  20 mL Intravenous Once    lidocaine 1 % injection  5 mL Intradermal Once    sodium chloride flush  10 mL Intravenous 2 times per day    budesonide-formoterol  2 puff Inhalation BID    thiamine  100 mg Oral Daily    lansoprazole  30 mg Oral BID AC    enoxaparin  40 mg Subcutaneous Daily    lamoTRIgine  100 mg Oral TID    sodium chloride  20 mL Intravenous Once    insulin glargine  5 Units Subcutaneous Nightly    carBAMazepine  200 mg Oral TID    citalopram  20 mg Oral Daily    trospium  20 mg Oral BID AC    sodium chloride flush  10 mL Intravenous 2 times per day    insulin lispro  0-6 Units Subcutaneous Q4H     PRN Meds: sodium chloride flush, potassium chloride, albuterol sulfate HFA, ipratropium-albuterol, sodium chloride flush, acetaminophen **OR** acetaminophen, polyethylene glycol, promethazine **OR** ondansetron, glucose, dextrose, glucagon (rDNA), dextrose      Intake/Output Summary (Last 24 hours) at 9/24/2020 1536  Last data filed at 9/24/2020 0900  Gross per 24 hour   Intake 300 ml   Output --   Net 300 ml       Physical Exam Performed:    /68   Pulse 89   Temp 96.7 °F (35.9 °C) (Temporal)   Resp (!) 33   Ht 5' 8\" (1.727 m)   Wt 177 lb 4 oz (80.4 kg)   SpO2 100%   BMI 26.95 kg/m²     General appearance: drowsy, BiPAP in place  HEENT: Pupils equal, round, and reactive to light. Conjunctivae/corneas clear. Neck: Supple, with full range of motion. No jugular venous distention. Trachea midline. Respiratory: bibasilar rales notes  Cardiovascular: Regular rate and rhythm with normal S1/S2 without murmurs, rubs or gallops. Abdomen: Soft, non-tender, non-distended with normal bowel sounds. Musculoskeletal: No clubbing, cyanosis or edema bilaterally. Full range of motion without deformity. Skin: Skin color, texture, turgor normal.  No rashes or lesions.   Neurologic:  Seems lethargic, does move limbs  Psychiatric: not able to assess   Capillary Refill: Brisk,< 3 seconds   Peripheral Pulses: +2 palpable, equal bilaterally       Labs:   Recent Labs     09/24/20  0133 09/24/20  0306 09/24/20  1100   WBC NA* 13.4* 14.7*   HGB NA* 5.0* 7.7*   HCT NA* 15.4* 23.9*   PLT NA* 66* 83*     Recent Labs     09/22/20  0616 09/23/20  0539 09/24/20  0133    148* 145   K 3.8 3.8 3.4*   CL 96* 97* 96*   CO2 44* 46* 44*   BUN 8 14 23*   CREATININE <0.5* 0.6* 0.9   CALCIUM 8.5 9.2 8.8   PHOS 2.7 2.2* 3.7     Recent Labs     09/23/20  0539   AST 32   ALT 60*   BILITOT 0.3 ALKPHOS 113     Recent Labs     09/22/20  0616 09/23/20  0539 09/24/20  0133   INR 0.97 1.06 1.14     No results for input(s): CKTOTAL, TROPONINI in the last 72 hours. Urinalysis:      Lab Results   Component Value Date    NITRU Negative 09/15/2020    WBCUA 2 09/15/2020    BACTERIA 4+ 09/10/2020    RBCUA 0-2 09/15/2020    BLOODU SMALL 09/15/2020    SPECGRAV 1.018 09/15/2020    GLUCOSEU Negative 09/15/2020       Radiology:  XR CHEST PORTABLE   Final Result   Slight improvement in the appearance of the right lung compared to prior study         XR CHEST PORTABLE   Final Result   Changes consistent with COPD. Increased opacity in the right mid lung, indeterminate for confluent   fibrosis/crowding of bronchovascular markings versus airspace disease   (pneumonia). This is stable in the 7 day interval.  It is increased compared   to 2017. XR CHEST PORTABLE   Final Result   Slightly improved aeration of the lungs with persistent mild opacification in   the right upper and lower lung zones. Small right pleural effusion. CT head without contrast   Final Result   No acute intracranial abnormality. XR CHEST 1 VIEW   Final Result   1. Bilateral perihilar opacities, right greater than left, most consistent   with multifocal pneumonia, to include atypical causes. 2. Stable chronic bibasilar pleural and parenchymal scar. 3. Stable cardiomegaly.                  Assessment/Plan:    Active Hospital Problems    Diagnosis    Acute pulmonary edema ([address] [J81.0]    Pleural effusion, right [J90]    Centrilobular emphysema (HCC) [J43.2]    Seizure ([address] [R56.9]       Acute hypercapnic respiratory failure secondary to underlying COPD with probable aspiration pneumonitis  Given his underlying COPD and his agonal breathing stat blood gas obtained which shows again hypercapnic respiratory failure with pH down   Code status has been discussed with his POA and they want him to be full code.    Aspiration pneumonitis  - monitor respiratory status. Acute GI bleed: s/p EGD; 2 bulbar ulcers; 1 with clot and V.V. Epi injected & Clipped  S/p 5 units of PRBCs and 3 units of FFP   Protonix drip discontinued; switch to Protonix 40 mg twice daily   We will continue to monitor hemoglobin will discontinue every 8 hours  Cont  pantoprazole oral twice daily   Hg was low this am but his repeat was normal Likely a lab error    Thrombocytopenia: Platelets stable around 83  Continue to monitor closely     Seizure disorder: CT head on admission negative for any acute pathology; Neurology on board appreciate their recs;  Keppra and Depakote d/c    started back on Tegretol and lamotrigine      Delirium on baseline of dementia he does not have encephalopathy     SARS-CoV-2 infection and right lung aspiration pneumonia:   - oxygen requirement has been stable around 2 L with sats between 95 to 96%  -s/p dexamethasone and 7-day course of Zyvox and Unasyn; patient afebrile, WBCs within normal limits and all the cultures have been negative to date     Acute kidney injury; suspect prerenal ; improved        Hypokalemia: Resolved     Type 2 diabetes mellitus:   On Lantus and sliding scale will change over to medications through his PEG tube     Neurogenic bladder with urinary incontinence:  Urology on board appreciate their recs; Herman to ostomy bag  - will change suprapubic catheter size once COVID-19 is negative  -Continue Sanctura     Suspected UTIs with underlying suprapubic catheter  -Has been  on IV Unasyn; antibiotics stopped as he is urine culture revealed mixed pathogens and repeat UA not indicative of infection     Overall his prognosis is pretty poor  Palliative care have also been consulted     DVT Prophylaxis: SCDs  Diet: No diet orders on file  Code Status: Full Code    PT/OT Eval Status: eval and treat when appropriate     Dispo - CCC   30 min of critical care time spent.      Latrell Rivas MD

## 2020-09-24 NOTE — PROGRESS NOTES
[phone] Hospital or Facility: [facility]   From: [place]   RE: Linda Reagan 1945 RM: [id number]     Patient last BP readings: 94/54 (64), 73/45 (52), 76/39(48). Patient is a very hard stick and only has 2 PIVs. Please advise. Thanks!

## 2020-09-24 NOTE — PROGRESS NOTES
Shift handoff and assessment complete. Patient is in droplet plus iso for +COVID-19. Proper PPE applied when entering room: gown, gloves, N95, and face mask. Nursing care clustered for the safety of the patient, RN, and rest of the unit. Patient is on Bipap, lethargic, and responds only to pain. L AC PIV continuously infusing per order. R forearm PIV flushed with no blood return, normal saline locked, and alcohol capped. Suprapubic singh with gastrostomy patent with hugo urine. In Afib. Breathing shallow, tachypneic, and accessory muscle use. Diminished breath sounds throughout. Abdomen rounded and taut with hypoactive bowel sounds in all four quadrants. Skin is pale and warm. BLE dry, flaky, and scabby. Scattered bruising. DTI on buttocks. Excoriation on lillian-area. Restraints in place for the safety of the patient, his lines, and his tubing. Passive ROM performed on all four extremities. No evidence of pain or distress. 0 FLACC scale. Bed in lowest position with wheels locked. Alarm activated. 3/4 side rails up. Non-skid socks on. Repositioned for comfort and the prevention of pressure ulcer formation. Will continue to monitor.

## 2020-09-24 NOTE — PLAN OF CARE
Problem: Falls - Risk of:  Goal: Will remain free from falls  Description: Will remain free from falls  Outcome: Ongoing     Problem: Falls - Risk of:  Goal: Absence of physical injury  Description: Absence of physical injury  Outcome: Ongoing  Patient educated on the importance of calling out before exiting the bed and always waiting for assistance. No evidence of learning. Bed in lowest position with wheels locked. Alarm activated. 3/4 side rails up. Non-skid socks on. Hourly checks. Problem: Skin Integrity:  Goal: Will show no infection signs and symptoms  Description: Will show no infection signs and symptoms  Outcome: Ongoing     Problem: Skin Integrity:  Goal: Absence of new skin breakdown  Description: Absence of new skin breakdown  Outcome: Ongoing  Repositioned with pillow support q 2 hrs for comfort and the prevention of pressure ulcer formation. Problem: Nutrition  Goal: Optimal nutrition therapy  Outcome: Not Met This Shift     Problem: Nutrition Deficits  Goal: Optimize nutrtional status  Outcome: Not Met This Shift  TF discontinued after vomiting it up into Bipap today. Problem: Airway Clearance - Ineffective  Goal: Achieve or maintain patent airway  Outcome: Ongoing     Problem: Gas Exchange - Impaired  Goal: Absence of hypoxia  Outcome: Ongoing     Problem: Gas Exchange - Impaired  Goal: Promote optimal lung function  Outcome: Ongoing  On Bipap at 70%. Problem: Breathing Pattern - Ineffective  Goal: Ability to achieve and maintain a regular respiratory rate  Outcome: Not Met This Shift  Tachypneic. Problem: Body Temperature -  Risk of, Imbalanced  Goal: Ability to maintain a body temperature within defined limits  Outcome: Ongoing  Temp WNL. Problem:  Body Temperature -  Risk of, Imbalanced  Goal: Will regain or maintain usual level of consciousness  Outcome: Not Met This Shift     Problem: Loneliness or Risk for Loneliness  Goal: Demonstrate positive use of time alone when socialization is not possible  Outcome: Ongoing     Problem: Fatigue  Goal: Verbalize increase energy and improved vitality  Outcome: Not Met This Shift     Problem: ACTIVITY INTOLERANCE/IMPAIRED MOBILITY  Goal: Mobility/activity is maintained at optimum level for patient  Outcome: Not Met This Shift     Problem: Sensory Perception - Impaired:  Goal: Ability to maintain a stable neurologic state will improve  Description: Ability to maintain a stable neurologic state will improve  Outcome: Not Met This Shift  Patient is very lethargic and only responds to pain. Does not verbalize. Problem: Restraint Use - Nonviolent/Non-Self-Destructive Behavior:  Goal: Absence of restraint indications  Description: Absence of restraint indications  Outcome: Ongoing     Problem: Restraint Use - Nonviolent/Non-Self-Destructive Behavior:  Goal: Absence of restraint-related injury  Description: Absence of restraint-related injury  Outcome: Ongoing  Restraints in place for the safety of the patient, his lines, and his tubing. Passive ROM performed on all four extremities. Hourly checks. No evidence of pain or distress. 0/10 pain on CPOT scale. Problem: Serum Glucose Level - Abnormal:  Goal: Ability to maintain appropriate glucose levels will improve  Description: Ability to maintain appropriate glucose levels will improve  Outcome: Ongoing  Insulin coverage not needed. Problem: HEMODYNAMIC STATUS  Goal: Patient has stable vital signs and fluid balance  Outcome: Not Met This Shift  Hypotensive. Problem: Pain:  Goal: Pain level will decrease  Description: Pain level will decrease  Outcome: Ongoing     Problem: Pain:  Goal: Control of acute pain  Description: Control of acute pain  Outcome: Ongoing     Problem: Pain:  Goal: Control of chronic pain  Description: Control of chronic pain  Outcome: Ongoing   No evidence of pain or distress.  0/10 on FLACC

## 2020-09-24 NOTE — PROGRESS NOTES
[phone] Hospital or Facility: [facility]   From: Fox Devi   RE: Shonna Hirsch 1945 RM: [id number]     This is the hypotensive patient without the line and had a GI bleed this admission. Critical lab value: H/H = 5.8/18.0. No signs of active bleed at this time. Please advise.

## 2020-09-24 NOTE — PROGRESS NOTES
Reassessment complete, patientt responds to voice but does not follow commands. Patient remains on BiPAP at 55% FiO2. Patientt bathed and repositioned, restraints remain in place.

## 2020-09-24 NOTE — PROGRESS NOTES
Office : [phone]     Fax :[phone]       Nephrology progress Note          Chief Complaint:    Chief Complaint   Patient presents with    Altered Mental Status     Altered mental status started yesterday. Pt brought in from [facility] home. History of Present Ilness:    Omar Reyna is a 67 y.o. male with h/o Dementia, renal ds, BPH was transfered from [facility] where he had a witnessed seizure. He is awake but not oriented . All history gathered from the records from [facility]. His initial lab work shows elevated BUN and creatinine   Has a singh in place     Interval Hx     Had an episode of vomiting this morning   likely aspirated   On bipap   Sodium level better from 154---> 147 ---> 151 ----> 145 ---> 146 ----> 142 --> 148  renal function improved   Making urine     I/O last 3 completed shifts:   In: 80 [I.V.:70; NG/GT:620]  Out: 5 [Urine:300]  I/O this shift:  In: 300 [Blood:300]  Out: -           Past Medical History:   Diagnosis Date    Cerebral palsy ([address]     CHF (congestive heart failure) (HCC)     CKD (chronic kidney disease)     Constipation     Convulsions ([address]     COVID-19     Degenerative disease of nervous system ([address]     Delusional disorder ([address]     Dementia ([address]     Depression     Diabetes mellitus ([address]     Encephalopathy     Enlarged prostate with lower urinary tract symptoms (LUTS)     GERD (gastroesophageal reflux disease)     GI obstruction (HCC)     Hematuria     Hyperlipidemia     Hypernatremia     Hypertension     Hypertension     Mood disorder ([address]     MRSA colonization 09/11/2020    Muscle weakness     Muscle weakness (generalized)     Neuromuscular dysfunction of bladder     Neuropathy     Obsessive-compulsive INR 0.97 1.06 1.14     UA:  No results for input(s): COLORU, CLARITYU, GLUCOSEU, BILIRUBINUR, KETUA, SPECGRAV, BLOODU, PHUR, PROTEINU, UROBILINOGEN, NITRU, LEUKOCYTESUR, Lala Nunnery in the last 72 hours. Urine Microscopic:   No results for input(s): LABCAST, BACTERIA, COMU, HYALCAST, WBCUA, RBCUA, EPIU in the last 72 hours. Urine Culture:   No results for input(s): LABURIN in the last 72 hours. Urine Chemistry:   No results for input(s): Jacquetta Bath, PROTEINUR, NAUR in the last 72 hours. IMAGING:  XR CHEST PORTABLE   Final Result   Slight improvement in the appearance of the right lung compared to prior study         XR CHEST PORTABLE   Final Result   Changes consistent with COPD. Increased opacity in the right mid lung, indeterminate for confluent   fibrosis/crowding of bronchovascular markings versus airspace disease   (pneumonia). This is stable in the 7 day interval.  It is increased compared   to 2017. XR CHEST PORTABLE   Final Result   Slightly improved aeration of the lungs with persistent mild opacification in   the right upper and lower lung zones. Small right pleural effusion. CT head without contrast   Final Result   No acute intracranial abnormality. XR CHEST 1 VIEW   Final Result   1. Bilateral perihilar opacities, right greater than left, most consistent   with multifocal pneumonia, to include atypical causes. 2. Stable chronic bibasilar pleural and parenchymal scar. 3. Stable cardiomegaly. I/O last 3 completed shifts: In: 690 [I.V.:70; NG/GT:620]  Out: 300 [Urine:300]      Assessment/Plan :      1. BENOIT 2/2 pre renal + RAAS blockade   Held lisinopril. BENOIT resolved       2. HTN. Controlled   on amlodipine 10 mg per peg  daily     3. Seizure disorder. Neurology following     4. Acid- base disorder. Metabolic alkalosis 2/2 dehydration. Iv fluids     5. Hypokalemia - corrected with supplements     6 . UTI.  abx per primary team 7. Hypernatremia. 147 ----> 151 ----> 145 ---> 146 ----> 142---> 148 ---> 145   Had an episode of vomiting this morning . likely aspirated so no oral / PEG supplements   Give D5 at 30 ml/ hr     8. Anemia. S/p EGD. GI bleed  HB low again .  Got one UNit oF PRBC   Has had received multiple blood transfusions    Will give lasix 40 mg iv x 1 now           Thank you for allowing us to participate in care of Tanvi Lundy         Electronically signed by: Matthew Nix MD, 9/24/2020, 11:20 AM      [facility] [address]  Office : [phone]  Fax :[phone]

## 2020-09-24 NOTE — PROGRESS NOTES
[phone] Hospital or Facility: [facility]   From: Yassine Fernández   RE: Lanre Fraga 1945 RM: [id number]     The patient was receiving TF through his PEG but threw up into his bipap with possible aspiration. TF stopped. . Should I given Lantus dose for 2100?

## 2020-09-25 NOTE — PLAN OF CARE
Problem: Falls - Risk of:  Goal: Will remain free from falls  Description: Will remain free from falls  Outcome: Ongoing  Goal: Absence of physical injury  Description: Absence of physical injury  Outcome: Ongoing     Problem: Skin Integrity:  Goal: Will show no infection signs and symptoms  Description: Will show no infection signs and symptoms  Outcome: Ongoing  Goal: Absence of new skin breakdown  Description: Absence of new skin breakdown  Outcome: Ongoing     Problem: Nutrition  Goal: Optimal nutrition therapy  Outcome: Not Met This Shift     Problem: Airway Clearance - Ineffective  Goal: Achieve or maintain patent airway  Outcome: Not Met This Shift     Problem: Gas Exchange - Impaired  Goal: Absence of hypoxia  Outcome: Not Met This Shift  Goal: Promote optimal lung function  Outcome: Ongoing     Problem: Breathing Pattern - Ineffective  Goal: Ability to achieve and maintain a regular respiratory rate  Outcome: Not Met This Shift     Problem:  Body Temperature -  Risk of, Imbalanced  Goal: Ability to maintain a body temperature within defined limits  Outcome: Ongoing  Goal: Will regain or maintain usual level of consciousness  Outcome: Not Met This Shift  Goal: Complications related to the disease process, condition or treatment will be avoided or minimized  Outcome: Ongoing     Problem: Isolation Precautions - Risk of Spread of Infection  Goal: Prevent transmission of infection  Outcome: Ongoing     Problem: Nutrition Deficits  Goal: Optimize nutrtional status  Outcome: Not Met This Shift     Problem: Risk for Fluid Volume Deficit  Goal: Maintain normal heart rhythm  Outcome: Ongoing  Goal: Maintain absence of muscle cramping  Outcome: Ongoing  Goal: Maintain normal serum potassium, sodium, calcium, phosphorus, and pH  Outcome: Not Met This Shift     Problem: Loneliness or Risk for Loneliness  Goal: Demonstrate positive use of time alone when socialization is not possible  Outcome: Not Met This Shift Problem: Fatigue  Goal: Verbalize increase energy and improved vitality  Outcome: Not Met This Shift     Problem: Patient Education: Go to Patient Education Activity  Goal: Patient/Family Education  Outcome: Not Met This Shift     Problem: Pain:  Goal: Pain level will decrease  Description: Pain level will decrease  Outcome: Not Met This Shift  Goal: Control of acute pain  Description: Control of acute pain  Outcome: Not Met This Shift  Goal: Control of chronic pain  Description: Control of chronic pain  Outcome: Not Met This Shift     Problem: OXYGENATION/RESPIRATORY FUNCTION  Goal: Patient will maintain patent airway  Outcome: Ongoing     Problem: OXYGENATION/RESPIRATORY FUNCTION  Goal: Patient will achieve/maintain normal respiratory rate/effort  Description: Respiratory rate and effort will be within normal limits for the patient  Outcome: Not Met This Shift     Problem: HEMODYNAMIC STATUS  Goal: Patient has stable vital signs and fluid balance  Outcome: Not Met This Shift     Problem: FLUID AND ELECTROLYTE IMBALANCE  Goal: Fluid and electrolyte balance are achieved/maintained  Outcome: Not Met This Shift     Problem: ACTIVITY INTOLERANCE/IMPAIRED MOBILITY  Goal: Mobility/activity is maintained at optimum level for patient  Outcome: Not Met This Shift     Problem: Discharge Planning:  Goal: Discharged to appropriate level of care  Description: Discharged to appropriate level of care  Outcome: Not Met This Shift     Problem: Serum Glucose Level - Abnormal:  Goal: Ability to maintain appropriate glucose levels will improve  Description: Ability to maintain appropriate glucose levels will improve  Outcome: Ongoing     Problem: Sensory Perception - Impaired:  Goal: Ability to maintain a stable neurologic state will improve  Description: Ability to maintain a stable neurologic state will improve  Outcome: Not Met This Shift     Problem: Restraint Use - Nonviolent/Non-Self-Destructive Behavior:  Goal: Absence of restraint indications  Description: Absence of restraint indications  Outcome: Not Met This Shift  Goal: Absence of restraint-related injury  Description: Absence of restraint-related injury  Outcome: Not Met This Shift

## 2020-09-25 NOTE — PROGRESS NOTES
I spoke with patient's niece June today on the phone. I updated her to his status and let her know that his prognosis wasn't good. She stated that her sister ( his POA) seemed confused about his condition and didn't tell her that he was COV positive etc. I told her that she needed to speak with her sister and that the both of them needed to set up a time in which to speak with a doctor.

## 2020-09-25 NOTE — PROGRESS NOTES
Comprehensive Nutrition Assessment    Type and Reason for Visit:  Reassess    Nutrition Recommendations/Plan:     Advance nutrition as tolerated/appropriate vs comfort measures (monitor for change in status)    Nutrition Assessment:  Pt's nutrition status is compromised AEB NPO status d/t likely aspiration from episode of vomiting with BIPAP on. MD has noted poor prognosis & plans to meet with family to determine goals of care. Will monitor for appropriate route of nutrition vs comfort measures. Malnutrition Assessment:  Malnutrition Status: At risk for malnutrition (Comment)    Context:  Acute Illness     Findings of the 6 clinical characteristics of malnutrition:  Energy Intake:  7 - 50% or less of estimated energy requirements for 5 or more days  Weight Loss:  Unable to assess(COVID isolation)     Body Fat Loss:  Unable to assess(COVID isolation)     Muscle Mass Loss:  Unable to assess    Fluid Accumulation:  No significant fluid accumulation     Strength:  Not Performed    Estimated Daily Nutrient Needs:  Energy (kcal):  1750 - 2100 (25-30 kcal/70kg); Weight Used for Energy Requirements:  (con't to use admission wt 70 kg for est needs)     Protein (g):   (1.3 - 1.6g/70kg); Weight Used for Protein Requirements:  Admission        Fluid (ml/day):  1 mL/kcal; Weight Used for Fluid Requirements:  Current      Nutrition Related Findings:  9/25: Na+ 147, k+ 3.4; no edema noted; LBM 9/24; I/O: +16 L since 9/11      Wounds:  Deep Tissue Injury(buttocks)       Current Nutrition Therapies:    NPO    Anthropometric Measures:  · Height: 5' 8\" (172.7 cm)  · Current Body Weight: 170 lb (77.1 kg)   · Admission Body Weight: 154 lb (69.9 kg)    · Usual Body Weight:       · Ideal Body Weight: 154 lbs; % Ideal Body Weight 110.4 %   · BMI: 25.9  · Adjusted Body Weight:  ;     · Adjusted BMI:      · BMI Categories: Overweight (BMI 25.0-29. 9)       Nutrition Diagnosis:   · Increased nutrient needs related to increase demand for energy/nutrients as evidenced by wounds    · Inadequate energy intake related to impaired respiratory function as evidenced by NPO or clear liquid status due to medical condition, other (comment)(aspiration)      Nutrition Interventions:   Food and/or Nutrient Delivery:  Continue NPO(monitor for appropriate route of nutrition vs comfort measures)  Nutrition Education/Counseling:  Education not indicated   Coordination of Nutrition Care:  Continued Inpatient Monitoring    Goals:  start of nutrition vs comfort measures(New note)       Nutrition Monitoring and Evaluation:   Behavioral-Environmental Outcomes:      Food/Nutrient Intake Outcomes:  Diet Advancement/Tolerance, Enteral Nutrition Intake/Tolerance  Physical Signs/Symptoms Outcomes:  Biochemical Data, Chewing or Swallowing, Weight     Discharge Planning:     Too soon to determine     Electronically signed by Alessandro Fischer RD, LD on 9/25/20 at 9:55 AM EDT    Contact: [phone]

## 2020-09-25 NOTE — PLAN OF CARE
Nutrition Problem #1: Increased nutrient needs  Intervention: Food and/or Nutrient Delivery: Continue NPO(monitor for appropriate route of nutrition vs comfort measures)  Nutritional Goals: start of nutrition vs comfort measures(New note)

## 2020-09-25 NOTE — PROGRESS NOTES
0400 assessment complete. Patient so restless and uncomfortable, PRN Tylenol suppository administered. BP now WDL. Weaning down fiO2. Currently at 50% and sating 98%. Restraints in place for the safety of the patient, his lines, and his tubing. Passive ROM performed on all four extremities. Pain level of 8 on FLACC scale. Bed in lowest position with wheels locked. Alarm activated. 3/4 side rails up.  Non-skid socks on. Repositioned for comfort and the prevention of pressure ulcer formation. Will continue to monitor.

## 2020-09-25 NOTE — PROGRESS NOTES
Shift handoff completed at bedside with day-shift RN. Four eyes skin check. Drips verified. Confirmed that bed alarm is activated. Shift assessment complete. Patient is in droplet plus iso for +COVID-19. Proper PPE applied when entering room: gown, gloves, N95, and face mask. Nursing care clustered for the safety of the patient, RN, and rest of the unit. Patient is on Bipap. Restless and tense. Responds to voice or new confusion/agitation. Nods to questions, but unsure if it is appropriately. Did nod \"yes\" to being in pain. See later Progress Note for [employer] message to MD. HENDERSON forearm PIV flushed with no blood return and continuously infusing per order. See MAR. L AC PIV  flushed with sluggish blood return, normal saline locked, and alcohol capped. Suprapubic singh with ostomy-bag-dressing patent with hugo urine. In NSR. Breathing shallow, tachypneic, and accessory muscle use. Diminished breath sounds throughout. Abdomen rounded and taut with hypoactive bowel sounds in all four quadrants. Skin is pale and warm. BLE dry, flaky, and scabby. Scattered bruising. DTI on buttocks. Excoriation on lillian-area. Restraints in place for the safety of the patient, his lines, and his tubing. Passive ROM performed on all four extremities. Pain level of 8 on FLACC scale. Bed in lowest position with wheels locked. Alarm activated. 3/4 side rails up. Non-skid socks on.  Repositioned for comfort and the prevention of pressure ulcer formation. Will continue to monitor.

## 2020-09-25 NOTE — PROGRESS NOTES
P Pulmonary and Critical Care    Follow Up Note    Subjective:   CHIEF COMPLAINT / HPI:   Chief Complaint   Patient presents with    Altered Mental Status     Altered mental status started yesterday. Pt brought in from [facility] home. Interval history: He is about the same. Continues to be BiPAP dependent. Desaturates very quickly and has a rising CO2 very quickly when the mask comes off. Past Medical History:    Reviewed; no changes    Social History:    Reviewed; no changes    REVIEW OF SYSTEMS:    ROS is unobtainable due to his critical illness. Objective:   PHYSICAL EXAM:        VITALS:  BP (!) 88/41   Pulse 75   Temp 96.4 °F (35.8 °C) (Temporal)   Resp (!) 31   Ht 5' 8\" (1.727 m)   Wt 170 lb 13.7 oz (77.5 kg)   SpO2 100%   BMI 25.98 kg/m²  on BiPAP    24HR INTAKE/OUTPUT:      Intake/Output Summary (Last 24 hours) at 9/25/2020 0901  Last data filed at 9/25/2020 0600  Gross per 24 hour   Intake 1034.1 ml   Output 2100 ml   Net -1065.9 ml       CONSTITUTIONAL:  awake, alert,  no apparent distress, and appears stated age  LUNGS:  No increased work of breathing and clear to auscultation, no crackles or wheezes  CARDIOVASCULAR: S1 and S2 and no JVD  ABDOMEN:  normal bowel sounds, non-distended and non-tender to palpation  EXT: No edema, no calf tenderness. Pulses are present bilaterally. NEUROLOGIC:  Mental Status Exam:  Level of Alertness:   awake  Orientation:   person, place, time.  Non focal  SKIN:  normal skin color, texture, turgor, no redness, warmth, or swelling at IV sites    DATA:    CBC:  Recent Labs     09/23/20  0539  09/24/20  0133 09/24/20  0306 09/24/20  1100   WBC 17.5*   < > NA* 13.4* 14.7*   RBC 2.40*   < > NA* 1.60* 2.50*   HGB 7.5*   < > NA* 5.0* 7.7*   HCT 22.6*   < > NA* 15.4* 23.9*   *   < > NA* 66* 83*   MCV 94.2   < > NA* 96.4 95.9   MCH 31.1   < > NA* 31.0 30.8   MCHC 33.0   < > NA* 32.1 32.2   RDW 15.2   < > NA* 15.6* 15.3   BANDSPCT 5  --   --  4  -- < > = values in this interval not displayed. BMP:  Recent Labs     09/23/20  0539 09/24/20  0133 09/25/20  0507   * 145 147*   K 3.8 3.4* 3.4*   CL 97* 96* 97*   CO2 46* 44* 43*   BUN 14 23* 27*   CREATININE 0.6* 0.9 0.6*   CALCIUM 9.2 8.8 8.8   GLUCOSE 134* 103* 82      ABG:  Recent Labs     09/22/20  1450 09/23/20  1123 09/23/20  1301   PHART 7.393 7.277* 7.352   [id number] 77.9* 125.5* 100.3*   PO2ART 121.0* 77.6 79.7   [id number] 47.4* 58.5* 55.7*   [id number] 99.6 91* 94   BEART 20.6* >30* >30*       Cultures:    Abx:    Radiology Review:  Pertinent images / reports were reviewed as a part of this visit. Assessment:     1. Acute on chronic hypoxemic and hypercapnic respiratory failure  2. Pulmonary edema/volume overload  3. Right pleural effusion  4. Aspiration pneumonia  5. SARS-CoV-2 infection    Plan:     1. I have reviewed laboratories, medical records and images for this visit  2. Has been stable on BiPAP but remains BiPAP dependent. 3. He did receive 1 unit packed red blood cells her hemoglobin of 5.  4. Repeat hemoglobin 7.7  5. Platelets are also on the low side but stable  6. No progress from a respiratory point of view  7. Would like to try to meet with family to establish goals of care  8.  Prognosis for functional recovery remains poor

## 2020-09-25 NOTE — CARE COORDINATION
Discharge planning note:    Chart reviewed for discharge planning. Barrier(s) to discharge- Bipap dependent, Restraints    Tentative discharge plan-  Return to [facility]    Tentative discharge date-TBD    Case management will continue to follow progress and update discharge plan as needed.       Chante Martinez RN BSN  Case Management  [phone]

## 2020-09-25 NOTE — PROGRESS NOTES
[phone] Hospital or Facility: [facility]   From: Su Valiente   RE: Linda Reagan 1945 RM: [id number]     Patients BS was 103. Same thing happened last night and we held his nightly lantus. Should this be held again? Please advise.

## 2020-09-25 NOTE — PROGRESS NOTES
0000 assessment complete. Patient remains tense with legs drawn up. Restless and uncomfortable. FLACC score of 3. BP is soft. Will monitor closely. Medium, brown, loose, watery BM in diaper and bed pad. Complete linen change. New bed pad and diaper. Zinc ointment applied to lillian-area. Pulled up and repositioned in bed. No other acute changes noted. Restraints remain in place for the safety of the patient, his lines, and his tubing. Passive ROM performed on all four extremities. Bed in lowest position with wheels locked. Alarm activated. 3/4 side rails up.  Non-skid socks on.

## 2020-09-25 NOTE — PROGRESS NOTES
[phone] Hospital or Facility: [facility]   From: Neil Maldonado   RE: Estefani Gonzales 1945 RM: [id number]     The patient is on bipap. Instantly dropped to 70s when getting disconnected once today on day shift. He is lethargic, but able to nod/gesture. Very restless and in restraints. Shaking head and swaying shoulders back and forth constantly. All extremities tense and will not stretch them out when asked or assisted. (Not previously contracted). Very restless/uncomfortable. Shook his head yes when asked if in pain. May not be able to receive anything d/t respiratory status, but thought that I'd at least try. Please advise.

## 2020-09-25 NOTE — PROGRESS NOTES
Office : [phone]     Fax :[phone]       Nephrology progress Note          Chief Complaint:    Chief Complaint   Patient presents with    Altered Mental Status     Altered mental status started yesterday. Pt brought in from [facility] home. History of Present Ilness:    Leonardo Talley is a 67 y.o. male with h/o Dementia, renal ds, BPH was transfered from [facility] where he had a witnessed seizure. He is awake but not oriented . All history gathered from the records from [facility]. His initial lab work shows elevated BUN and creatinine   Has a singh in place     Interval Hx     Had an episode of vomiting this morning   likely aspirated   On bipap   Sodium level better from 154---> 147 ---> 151 ----> 145 ---> 146 ----> 142 --> 148  renal function improved   Making urine     I/O last 3 completed shifts: In: 1334.1 [I.V.:514.1; Blood:600; NG/GT:120; IV Piggyback:100]  Out: 2100 [Urine:2100]  No intake/output data recorded.           Past Medical History:   Diagnosis Date    Cerebral palsy ([address]     CHF (congestive heart failure) (HCC)     CKD (chronic kidney disease)     Constipation     Convulsions ([address]     COVID-19     Degenerative disease of nervous system ([address]     Delusional disorder ([address]     Dementia ([address]     Depression     Diabetes mellitus ([address]     Encephalopathy     Enlarged prostate with lower urinary tract symptoms (LUTS)     GERD (gastroesophageal reflux disease)     GI obstruction (HCC)     Hematuria     Hyperlipidemia     Hypernatremia     Hypertension     Hypertension     Mood disorder ([address]     MRSA colonization 09/11/2020    Muscle weakness     Muscle weakness (generalized)     Neuromuscular dysfunction of bladder     Neuropathy     Obsessive-compulsive personality disorder (CODE)     [facility]osis [facility]     Suprapubic catheter ([facility]     #20    Urinary retention        Past Surgical History:   Procedure Laterality Date    ARM SURGERY      BLADDER SURGERY      CYSTOSCOPY N/A 5/15/2019    CYSTOLITHALOPAXY , STONE  SUPRA PUBIC CATHETER EXCHANGE. performed by Meagan Cesar MD at [address] [facility] [place] [facility] [place], [place]      EYE SURGERY      GASTROSTOMY TUBE PLACEMENT N/A 9/18/2020    EGD PEG TUBE PLACEMENT performed by Nikole Diallo MD at [facility] ASP BLADDER W INSERT SUPRAPUBIC CATH      TONSILLECTOMY      UMBILICAL HERNIA REPAIR N/A 1/15/2019    OPEN PRIMARY UMBILICAL HERNIA REPAIR performed by Brett Sandhu MD at [facility] N/A 9/17/2020    EGD CONTROL HEMORRHAGE with application of over the scope clip (star clip) injection of epinephrine 0.5 mg performed by Nikole Diallo MD at [address]       Current Medications:    0.9 % sodium chloride bolus, Once  dextrose 5 % solution, Continuous  lidocaine PF 1 % injection 5 mL, Once  sodium chloride flush 0.9 % injection 10 mL, 2 times per day  sodium chloride flush 0.9 % injection 10 mL, PRN  budesonide-formoterol (SYMBICORT) 160-4.5 MCG/ACT inhaler 2 puff, BID  vitamin B-1 (THIAMINE) tablet 100 mg, Daily  lansoprazole (PREVACID SOLUTAB) disintegrating tablet 30 mg, BID AC  enoxaparin (LOVENOX) injection 40 mg, Daily  lamoTRIgine (LAMICTAL) tablet 100 mg, TID  0.9 % sodium chloride bolus, Once  potassium chloride 10 mEq/100 mL IVPB (Peripheral Line), PRN  albuterol sulfate  (90 Base) MCG/ACT inhaler 2 puff, Q6H PRN  ipratropium-albuterol (DUONEB) nebulizer solution 1 ampule, Q4H PRN  carBAMazepine (TEGRETOL) tablet 200 mg, TID  citalopram (CELEXA) tablet 20 mg, Daily  trospium (SANCTURA) tablet 20 mg, BID AC  sodium chloride flush 0.9 % injection 10 mL, 2 times per day  sodium chloride flush 0.9 % injection 10 mL, PRN  acetaminophen (TYLENOL) tablet 650 mg, Q6H PRN    Or  acetaminophen (TYLENOL) suppository 650 mg, Q6H PRN  polyethylene glycol (GLYCOLAX) packet 17 g, Daily PRN  promethazine (PHENERGAN) tablet 12.5 mg, Q6H PRN    Or  ondansetron (ZOFRAN) injection 4 mg, Q6H PRN  glucose (GLUTOSE) 40 % oral gel 15 g, PRN  dextrose 50 % IV solution, PRN  glucagon (rDNA) injection 1 mg, PRN  dextrose 5 % solution, PRN  insulin lispro (1 Unit Dial) 0-6 Units, Q4H        Physical exam:     Vitals:  BP (!) 140/67   Pulse 88   Temp 96.1 °F (35.6 °C) (Temporal)   Resp 25   Ht 5' 8\" (1.727 m)   Wt 170 lb 13.7 oz (77.5 kg)   SpO2 99%   BMI 25.98 kg/m²   Constitutional:  Not oriented   Skin: no rash, turgor wnl  Heent:  eomi, mmm  Neck: no bruits or jvd noted  Cardiovascular:  S1, S2 without m/r/g  Respiratory: CTA B without w/r/r  Abdomen:  +bs, soft, nt, nd  Ext: no  lower extremity edema      Labs:  CBC:   Recent Labs     09/24/20  0306 09/24/20  1100 09/25/20  0506   WBC 13.4* 14.7* 9.1   HGB 5.0* 7.7* 7.3*   PLT 66* 83* 76*     BMP:    Recent Labs     09/23/20  0539 09/24/20  0133 09/25/20  0507   * 145 147*   K 3.8 3.4* 3.4*   CL 97* 96* 97*   CO2 46* 44* 43*   BUN 14 23* 27*   CREATININE 0.6* 0.9 0.6*   GLUCOSE 134* 103* 82     Ca/Mg/Phos:   Recent Labs     09/23/20  0539 09/24/20  0133 09/25/20  0507   CALCIUM 9.2 8.8 8.8   MG 1.90 2.00 2.00   PHOS 2.2* 3.7 2.7     Hepatic:   Recent Labs     09/23/20  0539   AST 32   ALT 60*   BILITOT 0.3   ALKPHOS 113     Troponin: No results for input(s): TROPONINI in the last 72 hours. BNP: No results for input(s): BNP in the last 72 hours. Lipids: No results for input(s): CHOL, TRIG, HDL, LDLCALC, LABVLDL in the last 72 hours.   ABGs:   Recent Labs     09/23/20  1301   PHART 7.352   PO2ART 79.7   [id number] 100.3*     INR:   Recent Labs     09/23/20  0539 09/24/20  0133 09/25/20  0506   INR 1.06 1.14 1.10     UA:  No results for input(s): COLORU, CLARITYU, Amadeo Furlough, BLOODU, PHUR, PROTEINU, UROBILINOGEN, NITRU, LEUKOCYTESUR, Joretta [place] in the last 72 hours. Urine Microscopic:   No results for input(s): LABCAST, BACTERIA, COMU, HYALCAST, WBCUA, RBCUA, EPIU in the last 72 hours. Urine Culture:   No results for input(s): LABURIN in the last 72 hours. Urine Chemistry:   No results for input(s): Carlus Lacer, PROTEINUR, NAUR in the last 72 hours. IMAGING:  XR CHEST PORTABLE   Final Result   Slight improvement in the appearance of the right lung compared to prior study         XR CHEST PORTABLE   Final Result   Changes consistent with COPD. Increased opacity in the right mid lung, indeterminate for confluent   fibrosis/crowding of bronchovascular markings versus airspace disease   (pneumonia). This is stable in the 7 day interval.  It is increased compared   to 2017. XR CHEST PORTABLE   Final Result   Slightly improved aeration of the lungs with persistent mild opacification in   the right upper and lower lung zones. Small right pleural effusion. CT head without contrast   Final Result   No acute intracranial abnormality. XR CHEST 1 VIEW   Final Result   1. Bilateral perihilar opacities, right greater than left, most consistent   with multifocal pneumonia, to include atypical causes. 2. Stable chronic bibasilar pleural and parenchymal scar. 3. Stable cardiomegaly. I/O last 3 completed shifts: In: 1334.1 [I.V.:514.1; Blood:600; NG/GT:120; IV Piggyback:100]  Out: 2100 [Urine:2100]      Assessment/Plan :      1. BENOIT 2/2 pre renal + RAAS blockade   Held lisinopril. BENOIT resolved       2. HTN. Controlled   on amlodipine 10 mg per peg  daily     3. Seizure disorder. Neurology following     4. Acid- base disorder. Metabolic alkalosis 2/2 dehydration. Iv fluids     5. Hypokalemia - corrected with supplements     6 . UTI. abx per primary team     7. Hypernatremia. 147 ----> 151 ----> 145 ---> 147  Give D5 at 30 ml/ hr     8. Anemia. S/p EGD. GI bleed  HB low again . Got one UNit oF PRBC   Has had received multiple blood transfusions    Will give lasix 20 mg iv x 1 now     Overall prognosis poor .       Thank you for allowing us to participate in care of Chikis Yañez         Electronically signed by: Garnett Schlatter, MD, 9/25/2020, 12:45 PM      [facility] [address]  Office : [phone]  Fax :[phone]

## 2020-09-26 NOTE — FLOWSHEET NOTE
Vascular Access Note:   A Power Midline has been placed in the right basilic vein using sterile technique. A power midline is an   long peripheral catheter and may receive medications that are allowed through a peripheral vein. Power midline is approved by the FDA for blood draws. As of now it draws blood briskly without a tourniquet. It is approved to be in place for 29 days. It may, in time, be more difficult to draw blood from it and you may place a tourniquet above it to assist with blood draws- remembering it is 15 cm long and a tourniquet needs to be placed well above the tip of the catheter in order not to cut off flow or fracture the line.      Thank you,  Meera Higuera RN, BSN, MSHI,  VA-BC, [address]  Vascular Access/PICC Team RN

## 2020-09-26 NOTE — PROGRESS NOTES
2000 medications given late d/t midline placement. Unable to get to PEG tube with out breaking sterile field. See MAR.

## 2020-09-26 NOTE — PROGRESS NOTES
[facility]    Follow Up Note    Subjective:   CHIEF COMPLAINT / HPI:   Chief Complaint   Patient presents with    Altered Mental Status     Altered mental status started yesterday. Pt brought in from [facility] home. Interval history: He is about the same. Continues to be BiPAP dependent. Desaturates very quickly and has a rising CO2 very quickly when the mask comes off. Past Medical History:    Reviewed; no changes    Social History:    Reviewed; no changes    REVIEW OF SYSTEMS:    ROS is unobtainable due to his critical illness. Objective:   PHYSICAL EXAM:        VITALS:  /86   Pulse 86   Temp 96.1 °F (35.6 °C) (Temporal)   Resp 22   Ht 5' 8\" (1.727 m)   Wt 169 lb 5 oz (76.8 kg)   SpO2 100%   BMI 25.74 kg/m²  on BiPAP    24HR INTAKE/OUTPUT:      Intake/Output Summary (Last 24 hours) at 9/26/2020 0948  Last data filed at 9/26/2020 0600  Gross per 24 hour   Intake 759.9 ml   Output 950 ml   Net -190.1 ml       CONSTITUTIONAL:  awake, alert,  no apparent distress, and appears stated age  LUNGS:  No increased work of breathing and clear to auscultation, no crackles or wheezes  CARDIOVASCULAR: S1 and S2 and no JVD  ABDOMEN:  normal bowel sounds, non-distended and non-tender to palpation  EXT: No edema, no calf tenderness. Pulses are present bilaterally. NEUROLOGIC:  Mental Status Exam:  Level of Alertness:   awake  Orientation:   person, place, time.  Non focal  SKIN:  normal skin color, texture, turgor, no redness, warmth, or swelling at IV sites    DATA:    CBC:  Recent Labs     09/24/20  0306 09/24/20  1100 09/25/20  0506 09/26/20  0430   WBC 13.4* 14.7* 9.1 7.6   RBC 1.60* 2.50* 2.32* 2.17*   HGB 5.0* 7.7* 7.3* 6.9*   HCT 15.4* 23.9* 22.1* 20.8*   PLT 66* 83* 76* 96*   MCV 96.4 95.9 95.5 95.8   MCH 31.0 30.8 31.5 31.8   MCHC 32.1 32.2 33.0 33.2   RDW 15.6* 15.3 15.4 15.7*   BANDSPCT 4  --   --   --       BMP:  Recent Labs     09/24/20  0133 09/25/20  050 09/26/20  0430    147* 146*   K 3.4* 3.4* 3.0*   CL 96* 97* 98*   CO2 44* 43* 44*   BUN 23* 27* 22*   CREATININE 0.9 0.6* 0.6*   CALCIUM 8.8 8.8 8.9   GLUCOSE 103* 82 88      ABG:  Recent Labs     09/23/20  1123 09/23/20  1301   PHART 7.277* 7.352   [id number] 125.5* 100.3*   PO2ART 77.6 79.7   [id number] 58.5* 55.7*   [id number] 91* 94   BEART >30* >30*       Cultures:    Abx:    Radiology Review:  Pertinent images / reports were reviewed as a part of this visit. Assessment:     1. Acute on chronic hypoxemic and hypercapnic respiratory failure  2. Pulmonary edema/volume overload  3. Right pleural effusion  4. Aspiration pneumonia  5. SARS-CoV-2 infection    Plan:     1. I have reviewed laboratories, medical records and images for this visit  2. Has been stable on BiPAP but remains BiPAP dependent. 3. He may need some Precedex to tolerate the BiPAP as he is becoming a bit more agitated  4. Replace potassium  5.  We will continue to work with family to address goals of care  Prognosis for recovery is poor

## 2020-09-26 NOTE — FLOWSHEET NOTE
Spoke with pt's third niece (there are four), Aleshia Boateng ([phone]), regarding pt's prognosis and POC. Rico Musa was receptive and asked multiple questions. Per Dr. Zen Parker was informed that she may come visit the patient but must remain outside the room, beyond the glass door. She was also informed of the option to have a video call with one or more people. At the conclusion of the call, Rico Musa wanted to see the patient prior to making any decisions regarding code status. Per [facility] Revised Code [facility] DEACONESS) 2133.08 (https://codes. [place].gov/orc/2133.08), this nurse has followed the sequence for Next of Kin decision-makin) guardian - none  2) spouse - none  3) adult children - none  4) parent(s) - none  5) adult siblings; majority if more than one - one, has dementia (her POA is Liz Best)  6) Nearest adult not described above - four nieces (Colby Landry, Liz Best, third unnamed, and Aleshia Boateng)  This nurse is attempting to get a [de-identified] of nieces information on pt POC/prognosis for them to make an informed decision on code status.

## 2020-09-26 NOTE — FLOWSHEET NOTE
Spoke with Denise Montague; she has no additional questions and does not want to video chat or come in to see pt. Left message for June. Spoke with David Petit who said \"June and I want to wait - miracles still happen, you know. \" Encouraged her to set up a video call so pt can hear familiar voices. David Petit was amenable to that idea and will call back tonight with a time when she and Liz can video chat with pt tomorrow.

## 2020-09-26 NOTE — PROGRESS NOTES
Office : [phone]     Fax :[phone]       Nephrology progress Note          Chief Complaint:    Chief Complaint   Patient presents with    Altered Mental Status     Altered mental status started yesterday. Pt brought in from [facility]. History of Present Ilness:    Gia Kamara is a 67 y.o. male with h/o Dementia, renal ds, BPH was transfered from [facility] where he had a witnessed seizure. He is awake but not oriented . All history gathered from the records from [facility]. His initial lab work shows elevated BUN and creatinine   Has a singh in place     Interval Hx       On bipap   Sodium high  renal function improved   Off TF     I/O last 3 completed shifts: In: 759.9 [I.V.:679.9; NG/GT:80]  Out: 950 [Urine:950]  No intake/output data recorded.           Past Medical History:   Diagnosis Date    Cerebral palsy ([address]     CHF (congestive heart failure) (HCC)     CKD (chronic kidney disease)     Constipation     Convulsions ([address]     COVID-19     Degenerative disease of nervous system ([address]     Delusional disorder ([address]     Dementia ([address]     Depression     Diabetes mellitus ([address]     Encephalopathy     Enlarged prostate with lower urinary tract symptoms (LUTS)     GERD (gastroesophageal reflux disease)     GI obstruction (HCC)     Hematuria     Hyperlipidemia     Hypernatremia     Hypertension     Hypertension     Mood disorder ([address]     MRSA colonization 09/11/2020    Muscle weakness     Muscle weakness (generalized)     Neuromuscular dysfunction of bladder     Neuropathy     Obsessive-compulsive personality disorder (CODE)     Psychosis ([address]     Suprapubic catheter ([address]     #20    Urinary retention        Past Surgical History: (TYLENOL) tablet 650 mg, Q6H PRN    Or  acetaminophen (TYLENOL) suppository 650 mg, Q6H PRN  polyethylene glycol (GLYCOLAX) packet 17 g, Daily PRN  promethazine (PHENERGAN) tablet 12.5 mg, Q6H PRN    Or  ondansetron (ZOFRAN) injection 4 mg, Q6H PRN  glucose (GLUTOSE) 40 % oral gel 15 g, PRN  dextrose 50 % IV solution, PRN  glucagon (rDNA) injection 1 mg, PRN  dextrose 5 % solution, PRN  insulin lispro (1 Unit Dial) 0-6 Units, Q4H        Physical exam:     Vitals:  BP (!) 133/59   Pulse 80   Temp 96.1 °F (35.6 °C) (Temporal)   Resp 22   Ht 5' 8\" (1.727 m)   Wt 169 lb 5 oz (76.8 kg)   SpO2 100%   BMI 25.74 kg/m²   Constitutional:  awake, mild distress  Cardiovascular:  S1, S2 reg  Respiratory: diminished, on oxygen   Abdomen:  sot  Ext: trace lower extremity edema  CNS: restless    Limited exam      Labs:  CBC:   Recent Labs     09/24/20  1100 09/25/20  0506 09/26/20  0430   WBC 14.7* 9.1 7.6   HGB 7.7* 7.3* 6.9*   PLT 83* 76* 96*     BMP:    Recent Labs     09/24/20  0133 09/25/20  0507 09/26/20  0430    147* 146*   K 3.4* 3.4* 3.0*   CL 96* 97* 98*   CO2 44* 43* 44*   BUN 23* 27* 22*   CREATININE 0.9 0.6* 0.6*   GLUCOSE 103* 82 88     Ca/Mg/Phos:   Recent Labs     09/24/20  0133 09/25/20  0507 09/26/20  0430   CALCIUM 8.8 8.8 8.9   MG 2.00 2.00 2.20   PHOS 3.7 2.7 2.7     Hepatic:   No results for input(s): AST, ALT, ALB, BILITOT, ALKPHOS in the last 72 hours. Troponin: No results for input(s): TROPONINI in the last 72 hours. BNP: No results for input(s): BNP in the last 72 hours. Lipids: No results for input(s): CHOL, TRIG, HDL, LDLCALC, LABVLDL in the last 72 hours.   ABGs:   Recent Labs     09/23/20  1301   PHART 7.352   PO2ART 79.7   [id number] 100.3*     INR:   Recent Labs     09/24/20  0133 09/25/20  0506 09/26/20  0430   INR 1.14 1.10 1.13     UA:  No results for input(s): Agustin Coleman, GLUCOSEU, Bakari Kwong, [address], BLOODU, [facility], PROTEINU, UROBILINOGEN, NITRU, LEUKOCYTESUR, LABMICR, URINETYPE in the last 72 hours. Urine Microscopic:   No results for input(s): LABCAST, BACTERIA, COMU, HYALCAST, WBCUA, RBCUA, EPIU in the last 72 hours. Urine Culture:   No results for input(s): LABURIN in the last 72 hours. Urine Chemistry:   No results for input(s): Mal Ayoub, PROTEINUR, NAUR in the last 72 hours. IMAGING:  XR CHEST PORTABLE   Final Result   Slight improvement in the appearance of the right lung compared to prior study         XR CHEST PORTABLE   Final Result   Changes consistent with COPD. Increased opacity in the right mid lung, indeterminate for confluent   fibrosis/crowding of bronchovascular markings versus airspace disease   (pneumonia). This is stable in the 7 day interval.  It is increased compared   to 2017. XR CHEST PORTABLE   Final Result   Slightly improved aeration of the lungs with persistent mild opacification in   the right upper and lower lung zones. Small right pleural effusion. CT head without contrast   Final Result   No acute intracranial abnormality. XR CHEST 1 VIEW   Final Result   1. Bilateral perihilar opacities, right greater than left, most consistent   with multifocal pneumonia, to include atypical causes. 2. Stable chronic bibasilar pleural and parenchymal scar. 3. Stable cardiomegaly. I/O last 3 completed shifts: In: 759.9 [I.V.:679.9; NG/GT:80]  Out: 950 [Urine:950]      Assessment/Plan :      1. BENOIT 2/2 pre renal + RAAS blockade   Held lisinopril. BENOIT resolved       2. HTN. Controlled   on amlodipine 10 mg per peg  daily     3. Seizure disorder. Neurology following     4. Acid- base disorder. Metabolic alkalosis 2/2 dehydration. Iv fluids     5. Hypokalemia - corrected with supplements     6 . UTI. abx per primary team     7. Hypernatremia. 147 ----> 151 ----> 145 ---> 147  increase D5 at 75 ml/ hr     8. Anemia. S/p EGD. GI bleed  HB low again .  Got one UNit oF PRBC   Has had received multiple

## 2020-09-26 NOTE — PROGRESS NOTES
0400 assessment complete. Patient remains on same [facility] settings. Agitated and restless. Pain level 8 on Advanced Dementia scale. CHG bath given. Cringed face and tightened body with every touch. L shin foam dressing changed. New gown. Restraints undone for passive ROM. Stayed at bedside and held each hand while bent up (instead of straight with the restraints) and patient actually seemed asleep/calm for the first time since I've had him. Took bipap off to give him a break, but he dropped quickly into 80s. Mepilex to bridge of nose for comfort and skin protection under bipap mask. VSS. Afebrile. See Flowsheets. 0400 insulin coverage not needed. Bed in lowest position with wheels locked. Alarm activated. 3/4 side rails up.  Non-skid socks on. Repositioned for comfort and the prevention of pressure ulcer formation. Will continue to monitor.

## 2020-09-26 NOTE — PROGRESS NOTES
Shift handoff completed at bedside with day-shift RN. Four eyes skin check. Drips verified. Confirmed that bed alarm is activated. Shift assessment complete. Patient is in droplet plus iso for +COVID-19. Proper PPE applied when entering room: gown, gloves, N95, and face mask. Nursing care clustered for the safety of the patient, RN, and rest of the unit. Patient is on Bipap. Restless and tense. Responds to voice or new confusion/agitation. L AC PIV flushed with sluggish blood return and continuously infusing per order. See MAR. R forearm PIV flushed with no blood return, normal saline locked, and alcohol capped. Suprapubic singh with ostomy-bag-dressing patent with hugo urine. In A fib. Breathing shallow, tachypneic, and with accessory muscle use. Diminished breath sounds throughout. Abdomen rounded with active bowel sounds in all four quadrants. Skin is pale, reddened, and warm. BLE dry, flaky, and scabby. +1 pitting edema in BUE. Scattered bruising. DTI on buttocks. Excoriation on lillian-area. Restraints in place for the safety of the patient, his lines, and his tubing. Passive ROM performed on all four extremities. Pain level of 7 on Advanced Dementia scale. Bed in lowest position with wheels locked. Alarm activated. 3/4 side rails up.  Non-skid socks on. Repositioned for comfort and the prevention of pressure ulcer formation. Will continue to monitor.

## 2020-09-26 NOTE — PROGRESS NOTES
[phone] Hospital or Facility: [facility]   From: Delvin Rojas   RE: Christina Running 1945 RM: [id number]     Critical lab value: H/H = 6.9/20.8. Please advise. Thanks!

## 2020-09-26 NOTE — FLOWSHEET NOTE
Pt less agitated/restless after Precedex gtt started. No other acute changes noted on reassessment, see flowsheets. VSS and afebrile. Will continue to monitor.  Debbrah Kayser, RN, BSN

## 2020-09-26 NOTE — FLOWSHEET NOTE
No acute changes noted on reassessment, see flowsheets. VSS and afebrile. Denies additional needs. Will continue to monitor.  Arpita Villar RN, BSN

## 2020-09-26 NOTE — PROGRESS NOTES
0000 assessment complete. Patient's swelling increased. +2 pitting edema in BUE and +1 non-pitting in BLE. Bowel sounds now hypoactive. He remains on bipap and restless. Still in Afib. Diminished breath sounds in bilateral lower lobes. No other acute changes. Restraints remain in place for the safety of the patient, his lines, and his tubing. Passive ROM performed on all four extremities. Bed in lowest position with wheels locked. Alarm activated. 3/4 side rails up. Non-skid socks on. Warm blanket provided.

## 2020-09-26 NOTE — PROGRESS NOTES
Hospitalist Progress Note      PCP: Karlton Severe, MD    Date of Admission: 9/10/2020    Chief Complaint:  [address] Course:   Patient is 58-year-old male with history of seizure disorder presenting from nursing facility altered mental status. Per EMS the patient has been altered since dayshift yesterday (about 12-16 hours prior to arrival to the ER). EMS reports that nursing staff stated that the patient is very bright and does converse with them at baseline. They noticed that he was not speaking much yesterday morning and they did call EMS today because they noted seizure-like activity. They also reported to EMS that the patient is coronavirus positive. EMS reports that the patient has been nodding yes and no and following some commands for them. They have not got him to speak much. Vitals per EMS were unremarkable. Glucose was 120. Patient tells me his name and provides minimal other history. He does shake his head no inappropriately to questioning.  21  spoke to [facility] staff and patient was quieter and more reserved than usual starting around midday. Is a full code. Nursing Notes were reviewed.     He has been BiPAP dependent and was noted to have had ulcers.         Subjective:   On BiPAP  Not really answering of responding much   Moving head      Medications:  Reviewed    Infusion Medications    sodium chloride      dexmedetomidine 0.2 mcg/kg/hr (09/26/20 1013)    dextrose 75 mL/hr at 09/26/20 1200    dextrose       Scheduled Medications    sodium chloride  20 mL Intravenous Once    lidocaine 1 % injection  5 mL Intradermal Once    sodium chloride flush  10 mL Intravenous 2 times per day    budesonide-formoterol  2 puff Inhalation BID    thiamine  100 mg Oral Daily    lansoprazole  30 mg Oral BID AC    enoxaparin  40 mg Subcutaneous Daily    lamoTRIgine  100 mg Oral TID    sodium chloride  20 mL Intravenous Once    carBAMazepine  200 mg Oral TID    AST, ALT, BILIDIR, BILITOT, ALKPHOS in the last 72 hours. Recent Labs     09/24/20  0133 09/25/20  0506 09/26/20  0430   INR 1.14 1.10 1.13     No results for input(s): Prashant Gar in the last 72 hours. Urinalysis:      Lab Results   Component Value Date    NITRU Negative 09/15/2020    WBCUA 2 09/15/2020    BACTERIA 4+ 09/10/2020    RBCUA 0-2 09/15/2020    BLOODU SMALL 09/15/2020    SPECGRAV 1.018 09/15/2020    GLUCOSEU Negative 09/15/2020       Radiology:  XR CHEST PORTABLE   Final Result   Slight improvement in the appearance of the right lung compared to prior study         XR CHEST PORTABLE   Final Result   Changes consistent with COPD. Increased opacity in the right mid lung, indeterminate for confluent   fibrosis/crowding of bronchovascular markings versus airspace disease   (pneumonia). This is stable in the 7 day interval.  It is increased compared   to 2017. XR CHEST PORTABLE   Final Result   Slightly improved aeration of the lungs with persistent mild opacification in   the right upper and lower lung zones. Small right pleural effusion. CT head without contrast   Final Result   No acute intracranial abnormality. XR CHEST 1 VIEW   Final Result   1. Bilateral perihilar opacities, right greater than left, most consistent   with multifocal pneumonia, to include atypical causes. 2. Stable chronic bibasilar pleural and parenchymal scar. 3. Stable cardiomegaly.                  Assessment/Plan:    Active Hospital Problems    Diagnosis    Acute pulmonary edema ([address] [J81.0]    Pleural effusion, right [J90]    Centrilobular emphysema (HCC) [J43.2]    Seizure ([address] [R56.9]       Acute hypercapnic respiratory failure secondary to underlying COPD with probable aspiration pneumonitis  Given his underlying COPD and his agonal breathing stat blood gas obtained which shows again hypercapnic respiratory failure with pH down   Code status has been discussed with his POA and they want him to be full code. Aspiration pneumonitis  - monitor respiratory status. Acute GI bleed: s/p EGD; 2 bulbar ulcers; 1 with clot and V.V. Epi injected & Clipped  S/p 5 units of PRBCs and 3 units of FFP   Protonix drip discontinued; switch to Protonix 40 mg twice daily   We will continue to monitor hemoglobin will discontinue every 8 hours  Cont  pantoprazole oral twice daily   Hg was low this am but his repeat was normal Likely a lab error    Thrombocytopenia: Platelets stable around 83  Continue to monitor closely     Seizure disorder: CT head on admission negative for any acute pathology; Neurology on board appreciate their recs;  Keppra and Depakote d/c    started back on Tegretol and lamotrigine      Delirium on baseline of dementia he does not have encephalopathy     SARS-CoV-2 infection and right lung aspiration pneumonia:   - oxygen requirement has been stable around 2 L with sats between 95 to 96%  -s/p dexamethasone and 7-day course of Zyvox and Unasyn; patient afebrile, WBCs within normal limits and all the cultures have been negative to date     Acute kidney injury; suspect prerenal ; improved        Hypokalemia: Resolved     Type 2 diabetes mellitus:   On Lantus and sliding scale will change over to medications through his PEG tube     Neurogenic bladder with urinary incontinence:  Urology on board appreciate their recs; Herman to ostomy bag  - will change suprapubic catheter size once COVID-19 is negative  -Continue Sanctura     Suspected UTIs with underlying suprapubic catheter  -Has been  on IV Unasyn; antibiotics stopped as he is urine culture revealed mixed pathogens and repeat UA not indicative of infection     Overall his prognosis is pretty poor  Palliative care have also been consulted     DVT Prophylaxis: SCDs  Diet: No diet orders on file  Code Status: Full Code    PT/OT Eval Status: eval and treat when appropriate     Dispo - CCC   30 min of critical care time spent. Gilmer Pollard MD

## 2020-09-27 NOTE — PROGRESS NOTES
Spoke with family email sent for zoom meeting. Family had difficulty accessing, they are going to have their granddaughter come assist them in \"a little while\". Significance explained and family verbalized importance.

## 2020-09-27 NOTE — PROGRESS NOTES
Office : [phone]     Fax :[phone]       Nephrology progress Note          Chief Complaint:    Chief Complaint   Patient presents with    Altered Mental Status     Altered mental status started yesterday. Pt brought in from [facility] home. History of Present Ilness:    Tanvi Lundy is a 67 y.o. male with h/o Dementia, renal ds, BPH was transfered from [facility] where he had a witnessed seizure. He is awake but not oriented . All history gathered from the records from [facility]. His initial lab work shows elevated BUN and creatinine   Has a singh in place     Interval Hx       On bipap   Sodium improving  renal function stable  Off TF     I/O last 3 completed shifts: In: 680.4 [I.V.:344.4; Blood:256; NG/GT:80]  Out: 525 [Urine:525]  No intake/output data recorded.           Past Medical History:   Diagnosis Date    Cerebral palsy ([address]     CHF (congestive heart failure) (HCC)     CKD (chronic kidney disease)     Constipation     Convulsions ([address]     COVID-19     Degenerative disease of nervous system ([address]     Delusional disorder ([address]     Dementia ([address]     Depression     Diabetes mellitus ([address]     Encephalopathy     Enlarged prostate with lower urinary tract symptoms (LUTS)     GERD (gastroesophageal reflux disease)     GI obstruction (HCC)     Hematuria     Hyperlipidemia     Hypernatremia     Hypertension     Hypertension     Mood disorder ([address]     MRSA colonization 09/11/2020    Muscle weakness     Muscle weakness (generalized)     Neuromuscular dysfunction of bladder     Neuropathy     Obsessive-compulsive personality disorder (CODE)     Psychosis ([address]     Suprapubic catheter ([address]     #20    Urinary retention        Past Surgical History:   Procedure Laterality Date    ARM SURGERY      BLADDER SURGERY      CYSTOSCOPY N/A 5/15/2019    CYSTOLITHALOPAXY , STONE  SUPRA PUBIC CATHETER EXCHANGE. performed by Jamir Retana MD at [address] [place] [address]      EYE SURGERY      GASTROSTOMY TUBE PLACEMENT N/A 9/18/2020    EGD PEG TUBE PLACEMENT performed by Cesario Dasilva MD at [facility] ASP BLADDER W INSERT SUPRAPUBIC CATH      TONSILLECTOMY      UMBILICAL HERNIA REPAIR N/A 1/15/2019    OPEN PRIMARY UMBILICAL HERNIA REPAIR performed by Allen Siddiqui MD at [facility] N/A 9/17/2020    EGD CONTROL HEMORRHAGE with application of over the scope clip (star clip) injection of epinephrine 0.5 mg performed by Cesario Dasilva MD at [address] [place] [address]       Current Medications:    dexmedetomidine (PRECEDEX) 400 mcg in sodium chloride 0.9 % 100 mL infusion, Continuous  0.9 % sodium chloride bolus, Once  dextrose 5 % solution, Continuous  lidocaine PF 1 % injection 5 mL, Once  sodium chloride flush 0.9 % injection 10 mL, 2 times per day  sodium chloride flush 0.9 % injection 10 mL, PRN  budesonide-formoterol (SYMBICORT) 160-4.5 MCG/ACT inhaler 2 puff, BID  vitamin B-1 (THIAMINE) tablet 100 mg, Daily  lansoprazole (PREVACID SOLUTAB) disintegrating tablet 30 mg, BID AC  enoxaparin (LOVENOX) injection 40 mg, Daily  lamoTRIgine (LAMICTAL) tablet 100 mg, TID  0.9 % sodium chloride bolus, Once  potassium chloride 10 mEq/100 mL IVPB (Peripheral Line), PRN  albuterol sulfate  (90 Base) MCG/ACT inhaler 2 puff, Q6H PRN  ipratropium-albuterol (DUONEB) nebulizer solution 1 ampule, Q4H PRN  carBAMazepine (TEGRETOL) tablet 200 mg, TID  citalopram (CELEXA) tablet 20 mg, Daily  trospium (SANCTURA) tablet 20 mg, BID AC  sodium chloride flush 0.9 % injection 10 mL, 2 times per day  sodium chloride flush 0.9 % injection 10 mL, PRN  acetaminophen (TYLENOL) tablet 650 mg, Q6H PRN    Or  acetaminophen (TYLENOL) suppository 650 mg, Q6H PRN  polyethylene glycol (GLYCOLAX) packet 17 g, Daily PRN  promethazine (PHENERGAN) tablet 12.5 mg, Q6H PRN    Or  ondansetron (ZOFRAN) injection 4 mg, Q6H PRN  glucose (GLUTOSE) 40 % oral gel 15 g, PRN  dextrose 50 % IV solution, PRN  glucagon (rDNA) injection 1 mg, PRN  dextrose 5 % solution, PRN  insulin lispro (1 Unit Dial) 0-6 Units, Q4H        Physical exam:     Vitals:  /61   Pulse 66   Temp 97.3 °F (36.3 °C) (Temporal)   Resp 30   Ht 5' 8\" (1.727 m)   Wt 169 lb 5 oz (76.8 kg)   SpO2 99%   BMI 25.74 kg/m²   Constitutional:  awake, mild distress  Cardiovascular:  S1, S2 reg  Respiratory: diminished, on oxygen   Abdomen:  sot  Ext: trace lower extremity edema  CNS: restless    Limited exam      Labs:  CBC:   Recent Labs     09/26/20  0430 09/26/20  2030 09/27/20  0500   WBC 7.6 8.2 9.5   HGB 6.9* 8.5* 8.4*   PLT 96* 97* 98*     BMP:    Recent Labs     09/25/20  0507 09/26/20 0430 09/27/20  0500   * 146* 145   K 3.4* 3.0* 2.8*   CL 97* 98* 97*   CO2 43* 44* 42*   BUN 27* 22* 16   CREATININE 0.6* 0.6* 0.6*   GLUCOSE 82 88 122*     Ca/Mg/Phos:   Recent Labs     09/25/20  0507 09/26/20  0430 09/27/20  0500   CALCIUM 8.8 8.9 8.7   MG 2.00 2.20 2.20   PHOS 2.7 2.7 2.7     Hepatic:   No results for input(s): AST, ALT, ALB, BILITOT, ALKPHOS in the last 72 hours. Troponin: No results for input(s): TROPONINI in the last 72 hours. BNP: No results for input(s): BNP in the last 72 hours. Lipids: No results for input(s): CHOL, TRIG, HDL, LDLCALC, LABVLDL in the last 72 hours. ABGs:   No results for input(s): PHART, PO2ART, RVC1FUW in the last 72 hours. INR:   Recent Labs     09/25/20  0506 09/26/20  0430 09/27/20  0500   INR 1.10 1.13 1.15*     UA:  No results for input(s): Hamp Feeler, GLUCOSEU, BILIRUBINUR, KETUA, SPECGRAV, BLOODU, PHUR, PROTEINU, UROBILINOGEN, NITRU, LEUKOCYTESUR, LABMICR, URINETYPE in the last 72 hours.    Urine Microscopic:   No results for input(s): LABCAST, BACTERIA, COMU, HYALCAST, WBCUA, RBCUA, EPIU in the last 72 hours. Urine Culture:   No results for input(s): LABURIN in the last 72 hours. Urine Chemistry:   No results for input(s): Levon Hashimoto, PROTEINUR, NAUR in the last 72 hours. IMAGING:  XR CHEST PORTABLE   Final Result   Slight improvement in the appearance of the right lung compared to prior study         XR CHEST PORTABLE   Final Result   Changes consistent with COPD. Increased opacity in the right mid lung, indeterminate for confluent   fibrosis/crowding of bronchovascular markings versus airspace disease   (pneumonia). This is stable in the 7 day interval.  It is increased compared   to 2017. XR CHEST PORTABLE   Final Result   Slightly improved aeration of the lungs with persistent mild opacification in   the right upper and lower lung zones. Small right pleural effusion. CT head without contrast   Final Result   No acute intracranial abnormality. XR CHEST 1 VIEW   Final Result   1. Bilateral perihilar opacities, right greater than left, most consistent   with multifocal pneumonia, to include atypical causes. 2. Stable chronic bibasilar pleural and parenchymal scar. 3. Stable cardiomegaly. I/O last 3 completed shifts: In: 680.4 [I.V.:344.4; Blood:256; NG/GT:80]  Out: 525 [Urine:525]      Assessment/Plan :      1. BENOIT 2/2 pre renal + RAAS blockade   Held lisinopril. BENOIT resolved       2. HTN. Controlled   on amlodipine 10 mg per peg  daily     3. Seizure disorder. Neurology following     4. Acid- base disorder. Metabolic alkalosis 2/2 dehydration. Iv fluids     5. Hypokalemia - corrected with supplements     6 . UTI. abx per primary team     7. Hypernatremia. 147 ----> 151 ----> 145 ---> 147->145  increase D5 at 75 ml/ hr     8. Anemia. S/p EGD. GI bleed  HB low again .  Got one UNit oF PRBC   Has had received multiple blood transfusions      Overall prognosis poor.  [address] discussion       Thank you for allowing us to participate in care of Brain Sánchez         Electronically signed by: Nile Rodriguez MD, 9/27/2020,     [facility] of [address] [place] [address] S  Office : [phone]  Fax :[phone]

## 2020-09-27 NOTE — PROGRESS NOTES
Started conversation about zoom meeting later this shift with Cici Left she is reaching out to other sisters and will call back. Will attempt to establish time frame once family returns call.

## 2020-09-27 NOTE — PROGRESS NOTES
Please notify pt   Your blood sugar average (hemoglobin A1C) is higher at 8.2 (average of 189)  YOu need to work on diet and exercise to lower the blood sugars    Your TSH is elevated, indicating an underactie thyroid -you need to start on thyroid replacement hormone - levothyroxine 50 mcg daily. This medicaiton should give you energy.  Medication sent ot [employer]    PSA is fine       Shift assessment complete the patient is alert with stimulation but does not answer questions or follow commands. Pt remains on Bipap without complication. Per handoff plan for zoom meeting to discuss code status and plan of care with family later this shift. Awaiting response from family to determine time of meeting.

## 2020-09-27 NOTE — PROGRESS NOTES
[facility] Pulmonary and Critical Care    Follow Up Note    Subjective:   CHIEF COMPLAINT / HPI:   Chief Complaint   Patient presents with    Altered Mental Status     Altered mental status started yesterday. Pt brought in from [facility] home. Interval history: He is about the same. Continues to be BiPAP dependent. Desaturates very quickly and has a rising CO2 very quickly when the mask comes off. Past Medical History:    Reviewed; no changes    Social History:    Reviewed; no changes    REVIEW OF SYSTEMS:    ROS is unobtainable due to his critical illness. Objective:   PHYSICAL EXAM:        VITALS:  /61   Pulse 66   Temp 97.3 °F (36.3 °C) (Temporal)   Resp 30   Ht 5' 8\" (1.727 m)   Wt 169 lb 5 oz (76.8 kg)   SpO2 99%   BMI 25.74 kg/m²  on BiPAP    24HR INTAKE/OUTPUT:      Intake/Output Summary (Last 24 hours) at 9/27/2020 1011  Last data filed at 9/27/2020 0515  Gross per 24 hour   Intake 424.38 ml   Output 525 ml   Net -100.62 ml       CONSTITUTIONAL:  awake, alert,  no apparent distress, and appears stated age  LUNGS:  No increased work of breathing and clear to auscultation, no crackles or wheezes  CARDIOVASCULAR: S1 and S2 and no JVD  ABDOMEN:  normal bowel sounds, non-distended and non-tender to palpation  EXT: No edema, no calf tenderness. Pulses are present bilaterally. NEUROLOGIC:  Mental Status Exam:  Level of Alertness:   awake  Orientation:   person, place, time.  Non focal  SKIN:  normal skin color, texture, turgor, no redness, warmth, or swelling at IV sites    DATA:    CBC:  Recent Labs     09/26/20  0430 09/26/20 2030 09/27/20  0500   WBC 7.6 8.2 9.5   RBC 2.17* 2.80* 2.77*   HGB 6.9* 8.5* 8.4*   HCT 20.8* 26.1* 26.0*   PLT 96* 97* 98*   MCV 95.8 92.9 93.7   MCH 31.8 30.2 30.3   MCHC 33.2 32.5 32.4   RDW 15.7* 17.2* 17.7*      BMP:  Recent Labs     09/25/20  0507 09/26/20  0430 09/27/20  0500   * 146* 145   K 3.4* 3.0* 2.8*   CL 97* 98* 97*   CO2 43* 44* 42*   BUN 27* 22* 16   CREATININE 0.6* 0.6* 0.6*   CALCIUM 8.8 8.9 8.7   GLUCOSE 82 88 122*      ABG:  No results for input(s): PHART, PUV9DML, PO2ART, SBY3ZLF, M4GVYCDE, BEART in the last 72 hours. Cultures:    Abx:    Radiology Review:  Pertinent images / reports were reviewed as a part of this visit. Assessment:     1. Acute on chronic hypoxemic and hypercapnic respiratory failure  2. Pulmonary edema/volume overload  3. Right pleural effusion  4. Aspiration pneumonia  5. SARS-CoV-2 infection    Plan:     1. I have reviewed laboratories, medical records and images for this visit  2. Has been stable on BiPAP but remains BiPAP dependent. 3. Now requiring some Precedex to help with his agitation  4. Developing some skin breakdown on his nose from the BiPAP  5.  Zoom call planned with family today to discuss goals of care

## 2020-09-27 NOTE — PROGRESS NOTES
Pt remained on Bipap through entire night, he did get frustrated with bipap but I was able to get him somewhat comfortable with precedex gtt and loosened his restraints. Pt became more alert but does not follow or interact. Pt turned every 2 hours. Pt also had BM overnight. Family did not call throughout night. K+ replaced with IV potassium per protocol. Pt's mouth very very dry from [facility]. .. he seems very uncomfortable. VSS.

## 2020-09-27 NOTE — PROGRESS NOTES
Spoke with Eli Montez (Niece who opts out of decision making) about gathering sisters for zoom meeting. She said she'd try and get a hold of them.

## 2020-09-27 NOTE — PROGRESS NOTES
Hospitalist Progress Note      PCP: José Miguel Castillo MD    Date of Admission: 9/10/2020    Chief Complaint:  [address] Course:   Patient is 76-year-old male with history of seizure disorder presenting from nursing facility altered mental status. Per EMS the patient has been altered since dayshift yesterday (about 12-16 hours prior to arrival to the ER). EMS reports that nursing staff stated that the patient is very bright and does converse with them at baseline. They noticed that he was not speaking much yesterday morning and they did call EMS today because they noted seizure-like activity. They also reported to EMS that the patient is coronavirus positive. EMS reports that the patient has been nodding yes and no and following some commands for them. They have not got him to speak much. Vitals per EMS were unremarkable. Glucose was 120. Patient tells me his name and provides minimal other history. He does shake his head no inappropriately to questioning.  21  spoke to [facility] staff and patient was quieter and more reserved than usual starting around midday. Is a full code. Nursing Notes were reviewed.     He has been BiPAP dependent and was noted to have had ulcers.         Subjective:   On BiPAP  Not really answering of responding much   Looks uncomfortable   on precedex      Medications:  Reviewed    Infusion Medications    dexmedetomidine 0.3 mcg/kg/hr (09/27/20 0718)    dextrose 75 mL/hr at 09/27/20 0538    dextrose       Scheduled Medications    sodium chloride  20 mL Intravenous Once    lidocaine 1 % injection  5 mL Intradermal Once    sodium chloride flush  10 mL Intravenous 2 times per day    budesonide-formoterol  2 puff Inhalation BID    thiamine  100 mg Oral Daily    lansoprazole  30 mg Oral BID AC    enoxaparin  40 mg Subcutaneous Daily    lamoTRIgine  100 mg Oral TID    sodium chloride  20 mL Intravenous Once    carBAMazepine  200 mg Oral TID    citalopram  20 mg Oral Daily    trospium  20 mg Oral BID AC    sodium chloride flush  10 mL Intravenous 2 times per day    insulin lispro  0-6 Units Subcutaneous Q4H     PRN Meds: sodium chloride flush, potassium chloride, albuterol sulfate HFA, ipratropium-albuterol, sodium chloride flush, acetaminophen **OR** acetaminophen, polyethylene glycol, promethazine **OR** ondansetron, glucose, dextrose, glucagon (rDNA), dextrose      Intake/Output Summary (Last 24 hours) at 9/27/2020 1607  Last data filed at 9/27/2020 0515  Gross per 24 hour   Intake 80 ml   Output 525 ml   Net -445 ml       Physical Exam Performed:    /64   Pulse 68   Temp 97.4 °F (36.3 °C) (Temporal)   Resp 30   Ht 5' 8\" (1.727 m)   Wt 169 lb 5 oz (76.8 kg)   SpO2 98%   BMI 25.74 kg/m²     General appearance: , BiPAP in place, looks incomfortable  HEENT: Pupils equal, round, and reactive to light. Conjunctivae/corneas clear. Neck: Supple, with full range of motion. No jugular venous distention. Trachea midline. Respiratory: bibasilar rales notes, seems to be working more. Cardiovascular: Regular rate and rhythm with normal S1/S2 without murmurs, rubs or gallops. Abdomen: Soft, non-tender, non-distended with normal bowel sounds. Musculoskeletal: No clubbing, cyanosis or edema bilaterally. Full range of motion without deformity. Skin: Skin color, texture, turgor normal.  No rashes or lesions. Neurologic:  Grossly intact.   Psychiatric: not able to assess   Capillary Refill: Brisk,< 3 seconds   Peripheral Pulses: +2 palpable, equal bilaterally       Labs:   Recent Labs     09/26/20  0430 09/26/20  2030 09/27/20  0500   WBC 7.6 8.2 9.5   HGB 6.9* 8.5* 8.4*   HCT 20.8* 26.1* 26.0*   PLT 96* 97* 98*     Recent Labs     09/25/20  0507 09/26/20  0430 09/27/20  0500   * 146* 145   K 3.4* 3.0* 2.8*   CL 97* 98* 97*   CO2 43* 44* 42*   BUN 27* 22* 16   CREATININE 0.6* 0.6* 0.6*   CALCIUM 8.8 8.9 8.7   PHOS 2.7 2.7 2.7     No results for input(s): AST, ALT, BILIDIR, BILITOT, ALKPHOS in the last 72 hours. Recent Labs     09/25/20  0506 09/26/20  0430 09/27/20  0500   INR 1.10 1.13 1.15*     No results for input(s): CKTOTAL, TROPONINI in the last 72 hours. Urinalysis:      Lab Results   Component Value Date    NITRU Negative 09/15/2020    WBCUA 2 09/15/2020    BACTERIA 4+ 09/10/2020    RBCUA 0-2 09/15/2020    BLOODU SMALL 09/15/2020    SPECGRAV 1.018 09/15/2020    GLUCOSEU Negative 09/15/2020       Radiology:  XR CHEST PORTABLE   Final Result   Slight improvement in the appearance of the right lung compared to prior study         XR CHEST PORTABLE   Final Result   Changes consistent with COPD. Increased opacity in the right mid lung, indeterminate for confluent   fibrosis/crowding of bronchovascular markings versus airspace disease   (pneumonia). This is stable in the 7 day interval.  It is increased compared   to 2017. XR CHEST PORTABLE   Final Result   Slightly improved aeration of the lungs with persistent mild opacification in   the right upper and lower lung zones. Small right pleural effusion. CT head without contrast   Final Result   No acute intracranial abnormality. XR CHEST 1 VIEW   Final Result   1. Bilateral perihilar opacities, right greater than left, most consistent   with multifocal pneumonia, to include atypical causes. 2. Stable chronic bibasilar pleural and parenchymal scar. 3. Stable cardiomegaly.                  Assessment/Plan:    Active Hospital Problems    Diagnosis    Acute pulmonary edema ([address] [J81.0]    Pleural effusion, right [J90]    Centrilobular emphysema (HCC) [J43.2]    Seizure ([address] [R56.9]       Acute hypercapnic respiratory failure secondary to underlying COPD with probable aspiration pneumonitis  Given his underlying COPD and his agonal breathing stat blood gas obtained which shows again hypercapnic respiratory failure with pH down   Code status has been discussed

## 2020-09-27 NOTE — PROGRESS NOTES
Family has not returned call. They have been requested to call back with zoom meeting. Multiple attempts without success.

## 2020-09-28 NOTE — PROGRESS NOTES
Office : [phone]     Fax :[phone]       Nephrology progress Note          Chief Complaint:    Chief Complaint   Patient presents with    Altered Mental Status     Altered mental status started yesterday. Pt brought in from [facility] home. History of Present Ilness:    Jerad Hoang is a 67 y.o. male with h/o Dementia, renal ds, BPH was transfered from [facility] where he had a witnessed seizure. He is awake but not oriented . All history gathered from the records from [facility]. His initial lab work shows elevated BUN and creatinine   Has a singh in place     Interval Hx       Sodium improving  renal function stable  HB low at 7.6      I/O last 3 completed shifts:  In: -   Out: 550 [Urine:550]  No intake/output data recorded.           Past Medical History:   Diagnosis Date    Cerebral palsy ([address]     CHF (congestive heart failure) (HCC)     CKD (chronic kidney disease)     Constipation     Convulsions ([address]     COVID-19     Degenerative disease of nervous system ([address]     Delusional disorder ([address]     Dementia ([address]     Depression     Diabetes mellitus ([address]     Encephalopathy     Enlarged prostate with lower urinary tract symptoms (LUTS)     GERD (gastroesophageal reflux disease)     GI obstruction (HCC)     Hematuria     Hyperlipidemia     Hypernatremia     Hypertension     Hypertension     Mood disorder ([address]     MRSA colonization 09/11/2020    Muscle weakness     Muscle weakness (generalized)     Neuromuscular dysfunction of bladder     Neuropathy     Obsessive-compulsive personality disorder (CODE)     Psychosis ([address]     Suprapubic catheter ([address]     #20    Urinary retention        Past Surgical History:   Procedure Laterality Date    tablet 650 mg, Q6H PRN    Or  acetaminophen (TYLENOL) suppository 650 mg, Q6H PRN  polyethylene glycol (GLYCOLAX) packet 17 g, Daily PRN  promethazine (PHENERGAN) tablet 12.5 mg, Q6H PRN    Or  ondansetron (ZOFRAN) injection 4 mg, Q6H PRN  glucose (GLUTOSE) 40 % oral gel 15 g, PRN  dextrose 50 % IV solution, PRN  glucagon (rDNA) injection 1 mg, PRN  dextrose 5 % solution, PRN  insulin lispro (1 Unit Dial) 0-6 Units, Q4H        Physical exam:     Vitals:  BP (!) 144/72   Pulse 73   Temp 99 °F (37.2 °C) (Temporal)   Resp 27   Ht 5' 8\" (1.727 m)   Wt 169 lb 5 oz (76.8 kg)   SpO2 96%   BMI 25.74 kg/m²   Constitutional:  awake, mild distress  Cardiovascular:  S1, S2 reg  Respiratory: diminished, on oxygen   Abdomen:  sot  Ext: trace lower extremity edema  CNS: restless    Limited exam      Labs:  CBC:   Recent Labs     09/26/20 2030 09/27/20  0500 09/28/20  0440   WBC 8.2 9.5 7.6   HGB 8.5* 8.4* 7.6*   PLT 97* 98* 88*     BMP:    Recent Labs     09/26/20 0430 09/27/20  0500 09/28/20  0440   * 145 141   K 3.0* 2.8* 2.9*   CL 98* 97* 97*   CO2 44* 42* 39*   BUN 22* 16 11   CREATININE 0.6* 0.6* <0.5*   GLUCOSE 88 122* 113*     Ca/Mg/Phos:   Recent Labs     09/26/20 0430 09/27/20  0500 09/28/20  0440   CALCIUM 8.9 8.7 8.3   MG 2.20 2.20 2.10   PHOS 2.7 2.7 2.1*     Hepatic:   No results for input(s): AST, ALT, ALB, BILITOT, ALKPHOS in the last 72 hours. Troponin: No results for input(s): TROPONINI in the last 72 hours. BNP: No results for input(s): BNP in the last 72 hours. Lipids: No results for input(s): CHOL, TRIG, HDL, LDLCALC, LABVLDL in the last 72 hours. ABGs:   No results for input(s): PHART, PO2ART, WWD3BXD in the last 72 hours.   INR:   Recent Labs     09/26/20  0430 09/27/20  0500 09/28/20  0440   INR 1.13 1.15* 1.25*     UA:  No results for input(s): Clelia Spatz, Cornelius Molina, Gal Arredondo, Lesly Shoulders, PROTEINU, UROBILINOGEN, Lexy [place], Blayne Mattson in the last 72 hours. Urine Microscopic:   No results for input(s): LABCAST, BACTERIA, COMU, HYALCAST, WBCUA, RBCUA, EPIU in the last 72 hours. Urine Culture:   No results for input(s): LABURIN in the last 72 hours. Urine Chemistry:   No results for input(s): Olga Abelson, PROTEINUR, NAUR in the last 72 hours. IMAGING:  XR CHEST PORTABLE   Final Result   Slight improvement in the appearance of the right lung compared to prior study         XR CHEST PORTABLE   Final Result   Changes consistent with COPD. Increased opacity in the right mid lung, indeterminate for confluent   fibrosis/crowding of bronchovascular markings versus airspace disease   (pneumonia). This is stable in the 7 day interval.  It is increased compared   to 2017. XR CHEST PORTABLE   Final Result   Slightly improved aeration of the lungs with persistent mild opacification in   the right upper and lower lung zones. Small right pleural effusion. CT head without contrast   Final Result   No acute intracranial abnormality. XR CHEST 1 VIEW   Final Result   1. Bilateral perihilar opacities, right greater than left, most consistent   with multifocal pneumonia, to include atypical causes. 2. Stable chronic bibasilar pleural and parenchymal scar. 3. Stable cardiomegaly. I/O last 3 completed shifts:  In: -   Out: 550 [Urine:550]      Assessment/Plan :      1. BENOIT 2/2 pre renal + RAAS blockade   Held lisinopril. BENOIT resolved       2. HTN. Controlled   on amlodipine 10 mg per peg  daily     3. Seizure disorder. Neurology following     4. Acid- base disorder. Metabolic alkalosis 2/2 dehydration. Iv fluids     5. Hypokalemia - corrected with supplements     6 . UTI. abx per primary team     7. Hypernatremia. 147 ----> 151 ----> 145 ---> 147->145 ----> 141   Change d5w to 40 ml/ hr     8. Anemia. S/p EGD. GI bleed  HB trended down.  7.6 today   Has had received multiple blood transfusions      Overall

## 2020-09-28 NOTE — PROGRESS NOTES
32.5 32.4 32.7   RDW 17.2* 17.7* 17.1*      BMP:  Recent Labs     09/26/20  0430 09/27/20  0500 09/28/20  0440   * 145 141   K 3.0* 2.8* 2.9*   CL 98* 97* 97*   CO2 44* 42* 39*   BUN 22* 16 11   CREATININE 0.6* 0.6* <0.5*   CALCIUM 8.9 8.7 8.3   GLUCOSE 88 122* 113*      ABG:  No results for input(s): PHART, LUR7PSV, PO2ART, TAD5XJH, G7NOZYLR, BEART in the last 72 hours. Cultures:    Abx:    Radiology Review:  Pertinent images / reports were reviewed as a part of this visit. Assessment:     1. Acute on chronic hypoxemic and hypercapnic respiratory failure  2. Pulmonary edema/volume overload  3. Right pleural effusion  4. Aspiration pneumonia  5. SARS-CoV-2 infection  6. Seizure disorder. Plan:     1. I have reviewed laboratories, medical records and images for this visit  2. Patient remains BiPAP dependent with persistent hypercapnic respiratory failure. 3. Now requiring some Precedex to help with his agitation  4. Patient receiving potassium phosphate for hypophosphatemia. Suspect refeeding syndrome. 5. Patient remains bedbound. With low functional status will be a very poor candidate for mechanical ventilation which will be for long-term.   6. Zoom call planned with family today to discuss goals of care    Willy Aguayo MD   Pulmonary Critical Care and Sleep Medicine  [facility]   [address] [facility], [place], [address]  9/10/2020, 1:42 PM

## 2020-09-28 NOTE — CARE COORDINATION
Discharge planning note:    Chart reviewed for discharge planning. Barrier(s) to discharge-   Continuous Bipap @ 50%, precedex gtt     + COVID    Tentative discharge plan-  Return to [facility] - change code status? Tentative discharge date-  TBD - 's having zoom meeting with sisters tomorrow    Case management will continue to follow progress and update discharge plan as needed.       Donovan Ellis RN BSN  Case Management  [phone]

## 2020-09-28 NOTE — PROGRESS NOTES
Hospitalist Progress Note      PCP: Talia Parker MD    Date of Admission: 9/10/2020    Chief Complaint:  [address] Course:   Patient is 58-year-old male with history of seizure disorder presenting from nursing facility altered mental status. Per EMS the patient has been altered since dayshift yesterday (about 12-16 hours prior to arrival to the ER). EMS reports that nursing staff stated that the patient is very bright and does converse with them at baseline. They noticed that he was not speaking much yesterday morning and they did call EMS today because they noted seizure-like activity. They also reported to EMS that the patient is coronavirus positive. EMS reports that the patient has been nodding yes and no and following some commands for them. They have not got him to speak much. Vitals per EMS were unremarkable. Glucose was 120. Patient tells me his name and provides minimal other history. He does shake his head no inappropriately to questioning.  21  [facility] to [facility] staff and patient was quieter and more reserved than usual starting around midday. Is a full code. Nursing Notes were reviewed. He has been BiPAP dependent and was noted to have had ulcers.         Subjective:   On BiPAP  Not really answering of responding much   Looks uncomfortable   on precedex.     Medications:  Reviewed    Infusion Medications    dexmedetomidine 0.3 mcg/kg/hr (09/28/20 1217)    dextrose 75 mL/hr at 09/28/20 0128    dextrose       Scheduled Medications    sodium chloride  20 mL Intravenous Once    lidocaine 1 % injection  5 mL Intradermal Once    sodium chloride flush  10 mL Intravenous 2 times per day    budesonide-formoterol  2 puff Inhalation BID    thiamine  100 mg Oral Daily    lansoprazole  30 mg Oral BID AC    enoxaparin  40 mg Subcutaneous Daily    lamoTRIgine  100 mg Oral TID    sodium chloride  20 mL Intravenous Once    carBAMazepine  200 mg Oral TID    citalopram  20 mg Oral Daily    trospium  20 mg Oral BID AC    sodium chloride flush  10 mL Intravenous 2 times per day    insulin lispro  0-6 Units Subcutaneous Q4H     PRN Meds: sodium chloride flush, potassium chloride, albuterol sulfate HFA, ipratropium-albuterol, sodium chloride flush, acetaminophen **OR** acetaminophen, polyethylene glycol, promethazine **OR** ondansetron, glucose, dextrose, glucagon (rDNA), dextrose      Intake/Output Summary (Last 24 hours) at 9/28/2020 1436  Last data filed at 9/28/2020 0600  Gross per 24 hour   Intake --   Output 550 ml   Net -550 ml       Physical Exam Performed:    BP (!) 151/67   Pulse 83   Temp 98.9 °F (37.2 °C) (Temporal)   Resp (!) 31   Ht 5' 8\" (1.727 m)   Wt 169 lb 5 oz (76.8 kg)   SpO2 100%   BMI 25.74 kg/m²        In-person bedside physical examination deferred. Pursuant to the emergency declaration under the [address] [place] [address], [address] [place] [address] authority and the [employer] Act, this clinical encounter was conducted to provide necessary medical care. (Also consistent with new provisions and guidance offered by [facility] on March 18, 2020 in setting of COVID 19 outbreak and in order to preserve personal protective equipment in accordance with the flexibilities announced by CMS on March 30, 2020)   References: [email or web address]://med. [email or web address]/Portals/[email or web address]/Resources/COVID-19/3_18%20Telemed%20Guidance%20Updated%20March%2018. pdf?[email or web address]                      [email or web address]/[email or web address]/Resources/COVID-19/3_18%20Telemed%20Guidance%20Updated%20March%2018. pdf?pfr=[email or web address]                      [email or web address] pdf                             General: On BiPAP.    HEENT: Deferred  Cardiovascular: Telemetry data reviewed, rest deferred   Pulmonary: deferred  Abdomen/GI: deferred  Neuro: deferred  Skin: deferred  Musculoskeletal:  deferred  Genitourinary: Deferred  Psych: deferred  Lymphatic/Immunologic: deferred      Labs:   Recent Labs     09/26/20 2030 09/27/20  0500 09/28/20  0440   WBC 8.2 9.5 7.6   HGB 8.5* 8.4* 7.6*   HCT 26.1* 26.0* 23.2*   PLT 97* 98* 88*     Recent Labs     09/26/20 0430 09/27/20  0500 09/28/20  0440   * 145 141   K 3.0* 2.8* 2.9*   CL 98* 97* 97*   CO2 44* 42* 39*   BUN 22* 16 11   CREATININE 0.6* 0.6* <0.5*   CALCIUM 8.9 8.7 8.3   PHOS 2.7 2.7 2.1*     No results for input(s): AST, ALT, BILIDIR, BILITOT, ALKPHOS in the last 72 hours. Recent Labs     09/26/20 0430 09/27/20 0500 09/28/20  0440   INR 1.13 1.15* 1.25*     No results for input(s): CKTOTAL, TROPONINI in the last 72 hours. Urinalysis:      Lab Results   Component Value Date    NITRU Negative 09/15/2020    WBCUA 2 09/15/2020    BACTERIA 4+ 09/10/2020    RBCUA 0-2 09/15/2020    BLOODU SMALL 09/15/2020    SPECGRAV 1.018 09/15/2020    GLUCOSEU Negative 09/15/2020       Radiology:  XR CHEST PORTABLE   Final Result   Slight improvement in the appearance of the right lung compared to prior study         XR CHEST PORTABLE   Final Result   Changes consistent with COPD. Increased opacity in the right mid lung, indeterminate for confluent   fibrosis/crowding of bronchovascular markings versus airspace disease   (pneumonia). This is stable in the 7 day interval.  It is increased compared   to 2017. XR CHEST PORTABLE   Final Result   Slightly improved aeration of the lungs with persistent mild opacification in   the right upper and lower lung zones. Small right pleural effusion. CT head without contrast   Final Result   No acute intracranial abnormality. XR CHEST 1 VIEW   Final Result   1. Bilateral perihilar opacities, right greater than left, most consistent   with multifocal pneumonia, to include atypical causes. 2. Stable chronic bibasilar pleural and parenchymal scar.    3. Stable cardiomegaly. Assessment/Plan:    Active Hospital Problems    Diagnosis    Acute pulmonary edema ([facility] [J81.0]    Pleural effusion, right [J90]    Centrilobular emphysema (HCC) [J43.2]    Seizure ([facility] [R56.9]       Acute hypercapnic respiratory failure:   Secondary to underlying COPD with probable aspiration pneumonitis  Given his underlying COPD and his agonal breathing stat blood gas obtained which shows again hypercapnic respiratory failure  Code status has been discussed with his POA and they want him to be full code. Several calls have been made to get some one to come and see him. Aspiration pneumonitis:  monitor respiratory status.   remains on continuous BiPAP    Acute GI bleed:   s/p EGD; 2 bulbar ulcers; 1 with clot and V.V. Epi injected & Clipped  S/p 5 units of PRBCs and 3 units of FFP  Protonix drip discontinued; switch to Protonix 40 mg twice daily   We will continue to monitor hemoglobin will discontinue every 8 hours  Cont  pantoprazole oral twice daily  Hemoglobin stable    Thrombocytopenia:   Platelets stable around 83  Continue to monitor closely    Hypokalemia:  Replaced  We will continue to monitor    Hypophosphatemia:  Refeeding syndrome  Replaced  We will continue to monitor     Seizure disorder:   CT head on admission negative for any acute pathology  Neurology on board appreciate their recs   Keppra and Depakote d/c   Started back on Tegretol and lamotrigine      Delirium on baseline of dementia he does not have encephalopathy     SARS-CoV-2 infection and right lung aspiration pneumonia:   Now requiring BiPAP with FiO2 50%  s/p dexamethasone    7-day course of Zyvox and Unasyn;   patient afebrile, WBCs within normal limits and all the cultures have been negative to date     Type 2 diabetes mellitus:   On Lantus and sliding scale will change over to medications through his PEG tube     Neurogenic bladder with urinary incontinence:  Urology on board appreciate

## 2020-09-29 PROBLEM — E44.0 MODERATE MALNUTRITION (HCC): Chronic | Status: ACTIVE | Noted: 2020-01-01

## 2020-09-29 NOTE — CARE COORDINATION
Discharge planning note:      Family cannot meet with [facility] until tomorrow morning. [facility] nurse is scheduled to go to sister's home tomorrow morning to talk and get consents signed.     Simin Tamayo RN BSN  Case Management  [phone]

## 2020-09-29 NOTE — PLAN OF CARE
Nutrition Problem #1: Increased nutrient needs  Intervention: Food and/or Nutrient Delivery: Start Tube Feeding  Nutritional Goals: start of nutrition vs comfort measures(New note)

## 2020-09-29 NOTE — PROGRESS NOTES
Assessment. Patient changed to [facility] after speaking with family, will change to [facility] after speaking with Palliative care in the am. Patient bathed and linens replaced. Large amount of facial and orbital edema noted, BiPap removed and NRB put on, will switch to venturi mask. Tolerating venturi mask, much more comfortable, edema is subsiding.      Vladimir Bustamante RN, CRRN, 9/29/2020,5:09 AM

## 2020-09-29 NOTE — PROGRESS NOTES
catheter ([facility] [address]     #20    Urinary retention        Past Surgical History:   Procedure Laterality Date    ARM SURGERY      BLADDER SURGERY      CYSTOSCOPY N/A 5/15/2019    CYSTOLITHALOPAXY , STONE  SUPRA PUBIC CATHETER EXCHANGE. performed by Shonda Webb MD at [facility]      EYE SURGERY      GASTROSTOMY TUBE PLACEMENT N/A 9/18/2020    EGD PEG TUBE PLACEMENT performed by Lynda Carreon MD at [facility] [place], operated by [facility] BLADDER W INSERT SUPRAPUBIC CATH      TONSILLECTOMY      UMBILICAL HERNIA REPAIR N/A 1/15/2019    OPEN PRIMARY UMBILICAL HERNIA REPAIR performed by Liz Gaines MD at [address] [place] [address] N/A 9/17/2020    EGD CONTROL HEMORRHAGE with application of over the scope clip (star clip) injection of epinephrine 0.5 mg performed by Lynda Carreon MD at [address] [place] [address]       Current Medications:    dexmedetomidine (PRECEDEX) 400 mcg in sodium chloride 0.9 % 100 mL infusion, Continuous  0.9 % sodium chloride bolus, Once  dextrose 5 % solution, Continuous  lidocaine PF 1 % injection 5 mL, Once  sodium chloride flush 0.9 % injection 10 mL, 2 times per day  sodium chloride flush 0.9 % injection 10 mL, PRN  budesonide-formoterol (SYMBICORT) 160-4.5 MCG/ACT inhaler 2 puff, BID  vitamin B-1 (THIAMINE) tablet 100 mg, Daily  lansoprazole (PREVACID SOLUTAB) disintegrating tablet 30 mg, BID AC  enoxaparin (LOVENOX) injection 40 mg, Daily  lamoTRIgine (LAMICTAL) tablet 100 mg, TID  0.9 % sodium chloride bolus, Once  potassium chloride 10 mEq/100 mL IVPB (Peripheral Line), PRN  albuterol sulfate  (90 Base) MCG/ACT inhaler 2 puff, Q6H PRN  ipratropium-albuterol (DUONEB) nebulizer solution 1 ampule, Q4H PRN  carBAMazepine (TEGRETOL) tablet 200 mg, TID  citalopram (CELEXA) tablet 20 mg, Daily  trospium (SANCTURA) tablet 20 mg, BID AC  sodium chloride flush 0.9 % injection 10 mL, 2 times per day  sodium chloride flush 0.9 % injection 10 mL, PRN  acetaminophen (TYLENOL) tablet 650 mg, Q6H PRN    Or  acetaminophen (TYLENOL) suppository 650 mg, Q6H PRN  polyethylene glycol (GLYCOLAX) packet 17 g, Daily PRN  promethazine (PHENERGAN) tablet 12.5 mg, Q6H PRN    Or  ondansetron (ZOFRAN) injection 4 mg, Q6H PRN  glucose (GLUTOSE) 40 % oral gel 15 g, PRN  dextrose 50 % IV solution, PRN  glucagon (rDNA) injection 1 mg, PRN  dextrose 5 % solution, PRN  insulin lispro (1 Unit Dial) 0-6 Units, Q4H        Physical exam:     Vitals:  BP (!) 91/42   Pulse 63   Temp 97.7 °F (36.5 °C) (Temporal)   Resp 21   Ht 5' 8\" (1.727 m)   Wt 151 lb 4.8 oz (68.6 kg)   SpO2 93%   BMI 23.01 kg/m²   Constitutional:  awake, mild distress  Cardiovascular:  S1, S2 reg  Respiratory: diminished, on oxygen   Abdomen:  sot  Ext: trace lower extremity edema  CNS: restless    Limited exam      Labs:  CBC:   Recent Labs     09/27/20  0500 09/28/20 0440 09/29/20  0400   WBC 9.5 7.6 22.8*   HGB 8.4* 7.6* 8.8*   PLT 98* 88* 112*     BMP:    Recent Labs     09/27/20  0500 09/28/20 0440 09/28/20  1642 09/29/20  0400    141  --  136   K 2.8* 2.9* 4.1 3.7   CL 97* 97*  --  94*   CO2 42* 39*  --  40*   BUN 16 11  --  10   CREATININE 0.6* <0.5*  --  <0.5*   GLUCOSE 122* 113*  --  114*     Ca/Mg/Phos:   Recent Labs     09/27/20  0500 09/28/20  0440 09/29/20  0400   CALCIUM 8.7 8.3 8.2*   MG 2.20 2.10 1.90   PHOS 2.7 2.1* 2.8     Hepatic:   No results for input(s): AST, ALT, ALB, BILITOT, ALKPHOS in the last 72 hours. Troponin: No results for input(s): TROPONINI in the last 72 hours. BNP: No results for input(s): BNP in the last 72 hours. Lipids: No results for input(s): CHOL, TRIG, HDL, LDLCALC, LABVLDL in the last 72 hours. ABGs:   No results for input(s): PHART, PO2ART, YHP8YER in the last 72 hours.   INR:   Recent Labs     09/27/20  0500 09/28/20  0440 09/29/20  0400   INR 1.15* 1.25* 1.32*     UA:  No results for input(s): Alleen Corti, GLUCOSEU, BILIRUBINUR, Change d5w to 40 ml/ hr     8. Anemia. S/p EGD. GI bleed  HB stable   Has had received multiple blood transfusions      Overall prognosis poor.  [address] discussion       Thank you for allowing us to participate in care of Brain Sánchez         Electronically signed by: Joseph Weaver MD, 9/29/2020,     [facility] of [address] [place] [address] S  Office : [phone]  Fax :[phone]

## 2020-09-29 NOTE — PROGRESS NOTES
Received call from June who is speaking on behalf of her mother(Patient's sister and POA), OK to change code status to [facility] and they will follow up with palliative care tomorrow to finalize code status to [facility], verification by second nurse, Barrington Mcclain RN.      Kassie Hale, RN, CRRN, 3/71/6318,3:33 PM

## 2020-09-29 NOTE — PROGRESS NOTES
Hospitalist Progress Note      PCP: Celeste Delgadillo MD    Date of Admission: 9/10/2020    Chief Complaint:  [address] Course:   Patient is 79-year-old male with history of seizure disorder presenting from nursing facility altered mental status. Per EMS the patient has been altered since dayshift yesterday (about 12-16 hours prior to arrival to the ER). EMS reports that nursing staff stated that the patient is very bright and does converse with them at baseline. They noticed that he was not speaking much yesterday morning and they did call EMS today because they noted seizure-like activity. They also reported to EMS that the patient is coronavirus positive. EMS reports that the patient has been nodding yes and no and following some commands for them. They have not got him to speak much. Vitals per EMS were unremarkable. Glucose was 120. Patient tells me his name and provides minimal other history. He does shake his head no inappropriately to questioning.  21  spoke to [facility] staff and patient was quieter and more reserved than usual starting around midday. Is a full code. Nursing Notes were reviewed. He has been BiPAP dependent and was noted to have had ulcers.         Subjective:   On BiPAP  Not really answering or responding much   Looks uncomfortable   on precedex.     Medications:  Reviewed    Infusion Medications    dexmedetomidine 0.8 mcg/kg/hr (09/29/20 1030)    dextrose 75 mL/hr at 09/29/20 0810    dextrose       Scheduled Medications    sodium chloride  20 mL Intravenous Once    lidocaine 1 % injection  5 mL Intradermal Once    sodium chloride flush  10 mL Intravenous 2 times per day    budesonide-formoterol  2 puff Inhalation BID    thiamine  100 mg Oral Daily    lansoprazole  30 mg Oral BID AC    enoxaparin  40 mg Subcutaneous Daily    lamoTRIgine  100 mg Oral TID    sodium chloride  20 mL Intravenous Once    carBAMazepine  200 mg Oral TID    citalopram  20 mg Oral Daily    trospium  20 mg Oral BID AC    sodium chloride flush  10 mL Intravenous 2 times per day    insulin lispro  0-6 Units Subcutaneous Q4H     PRN Meds: sodium chloride flush, potassium chloride, albuterol sulfate HFA, ipratropium-albuterol, sodium chloride flush, acetaminophen **OR** acetaminophen, polyethylene glycol, promethazine **OR** ondansetron, glucose, dextrose, glucagon (rDNA), dextrose      Intake/Output Summary (Last 24 hours) at 9/29/2020 1610  Last data filed at 9/29/2020 1418  Gross per 24 hour   Intake 1981.62 ml   Output 750 ml   Net 1231.62 ml       Physical Exam Performed:    BP (!) 91/42   Pulse 71   Temp 97.7 °F (36.5 °C) (Temporal)   Resp 21   Ht 5' 8\" (1.727 m)   Wt 151 lb 4.8 oz (68.6 kg)   SpO2 93%   BMI 23.01 kg/m²        In-person bedside physical examination deferred. Pursuant to the emergency declaration under the [address] [place] [address], [address] [place] [address] and the [employer] Act, this clinical encounter was conducted to provide necessary medical care. (Also consistent with new provisions and guidance offered by [facility] on March 18, 2020 in setting of COVID 19 outbreak and in order to preserve personal protective equipment in accordance with the flexibilities announced by CMS on March 30, 2020)   References: https://med. [email or web address].[email or web address]/Portals/0/Resources/COVID-19/3_18%20Telemed%20Guidance%20Updated%20March%2018. pdf?ocr=[email or web address]                      https://[facility]. [email or web address].[email or web address]/Portals/[email or web address]/Resources/COVID-19/3_18%20Telemed%20Guidance%20Updated%20March%2018. pdf?mpi=[email or web address]                      [email or web address] pdf                             General: On BiPAP.    HEENT: Deferred  Cardiovascular: Telemetry data reviewed, rest deferred   Pulmonary: deferred  Abdomen/GI: deferred  Neuro: deferred  Skin: deferred  Musculoskeletal:  deferred  Genitourinary: Deferred  Psych: deferred  Lymphatic/Immunologic: deferred      Labs:   Recent Labs     09/27/20  0500 09/28/20  0440 09/29/20  0400   WBC 9.5 7.6 22.8*   HGB 8.4* 7.6* 8.8*   HCT 26.0* 23.2* 27.0*   PLT 98* 88* 112*     Recent Labs     09/27/20  0500 09/28/20  0440 09/28/20  1642 09/29/20  0400    141  --  136   K 2.8* 2.9* 4.1 3.7   CL 97* 97*  --  94*   CO2 42* 39*  --  40*   BUN 16 11  --  10   CREATININE 0.6* <0.5*  --  <0.5*   CALCIUM 8.7 8.3  --  8.2*   PHOS 2.7 2.1*  --  2.8     No results for input(s): AST, ALT, BILIDIR, BILITOT, ALKPHOS in the last 72 hours. Recent Labs     09/27/20  0500 09/28/20  0440 09/29/20  0400   INR 1.15* 1.25* 1.32*     No results for input(s): CKTOTAL, TROPONINI in the last 72 hours. Urinalysis:      Lab Results   Component Value Date    NITRU Negative 09/15/2020    WBCUA 2 09/15/2020    BACTERIA 4+ 09/10/2020    RBCUA 0-2 09/15/2020    BLOODU SMALL 09/15/2020    SPECGRAV 1.018 09/15/2020    GLUCOSEU Negative 09/15/2020       Radiology:  XR CHEST PORTABLE   Final Result   Slight improvement in the appearance of the right lung compared to prior study         XR CHEST PORTABLE   Final Result   Changes consistent with COPD. Increased opacity in the right mid lung, indeterminate for confluent   fibrosis/crowding of bronchovascular markings versus airspace disease   (pneumonia). This is stable in the 7 day interval.  It is increased compared   to 2017. XR CHEST PORTABLE   Final Result   Slightly improved aeration of the lungs with persistent mild opacification in   the right upper and lower lung zones. Small right pleural effusion. CT head without contrast   Final Result   No acute intracranial abnormality. XR CHEST 1 VIEW   Final Result   1. Bilateral perihilar opacities, right greater than left, most consistent   with multifocal pneumonia, to include atypical causes.    2. Stable chronic bibasilar pleural and parenchymal scar. 3. Stable cardiomegaly. Assessment/Plan:    Active Hospital Problems    Diagnosis    Moderate malnutrition ([facility] [address] [E44.0]    Acute pulmonary edema (HCC) [J81.0]    Pleural effusion, right [J90]    Centrilobular emphysema (HCC) [J43.2]    Seizure ([facility] [address] [R56.9]       Acute hypercapnic respiratory failure:   Secondary to underlying COPD with probable aspiration pneumonitis  Given his underlying COPD and his agonal breathing stat blood gas obtained which shows again hypercapnic respiratory failure  Code status has been discussed with his POA and they want him to be full code. Several calls have been made to get some one to come and see him. Aspiration pneumonitis:  monitor respiratory status.   remains on continuous BiPAP    Acute GI bleed:   s/p EGD; 2 bulbar ulcers; 1 with clot and V.V. Epi injected & Clipped  S/p 5 units of PRBCs and 3 units of FFP  Protonix drip discontinued; switch to Protonix 40 mg twice daily   We will continue to monitor hemoglobin will discontinue every 8 hours  Cont  pantoprazole oral twice daily  Hemoglobin stable    Thrombocytopenia:   Platelets stable around 83  Continue to monitor closely    Hypokalemia:  Replaced  We will continue to monitor    Hypophosphatemia:  Refeeding syndrome  Replaced  We will continue to monitor     Seizure disorder:   CT head on admission negative for any acute pathology  Neurology on board appreciate their recs   Keppra and Depakote d/c   Started back on Tegretol and lamotrigine      Delirium on baseline of dementia he does not have encephalopathy     SARS-CoV-2 infection and right lung aspiration pneumonia:   Now requiring BiPAP with FiO2 50%  s/p dexamethasone    7-day course of Zyvox and Unasyn;   patient afebrile, WBCs within normal limits and all the cultures have been negative to date     Type 2 diabetes mellitus:   On Lantus and sliding scale will change over to medications through his PEG tube     Neurogenic bladder with urinary incontinence:  Urology on board appreciate their recs; Herman to ostomy bag  will change suprapubic catheter size once COVID-19 is negative  Continue Sanctura     Suspected UTIs with underlying suprapubic catheter:  Has been  on IV Unasyn; antibiotics stopped as he is urine culture revealed mixed pathogens and repeat UA not indicative of infection     Overall his prognosis is pretty poor:  Per family, patient now is DNR-CC  Consult [facility]    DVT Prophylaxis: SCDs  Diet: DIET TUBE FEED CONTINUOUS/CYCLIC NPO; Diabetic; Nasogastric; Continuous; 10  Code Status: DNR-CC    PT/OT Eval Status: When stable    Dispo -ICU     Due to the immediate potential for life-threatening deterioration due to COVID-19 pneumonia, I spent 35 minutes providing critical care. This time is excluding time spent performing procedures.           Prosper Dillard MD

## 2020-09-29 NOTE — PROGRESS NOTES
Palliative Care:     Call to family this AM to give update on physical status and follow up for hospice consult. Ewelina Delcid (niece) answered. Emotional support provided as we discussed options of hospice involvement. Ewelina Delcid is tearful yet thankful that patient was already showing he was more comfortable with BiPAP being removed and facial swelling decreased. Agrees for hospice consult. [facility] requested per family. Consult submitted. Nurse/Team on floor notified.

## 2020-09-29 NOTE — PLAN OF CARE
Problem: Falls - Risk of:  Goal: Will remain free from falls  Description: Will remain free from falls  Outcome: Ongoing  Goal: Absence of physical injury  Description: Absence of physical injury  Outcome: Ongoing     Problem: Skin Integrity:  Goal: Will show no infection signs and symptoms  Description: Will show no infection signs and symptoms  Outcome: Ongoing  Goal: Absence of new skin breakdown  Description: Absence of new skin breakdown  Outcome: Ongoing     Problem: Nutrition  Goal: Optimal nutrition therapy  Outcome: Ongoing     Problem: Airway Clearance - Ineffective  Goal: Achieve or maintain patent airway  Outcome: Ongoing     Problem: Gas Exchange - Impaired  Goal: Absence of hypoxia  Outcome: Ongoing  Goal: Promote optimal lung function  Outcome: Ongoing     Problem: Breathing Pattern - Ineffective  Goal: Ability to achieve and maintain a regular respiratory rate  Outcome: Ongoing     Problem:  Body Temperature -  Risk of, Imbalanced  Goal: Ability to maintain a body temperature within defined limits  Outcome: Ongoing  Goal: Will regain or maintain usual level of consciousness  Outcome: Ongoing  Goal: Complications related to the disease process, condition or treatment will be avoided or minimized  Outcome: Ongoing     Problem: Isolation Precautions - Risk of Spread of Infection  Goal: Prevent transmission of infection  Outcome: Ongoing     Problem: Nutrition Deficits  Goal: Optimize nutrtional status  Outcome: Ongoing     Problem: Risk for Fluid Volume Deficit  Goal: Maintain normal heart rhythm  Outcome: Ongoing  Goal: Maintain absence of muscle cramping  Outcome: Ongoing  Goal: Maintain normal serum potassium, sodium, calcium, phosphorus, and pH  Outcome: Ongoing     Problem: Loneliness or Risk for Loneliness  Goal: Demonstrate positive use of time alone when socialization is not possible  Outcome: Ongoing     Problem: Fatigue  Goal: Verbalize increase energy and improved vitality  Outcome: Ongoing     Problem: Patient Education: Go to Patient Education Activity  Goal: Patient/Family Education  Outcome: Ongoing     Problem: Pain:  Goal: Pain level will decrease  Description: Pain level will decrease  Outcome: Ongoing  Goal: Control of acute pain  Description: Control of acute pain  Outcome: Ongoing  Goal: Control of chronic pain  Description: Control of chronic pain  Outcome: Ongoing     Problem: OXYGENATION/RESPIRATORY FUNCTION  Goal: Patient will maintain patent airway  Outcome: Ongoing  Goal: Patient will achieve/maintain normal respiratory rate/effort  Description: Respiratory rate and effort will be within normal limits for the patient  Outcome: Ongoing     Problem: HEMODYNAMIC STATUS  Goal: Patient has stable vital signs and fluid balance  Outcome: Ongoing     Problem: FLUID AND ELECTROLYTE IMBALANCE  Goal: Fluid and electrolyte balance are achieved/maintained  Outcome: Ongoing     Problem: ACTIVITY INTOLERANCE/IMPAIRED MOBILITY  Goal: Mobility/activity is maintained at optimum level for patient  Outcome: Ongoing     Problem: Discharge Planning:  Goal: Discharged to appropriate level of care  Description: Discharged to appropriate level of care  Outcome: Ongoing     Problem: Serum Glucose Level - Abnormal:  Goal: Ability to maintain appropriate glucose levels will improve  Description: Ability to maintain appropriate glucose levels will improve  Outcome: Ongoing     Problem: Sensory Perception - Impaired:  Goal: Ability to maintain a stable neurologic state will improve  Description: Ability to maintain a stable neurologic state will improve  Outcome: Ongoing     Problem: Restraint Use - Nonviolent/Non-Self-Destructive Behavior:  Goal: Absence of restraint indications  Description: Absence of restraint indications  Outcome: Ongoing  Goal: Absence of restraint-related injury  Description: Absence of restraint-related injury  Outcome: Ongoing

## 2020-09-29 NOTE — PROGRESS NOTES
Palliative Care:     Family agree with [facility] consult tomorrow 9/29/20 in their home. The [facility] [place], [facility] and alternative number is: [phone]. [facility] has been called and given alternate address and phone number to reach; along with request consult take place before NOON. Discussed current code status DNR-CCA. Family agree to change code status to [facility]. Perfect serve to physician and follow up call to [facility] for update completed. Will continue to offer support as needed.

## 2020-09-29 NOTE — PROGRESS NOTES
P Pulmonary and Critical Care    Follow Up Note    Subjective:   CHIEF COMPLAINT / HPI:   Chief Complaint   Patient presents with    Altered Mental Status     Altered mental status started yesterday. Pt brought in from [facility] home. Interval history: Overall clinical picture remains the same. Has been given a small break from BiPAP this morning. Respiratory status remains unchanged. Currently on Venturi mask. . Nutrition has been compromised due to persistent dependence on BiPAP therapy. Remains intermittently agitated requiring low-dose Precedex gtt. to remain compliant with the BiPAP therapy. Remains bedbound. Yesterday had a family meeting and patient's CODE STATUS has been changed to [facility]. Past Medical History:    Reviewed; no changes    Social History:    Reviewed; no changes    REVIEW OF SYSTEMS:    ROS is unobtainable due to his critical illness. Objective:   PHYSICAL EXAM:        VITALS:  BP 91/80   Pulse 65   Temp 97.4 °F (36.3 °C) (Temporal)   Resp 29   Ht 5' 8\" (1.727 m)   Wt 151 lb 4.8 oz (68.6 kg)   SpO2 96%   BMI 23.01 kg/m²  on BiPAP    24HR INTAKE/OUTPUT:      Intake/Output Summary (Last 24 hours) at 9/29/2020 1206  Last data filed at 9/29/2020 0500  Gross per 24 hour   Intake 4424 ml   Output 800 ml   Net 3624 ml       CONSTITUTIONAL: Somnolent and lethargic. LUNGS: Decreased breath sounds in bibasilar areas. CARDIOVASCULAR: S1 and S2 and no JVD  ABDOMEN:  normal bowel sounds, non-distended and non-tender to palpation  EXT: No edema, no calf tenderness. Pulses are present bilaterally. NEUROLOGIC:  Mental Status Exam:  Level of Alertness:   awake  Orientation:   Moves all his extremities spontaneously.   SKIN:  normal skin color, texture, turgor, no redness, warmth, or swelling at IV sites    DATA:    CBC:  Recent Labs     09/27/20  0500 09/28/20  0440 09/29/20  0400   WBC 9.5 7.6 22.8*   RBC 2.77* 2.51* 2.89*   HGB 8.4* 7.6* 8.8*   HCT 26.0* 23.2* 27.0* PLT 98* 88* 112*   MCV 93.7 92.6 93.2   MCH 30.3 30.3 30.4   MCHC 32.4 32.7 32.6   RDW 17.7* 17.1* 16.7*      BMP:  Recent Labs     09/27/20  0500 09/28/20  0440 09/28/20  1642 09/29/20  0400    141  --  136   K 2.8* 2.9* 4.1 3.7   CL 97* 97*  --  94*   CO2 42* 39*  --  40*   BUN 16 11  --  10   CREATININE 0.6* <0.5*  --  <0.5*   CALCIUM 8.7 8.3  --  8.2*   GLUCOSE 122* 113*  --  114*      ABG:  No results for input(s): PHART, LZH5WVH, PO2ART, WYY3ZKO, I1SNHTAM, BEART in the last 72 hours. Cultures:    Abx:    Radiology Review:  Pertinent images / reports were reviewed as a part of this visit. Assessment:     1. Acute on chronic hypoxemic and hypercapnic respiratory failure  2. Pulmonary edema/volume overload  3. Right pleural effusion  4. Aspiration pneumonia  5. SARS-CoV-2 infection  6. Seizure disorder. Plan:     1. I have reviewed laboratories, medical records and images for this visit  2. Patient remains BiPAP dependent with persistent hypercapnic respiratory failure. Off of BiPAP patient retains carbon dioxide and goes into respiratory failure. Currently has been given a small break from BiPAP to Venturi mask. 3. On Precedex gtt. to help with his agitation  4. Patient receiving potassium phosphate for hypophosphatemia. Will provide it again today. 5. Patient remains bedbound. 6. We will start him on trickle tube feeds with free water flushes of 30 cc every 6 hours. Dextrose containing fluids have been stopped. 7. Zoom call planned with family today to discuss comfort care/hospice care.     Bonita Agrawal MD   Pulmonary Critical Care and Sleep Medicine  [facility], Bryce Li, [address]  9/10/2020, 12:06 PM

## 2020-09-30 NOTE — PROGRESS NOTES
Pt removed from BiPAP and place on 2L NC for comfort care. 4 mg of morphine and 1 mg of ativan given as ordered PRN. Spoke with pts nigaudencio June, updated on pt current status and comfort care measures. Will continue to monitor.

## 2020-09-30 NOTE — PROGRESS NOTES
Second call placed to patient's niece, Stephany Tello, to update on status. Patient's SBP 70's. SaO2 sustained 60-70's% despite BiPap on max settings. Remains minimally responsive and agonally breathing. Plan was for family to meet with hospice this AM. Per patient's niece Stephany Tello, the family wishes to just keep him comfortable at this time and to proceed with comfort measures only, including removal of BiPap. Hospitalist notified, see new orders.

## 2020-09-30 NOTE — PROGRESS NOTES
Reassessment complete. No acute changes noted to previous assessment (see doc flowsheets). VSS. Will continue to monitor and assess.      Electronically signed by AL Zhu, RN

## 2020-09-30 NOTE — PROGRESS NOTES
Per bedside monitor, pt in and out of sinus rhythm, junctional, and afib. EKG performed at bedside to confirm and reveal sinus rhythm.      Electronically signed by GABE PringleN, RN

## 2020-09-30 NOTE — PLAN OF CARE
Problem: Falls - Risk of:  Goal: Will remain free from falls  Description: Will remain free from falls  9/29/2020 2253 by Marcia Deras RN  Outcome: Ongoing  9/29/2020 1713 by Glory Toro RN  Outcome: Ongoing  Goal: Absence of physical injury  Description: Absence of physical injury  9/29/2020 2253 by Marcia Deras RN  Outcome: Ongoing  9/29/2020 1713 by Glory Toro RN  Outcome: Ongoing     Problem: Skin Integrity:  Goal: Will show no infection signs and symptoms  Description: Will show no infection signs and symptoms  9/29/2020 2253 by Marcia Deras RN  Outcome: Ongoing  9/29/2020 1713 by Glory Toro RN  Outcome: Ongoing  Goal: Absence of new skin breakdown  Description: Absence of new skin breakdown  9/29/2020 2253 by Marcia Deras RN  Outcome: Ongoing  9/29/2020 1713 by Glory Toro RN  Outcome: Ongoing     Problem: Nutrition  Goal: Optimal nutrition therapy  9/29/2020 2253 by Marcia Deras RN  Outcome: Ongoing  9/29/2020 1713 by Glory Toro RN  Outcome: Ongoing     Problem: Airway Clearance - Ineffective  Goal: Achieve or maintain patent airway  9/29/2020 2253 by Marcia Deras RN  Outcome: Ongoing  9/29/2020 1713 by Glory Toro RN  Outcome: Ongoing     Problem: Gas Exchange - Impaired  Goal: Absence of hypoxia  9/29/2020 2253 by Marcia Deras RN  Outcome: Ongoing  9/29/2020 1713 by Glory Toro RN  Outcome: Ongoing  Goal: Promote optimal lung function  9/29/2020 2253 by Marcia Deras RN  Outcome: Ongoing  9/29/2020 1713 by Glory Toro RN  Outcome: Ongoing     Problem: Breathing Pattern - Ineffective  Goal: Ability to achieve and maintain a regular respiratory rate  9/29/2020 2253 by Marcia Deras RN  Outcome: Ongoing  9/29/2020 1713 by Glory Toro RN  Outcome: Ongoing     Problem:  Body Temperature -  Risk of, Imbalanced  Goal: Ability to maintain a body temperature within defined limits  9/29/2020 2253 by Marcia Deras RN  Outcome: Ongoing  9/29/2020 1713 by Jose J Anand RN  Outcome: Ongoing  Goal: Will regain or maintain usual level of consciousness  9/29/2020 2253 by Tess Sosa RN  Outcome: Ongoing  9/29/2020 1713 by Jose J Anand RN  Outcome: Ongoing  Goal: Complications related to the disease process, condition or treatment will be avoided or minimized  9/29/2020 2253 by Tess Sosa RN  Outcome: Ongoing  9/29/2020 1713 by Jose J Anand RN  Outcome: Ongoing     Problem: Isolation Precautions - Risk of Spread of Infection  Goal: Prevent transmission of infection  9/29/2020 2253 by Tess Sosa RN  Outcome: Ongoing  9/29/2020 1713 by Jose J Anand RN  Outcome: Ongoing     Problem: Nutrition Deficits  Goal: Optimize nutrtional status  9/29/2020 2253 by Tess Sosa RN  Outcome: Ongoing  9/29/2020 1713 by Jose J Anand RN  Outcome: Ongoing     Problem: Risk for Fluid Volume Deficit  Goal: Maintain normal heart rhythm  9/29/2020 2253 by Tess Sosa RN  Outcome: Ongoing  9/29/2020 1713 by Jose J Anand RN  Outcome: Ongoing  Goal: Maintain absence of muscle cramping  9/29/2020 2253 by Tess Sosa RN  Outcome: Ongoing  9/29/2020 1713 by Jose J Anand RN  Outcome: Ongoing  Goal: Maintain normal serum potassium, sodium, calcium, phosphorus, and pH  9/29/2020 2253 by Tess Sosa RN  Outcome: Ongoing  9/29/2020 1713 by Jose J Anand RN  Outcome: Ongoing     Problem: Loneliness or Risk for Loneliness  Goal: Demonstrate positive use of time alone when socialization is not possible  9/29/2020 2253 by Tess Sosa RN  Outcome: Ongoing  9/29/2020 1713 by Jose J Anand RN  Outcome: Ongoing     Problem: Fatigue  Goal: Verbalize increase energy and improved vitality  9/29/2020 2253 by Tess Sosa RN  Outcome: Ongoing  9/29/2020 1713 by Jose J Anand RN  Outcome: Ongoing     Problem: Patient Education: Go to Patient Education Activity  Goal: Patient/Family Education  9/29/2020 2253 by Tess Sosa RN  Outcome: Ongoing  9/29/2020 1713 by Aamir Boateng RN  Outcome: Ongoing     Problem: Pain:  Goal: Pain level will decrease  Description: Pain level will decrease  9/29/2020 2253 by Ricardo Carter RN  Outcome: Ongoing  9/29/2020 1713 by Aamir Boateng RN  Outcome: Ongoing  Goal: Control of acute pain  Description: Control of acute pain  9/29/2020 2253 by Ricardo Carter RN  Outcome: Ongoing  9/29/2020 1713 by Aamir Boateng RN  Outcome: Ongoing  Goal: Control of chronic pain  Description: Control of chronic pain  9/29/2020 2253 by Ricardo Carter RN  Outcome: Ongoing  9/29/2020 1713 by Aamir Boateng RN  Outcome: Ongoing     Problem: OXYGENATION/RESPIRATORY FUNCTION  Goal: Patient will maintain patent airway  9/29/2020 2253 by Ricardo Carter RN  Outcome: Ongoing  9/29/2020 1713 by Aamir Boateng RN  Outcome: Ongoing  Goal: Patient will achieve/maintain normal respiratory rate/effort  Description: Respiratory rate and effort will be within normal limits for the patient  9/29/2020 2253 by Ricardo Carter RN  Outcome: Ongoing  9/29/2020 1713 by Aamir Boateng RN  Outcome: Ongoing     Problem: HEMODYNAMIC STATUS  Goal: Patient has stable vital signs and fluid balance  9/29/2020 2253 by Ricardo Carter RN  Outcome: Ongoing  9/29/2020 1713 by Aamir Boateng RN  Outcome: Ongoing     Problem: FLUID AND ELECTROLYTE IMBALANCE  Goal: Fluid and electrolyte balance are achieved/maintained  9/29/2020 2253 by Ricardo Carter RN  Outcome: Ongoing  9/29/2020 1713 by Aamir Boateng RN  Outcome: Ongoing     Problem: ACTIVITY INTOLERANCE/IMPAIRED MOBILITY  Goal: Mobility/activity is maintained at optimum level for patient  9/29/2020 2253 by Ricardo Carter RN  Outcome: Ongoing  9/29/2020 1713 by Aamir Boateng RN  Outcome: Ongoing     Problem: Discharge Planning:  Goal: Discharged to appropriate level of care  Description: Discharged to appropriate level of care  9/29/2020 2253 by Ricardo Carter RN  Outcome: Ongoing  9/29/2020 1713

## 2020-09-30 NOTE — PROGRESS NOTES
Post Mortem care provided for patient. [facility] home notified of patient. [address] notified of patient's death.

## 2020-09-30 NOTE — PROGRESS NOTES
Pt oxygen saturation dropped to 87% on Venti mask at 6L with nasal cannula at 1L. Increased nasal cannula flow to 3L/min and pt maintained O2 saturation in mid-to upper 90s.      Electronically signed by GABE AmayaN, RN

## 2020-09-30 NOTE — DISCHARGE SUMMARY
Hospital Medicine Discharge Summary    Patient ID: Paco Arndt      Patient's PCP: Ainsley Leblanc MD    Admit Date: 9/10/2020     Discharge Date: 9/30/2020      Admitting Physician: Chrissy Tineo MD     Discharge Physician: Jimbo Milligan MD     Discharge Diagnoses: Active Hospital Problems    Diagnosis    Moderate malnutrition ([facility] [E44.0]    Acute pulmonary edema (HCC) [J81.0]    Pleural effusion, right [J90]    Centrilobular emphysema (HCC) [J43.2]    Seizure ([facility] [R56.9]       The patient was seen and examined on day of discharge and this discharge summary is in conjunction with any daily progress note from day of discharge. Hospital Course:   Patient is 39-year-old male with history of seizure disorder presenting from nursing facility altered mental status.  Per EMS the patient has been altered since dayshift yesterday (about 12-16 hours prior to arrival to the ER).   EMS reports that nursing staff stated that the patient is very bright and does converse with them at baseline. Kehinde Navarro noticed that he was not speaking much yesterday morning and they did call EMS today because they noted seizure-like activity. Kehinde Navarro also reported to EMS that the patient is coronavirus positive. EMS reports that the patient has been nodding yes and no and following some commands for them. Kehinde Navarro have not got him to speak much. Vitals per EMS were unremarkable.  Glucose was 120. Patient tells me his name and provides minimal other history. [facility] does shake his head no inappropriately to questioning.  0705 spoke to [facility] staff and patient was quieter and more reserved than usual starting around midday.    Nursing Notes were reviewed.   Patient family changed the CODE STATUS to DO NOT RESUSCITATE comfort care only, hospice care initiated per family request.      In hospital, patient was treated for the following medical condition:     Acute hypercapnic respiratory failure secondary to COVID-19 pneumonia  Aspiration pneumonitis  Acute GI bleed  Thrombocytopenia  Hypokalemia  Hypophosphatemia  Seizure disorder  Delirium on baseline of dementia  COVID-19 infection, right lung aspiration pneumonia  Type 2 diabetes  Neurogenic bladder with urinary incontinence  Suspected UTIs with underlying suprapubic catheter    Patient was made comfort care, DO NOT RESUSCITATE per family wishes  Patient  2020 at 8:05 AM        Physical Exam Performed:     BP (!) 41/16   Pulse (!) 43   Temp 95.1 °F (35.1 °C) (Temporal)   Resp 14   Ht 5' 8\" (1.727 m)   Wt 169 lb 12.1 oz (77 kg)   SpO2 (!) 82%   BMI 25.81 kg/m²        In-person bedside physical examination deferred. Pursuant to the emergency declaration under the [address] [place] [address] and the [employer] Act, this clinical encounter was conducted to provide necessary medical care. (Also consistent with new provisions and guidance offered by [facility] on 2020 in setting of COVID 19 outbreak and in order to preserve personal protective equipment in accordance with the flexibilities announced by CMS on 2020)   References: https://[facility]. [facility]/Portals/0/Resources/COVID-19/3_18%20Telemed%20Guidance%20Updated%20March%. pdf?[email or web address]                      https://[facility]. [facility]/Portals/0/Resources/COVID-19/3_18%20Telemed%20Guidance%20Updated%20March%. pdf?xbu=[email or web address]                      [email or web address] pdf                             General: No response  HEENT: Deferred  Cardiovascular:  No cardiac activity  Pulmonary:  No respiratory rate  Abdomen/GI: deferred  Neuro: Does not withdraw to pain  Skin: deferred  Musculoskeletal:  deferred  Genitourinary: Deferred  Psych: deferred  Lymphatic/Immunologic: deferred    Labs:  For convenience and continuity at follow-up the following most recent labs are provided:      CBC:    Lab Results   Component Value Date    WBC 36.3 2020    HGB 9.1 2020    HCT 28.8 2020     2020       Renal:    Lab Results   Component Value Date     2020    K 3.7 2020    K 3.9 2020    CL 94 2020    CO2 40 2020    BUN 10 2020    CREATININE <0.5 2020    CALCIUM 8.2 2020    PHOS 2.8 2020         Significant Diagnostic Studies    Radiology:   XR CHEST PORTABLE   Final Result   Slight improvement in the appearance of the right lung compared to prior study         XR CHEST PORTABLE   Final Result   Changes consistent with COPD. Increased opacity in the right mid lung, indeterminate for confluent   fibrosis/crowding of bronchovascular markings versus airspace disease   (pneumonia). This is stable in the 7 day interval.  It is increased compared   to 2017. XR CHEST PORTABLE   Final Result   Slightly improved aeration of the lungs with persistent mild opacification in   the right upper and lower lung zones. Small right pleural effusion. CT head without contrast   Final Result   No acute intracranial abnormality. XR CHEST 1 VIEW   Final Result   1. Bilateral perihilar opacities, right greater than left, most consistent   with multifocal pneumonia, to include atypical causes. 2. Stable chronic bibasilar pleural and parenchymal scar. 3. Stable cardiomegaly.                 Consults:     IP CONSULT TO HOSPITALIST  IP CONSULT TO NEUROLOGY  IP CONSULT TO DIETITIAN  IP CONSULT TO SOCIAL WORK  IP CONSULT TO PALLIATIVE CARE  IP CONSULT TO NEPHROLOGY  IP CONSULT TO NEUROLOGY  IP CONSULT TO PALLIATIVE CARE  IP CONSULT TO PULMONOLOGY  IP CONSULT TO GI  IP CONSULT TO CRITICAL CARE  IP CONSULT TO PALLIATIVE CARE  IP CONSULT TO ETHICS  IP CONSULT TO HOSPICE    Disposition:     Condition at Discharge: Terminal    Discharge Instructions/Follow-up: None    Code Status:  Prior     Activity: activity as tolerated    Diet: None      Discharge Medications:     Discharge Medication List as of 9/30/2020 12:46 PM           Details   divalproex (DEPAKOTE) 125 MG DR tablet Take 125 mg by mouth 2 times dailyHistorical Med      traZODone (DESYREL) 25 mg TABS Take 25 mg by mouth nightlyHistorical Med      glucose (GLUTOSE) 40 % GEL Take 37.5 mLs by mouth as needed (low BS), Disp-45 g, R-1Normal      Lactobacillus (ACIDOPHILUS) 100 MG CAPS Take by mouth 2 times dailyHistorical Med      citalopram (CELEXA) 20 MG tablet Take 20 mg by mouth dailyHistorical Med      trospium (SANCTURA) 20 MG tablet Take 20 mg by mouth 2 times dailyHistorical Med      insulin detemir (LEVEMIR) 100 UNIT/ML injection vial Inject 5 Units into the skin 2 times dailyHistorical Med      acetaminophen (TYLENOL) 325 MG tablet Take 650 mg by mouth every 4 hours as needed for Pain      aspirin 81 MG tablet Take 81 mg by mouth daily      carBAMazepine (TEGRETOL) 200 MG tablet Take 200 mg by mouth 3 times daily Historical Med      loratadine (CLARITIN) 10 MG tablet Take 10 mg by mouth daily      Folic Acid 0.8 MG CAPS Take 1 tablet by mouth daily      gabapentin (NEURONTIN) 100 MG capsule Take 200 mg by mouth 3 times daily      lamoTRIgine (LAMICTAL) 100 MG tablet Take 100 mg by mouth 3 times daily Historical Med      lisinopril (PRINIVIL;ZESTRIL) 20 MG tablet Take 10 mg by mouth daily Historical Med      polyethylene glycol (MIRALAX) POWD powder Take 17 g by mouth daily Historical Med      vitamin B-12 (CYANOCOBALAMIN) 1000 MCG tablet Take 1,000 mcg by mouth daily      vitamin D (CHOLECALCIFEROL) 1000 UNIT TABS tablet Take 5,000 Units by mouth daily Historical Med      omeprazole (PRILOSEC) 20 MG capsule Take 20 mg by mouth daily             Time Spent on discharge is more than 30 minutes in the examination, evaluation, counseling and review of medications and

## 2020-09-30 NOTE — DEATH NOTES
Death Pronouncement Note  Patient's Name: Chikis Yañez   Patient's YOB: 1945  MRN Number: [id number]    Admitting Provider: Genie Luong MD  Attending Provider: No att. providers found    Patient was examined and the following were absent: Pulses, Blood Pressure and Respiratory effort    I declared the patient dead on 9/30/2020 at 8:05 AM    Preliminary Cause of Death: Acute hypoxic respiratory failure secondary to COVID-19 pneumonia    Electronically signed by Thony Duckworth MD on 9/30/20 at 3:27 PM EDT

## 2022-10-11 NOTE — PROGRESS NOTES
Comprehensive Nutrition Assessment    Type and Reason for Visit:  Reassess    Nutrition Recommendations/Plan:   1. Recommend order \"Diet: Tube feed continuous/ NPO\". Initiate Glucerna 1.2 (Diabetic formula) at 10 ml/hr & HOLD. 2. Recommend 30 mL H20 q 6 hours. Recommend reevaluate IV fluids at this time. Increase flush if Na increases greater than 145 mEq/L.  3. Ensure head of bed is 30 - 45 degrees during continuous feeding. Turn off the feeding if head of bed is lowered less than 30 degrees. 4. Monitor for tolerance (bowel habits, N/V, cramping). 5. Irrigate tube with 30 mL water before, between and after each medication. Nutrition Assessment:  Pt's nutrition status remains compromised d/t NPO status x past 5 days. Will initiate TF very slowly & monitor for tolerance/ change in plan of care. Recommend Glucerna 1.2 @ 10 ml/hr with 30 ml water flush q 6 hr.    Malnutrition Assessment:  Malnutrition Status: Moderate malnutrition    Context:  Acute Illness     Findings of the 6 clinical characteristics of malnutrition:  Energy Intake:  7 - 50% or less of estimated energy requirements for 5 or more days  Weight Loss:  Unable to assess     Body Fat Loss:  Unable to assess     Muscle Mass Loss:  Unable to assess    Fluid Accumulation:  1 - Mild Extremities   Strength:  Not Performed    Estimated Daily Nutrient Needs:  Energy (kcal):  1750 - 2100 (25-30 kcal/70kg); Weight Used for Energy Requirements:  (con't to use 70 kg for est needs)     Protein (g):   (1.3 - 1.6g/70kg); Weight Used for Protein Requirements:  Admission        Fluid (ml/day):  1 mL/kcal; Weight Used for Fluid Requirements:  Current      Nutrition Related Findings:  9/29: +4 L since 9/28; DNR-CCA; Lytes & NA wnl; D5 IV fluids stopped; nonpitting edema extremities.  Hypoactive BS      Wounds:  Deep Tissue Injury(buttocks)       Current Nutrition Therapies:    DIET TUBE FEED CONTINUOUS/CYCLIC NPO; Diabetic; Nasogastric; Continuous; 10 ml/hr    Anthropometric Measures:  · Height: 5' 8\" (172.7 cm)  · Current Body Weight: 151 lb (68.5 kg)   · Admission Body Weight: 154 lb (69.9 kg)    · Usual Body Weight:       · Ideal Body Weight: 154 lbs; % Ideal Body Weight 98.1 %   · BMI: 23  · BMI Categories: Normal Weight (BMI 18.5-24. 9)       Nutrition Diagnosis:   · Increased nutrient needs related to increase demand for energy/nutrients as evidenced by wounds    · Inadequate energy intake related to impaired respiratory function, altered GI function as evidenced by NPO or clear liquid status due to medical condition, vomiting      Nutrition Interventions:   Food and/or Nutrient Delivery:  Start Tube Feeding  Nutrition Education/Counseling:  Education not indicated   Coordination of Nutrition Care:  Continued Inpatient Monitoring    Goals:  start of nutrition vs comfort measures    Nutrition Monitoring and Evaluation:        Food/Nutrient Intake Outcomes:  Diet Advancement/Tolerance, Enteral Nutrition Intake/Tolerance  Physical Signs/Symptoms Outcomes:  Biochemical Data, Nausea or Vomiting, Hemodynamic Status, Weight, Skin     Discharge Planning:     Too soon to determine     Electronically signed by Lalit Betancourt RD, LD on 9/29/20 at 1:57 PM EDT    Contact: [phone] Angel RN: 68 yom presents to ambulance bay on stretcher,  A&Ox4, c/o back pain that radiates to chest pain x1 wk. Endorses fall 3 months ago. Respirations even and unlabored. Pt denies any active chest pain, dyspnea. dizziness, N/V/. 20g IV placed in R AC, labs sent per order. Pending room in main ED. Safety precautions.

## 2022-11-27 NOTE — PROGRESS NOTES
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 Pt noted to have Creatinine of 1.5 on . If possible, please   document in the progress notes and discharge summary if you are evaluating   and/or treating any of the following: The medical record reflects the following:  Risk Factors: left groin bleeding (large amount)  Clinical Indicators: Serial creatinine:  @ 03:18= 1.5 mg/dl;    @09:49= 1.7 mg/dl; @ 03:39= 2.2 mg/dl; per  H/P: Patient has extensive left groin surgical history and bleeding from the   chronic wound is likely indicative of likely pseudo  aneurysm rupture, which was found on CTA, Patient will be taken to the OR   emergently for repair of pseudoaneurysm and  explantation of  infected graft with redo bypass\"  Treatment: BNP monitoring, CBC, Vascular surgery consult, Emergent \"EXCISION   OF INFECTED ILEOFEMORAL BYPASS  GRAFT; HARVEST LEFT BASILIC VEIN; LEFT EXTERNAL ILIAC ARTERY TO FEMORAL BYPASS   THROUGH OBTURATOR  CANAL; PLACEMENT OF WOUND VAC\", 1L of LR IV bolus, continuous NS IV   @100cc/hour, Type and cross for 4 Units of  PRBC, 2 units PRBC transfused, Strict I/O, VSS monitoring per unit protocol.       Defined by Kidney Disease Improving Global Outcomes (KDIGO) clinical practice   guideline for acute kidney injury:  -Increase in SCr by greater than or equal to 0.3 mg/dl within 48 hours; or  -Increase or decrease in SCr to greater than or equal to 1.5 times baseline,   which is known or presumed to have occurred within the prior 7 days; or  -Urine volume < 0.5ml/kg/h for 6 hours  Options provided:  -- Acute kidney failure  -- Acute kidney injury  -- Other - I will add my own 200 mg Oral TID    citalopram  20 mg Oral Daily    trospium  20 mg Oral BID AC    sodium chloride flush  10 mL Intravenous 2 times per day    insulin lispro  0-6 Units Subcutaneous Q4H     PRN Meds: sodium chloride flush, potassium chloride, albuterol sulfate HFA, ipratropium-albuterol, sodium chloride flush, acetaminophen **OR** acetaminophen, polyethylene glycol, promethazine **OR** ondansetron, glucose, dextrose, glucagon (rDNA), dextrose      Intake/Output Summary (Last 24 hours) at 9/26/2020 1601  Last data filed at 9/26/2020 0900  Gross per 24 hour   Intake 1015.9 ml   Output 950 ml   Net 65.9 ml       Physical Exam Performed:    BP (!) 152/75   Pulse 81   Temp 96.4 °F (35.8 °C) (Temporal)   Resp 24   Ht 5' 8\" (1.727 m)   Wt 169 lb 5 oz (76.8 kg)   SpO2 93%   BMI 25.74 kg/m²     General appearance: drowsy, BiPAP in place  HEENT: Pupils equal, round, and reactive to light. Conjunctivae/corneas clear. Neck: Supple, with full range of motion. No jugular venous distention. Trachea midline. Respiratory: bibasilar rales notes  Cardiovascular: Regular rate and rhythm with normal S1/S2 without murmurs, rubs or gallops. Abdomen: Soft, non-tender, non-distended with normal bowel sounds. Musculoskeletal: No clubbing, cyanosis or edema bilaterally. Full range of motion without deformity. Skin: Skin color, texture, turgor normal.  No rashes or lesions.   Neurologic:  Seems lethargic, does move limbs  Psychiatric: not able to assess   Capillary Refill: Brisk,< 3 seconds   Peripheral Pulses: +2 palpable, equal bilaterally       Labs:   Recent Labs     09/24/20  1100 09/25/20  0506 09/26/20  0430   WBC 14.7* 9.1 7.6   HGB 7.7* 7.3* 6.9*   HCT 23.9* 22.1* 20.8*   PLT 83* 76* 96*     Recent Labs     09/24/20  0133 09/25/20  0507 09/26/20  0430    147* 146*   K 3.4* 3.4* 3.0*   CL 96* 97* 98*   CO2 44* 43* 44*   BUN 23* 27* 22*   CREATININE 0.9 0.6* 0.6*   CALCIUM 8.8 8.8 8.9   PHOS 3.7 2.7 2.7     No diagnosis  -- Disagree - Not applicable / Not valid  -- Disagree - Clinically unable to determine / Unknown  -- Refer to Clinical Documentation Reviewer    PROVIDER RESPONSE TEXT:    This patient has an Acute kidney injury.     Query created by: Fish Hernandez on 11/25/2022 2:58 PM      Electronically signed by:  Cristopher Gottlieb 11/27/2022 6:25 AM results for input(s): AST, ALT, BILIDIR, BILITOT, ALKPHOS in the last 72 hours. Recent Labs     09/24/20  0133 09/25/20  0506 09/26/20  0430   INR 1.14 1.10 1.13     No results for input(s): Rachna Lowers in the last 72 hours. Urinalysis:      Lab Results   Component Value Date    NITRU Negative 09/15/2020    WBCUA 2 09/15/2020    BACTERIA 4+ 09/10/2020    RBCUA 0-2 09/15/2020    BLOODU SMALL 09/15/2020    SPECGRAV 1.018 09/15/2020    GLUCOSEU Negative 09/15/2020       Radiology:  XR CHEST PORTABLE   Final Result   Slight improvement in the appearance of the right lung compared to prior study         XR CHEST PORTABLE   Final Result   Changes consistent with COPD. Increased opacity in the right mid lung, indeterminate for confluent   fibrosis/crowding of bronchovascular markings versus airspace disease   (pneumonia). This is stable in the 7 day interval.  It is increased compared   to 2017. XR CHEST PORTABLE   Final Result   Slightly improved aeration of the lungs with persistent mild opacification in   the right upper and lower lung zones. Small right pleural effusion. CT head without contrast   Final Result   No acute intracranial abnormality. XR CHEST 1 VIEW   Final Result   1. Bilateral perihilar opacities, right greater than left, most consistent   with multifocal pneumonia, to include atypical causes. 2. Stable chronic bibasilar pleural and parenchymal scar. 3. Stable cardiomegaly.                  Assessment/Plan:    Active Hospital Problems    Diagnosis    Acute pulmonary edema ([address] [J81.0]    Pleural effusion, right [J90]    Centrilobular emphysema (HCC) [J43.2]    Seizure ([address] [R56.9]       Acute hypercapnic respiratory failure secondary to underlying COPD with probable aspiration pneumonitis  Given his underlying COPD and his agonal breathing stat blood gas obtained which shows again hypercapnic respiratory failure with pH down   Code status has been discussed with his POA and they want him to be full code. Aspiration pneumonitis  - monitor respiratory status. Acute GI bleed: s/p EGD; 2 bulbar ulcers; 1 with clot and V.V. Epi injected & Clipped  S/p 5 units of PRBCs and 3 units of FFP   Protonix drip discontinued; switch to Protonix 40 mg twice daily   We will continue to monitor hemoglobin will discontinue every 8 hours  Cont  pantoprazole oral twice daily  hG LOW WAS NOT LAB ERROR AND BLOOD was given  Counts improved    Thrombocytopenia: Platelets stable around 83  Continue to monitor closely     Seizure disorder: CT head on admission negative for any acute pathology; Neurology on board appreciate their recs;  Keppra and Depakote d/c    started back on Tegretol and lamotrigine      Delirium on baseline of dementia he does not have encephalopathy     SARS-CoV-2 infection and right lung aspiration pneumonia:   - oxygen requirement has been stable around 2 L with sats between 95 to 96%  -s/p dexamethasone and 7-day course of Zyvox and Unasyn; patient afebrile, WBCs within normal limits and all the cultures have been negative to date     Acute kidney injury; suspect prerenal ; improved        Hypokalemia: Resolved     Type 2 diabetes mellitus:   On Lantus and sliding scale will change over to medications through his PEG tube     Neurogenic bladder with urinary incontinence:  Urology on board appreciate their recs;  Herman to ostomy bag  - will change suprapubic catheter size once COVID-19 is negative  -Continue Sanct[place]     Suspected UTIs with underlying suprapubic catheter  -Has been  on IV Unasyn; antibiotics stopped as he is urine culture revealed mixed pathogens and repeat UA not indicative of infection     Overall his prognosis is pretty poor  Family is finally going to come see and if both agree will make him [facility]     DVT Prophylaxis: SCDs  Diet: No diet orders on file  Code Status: Full Code    PT/OT Eval Status: eval and treat when appropriate

## 2023-02-06 NOTE — PROGRESS NOTES
Comprehensive Nutrition Assessment    Type and Reason for Visit:  Reassess    Nutrition Recommendations/Plan:   1. Monitor plan of care per MD/GI. 2. RD remains available to provide EN recommendations as appropriate based on EGD results and timing of PEG placement/nutrition support to start. Nutrition Assessment:  Pt's nutrition status continues to decline. Pt now NPO x 6 days during admission. Pt has been refusing PO intake and will not cooperate with SLP evaluation. GI planning for PEG placement; however pt is now to have EGD today d/t concern for bleeding with H&H 5.7. Will continue to monitor for ability to advance diet vs ability to begin nutrition support. Malnutrition Assessment:  Malnutrition Status:  Insufficient data(NFPE deferred d/t COVID-18 positive)      Estimated Daily Nutrient Needs:  Energy (kcal):  3657-2343; Weight Used for Energy Requirements:  Current(70 kg)     Protein (g):   grams; Weight Used for Protein Requirements:  Current(70 kg; 1.4-1.7 grams per kg)        Fluid (ml/day):  1 mL per kcal; Weight Used for Fluid Requirements:  Current      Nutrition Related Findings:  Oriented to person. Trace BLE edema. Na+ 146. Has been agitated at times. LBM 9/16. LR at 50 mL per hour. Wounds:  Stage II, Pressure Injury(buttocks)       Current Nutrition Therapies:    Diet NPO Effective Now    Anthropometric Measures:  · Height: 5' 8\" (172.7 cm)  · Current Body Weight: 153 lb (69.4 kg)   · Admission Body Weight: 154 lb (69.9 kg)    · Ideal Body Weight: 154 lbs; % Ideal Body Weight 101.9 %   · BMI: 23.3  · BMI Categories: Normal Weight (BMI 18.5-24. 9)       Nutrition Diagnosis:   · Inadequate oral intake related to inadequate protein-energy intake as evidenced by NPO or clear liquid status due to medical condition      Nutrition Interventions:   Food and/or Nutrient Delivery:  Continue NPO  Nutrition Education/Counseling:  Education not indicated   Coordination of Nutrition Care: Continued Inpatient Monitoring    Goals:  Diet advancement vs nutrition support       Nutrition Monitoring and Evaluation:   Food/Nutrient Intake Outcomes:  Diet Advancement/Tolerance, Enteral Nutrition Intake/Tolerance  Physical Signs/Symptoms Outcomes:  Chewing or Swallowing, GI Status     Discharge Planning:     Too soon to determine     Electronically signed by Yessica Werner RD, LD on 9/16/20 at 9:07 AM EDT    Contact: [phone] Complex Repair Preamble Text (Leave Blank If You Do Not Want): Extensive wide undermining was performed.

## 2024-08-22 NOTE — PROGRESS NOTES
4 Eyes Skin Assessment     The patient is being assess for  {Blank single:32701::\"Admission\",\"Transfer to New Unit\",\"Post-Op Surgical\",\"Cath Lab Post-Op\",\"Shift Handoff\"}    I agree that 2 RN's have performed a thorough Head to Toe Skin Assessment on the patient. ALL assessment sites listed below have been assessed. Areas assessed by both nurses: yes  [x]   Head, Face, and Ears   [x]   Shoulders, Back, and Chest  [x]   Arms, Elbows, and Hands   [x]   Coccyx, Sacrum, and IschIum  [x]   Legs, Feet, and Heels        Does the Patient have Skin Breakdown?   Yes LDA WOUND CARE was Initiated documentation include the Hafsa-wound, Wound Assessment, Measurements, Dressing Treatment, Drainage, and Color\", not measured wound care consult in place for BLE      Milind Prevention initiated:  Yes   Wound Care Orders initiated:  Yes      WOC nurse consulted for Pressure Injury (Stage 3,4, Unstageable, DTI, NWPT, and Complex wounds), New and Established Ostomies:  Yes      Nurse 1 eSignature: Electronically signed by Elena Salinas RN on 5/19/19 at 5:59 PM    **SHARE this note so that the co-signing nurse is able to place an eSignature**    Nurse 2 eSignature: {Esignature:[id number]} [Negative] : Heme/Lymph

## (undated) DEVICE — MAJOR SET UP PK

## (undated) DEVICE — 3M™ WARMING BLANKET, UPPER BODY, 10 PER CASE, 42268: Brand: BAIR HUGGER™

## (undated) DEVICE — NEEDLE 25GAX5.0MM INJ CARR LOCKE

## (undated) DEVICE — GUIDEWIRE ENDOSCP L150CM DIA0.035IN TIP 3CM PTFE NIT

## (undated) DEVICE — Z DUP USE 2522782 SOLUTION IRRIG 1000ML STRL H2O PLAS CONTAINER UROMATIC

## (undated) DEVICE — Device

## (undated) DEVICE — 35 ML SYRINGE LUER-LOCK TIP: Brand: MONOJECT

## (undated) DEVICE — CHLORAPREP 26ML ORANGE

## (undated) DEVICE — SUTURE VCRL SZ 3-0 L18IN ABSRB UD L26MM SH 1/2 CIR J864D

## (undated) DEVICE — SHEET,DRAPE,53X77,STERILE: Brand: MEDLINE

## (undated) DEVICE — GOWN,AURORA,NONREINF,RAGLAN,XXL,STERILE: Brand: MEDLINE

## (undated) DEVICE — TRAY PREP DRY W/ PREM GLV 2 APPL 6 SPNG 2 UNDPD 1 OVERWRAP

## (undated) DEVICE — BAG DRNGE L6FT 20L PREFIL ABSRB POLYMER EXP TBNG DISP FOR

## (undated) DEVICE — SYRINGE MED 10ML TRNSLUC BRL PLUNG BLK MRK POLYPR CTRL

## (undated) DEVICE — CYSTO PACK: Brand: MEDLINE INDUSTRIES, INC.

## (undated) DEVICE — URETERAL DILATOR

## (undated) DEVICE — BLADE ES ELASTOMERIC COAT INSUL DURABLE BEND UPTO 90DEG

## (undated) DEVICE — SKIN AFFIX SURG ADHESIVE 72/CS 0.55ML: Brand: MEDLINE

## (undated) DEVICE — MOUTHPIECE ENDOSCP L CTRL OPN AND SIDE PORTS DISP

## (undated) DEVICE — BW-412T DISP COMBO CLEANING BRUSH: Brand: SINGLE USE COMBINATION CLEANING BRUSH

## (undated) DEVICE — Device: Brand: MEDEX

## (undated) DEVICE — TUBING, SUCTION, 1/4" X 10', STRAIGHT: Brand: MEDLINE

## (undated) DEVICE — PROCEDURE KIT ENDOSCP CUST

## (undated) DEVICE — SOLUTION IV IRRIG WATER 1000ML POUR BRL 2F7114

## (undated) DEVICE — GAUZE,SPONGE,4"X4",8PLY,STRL,LF,10/TRAY: Brand: MEDLINE

## (undated) DEVICE — STANDARD HYPODERMIC NEEDLE,POLYPROPYLENE HUB: Brand: MONOJECT

## (undated) DEVICE — SHEET, T, LAPAROTOMY, STERILE: Brand: MEDLINE

## (undated) DEVICE — CATHETER,FOLEY,SILI-ELAST,LTX,20FR,10ML: Brand: MEDLINE

## (undated) DEVICE — INTENDED FOR TISSUE SEPARATION, AND OTHER PROCEDURES THAT REQUIRE A SHARP SURGICAL BLADE TO PUNCTURE OR CUT.: Brand: BARD-PARKER ® CARBON RIB-BACK BLADES

## (undated) DEVICE — SUTURE ETHBND EXCEL SZ 0 L18IN NONABSORBABLE GRN L36MM CT-1 CX21D

## (undated) DEVICE — GLOVE ORANGE PI 7   MSG9070

## (undated) DEVICE — CYSTO/BLADDER IRRIGATION SET, REGULATING CLAMP

## (undated) DEVICE — SET VLV 3 PC AWS DISPOSABLE GRDIAN SCOPEVALET

## (undated) DEVICE — SYRINGE,TOOMEY,IRRIGATION,70CC,STERILE: Brand: MEDLINE

## (undated) DEVICE — SYRINGE MED 10ML SLIP TIP BLNT FILL AND LUERLOCK DISP

## (undated) DEVICE — SOLUTION IV IRRIG WATER 500ML POUR BRL ST 2F7113

## (undated) DEVICE — KIT OR ROOM TURNOVER W/STRAP

## (undated) DEVICE — BAG,DRAINAGE,4L,A/R TOWER,LL,SLIDE TAP: Brand: MEDLINE

## (undated) DEVICE — SOLUTION IV IRRIG POUR BRL 0.9% SODIUM CHL 2F7124

## (undated) DEVICE — KIT PEG 20FR STD PUL EN ACCS DEV ENDOVIVE

## (undated) DEVICE — STERILE POLYISOPRENE POWDER-FREE SURGICAL GLOVES: Brand: PROTEXIS